# Patient Record
Sex: FEMALE | Race: WHITE | NOT HISPANIC OR LATINO | Employment: OTHER | URBAN - METROPOLITAN AREA
[De-identification: names, ages, dates, MRNs, and addresses within clinical notes are randomized per-mention and may not be internally consistent; named-entity substitution may affect disease eponyms.]

---

## 2018-12-02 ENCOUNTER — HOSPITAL ENCOUNTER (EMERGENCY)
Facility: HOSPITAL | Age: 58
Discharge: HOME | End: 2018-12-02
Attending: EMERGENCY MEDICINE
Payer: COMMERCIAL

## 2018-12-02 VITALS
OXYGEN SATURATION: 95 % | TEMPERATURE: 98 F | SYSTOLIC BLOOD PRESSURE: 183 MMHG | WEIGHT: 215.94 LBS | HEART RATE: 87 BPM | HEIGHT: 62 IN | DIASTOLIC BLOOD PRESSURE: 88 MMHG | RESPIRATION RATE: 18 BRPM | BODY MASS INDEX: 39.74 KG/M2

## 2018-12-02 DIAGNOSIS — G50.0 TRIGEMINAL NEURALGIA: Primary | ICD-10-CM

## 2018-12-02 PROCEDURE — 99284 EMERGENCY DEPT VISIT MOD MDM: CPT | Mod: 25

## 2018-12-02 RX ORDER — CARBAMAZEPINE 200 MG/1
200 TABLET ORAL DAILY
Qty: 30 TABLET | Refills: 0 | Status: SHIPPED | OUTPATIENT
Start: 2018-12-02 | End: 2019-01-01

## 2018-12-02 NOTE — ED PROVIDER NOTES
"Encounter Date: 12/2/2018       History     Chief Complaint   Patient presents with    Otalgia     pt c/o intermittent, "shooting" pain to R ear since Thursday night, denies hearing change, ear fullness, tinnitus     The history is provided by the patient.   Headache    This is a new problem. The current episode started in the past 7 days. The problem occurs every few minutes. The problem has been unchanged. The pain is located in the right unilateral region. The pain radiates to the face. The pain quality is not similar to prior headaches. The quality of the pain is described as transient, knife-like, shooting and localized. The pain is at a severity of 3/10. Pertinent negatives include no abdominal pain, abnormal behavior, anorexia, back pain, blurred vision, coughing, dizziness, drainage, ear pain, eye pain, eye redness, eye watering, facial sweating, fever, hearing loss, insomnia, loss of balance, muscle aches, nausea, neck pain, numbness, phonophobia, photophobia, rhinorrhea, scalp tenderness, seizures, sinus pressure, sore throat, swollen glands, tingling, tinnitus, visual change, vomiting, weakness or weight loss. Nothing aggravates the symptoms. She has tried nothing for the symptoms.     Review of patient's allergies indicates:   Allergen Reactions    Epinephrine Anaphylaxis    Lidocaine Anaphylaxis     Dental visit pt stopped breathing after given lidocaine    Ace inhibitors     Shellfish containing products Itching     No past medical history on file.  No past surgical history on file.  No family history on file.  Social History     Tobacco Use    Smoking status: Not on file   Substance Use Topics    Alcohol use: Not on file    Drug use: Not on file     Review of Systems   Constitutional: Negative for chills, fever and weight loss.   HENT: Negative for ear pain, hearing loss, rhinorrhea, sinus pressure, sore throat and tinnitus.    Eyes: Negative for blurred vision, photophobia, pain and redness. "   Respiratory: Negative for cough and shortness of breath.    Cardiovascular: Negative for chest pain.   Gastrointestinal: Negative for abdominal pain, anorexia, diarrhea, nausea and vomiting.   Endocrine: Negative for polydipsia and polyphagia.   Genitourinary: Negative for dysuria.   Musculoskeletal: Negative for arthralgias, back pain, myalgias and neck pain.   Skin: Negative for rash.   Neurological: Positive for headaches. Negative for dizziness, tingling, seizures, weakness, numbness and loss of balance.   Hematological: Does not bruise/bleed easily.   Psychiatric/Behavioral: The patient is not nervous/anxious and does not have insomnia.    All other systems reviewed and are negative.      Physical Exam     Initial Vitals [12/02/18 1229]   BP Pulse Resp Temp SpO2   (!) 183/88 87 18 98.3 °F (36.8 °C) 95 %      MAP       --         Physical Exam    Nursing note and vitals reviewed.  Constitutional: Vital signs are normal. She appears well-developed and well-nourished. No distress.   HENT:   Head: Normocephalic and atraumatic.   Right Ear: External ear normal.   Left Ear: External ear normal.   Nose: Nose normal.   Mouth/Throat: Oropharynx is clear and moist.   Eyes: Conjunctivae, EOM and lids are normal. Pupils are equal, round, and reactive to light.   Neck: Normal range of motion and full passive range of motion without pain. Neck supple.   Cardiovascular: Normal rate, regular rhythm, S1 normal, S2 normal, normal heart sounds, intact distal pulses and normal pulses.   Pulmonary/Chest: Breath sounds normal. No respiratory distress. She has no wheezes. She has no rales.   Abdominal: Soft. Normal appearance and bowel sounds are normal. She exhibits no distension. There is no tenderness.   Musculoskeletal: Normal range of motion.   Lymphadenopathy:     She has no cervical adenopathy.   Neurological: She is alert and oriented to person, place, and time. She has normal strength. No cranial nerve deficit or sensory  deficit. Coordination and gait normal.   Skin: Skin is warm, dry and intact.   Psychiatric: She has a normal mood and affect. Her speech is normal and behavior is normal. Judgment and thought content normal. Cognition and memory are normal.         ED Course   Procedures  Labs Reviewed - No data to display       Imaging Results          CT Head Without Contrast (Final result)  Result time 12/02/18 14:00:32    Final result by Deuce Prince MD (Timothy) (12/02/18 14:00:32)                 Impression:      Normal study.    All CT scans at this facility use dose modulation, iterative reconstructions, and/or weight base dosing when appropriate to reduce radiation dose to as low as reasonably achievable.      Electronically signed by: Deuce Prince MD  Date:    12/02/2018  Time:    14:00             Narrative:    EXAMINATION:  CT HEAD WITHOUT CONTRAST    CLINICAL HISTORY:  Headache, trigeminal distribution;    COMPARISON:  None    FINDINGS:  No intracranial acute hemorrhage or acute focal brain parenchymal abnormality is identified.  Calvarium is intact.                                                      Clinical Impression:   The encounter diagnosis was Trigeminal neuralgia.      Disposition:   Disposition: Discharged  Condition: Stable                        DAVIDSON Elliott  12/02/18 [id number]

## 2020-02-06 PROBLEM — F32.9 MDD (MAJOR DEPRESSIVE DISORDER): Status: ACTIVE | Noted: 2020-02-06

## 2020-02-06 PROBLEM — K21.9 GERD (GASTROESOPHAGEAL REFLUX DISEASE): Status: ACTIVE | Noted: 2020-02-06

## 2020-02-06 PROBLEM — G25.81 RLS (RESTLESS LEGS SYNDROME): Status: ACTIVE | Noted: 2018-10-18

## 2021-08-19 ENCOUNTER — IMMUNIZATION (OUTPATIENT)
Dept: PRIMARY CARE CLINIC | Facility: CLINIC | Age: 61
End: 2021-08-19
Payer: COMMERCIAL

## 2021-08-19 DIAGNOSIS — Z23 NEED FOR VACCINATION: Primary | ICD-10-CM

## 2021-08-19 PROCEDURE — 0031A COVID-19,VECTOR-NR,RS-AD26,PF,0.5 ML DOSE VACCINE (JANSSEN): ICD-10-PCS | Mod: CV19,S$GLB,, | Performed by: FAMILY MEDICINE

## 2021-08-19 PROCEDURE — 91303 COVID-19,VECTOR-NR,RS-AD26,PF,0.5 ML DOSE VACCINE (JANSSEN): CPT | Mod: S$GLB,,, | Performed by: FAMILY MEDICINE

## 2021-08-19 PROCEDURE — 91303 COVID-19,VECTOR-NR,RS-AD26,PF,0.5 ML DOSE VACCINE (JANSSEN): ICD-10-PCS | Mod: S$GLB,,, | Performed by: FAMILY MEDICINE

## 2021-08-19 PROCEDURE — 0031A COVID-19,VECTOR-NR,RS-AD26,PF,0.5 ML DOSE VACCINE (JANSSEN): CPT | Mod: CV19,S$GLB,, | Performed by: FAMILY MEDICINE

## 2022-03-18 ENCOUNTER — HOSPITAL ENCOUNTER (EMERGENCY)
Facility: HOSPITAL | Age: 62
Discharge: HOME | End: 2022-03-18
Attending: EMERGENCY MEDICINE
Payer: COMMERCIAL

## 2022-03-18 VITALS
RESPIRATION RATE: 16 BRPM | TEMPERATURE: 98 F | HEART RATE: 72 BPM | OXYGEN SATURATION: 99 % | SYSTOLIC BLOOD PRESSURE: 163 MMHG | BODY MASS INDEX: 36.63 KG/M2 | WEIGHT: 200.31 LBS | DIASTOLIC BLOOD PRESSURE: 70 MMHG

## 2022-03-18 DIAGNOSIS — R73.9 HYPERGLYCEMIA WITHOUT KETOSIS: Primary | ICD-10-CM

## 2022-03-18 DIAGNOSIS — R19.7 DIARRHEA, UNSPECIFIED TYPE: ICD-10-CM

## 2022-03-18 LAB
ALBUMIN SERPL BCP-MCNC: 3.1 G/DL (ref 3.5–5.2)
ALP SERPL-CCNC: 86 U/L (ref 55–135)
ALT SERPL W/O P-5'-P-CCNC: 12 U/L (ref 10–44)
ANION GAP SERPL CALC-SCNC: 13 MMOL/L (ref 8–16)
AST SERPL-CCNC: 17 U/L (ref 10–40)
BACTERIA #/AREA URNS HPF: NORMAL /HPF
BASOPHILS # BLD AUTO: 0.03 K/UL (ref 0–0.2)
BASOPHILS NFR BLD: 0.4 % (ref 0–1.9)
BILIRUB SERPL-MCNC: 0.2 MG/DL (ref 0.1–1)
BILIRUB UR QL STRIP: NEGATIVE
BUN SERPL-MCNC: 9 MG/DL (ref 6–20)
CALCIUM SERPL-MCNC: 8.7 MG/DL (ref 8.7–10.5)
CHLORIDE SERPL-SCNC: 104 MMOL/L (ref 95–110)
CLARITY UR: CLEAR
CO2 SERPL-SCNC: 19 MMOL/L (ref 23–29)
COLOR UR: YELLOW
CREAT SERPL-MCNC: 0.8 MG/DL (ref 0.5–1.4)
DIFFERENTIAL METHOD: ABNORMAL
EOSINOPHIL # BLD AUTO: 0.2 K/UL (ref 0–0.5)
EOSINOPHIL NFR BLD: 2.4 % (ref 0–8)
ERYTHROCYTE [DISTWIDTH] IN BLOOD BY AUTOMATED COUNT: 14 % (ref 11.5–14.5)
EST. GFR  (AFRICAN AMERICAN): >60 ML/MIN/1.73 M^2
EST. GFR  (NON AFRICAN AMERICAN): >60 ML/MIN/1.73 M^2
GLUCOSE SERPL-MCNC: 295 MG/DL (ref 70–110)
GLUCOSE UR QL STRIP: ABNORMAL
HCT VFR BLD AUTO: 39.6 % (ref 37–48.5)
HGB BLD-MCNC: 12.7 G/DL (ref 12–16)
HGB UR QL STRIP: NEGATIVE
IMM GRANULOCYTES # BLD AUTO: 0.02 K/UL (ref 0–0.04)
IMM GRANULOCYTES NFR BLD AUTO: 0.3 % (ref 0–0.5)
KETONES UR QL STRIP: NEGATIVE
LACTATE SERPL-SCNC: 1.2 MMOL/L (ref 0.5–2.2)
LACTATE SERPL-SCNC: 3.5 MMOL/L (ref 0.5–2.2)
LEUKOCYTE ESTERASE UR QL STRIP: ABNORMAL
LIPASE SERPL-CCNC: 9 U/L (ref 4–60)
LYMPHOCYTES # BLD AUTO: 2.6 K/UL (ref 1–4.8)
LYMPHOCYTES NFR BLD: 36.6 % (ref 18–48)
MCH RBC QN AUTO: 26.9 PG (ref 27–31)
MCHC RBC AUTO-ENTMCNC: 32.1 G/DL (ref 32–36)
MCV RBC AUTO: 84 FL (ref 82–98)
MICROSCOPIC COMMENT: NORMAL
MONOCYTES # BLD AUTO: 0.9 K/UL (ref 0.3–1)
MONOCYTES NFR BLD: 12.3 % (ref 4–15)
NEUTROPHILS # BLD AUTO: 3.4 K/UL (ref 1.8–7.7)
NEUTROPHILS NFR BLD: 48 % (ref 38–73)
NITRITE UR QL STRIP: NEGATIVE
NRBC BLD-RTO: 0 /100 WBC
PH UR STRIP: 6 [PH] (ref 5–8)
PLATELET # BLD AUTO: 229 K/UL (ref 150–450)
PMV BLD AUTO: 10.8 FL (ref 9.2–12.9)
POTASSIUM SERPL-SCNC: 3.6 MMOL/L (ref 3.5–5.1)
PROT SERPL-MCNC: 6.8 G/DL (ref 6–8.4)
PROT UR QL STRIP: NEGATIVE
RBC # BLD AUTO: 4.72 M/UL (ref 4–5.4)
SODIUM SERPL-SCNC: 136 MMOL/L (ref 136–145)
SP GR UR STRIP: <=1.005 (ref 1–1.03)
URN SPEC COLLECT METH UR: ABNORMAL
UROBILINOGEN UR STRIP-ACNC: NEGATIVE EU/DL
WBC # BLD AUTO: 7.15 K/UL (ref 3.9–12.7)
WBC #/AREA URNS HPF: 3 /HPF (ref 0–5)
YEAST URNS QL MICRO: NORMAL

## 2022-03-18 PROCEDURE — 96361 HYDRATE IV INFUSION ADD-ON: CPT

## 2022-03-18 PROCEDURE — 25000003 PHARM REV CODE 250: Performed by: EMERGENCY MEDICINE

## 2022-03-18 PROCEDURE — 99284 EMERGENCY DEPT VISIT MOD MDM: CPT | Mod: 25

## 2022-03-18 PROCEDURE — 85025 COMPLETE CBC W/AUTO DIFF WBC: CPT | Performed by: EMERGENCY MEDICINE

## 2022-03-18 PROCEDURE — 83605 ASSAY OF LACTIC ACID: CPT | Mod: 91 | Performed by: EMERGENCY MEDICINE

## 2022-03-18 PROCEDURE — 83690 ASSAY OF LIPASE: CPT | Performed by: EMERGENCY MEDICINE

## 2022-03-18 PROCEDURE — 81000 URINALYSIS NONAUTO W/SCOPE: CPT | Performed by: EMERGENCY MEDICINE

## 2022-03-18 PROCEDURE — 80053 COMPREHEN METABOLIC PANEL: CPT | Performed by: EMERGENCY MEDICINE

## 2022-03-18 PROCEDURE — 96360 HYDRATION IV INFUSION INIT: CPT

## 2022-03-18 RX ORDER — LOPERAMIDE HYDROCHLORIDE 2 MG/1
2 CAPSULE ORAL 4 TIMES DAILY PRN
Qty: 12 CAPSULE | Refills: 0 | Status: SHIPPED | OUTPATIENT
Start: 2022-03-18 | End: 2022-03-28

## 2022-03-18 RX ORDER — DICYCLOMINE HYDROCHLORIDE 20 MG/1
20 TABLET ORAL 3 TIMES DAILY PRN
Qty: 12 TABLET | Refills: 0 | Status: SHIPPED | OUTPATIENT
Start: 2022-03-18 | End: 2022-09-17

## 2022-03-18 RX ORDER — DIPHENOXYLATE HYDROCHLORIDE AND ATROPINE SULFATE 2.5; .025 MG/1; MG/1
1 TABLET ORAL
Status: COMPLETED | OUTPATIENT
Start: 2022-03-18 | End: 2022-03-18

## 2022-03-18 RX ADMIN — SODIUM CHLORIDE 1000 ML: 0.9 INJECTION, SOLUTION INTRAVENOUS at 03:03

## 2022-03-18 RX ADMIN — SODIUM CHLORIDE 1000 ML: 0.9 INJECTION, SOLUTION INTRAVENOUS at 12:03

## 2022-03-18 RX ADMIN — DIPHENOXYLATE HYDROCHLORIDE AND ATROPINE SULFATE 1 TABLET: 2.5; .025 TABLET ORAL at 05:03

## 2022-03-18 NOTE — ED NOTES
Pt ambulated to restroom with steady gait, urine cup given for urine sample. Will continue to monitor.

## 2022-03-18 NOTE — ED PROVIDER NOTES
SCRIBE #1 NOTE: I, Jorge Bangura, am scribing for, and in the presence of, Alec Moe MD. I have scribed the entire note.      History      Chief Complaint   Patient presents with    Diarrhea     Watery stools x 5 days       Review of patient's allergies indicates:   Allergen Reactions    Epinephrine Anaphylaxis    Lidocaine Anaphylaxis     Dental visit pt stopped breathing after given lidocaine    Ace inhibitors     Shellfish containing products Itching        HPI   HPI    3/18/2022, 11:58 AM   History obtained from the patient      History of Present Illness: Ca Diaz is a 61 y.o. female patient who presents to the Emergency Department for diarrhea, onset 5 days PTA. Symptoms are episodic and moderate in severity. No mitigating or exacerbating factors reported.  No recent abx.     Associated sxs include generalized abdominal cramping. Patient denies any fever, chills, n/v, SOB, CP, weakness, numbness, dizziness, headache, and all other sxs at this time. No prior Tx reported. No further complaints or concerns at this time.     Arrival mode: Personal vehicle    PCP: Desiree Frye MD       Past Medical History:  Past Medical History:   Diagnosis Date    Anxiety and depression 3/13/2014    Essential (primary) hypertension 2013    Fibromyalgia 2012    Hyperlipidemia     Obstructive sleep apnea syndrome 2014    RLS (restless legs syndrome) 10/18/2018    Overview:  Rheum Dr Tam    Seizures     Type 2 diabetes mellitus 2012    ICD-10 Transition Last Assessment & Plan:  Continue with sliding scale insulin.  Control improved on low-dose Lantus presently       Past Surgical History:  Past Surgical History:   Procedure Laterality Date    ABDOMINAL SURGERY       SECTION           Family History:  History reviewed. No pertinent family history.    Social History:  Social History     Tobacco Use    Smoking status: Former Smoker     Types: Cigarettes     Quit date:  1990     Years since quittin.2    Smokeless tobacco: Never Used   Substance and Sexual Activity    Alcohol use: Not Currently    Drug use: Never    Sexual activity: Not on file       ROS   Review of Systems   Constitutional: Negative for chills and fever.   HENT: Negative for sore throat.    Respiratory: Negative for shortness of breath.    Cardiovascular: Negative for chest pain.   Gastrointestinal: Positive for abdominal pain (generalized cramping) and diarrhea. Negative for nausea and vomiting.   Genitourinary: Negative for dysuria.   Musculoskeletal: Negative for back pain.   Skin: Negative for rash.   Neurological: Negative for dizziness, weakness, light-headedness, numbness and headaches.   Hematological: Does not bruise/bleed easily.   All other systems reviewed and are negative.    Physical Exam      Initial Vitals [22 1143]   BP Pulse Resp Temp SpO2   109/74 80 20 99.1 °F (37.3 °C) 97 %      MAP       --          Physical Exam  Nursing Notes and Vital Signs Reviewed.  Constitutional: Patient is in no acute distress. Well-developed and well-nourished.  Head: Atraumatic. Normocephalic.  Eyes: PERRL. EOM intact. Conjunctivae are not pale. No scleral icterus.  ENT: Mucous membranes are slightly dry. Oropharynx is clear and symmetric.    Neck: Supple. Full ROM.   Cardiovascular: Regular rate. Regular rhythm. No murmurs, rubs, or gallops. Distal pulses are 2+ and symmetric.  Pulmonary/Chest: No respiratory distress. Clear to auscultation bilaterally. No wheezing or rales.  Abdominal: Soft and non-distended.  There is no tenderness.  No rebound, guarding, or rigidity.   Musculoskeletal: Moves all extremities. No obvious deformities. No edema.  Skin: Warm and dry.  Neurological:  Alert, awake, and appropriate.  Normal speech.  No acute focal neurological deficits are appreciated.  Psychiatric: Normal affect. Good eye contact. Appropriate in content.    ED Course    Procedures  ED Vital  Signs:  Vitals:    03/18/22 1143 03/18/22 1155 03/18/22 1203 03/18/22 1233   BP: 109/74 139/68 (!) 117/53 (!) 142/67   Pulse: 80 82 79 80   Resp: 20      Temp: 99.1 °F (37.3 °C)      TempSrc: Oral      SpO2: 97% 98% 97% 98%   Weight: 90.9 kg (200 lb 4.6 oz)       03/18/22 1304 03/18/22 1333 03/18/22 1403 03/18/22 1503   BP: (!) 182/85 (!) 157/72 133/66 (!) 152/68   Pulse: 82 80 79 74   Resp:       Temp:   99 °F (37.2 °C)    TempSrc:   Oral    SpO2: 99% 97% 98% 97%   Weight:        03/18/22 1532 03/18/22 1730   BP: (!) 153/67 (!) 163/70   Pulse: 75 72   Resp:  16   Temp:  98.3 °F (36.8 °C)   TempSrc:     SpO2: 99% 99%   Weight:         Abnormal Lab Results:  Labs Reviewed   CBC W/ AUTO DIFFERENTIAL - Abnormal; Notable for the following components:       Result Value    MCH 26.9 (*)     All other components within normal limits   COMPREHENSIVE METABOLIC PANEL - Abnormal; Notable for the following components:    CO2 19 (*)     Glucose 295 (*)     Albumin 3.1 (*)     All other components within normal limits   URINALYSIS, REFLEX TO URINE CULTURE - Abnormal; Notable for the following components:    Specific Gravity, UA <=1.005 (*)     Glucose, UA 3+ (*)     Leukocytes, UA Trace (*)     All other components within normal limits    Narrative:     Specimen Source->Urine   LACTIC ACID, PLASMA - Abnormal; Notable for the following components:    Lactate (Lactic Acid) 3.5 (*)     All other components within normal limits    Narrative:      LA  critical result(s) called and verbal readback obtained from   FREDIS LEÓN RN by ZHANNA 03/18/2022 12:49   LIPASE   URINALYSIS MICROSCOPIC    Narrative:     Specimen Source->Urine   LACTIC ACID, PLASMA        All Lab Results:  Results for orders placed or performed during the hospital encounter of 03/18/22   CBC W/ AUTO DIFFERENTIAL   Result Value Ref Range    WBC 7.15 3.90 - 12.70 K/uL    RBC 4.72 4.00 - 5.40 M/uL    Hemoglobin 12.7 12.0 - 16.0 g/dL    Hematocrit 39.6 37.0 - 48.5 %     MCV 84 82 - 98 fL    MCH 26.9 (L) 27.0 - 31.0 pg    MCHC 32.1 32.0 - 36.0 g/dL    RDW 14.0 11.5 - 14.5 %    Platelets 229 150 - 450 K/uL    MPV 10.8 9.2 - 12.9 fL    Immature Granulocytes 0.3 0.0 - 0.5 %    Gran # (ANC) 3.4 1.8 - 7.7 K/uL    Immature Grans (Abs) 0.02 0.00 - 0.04 K/uL    Lymph # 2.6 1.0 - 4.8 K/uL    Mono # 0.9 0.3 - 1.0 K/uL    Eos # 0.2 0.0 - 0.5 K/uL    Baso # 0.03 0.00 - 0.20 K/uL    nRBC 0 0 /100 WBC    Gran % 48.0 38.0 - 73.0 %    Lymph % 36.6 18.0 - 48.0 %    Mono % 12.3 4.0 - 15.0 %    Eosinophil % 2.4 0.0 - 8.0 %    Basophil % 0.4 0.0 - 1.9 %    Differential Method Automated    Comp. Metabolic Panel   Result Value Ref Range    Sodium 136 136 - 145 mmol/L    Potassium 3.6 3.5 - 5.1 mmol/L    Chloride 104 95 - 110 mmol/L    CO2 19 (L) 23 - 29 mmol/L    Glucose 295 (H) 70 - 110 mg/dL    BUN 9 6 - 20 mg/dL    Creatinine 0.8 0.5 - 1.4 mg/dL    Calcium 8.7 8.7 - 10.5 mg/dL    Total Protein 6.8 6.0 - 8.4 g/dL    Albumin 3.1 (L) 3.5 - 5.2 g/dL    Total Bilirubin 0.2 0.1 - 1.0 mg/dL    Alkaline Phosphatase 86 55 - 135 U/L    AST 17 10 - 40 U/L    ALT 12 10 - 44 U/L    Anion Gap 13 8 - 16 mmol/L    eGFR if African American >60 >60 mL/min/1.73 m^2    eGFR if non African American >60 >60 mL/min/1.73 m^2   Lipase   Result Value Ref Range    Lipase 9 4 - 60 U/L   Urinalysis, Reflex to Urine Culture Urine, Clean Catch    Specimen: Urine   Result Value Ref Range    Specimen UA Urine, Clean Catch     Color, UA Yellow Yellow, Straw, Estrellita    Appearance, UA Clear Clear    pH, UA 6.0 5.0 - 8.0    Specific Gravity, UA <=1.005 (A) 1.005 - 1.030    Protein, UA Negative Negative    Glucose, UA 3+ (A) Negative    Ketones, UA Negative Negative    Bilirubin (UA) Negative Negative    Occult Blood UA Negative Negative    Nitrite, UA Negative Negative    Urobilinogen, UA Negative <2.0 EU/dL    Leukocytes, UA Trace (A) Negative   Lactic acid, plasma   Result Value Ref Range    Lactate (Lactic Acid) 3.5 (HH) 0.5 - 2.2  mmol/L   Urinalysis Microscopic   Result Value Ref Range    WBC, UA 3 0 - 5 /hpf    Bacteria Rare None-Occ /hpf    Yeast, UA None None    Microscopic Comment SEE COMMENT    Lactic acid, plasma   Result Value Ref Range    Lactate (Lactic Acid) 1.2 0.5 - 2.2 mmol/L     Imaging Results:  Imaging Results          X-Ray Abdomen Flat And Erect (Final result)  Result time 03/18/22 12:54:39    Final result by Amrit Cotter MD (03/18/22 12:54:39)                 Impression:      1.  Fluid in the nondilated colon, a finding that can be seen in patients who have gastroenteritis or other diarrheal disease.  Bowel-gas pattern is otherwise normal.    2.  Negative for acute process otherwise.  Incidental findings as noted above.      Electronically signed by: Amrit Cotter MD  Date:    03/18/2022  Time:    12:54             Narrative:    EXAMINATION:  XR ABDOMEN FLAT AND ERECT    CLINICAL HISTORY:  Abdominal Pain;    TECHNIQUE:  Flat and erect AP views of the abdomen were performed.    COMPARISON:  None    FINDINGS:  There is air in fluid in the nondilated colon.  Bowel-gas pattern is otherwise normal.  Negative for free air.    Lung bases are clear.  Mild degenerative changes of the spine and pelvis.                                        The Emergency Provider reviewed the vital signs and test results, which are outlined above.    ED Discussion     4:57 PM: Reassessed pt at this time. Discussed with pt all pertinent ED information and results. Offered pt admission at this time but pt declined, stating that she now feels much better and would prefer outpatient management at this time. Discussed pt dx and plan of tx. Gave pt all f/u and return to the ED instructions. All questions and concerns were addressed at this time. Pt expresses understanding of information and instructions, and is comfortable with plan to discharge. Pt is stable for discharge and will f/u PCP in the next 2-3 days and return to the ED immediately for any  worsening sx/s..    I discussed with patient and/or family/caretaker that evaluation in the ED does not suggest any emergent or life threatening medical conditions requiring immediate intervention beyond what was provided in the ED, and I believe patient is safe for discharge.  Regardless, an unremarkable evaluation in the ED does not preclude the development or presence of a serious of life threatening condition. As such, patient was instructed to return immediately for any worsening or change in current symptoms.         ED Medication(s):  Medications   sodium chloride 0.9% bolus 1,000 mL (0 mLs Intravenous Stopped 3/18/22 1301)   sodium chloride 0.9% bolus 1,000 mL (0 mLs Intravenous Stopped 3/18/22 1705)   diphenoxylate-atropine 2.5-0.025 mg per tablet 1 tablet (1 tablet Oral Given 3/18/22 3039)     Discharge Medication List as of 3/18/2022  4:59 PM      START taking these medications    Details   dicyclomine (BENTYL) 20 mg tablet Take 1 tablet (20 mg total) by mouth 3 (three) times daily as needed (Abdominal cramping). As needed for abdominal cramping, Starting Fri 3/18/2022, Normal      loperamide (IMODIUM) 2 mg capsule Take 1 capsule (2 mg total) by mouth 4 (four) times daily as needed for Diarrhea., Starting Fri 3/18/2022, Until Mon 3/28/2022 at 2359, Normal            Follow-up Information     Desiree Frye MD. Schedule an appointment as soon as possible for a visit in 3 days.    Specialty: Family Medicine  Contact information:  [address]  [facility]  [facility] [address]  [phone]                           Medical Decision Making    Medical Decision Making:   Clinical Tests:   Lab Tests: Ordered and Reviewed  Radiological Study: Ordered and Reviewed           Scribe Attestation:   Scribe #1: I performed the above scribed service and the documentation accurately describes the services I performed. I attest to the accuracy of the note.    Attending:   Physician Attestation Statement for Scribe  #1: I, Alec Moe MD, personally performed the services described in this documentation, as scribed by Jorge Bangura, in my presence, and it is both accurate and complete.          Clinical Impression       ICD-10-CM ICD-9-CM   1. Hyperglycemia without ketosis  R73.9 790.29   2. Diarrhea, unspecified type  R19.7 787.91       Disposition:   Disposition: Discharged  Condition: Stable         Alec Moe MD  03/18/22 [id number]

## 2022-09-16 ENCOUNTER — HOSPITAL ENCOUNTER (INPATIENT)
Facility: HOSPITAL | Age: 62
LOS: 13 days | Discharge: NURSING, REHAB OR LONG-TERM CARE | DRG: 233 | End: 2022-09-30
Attending: EMERGENCY MEDICINE | Admitting: FAMILY MEDICINE
Payer: COMMERCIAL

## 2022-09-16 DIAGNOSIS — Z95.1 S/P CABG X 3: Primary | ICD-10-CM

## 2022-09-16 DIAGNOSIS — R07.9 CHEST PAIN, UNSPECIFIED TYPE: ICD-10-CM

## 2022-09-16 DIAGNOSIS — Z98.890 POST-OPERATIVE STATE: ICD-10-CM

## 2022-09-16 DIAGNOSIS — I21.4 NSTEMI (NON-ST ELEVATED MYOCARDIAL INFARCTION): ICD-10-CM

## 2022-09-16 DIAGNOSIS — F41.9 ANXIETY AND DEPRESSION: ICD-10-CM

## 2022-09-16 DIAGNOSIS — R42 DIZZINESS: ICD-10-CM

## 2022-09-16 DIAGNOSIS — I10 ESSENTIAL (PRIMARY) HYPERTENSION: Chronic | ICD-10-CM

## 2022-09-16 DIAGNOSIS — R07.9 CHEST PAIN: ICD-10-CM

## 2022-09-16 DIAGNOSIS — R73.9 HYPERGLYCEMIA: ICD-10-CM

## 2022-09-16 DIAGNOSIS — R56.9 SEIZURE: ICD-10-CM

## 2022-09-16 DIAGNOSIS — Z01.810 PRE-OPERATIVE CARDIOVASCULAR EXAMINATION: ICD-10-CM

## 2022-09-16 DIAGNOSIS — F32.A ANXIETY AND DEPRESSION: ICD-10-CM

## 2022-09-16 DIAGNOSIS — E78.5 HYPERLIPIDEMIA, UNSPECIFIED HYPERLIPIDEMIA TYPE: Chronic | ICD-10-CM

## 2022-09-16 PROCEDURE — 93005 ELECTROCARDIOGRAM TRACING: CPT

## 2022-09-16 PROCEDURE — 80053 COMPREHEN METABOLIC PANEL: CPT | Performed by: EMERGENCY MEDICINE

## 2022-09-16 PROCEDURE — 84484 ASSAY OF TROPONIN QUANT: CPT | Performed by: EMERGENCY MEDICINE

## 2022-09-16 PROCEDURE — 85025 COMPLETE CBC W/AUTO DIFF WBC: CPT | Performed by: EMERGENCY MEDICINE

## 2022-09-16 PROCEDURE — 96360 HYDRATION IV INFUSION INIT: CPT

## 2022-09-16 PROCEDURE — 99291 CRITICAL CARE FIRST HOUR: CPT

## 2022-09-16 PROCEDURE — 93010 EKG 12-LEAD: ICD-10-PCS | Mod: ,,, | Performed by: INTERNAL MEDICINE

## 2022-09-16 PROCEDURE — 93010 ELECTROCARDIOGRAM REPORT: CPT | Mod: ,,, | Performed by: INTERNAL MEDICINE

## 2022-09-16 RX ORDER — MECLIZINE HYDROCHLORIDE 25 MG/1
50 TABLET ORAL
Status: COMPLETED | OUTPATIENT
Start: 2022-09-16 | End: 2022-09-17

## 2022-09-16 NOTE — Clinical Note
The left ventricle was injected. The injected rate was 12 mL/sec. The total injected volume was 30 mL.

## 2022-09-16 NOTE — Clinical Note
The catheter was inserted over the wire into the ostium   left main. An angiography was performed of the left coronary arteries. Multiple views were taken.

## 2022-09-16 NOTE — Clinical Note
The catheter was inserted over the wire into the left subclavian artery. An angiography was performed of the LIMA.

## 2022-09-16 NOTE — Clinical Note
The catheter was inserted over the wire into the ostium   right coronary artery. An angiography was performed of the right coronary arteries. Multiple views were taken.

## 2022-09-17 PROBLEM — I21.4 NSTEMI (NON-ST ELEVATED MYOCARDIAL INFARCTION): Status: ACTIVE | Noted: 2022-09-17

## 2022-09-17 PROBLEM — I25.10 ATHEROSCLEROSIS OF NATIVE CORONARY ARTERY OF NATIVE HEART WITHOUT ANGINA PECTORIS: Status: ACTIVE | Noted: 2018-10-18

## 2022-09-17 PROBLEM — I25.10 ATHEROSCLEROSIS OF NATIVE CORONARY ARTERY OF NATIVE HEART WITHOUT ANGINA PECTORIS: Chronic | Status: ACTIVE | Noted: 2018-10-18

## 2022-09-17 PROBLEM — E66.9 OBESITY: Chronic | Status: ACTIVE | Noted: 2022-09-17

## 2022-09-17 LAB
ALBUMIN SERPL BCP-MCNC: 3.1 G/DL (ref 3.5–5.2)
ALP SERPL-CCNC: 71 U/L (ref 55–135)
ALT SERPL W/O P-5'-P-CCNC: 37 U/L (ref 10–44)
ANION GAP SERPL CALC-SCNC: 13 MMOL/L (ref 8–16)
ANION GAP SERPL CALC-SCNC: 15 MMOL/L (ref 8–16)
AORTIC ROOT ANNULUS: 2.57 CM
APTT BLDCRRT: 27.8 SEC (ref 21–32)
APTT BLDCRRT: 33.6 SEC (ref 21–32)
APTT BLDCRRT: 39.1 SEC (ref 21–32)
APTT BLDCRRT: 45.6 SEC (ref 21–32)
APTT BLDCRRT: 68.6 SEC (ref 21–32)
ASCENDING AORTA: 2.9 CM
AST SERPL-CCNC: 53 U/L (ref 10–40)
AV INDEX (PROSTH): 0.65
AV MEAN GRADIENT: 8 MMHG
AV PEAK GRADIENT: 12 MMHG
AV VALVE AREA: 1.96 CM2
AV VELOCITY RATIO: 0.63
BACTERIA #/AREA URNS HPF: ABNORMAL /HPF
BASOPHILS # BLD AUTO: 0.02 K/UL (ref 0–0.2)
BASOPHILS # BLD AUTO: 0.03 K/UL (ref 0–0.2)
BASOPHILS NFR BLD: 0.5 % (ref 0–1.9)
BASOPHILS NFR BLD: 0.7 % (ref 0–1.9)
BILIRUB SERPL-MCNC: 0.3 MG/DL (ref 0.1–1)
BILIRUB UR QL STRIP: NEGATIVE
BSA FOR ECHO PROCEDURE: 2 M2
BUN SERPL-MCNC: 11 MG/DL (ref 6–20)
BUN SERPL-MCNC: 9 MG/DL (ref 6–20)
CALCIUM SERPL-MCNC: 8.4 MG/DL (ref 8.7–10.5)
CALCIUM SERPL-MCNC: 9.1 MG/DL (ref 8.7–10.5)
CATH EF QUANTITATIVE: 60 %
CHLORIDE SERPL-SCNC: 102 MMOL/L (ref 95–110)
CHLORIDE SERPL-SCNC: 98 MMOL/L (ref 95–110)
CHOLEST SERPL-MCNC: 96 MG/DL (ref 120–199)
CHOLEST/HDLC SERPL: 4 {RATIO} (ref 2–5)
CLARITY UR: CLEAR
CO2 SERPL-SCNC: 24 MMOL/L (ref 23–29)
CO2 SERPL-SCNC: 25 MMOL/L (ref 23–29)
COLOR UR: YELLOW
CREAT SERPL-MCNC: 0.6 MG/DL (ref 0.5–1.4)
CREAT SERPL-MCNC: 0.7 MG/DL (ref 0.5–1.4)
CTP QC/QA: YES
CV ECHO LV RWT: 0.68 CM
DIFFERENTIAL METHOD: ABNORMAL
DIFFERENTIAL METHOD: NORMAL
DOP CALC AO PEAK VEL: 1.71 M/S
DOP CALC AO VTI: 42.1 CM
DOP CALC LVOT AREA: 3 CM2
DOP CALC LVOT DIAMETER: 1.96 CM
DOP CALC LVOT PEAK VEL: 1.07 M/S
DOP CALC LVOT STROKE VOLUME: 82.33 CM3
DOP CALC RVOT PEAK VEL: 0.7 M/S
DOP CALC RVOT VTI: 15.6 CM
DOP CALCLVOT PEAK VEL VTI: 27.3 CM
E WAVE DECELERATION TIME: 236.57 MSEC
E/A RATIO: 0.98
E/E' RATIO: 16 M/S
ECHO LV POSTERIOR WALL: 1.18 CM (ref 0.6–1.1)
EJECTION FRACTION: 60 %
EOSINOPHIL # BLD AUTO: 0.1 K/UL (ref 0–0.5)
EOSINOPHIL # BLD AUTO: 0.1 K/UL (ref 0–0.5)
EOSINOPHIL NFR BLD: 2.2 % (ref 0–8)
EOSINOPHIL NFR BLD: 2.8 % (ref 0–8)
ERYTHROCYTE [DISTWIDTH] IN BLOOD BY AUTOMATED COUNT: 14.1 % (ref 11.5–14.5)
ERYTHROCYTE [DISTWIDTH] IN BLOOD BY AUTOMATED COUNT: 14.2 % (ref 11.5–14.5)
EST. GFR  (NO RACE VARIABLE): >60 ML/MIN/1.73 M^2
EST. GFR  (NO RACE VARIABLE): >60 ML/MIN/1.73 M^2
ESTIMATED AVG GLUCOSE: 266 MG/DL (ref 68–131)
FRACTIONAL SHORTENING: 30 % (ref 28–44)
GLUCOSE SERPL-MCNC: 284 MG/DL (ref 70–110)
GLUCOSE SERPL-MCNC: 324 MG/DL (ref 70–110)
GLUCOSE UR QL STRIP: ABNORMAL
HBA1C MFR BLD: 10.9 % (ref 4–5.6)
HCT VFR BLD AUTO: 38 % (ref 37–48.5)
HCT VFR BLD AUTO: 40.1 % (ref 37–48.5)
HCV AB SERPL QL IA: NEGATIVE
HDLC SERPL-MCNC: 24 MG/DL (ref 40–75)
HDLC SERPL: 25 % (ref 20–50)
HEP C VIRUS HOLD SPECIMEN: NORMAL
HGB BLD-MCNC: 12.7 G/DL (ref 12–16)
HGB BLD-MCNC: 13.4 G/DL (ref 12–16)
HGB UR QL STRIP: NEGATIVE
HIV 1+2 AB+HIV1 P24 AG SERPL QL IA: NEGATIVE
HYALINE CASTS #/AREA URNS LPF: 38 /LPF
IMM GRANULOCYTES # BLD AUTO: 0.01 K/UL (ref 0–0.04)
IMM GRANULOCYTES # BLD AUTO: 0.02 K/UL (ref 0–0.04)
IMM GRANULOCYTES NFR BLD AUTO: 0.2 % (ref 0–0.5)
IMM GRANULOCYTES NFR BLD AUTO: 0.5 % (ref 0–0.5)
INR PPP: 1 (ref 0.8–1.2)
INR PPP: 1 (ref 0.8–1.2)
INTERVENTRICULAR SEPTUM: 1.28 CM (ref 0.6–1.1)
IVC DIAMETER: 1.78 CM
IVRT: 79.92 MSEC
KETONES UR QL STRIP: ABNORMAL
LA MAJOR: 5.08 CM
LA MINOR: 5.02 CM
LA WIDTH: 3.5 CM
LDLC SERPL CALC-MCNC: 43 MG/DL (ref 63–159)
LEFT ATRIUM SIZE: 3.58 CM
LEFT ATRIUM VOLUME INDEX: 28 ML/M2
LEFT ATRIUM VOLUME: 53.78 CM3
LEFT INTERNAL DIMENSION IN SYSTOLE: 2.43 CM (ref 2.1–4)
LEFT VENTRICLE DIASTOLIC VOLUME INDEX: 26.3 ML/M2
LEFT VENTRICLE DIASTOLIC VOLUME: 50.49 ML
LEFT VENTRICLE MASS INDEX: 73 G/M2
LEFT VENTRICLE SYSTOLIC VOLUME INDEX: 10.8 ML/M2
LEFT VENTRICLE SYSTOLIC VOLUME: 20.74 ML
LEFT VENTRICULAR INTERNAL DIMENSION IN DIASTOLE: 3.49 CM (ref 3.5–6)
LEFT VENTRICULAR MASS: 140.49 G
LEUKOCYTE ESTERASE UR QL STRIP: ABNORMAL
LV LATERAL E/E' RATIO: 16 M/S
LV SEPTAL E/E' RATIO: 16 M/S
LVOT MG: 3.28 MMHG
LVOT MV: 0.9 CM/S
LYMPHOCYTES # BLD AUTO: 1.6 K/UL (ref 1–4.8)
LYMPHOCYTES # BLD AUTO: 2 K/UL (ref 1–4.8)
LYMPHOCYTES NFR BLD: 39.3 % (ref 18–48)
LYMPHOCYTES NFR BLD: 46.9 % (ref 18–48)
MCH RBC QN AUTO: 27.2 PG (ref 27–31)
MCH RBC QN AUTO: 27.3 PG (ref 27–31)
MCHC RBC AUTO-ENTMCNC: 33.4 G/DL (ref 32–36)
MCHC RBC AUTO-ENTMCNC: 33.4 G/DL (ref 32–36)
MCV RBC AUTO: 81 FL (ref 82–98)
MCV RBC AUTO: 82 FL (ref 82–98)
MICROSCOPIC COMMENT: ABNORMAL
MONOCYTES # BLD AUTO: 0.6 K/UL (ref 0.3–1)
MONOCYTES # BLD AUTO: 0.6 K/UL (ref 0.3–1)
MONOCYTES NFR BLD: 13.2 % (ref 4–15)
MONOCYTES NFR BLD: 14.5 % (ref 4–15)
MV PEAK A VEL: 0.98 M/S
MV PEAK E VEL: 0.96 M/S
NEUTROPHILS # BLD AUTO: 1.6 K/UL (ref 1.8–7.7)
NEUTROPHILS # BLD AUTO: 1.8 K/UL (ref 1.8–7.7)
NEUTROPHILS NFR BLD: 36.1 % (ref 38–73)
NEUTROPHILS NFR BLD: 43.1 % (ref 38–73)
NITRITE UR QL STRIP: NEGATIVE
NONHDLC SERPL-MCNC: 72 MG/DL
NRBC BLD-RTO: 0 /100 WBC
NRBC BLD-RTO: 0 /100 WBC
PH UR STRIP: 7 [PH] (ref 5–8)
PLATELET # BLD AUTO: 207 K/UL (ref 150–450)
PLATELET # BLD AUTO: 210 K/UL (ref 150–450)
PMV BLD AUTO: 10.5 FL (ref 9.2–12.9)
PMV BLD AUTO: 10.8 FL (ref 9.2–12.9)
POCT GLUCOSE: 247 MG/DL (ref 70–110)
POCT GLUCOSE: 301 MG/DL (ref 70–110)
POCT GLUCOSE: 335 MG/DL (ref 70–110)
POTASSIUM SERPL-SCNC: 3.4 MMOL/L (ref 3.5–5.1)
POTASSIUM SERPL-SCNC: 3.6 MMOL/L (ref 3.5–5.1)
PROT SERPL-MCNC: 7 G/DL (ref 6–8.4)
PROT UR QL STRIP: NEGATIVE
PROTHROMBIN TIME: 10.4 SEC (ref 9–12.5)
PROTHROMBIN TIME: 10.5 SEC (ref 9–12.5)
PV MEAN GRADIENT: 1.22 MMHG
RA MAJOR: 3.84 CM
RA PRESSURE: 8 MMHG
RA WIDTH: 2.8 CM
RBC # BLD AUTO: 4.67 M/UL (ref 4–5.4)
RBC # BLD AUTO: 4.9 M/UL (ref 4–5.4)
SARS-COV-2 RDRP RESP QL NAA+PROBE: NEGATIVE
SODIUM SERPL-SCNC: 138 MMOL/L (ref 136–145)
SODIUM SERPL-SCNC: 139 MMOL/L (ref 136–145)
SP GR UR STRIP: 1.02 (ref 1–1.03)
SQUAMOUS #/AREA URNS HPF: 4 /HPF
STJ: 2.92 CM
TDI LATERAL: 0.06 M/S
TDI SEPTAL: 0.06 M/S
TDI: 0.06 M/S
TRICUSPID ANNULAR PLANE SYSTOLIC EXCURSION: 1.9 CM
TRIGL SERPL-MCNC: 145 MG/DL (ref 30–150)
TROPONIN I SERPL DL<=0.01 NG/ML-MCNC: 0.21 NG/ML (ref 0–0.03)
TROPONIN I SERPL DL<=0.01 NG/ML-MCNC: 0.27 NG/ML (ref 0–0.03)
TROPONIN I SERPL DL<=0.01 NG/ML-MCNC: 0.5 NG/ML (ref 0–0.03)
URN SPEC COLLECT METH UR: ABNORMAL
UROBILINOGEN UR STRIP-ACNC: NEGATIVE EU/DL
WBC # BLD AUTO: 4.07 K/UL (ref 3.9–12.7)
WBC # BLD AUTO: 4.33 K/UL (ref 3.9–12.7)
WBC #/AREA URNS HPF: 13 /HPF (ref 0–5)
WBC CLUMPS URNS QL MICRO: ABNORMAL
YEAST URNS QL MICRO: ABNORMAL

## 2022-09-17 PROCEDURE — 86803 HEPATITIS C AB TEST: CPT | Performed by: EMERGENCY MEDICINE

## 2022-09-17 PROCEDURE — 25000003 PHARM REV CODE 250: Performed by: NURSE PRACTITIONER

## 2022-09-17 PROCEDURE — 63600175 PHARM REV CODE 636 W HCPCS: Performed by: INTERNAL MEDICINE

## 2022-09-17 PROCEDURE — 99152 MOD SED SAME PHYS/QHP 5/>YRS: CPT | Performed by: INTERNAL MEDICINE

## 2022-09-17 PROCEDURE — 25000003 PHARM REV CODE 250: Performed by: FAMILY MEDICINE

## 2022-09-17 PROCEDURE — C1894 INTRO/SHEATH, NON-LASER: HCPCS | Performed by: INTERNAL MEDICINE

## 2022-09-17 PROCEDURE — 99152 PR MOD CONSCIOUS SEDATION, SAME PHYS, 5+ YRS, FIRST 15 MIN: ICD-10-PCS | Mod: ,,, | Performed by: INTERNAL MEDICINE

## 2022-09-17 PROCEDURE — 25000003 PHARM REV CODE 250: Performed by: INTERNAL MEDICINE

## 2022-09-17 PROCEDURE — 99223 1ST HOSP IP/OBS HIGH 75: CPT | Mod: 25,,, | Performed by: INTERNAL MEDICINE

## 2022-09-17 PROCEDURE — 36415 COLL VENOUS BLD VENIPUNCTURE: CPT | Performed by: STUDENT IN AN ORGANIZED HEALTH CARE EDUCATION/TRAINING PROGRAM

## 2022-09-17 PROCEDURE — 25500020 PHARM REV CODE 255: Performed by: INTERNAL MEDICINE

## 2022-09-17 PROCEDURE — 25000003 PHARM REV CODE 250: Performed by: EMERGENCY MEDICINE

## 2022-09-17 PROCEDURE — 85730 THROMBOPLASTIN TIME PARTIAL: CPT | Mod: 91 | Performed by: FAMILY MEDICINE

## 2022-09-17 PROCEDURE — 63600175 PHARM REV CODE 636 W HCPCS: Performed by: NURSE PRACTITIONER

## 2022-09-17 PROCEDURE — 85610 PROTHROMBIN TIME: CPT | Performed by: EMERGENCY MEDICINE

## 2022-09-17 PROCEDURE — 80048 BASIC METABOLIC PNL TOTAL CA: CPT | Performed by: NURSE PRACTITIONER

## 2022-09-17 PROCEDURE — 96372 THER/PROPH/DIAG INJ SC/IM: CPT | Performed by: NURSE PRACTITIONER

## 2022-09-17 PROCEDURE — 81000 URINALYSIS NONAUTO W/SCOPE: CPT | Performed by: EMERGENCY MEDICINE

## 2022-09-17 PROCEDURE — 99152 MOD SED SAME PHYS/QHP 5/>YRS: CPT | Mod: ,,, | Performed by: INTERNAL MEDICINE

## 2022-09-17 PROCEDURE — 99153 MOD SED SAME PHYS/QHP EA: CPT | Performed by: INTERNAL MEDICINE

## 2022-09-17 PROCEDURE — 87086 URINE CULTURE/COLONY COUNT: CPT | Performed by: EMERGENCY MEDICINE

## 2022-09-17 PROCEDURE — 87389 HIV-1 AG W/HIV-1&-2 AB AG IA: CPT | Performed by: EMERGENCY MEDICINE

## 2022-09-17 PROCEDURE — U0002 COVID-19 LAB TEST NON-CDC: HCPCS | Performed by: EMERGENCY MEDICINE

## 2022-09-17 PROCEDURE — 63600175 PHARM REV CODE 636 W HCPCS: Performed by: EMERGENCY MEDICINE

## 2022-09-17 PROCEDURE — 36415 COLL VENOUS BLD VENIPUNCTURE: CPT | Performed by: FAMILY MEDICINE

## 2022-09-17 PROCEDURE — 85730 THROMBOPLASTIN TIME PARTIAL: CPT | Performed by: NURSE PRACTITIONER

## 2022-09-17 PROCEDURE — 84484 ASSAY OF TROPONIN QUANT: CPT | Mod: 91 | Performed by: NURSE PRACTITIONER

## 2022-09-17 PROCEDURE — C1760 CLOSURE DEV, VASC: HCPCS | Performed by: INTERNAL MEDICINE

## 2022-09-17 PROCEDURE — 21400001 HC TELEMETRY ROOM

## 2022-09-17 PROCEDURE — 80061 LIPID PANEL: CPT | Performed by: NURSE PRACTITIONER

## 2022-09-17 PROCEDURE — 85730 THROMBOPLASTIN TIME PARTIAL: CPT | Mod: 91 | Performed by: EMERGENCY MEDICINE

## 2022-09-17 PROCEDURE — 93459 L HRT ART/GRFT ANGIO: CPT | Performed by: INTERNAL MEDICINE

## 2022-09-17 PROCEDURE — 85025 COMPLETE CBC W/AUTO DIFF WBC: CPT | Performed by: NURSE PRACTITIONER

## 2022-09-17 PROCEDURE — 11000001 HC ACUTE MED/SURG PRIVATE ROOM

## 2022-09-17 PROCEDURE — 93459 L HRT ART/GRFT ANGIO: CPT | Mod: 26,,, | Performed by: INTERNAL MEDICINE

## 2022-09-17 PROCEDURE — 83036 HEMOGLOBIN GLYCOSYLATED A1C: CPT | Performed by: NURSE PRACTITIONER

## 2022-09-17 PROCEDURE — 85730 THROMBOPLASTIN TIME PARTIAL: CPT | Mod: 91 | Performed by: STUDENT IN AN ORGANIZED HEALTH CARE EDUCATION/TRAINING PROGRAM

## 2022-09-17 PROCEDURE — 85610 PROTHROMBIN TIME: CPT | Mod: 91 | Performed by: FAMILY MEDICINE

## 2022-09-17 PROCEDURE — 99223 PR INITIAL HOSPITAL CARE,LEVL III: ICD-10-PCS | Mod: 25,,, | Performed by: INTERNAL MEDICINE

## 2022-09-17 PROCEDURE — 93459: ICD-10-PCS | Mod: 26,,, | Performed by: INTERNAL MEDICINE

## 2022-09-17 PROCEDURE — 27201423 OPTIME MED/SURG SUP & DEVICES STERILE SUPPLY: Performed by: INTERNAL MEDICINE

## 2022-09-17 DEVICE — ANGIO-SEAL VIP VASCULAR CLOSURE DEVICE
Type: IMPLANTABLE DEVICE | Site: GROIN | Status: FUNCTIONAL
Brand: ANGIO-SEAL

## 2022-09-17 RX ORDER — IODIXANOL 320 MG/ML
INJECTION, SOLUTION INTRAVASCULAR
Status: DISCONTINUED | OUTPATIENT
Start: 2022-09-17 | End: 2022-09-17 | Stop reason: HOSPADM

## 2022-09-17 RX ORDER — ACETAMINOPHEN 325 MG/1
650 TABLET ORAL EVERY 6 HOURS PRN
Status: DISCONTINUED | OUTPATIENT
Start: 2022-09-17 | End: 2022-09-30 | Stop reason: HOSPADM

## 2022-09-17 RX ORDER — PREGABALIN 100 MG/1
100 CAPSULE ORAL 3 TIMES DAILY
Status: DISCONTINUED | OUTPATIENT
Start: 2022-09-17 | End: 2022-09-25

## 2022-09-17 RX ORDER — DIVALPROEX SODIUM 500 MG/1
500 TABLET, DELAYED RELEASE ORAL 3 TIMES DAILY
COMMUNITY
Start: 2022-07-19

## 2022-09-17 RX ORDER — SODIUM CHLORIDE 9 MG/ML
INJECTION, SOLUTION INTRAVENOUS CONTINUOUS
Status: ACTIVE | OUTPATIENT
Start: 2022-09-17 | End: 2022-09-17

## 2022-09-17 RX ORDER — INSULIN ASPART 100 [IU]/ML
1-10 INJECTION, SOLUTION INTRAVENOUS; SUBCUTANEOUS
Status: DISCONTINUED | OUTPATIENT
Start: 2022-09-17 | End: 2022-09-23

## 2022-09-17 RX ORDER — IPRATROPIUM BROMIDE AND ALBUTEROL SULFATE 2.5; .5 MG/3ML; MG/3ML
3 SOLUTION RESPIRATORY (INHALATION) EVERY 4 HOURS PRN
Status: DISCONTINUED | OUTPATIENT
Start: 2022-09-17 | End: 2022-09-30 | Stop reason: HOSPADM

## 2022-09-17 RX ORDER — IBUPROFEN 200 MG
24 TABLET ORAL
Status: DISCONTINUED | OUTPATIENT
Start: 2022-09-17 | End: 2022-09-23 | Stop reason: HOSPADM

## 2022-09-17 RX ORDER — ACETAMINOPHEN 325 MG/1
650 TABLET ORAL EVERY 4 HOURS PRN
Status: DISCONTINUED | OUTPATIENT
Start: 2022-09-17 | End: 2022-09-23 | Stop reason: HOSPADM

## 2022-09-17 RX ORDER — SODIUM CHLORIDE 9 MG/ML
INJECTION, SOLUTION INTRAVENOUS CONTINUOUS
Status: DISCONTINUED | OUTPATIENT
Start: 2022-09-17 | End: 2022-09-17

## 2022-09-17 RX ORDER — MIDAZOLAM HYDROCHLORIDE 1 MG/ML
INJECTION, SOLUTION INTRAMUSCULAR; INTRAVENOUS
Status: DISCONTINUED | OUTPATIENT
Start: 2022-09-17 | End: 2022-09-17 | Stop reason: HOSPADM

## 2022-09-17 RX ORDER — PREGABALIN 100 MG/1
100 CAPSULE ORAL 3 TIMES DAILY
COMMUNITY
Start: 2022-09-10 | End: 2022-11-13 | Stop reason: SDUPTHER

## 2022-09-17 RX ORDER — BISACODYL 10 MG
10 SUPPOSITORY, RECTAL RECTAL DAILY PRN
Status: DISCONTINUED | OUTPATIENT
Start: 2022-09-17 | End: 2022-09-30 | Stop reason: HOSPADM

## 2022-09-17 RX ORDER — HEPARIN SODIUM,PORCINE/D5W 25000/250
0-40 INTRAVENOUS SOLUTION INTRAVENOUS CONTINUOUS
Status: DISCONTINUED | OUTPATIENT
Start: 2022-09-17 | End: 2022-09-17

## 2022-09-17 RX ORDER — GLUCAGON 1 MG
1 KIT INJECTION
Status: DISCONTINUED | OUTPATIENT
Start: 2022-09-17 | End: 2022-09-23 | Stop reason: HOSPADM

## 2022-09-17 RX ORDER — ATORVASTATIN CALCIUM 80 MG/1
1 TABLET, FILM COATED ORAL DAILY
Status: ON HOLD | COMMUNITY
Start: 2022-08-03 | End: 2022-09-30 | Stop reason: HOSPADM

## 2022-09-17 RX ORDER — METOPROLOL TARTRATE 25 MG/1
25 TABLET, FILM COATED ORAL
Status: COMPLETED | OUTPATIENT
Start: 2022-09-17 | End: 2022-09-17

## 2022-09-17 RX ORDER — SODIUM CHLORIDE 0.9 % (FLUSH) 0.9 %
10 SYRINGE (ML) INJECTION EVERY 12 HOURS PRN
Status: DISCONTINUED | OUTPATIENT
Start: 2022-09-17 | End: 2022-09-30 | Stop reason: HOSPADM

## 2022-09-17 RX ORDER — ASPIRIN 81 MG/1
81 TABLET ORAL DAILY
Status: DISCONTINUED | OUTPATIENT
Start: 2022-09-18 | End: 2022-09-30 | Stop reason: HOSPADM

## 2022-09-17 RX ORDER — IBUPROFEN 200 MG
16 TABLET ORAL
Status: DISCONTINUED | OUTPATIENT
Start: 2022-09-17 | End: 2022-09-23 | Stop reason: HOSPADM

## 2022-09-17 RX ORDER — LOSARTAN POTASSIUM 50 MG/1
100 TABLET ORAL DAILY
Status: DISCONTINUED | OUTPATIENT
Start: 2022-09-17 | End: 2022-09-17

## 2022-09-17 RX ORDER — ASPIRIN 325 MG
325 TABLET ORAL
Status: COMPLETED | OUTPATIENT
Start: 2022-09-17 | End: 2022-09-17

## 2022-09-17 RX ORDER — DIVALPROEX SODIUM 125 MG/1
500 CAPSULE, COATED PELLETS ORAL 3 TIMES DAILY
Status: DISCONTINUED | OUTPATIENT
Start: 2022-09-17 | End: 2022-09-20 | Stop reason: CLARIF

## 2022-09-17 RX ORDER — TALC
6 POWDER (GRAM) TOPICAL NIGHTLY PRN
Status: DISCONTINUED | OUTPATIENT
Start: 2022-09-17 | End: 2022-09-30 | Stop reason: HOSPADM

## 2022-09-17 RX ORDER — METOPROLOL SUCCINATE 50 MG/1
50 TABLET, EXTENDED RELEASE ORAL DAILY
Status: ON HOLD | COMMUNITY
Start: 2022-06-09 | End: 2022-09-30 | Stop reason: HOSPADM

## 2022-09-17 RX ORDER — AMLODIPINE BESYLATE 10 MG/1
10 TABLET ORAL DAILY
Status: DISCONTINUED | OUTPATIENT
Start: 2022-09-17 | End: 2022-09-24

## 2022-09-17 RX ORDER — NITROGLYCERIN 0.4 MG/1
0.4 TABLET SUBLINGUAL EVERY 5 MIN PRN
Status: DISCONTINUED | OUTPATIENT
Start: 2022-09-17 | End: 2022-09-30 | Stop reason: HOSPADM

## 2022-09-17 RX ORDER — FLUOXETINE HYDROCHLORIDE 20 MG/1
20 CAPSULE ORAL DAILY
COMMUNITY
Start: 2022-03-28 | End: 2022-11-13 | Stop reason: SDUPTHER

## 2022-09-17 RX ORDER — CIDER VINEGAR 300 MG
450 TABLET ORAL DAILY
COMMUNITY

## 2022-09-17 RX ORDER — CEFAZOLIN SODIUM 1 G/3ML
INJECTION, POWDER, FOR SOLUTION INTRAMUSCULAR; INTRAVENOUS
Status: DISCONTINUED | OUTPATIENT
Start: 2022-09-17 | End: 2022-09-17 | Stop reason: HOSPADM

## 2022-09-17 RX ORDER — PRAMIPEXOLE DIHYDROCHLORIDE 0.5 MG/1
1 TABLET ORAL NIGHTLY
Status: DISCONTINUED | OUTPATIENT
Start: 2022-09-17 | End: 2022-09-17

## 2022-09-17 RX ORDER — DIPHENHYDRAMINE HYDROCHLORIDE 50 MG/ML
INJECTION INTRAMUSCULAR; INTRAVENOUS
Status: DISCONTINUED | OUTPATIENT
Start: 2022-09-17 | End: 2022-09-17 | Stop reason: HOSPADM

## 2022-09-17 RX ORDER — INSULIN LISPRO 100 [IU]/ML
80 INJECTION, SUSPENSION SUBCUTANEOUS
Status: ON HOLD | COMMUNITY
Start: 2021-10-11 | End: 2022-09-30 | Stop reason: HOSPADM

## 2022-09-17 RX ORDER — ONDANSETRON 2 MG/ML
4 INJECTION INTRAMUSCULAR; INTRAVENOUS EVERY 8 HOURS PRN
Status: DISCONTINUED | OUTPATIENT
Start: 2022-09-17 | End: 2022-09-30 | Stop reason: HOSPADM

## 2022-09-17 RX ORDER — ONDANSETRON 8 MG/1
8 TABLET, ORALLY DISINTEGRATING ORAL EVERY 8 HOURS PRN
Status: DISCONTINUED | OUTPATIENT
Start: 2022-09-17 | End: 2022-09-30 | Stop reason: HOSPADM

## 2022-09-17 RX ORDER — FLUOXETINE HYDROCHLORIDE 40 MG/1
40 CAPSULE ORAL DAILY
Status: ON HOLD | COMMUNITY
Start: 2022-03-28 | End: 2022-09-30 | Stop reason: HOSPADM

## 2022-09-17 RX ORDER — NALOXONE HCL 0.4 MG/ML
0.02 VIAL (ML) INJECTION
Status: DISCONTINUED | OUTPATIENT
Start: 2022-09-17 | End: 2022-09-30 | Stop reason: HOSPADM

## 2022-09-17 RX ORDER — GUAIFENESIN AND PHENYLEPHRINE HCL 400; 10 MG/1; MG/1
500 TABLET ORAL 2 TIMES DAILY
COMMUNITY

## 2022-09-17 RX ORDER — PROMETHAZINE HYDROCHLORIDE 25 MG/1
25 TABLET ORAL EVERY 6 HOURS PRN
Status: DISCONTINUED | OUTPATIENT
Start: 2022-09-17 | End: 2022-09-30 | Stop reason: HOSPADM

## 2022-09-17 RX ORDER — AMLODIPINE BESYLATE 10 MG/1
1 TABLET ORAL DAILY
Status: ON HOLD | COMMUNITY
Start: 2021-11-22 | End: 2022-09-30 | Stop reason: HOSPADM

## 2022-09-17 RX ORDER — MAG HYDROX/ALUMINUM HYD/SIMETH 200-200-20
30 SUSPENSION, ORAL (FINAL DOSE FORM) ORAL 4 TIMES DAILY PRN
Status: DISCONTINUED | OUTPATIENT
Start: 2022-09-17 | End: 2022-09-30 | Stop reason: HOSPADM

## 2022-09-17 RX ORDER — FENTANYL CITRATE 50 UG/ML
INJECTION, SOLUTION INTRAMUSCULAR; INTRAVENOUS
Status: DISCONTINUED | OUTPATIENT
Start: 2022-09-17 | End: 2022-09-17 | Stop reason: HOSPADM

## 2022-09-17 RX ORDER — METOPROLOL SUCCINATE 50 MG/1
100 TABLET, EXTENDED RELEASE ORAL DAILY
Status: DISCONTINUED | OUTPATIENT
Start: 2022-09-17 | End: 2022-09-17

## 2022-09-17 RX ORDER — FLUOXETINE HYDROCHLORIDE 20 MG/1
40 CAPSULE ORAL DAILY
Status: DISCONTINUED | OUTPATIENT
Start: 2022-09-17 | End: 2022-09-30 | Stop reason: HOSPADM

## 2022-09-17 RX ORDER — FAMOTIDINE 10 MG/ML
INJECTION INTRAVENOUS
Status: DISCONTINUED | OUTPATIENT
Start: 2022-09-17 | End: 2022-09-17 | Stop reason: HOSPADM

## 2022-09-17 RX ORDER — HEPARIN SODIUM,PORCINE/D5W 25000/250
0-40 INTRAVENOUS SOLUTION INTRAVENOUS CONTINUOUS
Status: DISCONTINUED | OUTPATIENT
Start: 2022-09-17 | End: 2022-09-23 | Stop reason: HOSPADM

## 2022-09-17 RX ORDER — ASPIRIN 81 MG/1
1 TABLET ORAL DAILY
Status: ON HOLD | COMMUNITY
Start: 2022-07-25 | End: 2022-09-30 | Stop reason: HOSPADM

## 2022-09-17 RX ORDER — FLASH GLUCOSE SENSOR
1 KIT MISCELLANEOUS
COMMUNITY
Start: 2022-08-08

## 2022-09-17 RX ORDER — ATORVASTATIN CALCIUM 40 MG/1
80 TABLET, FILM COATED ORAL DAILY
Status: DISCONTINUED | OUTPATIENT
Start: 2022-09-17 | End: 2022-09-30 | Stop reason: HOSPADM

## 2022-09-17 RX ORDER — METOPROLOL SUCCINATE 50 MG/1
50 TABLET, EXTENDED RELEASE ORAL DAILY
Status: DISCONTINUED | OUTPATIENT
Start: 2022-09-17 | End: 2022-09-19

## 2022-09-17 RX ORDER — DIVALPROEX SODIUM 250 MG/1
250 TABLET, DELAYED RELEASE ORAL DAILY
Status: DISCONTINUED | OUTPATIENT
Start: 2022-09-17 | End: 2022-09-17

## 2022-09-17 RX ADMIN — TICAGRELOR 90 MG: 90 TABLET ORAL at 09:09

## 2022-09-17 RX ADMIN — SODIUM CHLORIDE: 0.9 INJECTION, SOLUTION INTRAVENOUS at 05:09

## 2022-09-17 RX ADMIN — PREGABALIN 100 MG: 100 CAPSULE ORAL at 09:09

## 2022-09-17 RX ADMIN — SODIUM CHLORIDE: 0.9 INJECTION, SOLUTION INTRAVENOUS at 12:09

## 2022-09-17 RX ADMIN — SODIUM CHLORIDE 500 ML: 0.9 INJECTION, SOLUTION INTRAVENOUS at 12:09

## 2022-09-17 RX ADMIN — MECLIZINE HYDROCHLORIDE 50 MG: 25 TABLET ORAL at 12:09

## 2022-09-17 RX ADMIN — DIVALPROEX SODIUM 500 MG: 125 CAPSULE, COATED PELLETS ORAL at 09:09

## 2022-09-17 RX ADMIN — ASPIRIN 325 MG: 325 TABLET ORAL at 01:09

## 2022-09-17 RX ADMIN — METOPROLOL TARTRATE 25 MG: 25 TABLET, FILM COATED ORAL at 01:09

## 2022-09-17 RX ADMIN — INSULIN ASPART 4 UNITS: 100 INJECTION, SOLUTION INTRAVENOUS; SUBCUTANEOUS at 11:09

## 2022-09-17 RX ADMIN — INSULIN ASPART 8 UNITS: 100 INJECTION, SOLUTION INTRAVENOUS; SUBCUTANEOUS at 04:09

## 2022-09-17 RX ADMIN — DIVALPROEX SODIUM 500 MG: 125 CAPSULE, COATED PELLETS ORAL at 08:09

## 2022-09-17 RX ADMIN — INSULIN ASPART 4 UNITS: 100 INJECTION, SOLUTION INTRAVENOUS; SUBCUTANEOUS at 09:09

## 2022-09-17 RX ADMIN — HEPARIN SODIUM 12 UNITS/KG/HR: 10000 INJECTION, SOLUTION INTRAVENOUS at 06:09

## 2022-09-17 RX ADMIN — AMLODIPINE BESYLATE 10 MG: 10 TABLET ORAL at 09:09

## 2022-09-17 RX ADMIN — PREGABALIN 100 MG: 100 CAPSULE ORAL at 08:09

## 2022-09-17 RX ADMIN — METOPROLOL SUCCINATE 50 MG: 50 TABLET, FILM COATED, EXTENDED RELEASE ORAL at 09:09

## 2022-09-17 RX ADMIN — PREGABALIN 100 MG: 100 CAPSULE ORAL at 03:09

## 2022-09-17 RX ADMIN — HEPARIN SODIUM AND DEXTROSE 12 UNITS/KG/HR: 10000; 5 INJECTION INTRAVENOUS at 01:09

## 2022-09-17 RX ADMIN — ALUMINUM HYDROXIDE, MAGNESIUM HYDROXIDE, AND SIMETHICONE 30 ML: 200; 200; 20 SUSPENSION ORAL at 09:09

## 2022-09-17 RX ADMIN — DIVALPROEX SODIUM 500 MG: 125 CAPSULE, COATED PELLETS ORAL at 03:09

## 2022-09-17 RX ADMIN — FLUOXETINE 40 MG: 20 CAPSULE ORAL at 09:09

## 2022-09-17 RX ADMIN — ATORVASTATIN CALCIUM 80 MG: 40 TABLET, FILM COATED ORAL at 09:09

## 2022-09-17 NOTE — CONSULTS
"[facility]  Cardiology  Consult Note    Patient Name: Ca Diaz  MRN: [id number]  Admission Date: 9/16/2022  Hospital Length of Stay: 0 days  Code Status: Full Code   Attending Provider: Leisa Plata, *   Consulting Provider: Elmer Maguire MD  Primary Care Physician: Desiree Frye MD  Principal Problem:NSTEMI (non-ST elevated myocardial infarction)    Patient information was obtained from patient and ER records.     Inpatient consult to Cardiology  Consult performed by: Elmer Maguire MD  Consult ordered by: Alba Mccann NP        Subjective:     Chief Complaint:  Chest pain and weakness     HPI:    Ca Diaz is a 62 y.o. female patient with a PMHx of anxiety, CAD, h/o CVA, depression, DM II, RLS, seizures, fibromyalgia, HLD, HTN, PRAVEENA, and obesity who presented to the Emergency Department for evaluation of generalized weakness which onset gradually two days ago. Pt states she fell last night (9/15) and the night before last (9/14). Pt's  states that she "can't even walk through an open doorway." Symptoms are constant and moderate in severity. No mitigating or exacerbating factors reported. Associated sxs include N/D and dizziness. Pt reports the nausea began one day ago. Patient denies any HA, vomiting, light-headedness, visual disturbance, CP, SOB/MALAVE, diaphoresis, neck or jaw pain, upper extremity pain, numbness/tingling, dysuria, and all other sxs at this time. Work-up in the ED revealed NSTEMI with troponin of 0.502, EKG unrevealing, CXR unremarkable, CT of the head negative for acute process. 325 mg ASA given and UFH initiated. Hospital Medicine was contacted for admission and patient placed in Observation.     Prior CAD and coronary stents:  1.  Coronary artery disease , status post stenting of the proximal circumflex artery with 2.5 x 16 mm Synergy drug-eluting stent and stenting of the distal right coronary artery 3.0 x 24 mm Synergy " drug-eluting stent 2020.  Recent catheterization also revealed moderate stenosis of about 60% in the LAD that was evaluated by FFR and deemed to be not functionally significant, first diagonal stenosis of 90% just proximal to the previously placed stent.  The remote stents of the LAD and diagonal from  were patent with about 50 to 60% in-stent stenosis of the LAD, patent stent to the diagonal but with 90% stenosis just proximal to the stent at the ostium of the diagonal.  The stents to the obtuse marginal and proximal right coronary artery from  were also patent.   Patient states she has several days of angina and presented with weakness and chest pressure.      Past Medical History:   Diagnosis Date    Anxiety and depression 3/13/2014    Essential (primary) hypertension 2013    Fibromyalgia 2012    Hyperlipidemia     Obstructive sleep apnea syndrome 2014    RLS (restless legs syndrome) 10/18/2018    Overview:  Rheum Dr Tam    Seizures     Type 2 diabetes mellitus 2012    ICD-10 Transition Last Assessment & Plan:  Continue with sliding scale insulin.  Control improved on low-dose Lantus presently       Past Surgical History:   Procedure Laterality Date    ABDOMINAL SURGERY       SECTION         Review of patient's allergies indicates:   Allergen Reactions    Epinephrine Anaphylaxis    Lidocaine Anaphylaxis     Dental visit pt stopped breathing after given lidocaine    Ace inhibitors     Shellfish containing products Itching    Sulfadiazine Other (See Comments)       No current facility-administered medications on file prior to encounter.     Current Outpatient Medications on File Prior to Encounter   Medication Sig    amLODIPine (NORVASC) 10 MG tablet TAKE 1 TABLET BY MOUTH ONCE DAILY    amLODIPine (NORVASC) 10 MG tablet Take 1 tablet by mouth once daily.    apple cider vinegar 300 mg Tab Take 450 mg by mouth once daily.    aspirin (ECOTRIN) 81 MG EC  tablet Take 81 mg by mouth.    aspirin (ECOTRIN) 81 MG EC tablet Take 1 tablet by mouth once daily.    atorvastatin (LIPITOR) 80 MG tablet TAKE 1 TABLET BY MOUTH ONCE DAILY    atorvastatin (LIPITOR) 80 MG tablet Take 1 tablet by mouth once daily.    CALCIUM-MAGNESIUM-ZINC ORAL Take 2 tablets by mouth once daily.    divalproex ER (DEPAKOTE) 500 MG Tb24 Take 500 mg by mouth 3 (three) times daily.    ELDERBERRY FRUIT ORAL Take 50 mg by mouth once daily.    flash glucose sensor (MentorMobSTYLE QUYNH 14 DAY SENSOR) Kit 1 Cartridge by subcutaneous (via wearable injector) route every 14 (fourteen) days.    FLUoxetine 20 MG capsule Take 20 mg by mouth once daily.    FLUoxetine 40 MG capsule Take 40 mg by mouth once daily.    HUMALOG MIX 75-25 KWIKPEN 100 unit/mL (75-25) InPn INJECT 75 UNITS SUBCUTANEOUSLY TWICE DAILY BEFORE MEAL(S)    insulin lispro protamin-lispro 100 unit/mL (75-25) InPn Inject 80 Units into the skin.    metoprolol succinate (TOPROL-XL) 50 MG 24 hr tablet Take 50 mg by mouth once daily.    multivitamin capsule Take 1 capsule by mouth once daily.    pregabalin (LYRICA) 100 MG capsule Take 100 mg by mouth 3 (three) times daily.    ticagrelor (BRILINTA) 90 mg tablet Take 1 tablet by mouth 2 (two) times daily.    turmeric root extract 500 mg Cap Take 500 mg by mouth 2 (two) times a day.    albuterol (PROVENTIL/VENTOLIN HFA) 90 mcg/actuation inhaler Inhale 2 puffs into the lungs every 6 (six) hours as needed for Wheezing.    carBAMazepine (TEGRETOL) 200 mg tablet Take 1 tablet (200 mg total) by mouth once daily. Take one tab daily x 7 days;  If symptoms persist may increase to one tab BID thereafter    divalproex (DEPAKOTE) 500 MG TbEC Take 500 mg by mouth 3 (three) times daily.    [DISCONTINUED] diclofenac (VOLTAREN) 75 MG EC tablet Take 75 mg by mouth.    [DISCONTINUED] dicyclomine (BENTYL) 20 mg tablet Take 1 tablet (20 mg total) by mouth 3 (three) times daily as needed (Abdominal cramping).  As needed for abdominal cramping    [DISCONTINUED] divalproex (DEPAKOTE) 250 MG EC tablet TAKE 1 TABLET BY MOUTH ONCE DAILY    [DISCONTINUED] HYDROcodone-acetaminophen (NORCO)  mg per tablet One by mouth every 4-6 hours when necessary pain    [DISCONTINUED] JARDIANCE 25 mg Tab Take 1 tablet by mouth once daily.    [DISCONTINUED] losartan (COZAAR) 100 MG tablet TAKE ONE TABLET BY MOUTH ONCE DAILY FOR  BLOOD  PRESSURE.    [DISCONTINUED] modafinil (PROVIGIL) 100 MG Tab TAKE 1 TABLET BY MOUTH TWICE DAILY AS NEEDED FOR FATIGUE    [DISCONTINUED] pantoprazole (PROTONIX) 40 MG tablet TAKE 1 TABLET BY MOUTH ONCE DAILY FOR REFLUX    [DISCONTINUED] pramipexole (MIRAPEX) 1 MG tablet Take 1 mg by mouth nightly.    [DISCONTINUED] zonisamide (ZONEGRAN) 100 MG Cap TAKE 1 CAPSULE BY MOUTH AT NIGHT     Family History    None       Tobacco Use    Smoking status: Former     Types: Cigarettes     Quit date:      Years since quittin.7    Smokeless tobacco: Never   Substance and Sexual Activity    Alcohol use: Not Currently    Drug use: Never    Sexual activity: Not on file     Review of Systems   Constitutional: Positive for malaise/fatigue. Negative for chills, diaphoresis, night sweats, weight gain and weight loss.   HENT:  Negative for congestion, hoarse voice, sore throat and stridor.    Eyes:  Negative for double vision and pain.   Cardiovascular:  Positive for chest pain. Negative for claudication, cyanosis, dyspnea on exertion, irregular heartbeat, leg swelling, near-syncope, orthopnea, palpitations, paroxysmal nocturnal dyspnea and syncope.   Respiratory:  Negative for cough, hemoptysis, shortness of breath, sleep disturbances due to breathing, snoring, sputum production and wheezing.    Endocrine: Negative for cold intolerance, heat intolerance and polydipsia.   Hematologic/Lymphatic: Negative for bleeding problem. Does not bruise/bleed easily.   Skin:  Negative for color change, dry skin and rash.    Musculoskeletal:  Negative for joint swelling and muscle cramps.   Gastrointestinal:  Negative for bloating, abdominal pain, constipation, diarrhea, dysphagia, melena, nausea and vomiting.   Genitourinary:  Negative for flank pain and urgency.   Neurological:  Negative for dizziness, focal weakness, headaches, light-headedness, loss of balance, seizures and weakness.   Psychiatric/Behavioral:  Negative for altered mental status and memory loss. The patient is not nervous/anxious.    Objective:     Vital Signs (Most Recent):  Temp: 98.2 °F (36.8 °C) (09/17/22 1150)  Pulse: 67 (09/17/22 1152)  Resp: 20 (09/17/22 1150)  BP: 134/62 (09/17/22 1150)  SpO2: 95 % (09/17/22 1150) Vital Signs (24h Range):  Temp:  [97.8 °F (36.6 °C)-98.7 °F (37.1 °C)] 98.2 °F (36.8 °C)  Pulse:  [65-81] 67  Resp:  [15-24] 20  SpO2:  [95 %-99 %] 95 %  BP: (127-156)/(58-86) 134/62     Weight: 91.6 kg (202 lb)  Body mass index is 36.95 kg/m².    SpO2: 95 %  O2 Device (Oxygen Therapy): room air      Intake/Output Summary (Last 24 hours) at 9/17/2022 1201  Last data filed at 9/17/2022 1000  Gross per 24 hour   Intake 500 ml   Output 200 ml   Net 300 ml       Lines/Drains/Airways       Peripheral Intravenous Line  Duration                  Peripheral IV - Single Lumen 09/17/22 0001 20 G Left;Posterior Hand <1 day         Peripheral IV - Single Lumen 09/17/22 0150 20 G Posterior;Right Hand <1 day                    Physical Exam  Eyes:      Pupils: Pupils are equal, round, and reactive to light.   Neck:      Trachea: No tracheal deviation.   Cardiovascular:      Rate and Rhythm: Normal rate and regular rhythm.      Pulses: Intact distal pulses.           Carotid pulses are 2+ on the right side and 2+ on the left side.       Radial pulses are 2+ on the right side and 2+ on the left side.        Femoral pulses are 2+ on the right side and 2+ on the left side.       Popliteal pulses are 2+ on the right side and 2+ on the left side.        Dorsalis  pedis pulses are 2+ on the right side and 2+ on the left side.        Posterior tibial pulses are 2+ on the right side and 2+ on the left side.      Heart sounds: Normal heart sounds. No murmur heard.    No friction rub. No gallop.   Pulmonary:      Effort: Pulmonary effort is normal. No respiratory distress.      Breath sounds: Normal breath sounds. No stridor. No wheezing or rales.   Chest:      Chest wall: No tenderness.   Abdominal:      General: There is no distension.      Tenderness: There is no abdominal tenderness. There is no rebound.   Musculoskeletal:         General: No tenderness.      Cervical back: Normal range of motion.   Skin:     General: Skin is warm and dry.   Neurological:      Mental Status: She is alert and oriented to person, place, and time.       Significant Labs: CBC   Recent Labs   Lab 09/16/22 2357 09/17/22  0410   WBC 4.07 4.33   HGB 13.4 12.7   HCT 40.1 38.0    210    and Troponin   Recent Labs   Lab 09/16/22 2357 09/17/22  0828   TROPONINI 0.502* 0.274*       Significant Imaging: Echocardiogram: Transthoracic echo (TTE) complete ([facility] Only):   Results for orders placed or performed during the hospital encounter of 09/16/22   Echo   Result Value Ref Range    BSA 2 m2    TDI SEPTAL 0.06 m/s    LV LATERAL E/E' RATIO 16.00 m/s    LV SEPTAL E/E' RATIO 16.00 m/s    LA WIDTH 3.50 cm    IVC diameter 1.78 cm    Left Ventricular Outflow Tract Mean Velocity 0.90 cm/s    Left Ventricular Outflow Tract Mean Gradient 3.28 mmHg    TDI LATERAL 0.06 m/s    LVIDd 3.49 (A) 3.5 - 6.0 cm    IVS 1.28 (A) 0.6 - 1.1 cm    Posterior Wall 1.18 (A) 0.6 - 1.1 cm    Ao root annulus 2.57 cm    LVIDs 2.43 2.1 - 4.0 cm    FS 30 28 - 44 %    LA volume 53.78 cm3    STJ 2.92 cm    Ascending aorta 2.90 cm    LV mass 140.49 g    LA size 3.58 cm    TAPSE 1.90 cm    Left Ventricle Relative Wall Thickness 0.68 cm    AV mean gradient 8 mmHg    AV valve area 1.96 cm2    AV Velocity Ratio 0.63     AV index  (prosthetic) 0.65     E/A ratio 0.98     Mean e' 0.06 m/s    E wave deceleration time 236.57 msec    IVRT 79.92 msec    LVOT diameter 1.96 cm    LVOT area 3.0 cm2    LVOT peak nilay 1.07 m/s    LVOT peak VTI 27.30 cm    Ao peak nilay 1.71 m/s    Ao VTI 42.1 cm    RVOT peak nilay 0.70 m/s    RVOT peak VTI 15.6 cm    LVOT stroke volume 82.33 cm3    AV peak gradient 12 mmHg    PV mean gradient 1.22 mmHg    E/E' ratio 16.00 m/s    MV Peak E Nilay 0.96 m/s    MV Peak A Nilay 0.98 m/s    LV Systolic Volume 20.74 mL    LV Systolic Volume Index 10.8 mL/m2    LV Diastolic Volume 50.49 mL    LV Diastolic Volume Index 26.30 mL/m2    LA Volume Index 28.0 mL/m2    LV Mass Index 73 g/m2    RA Major Axis 3.84 cm    Left Atrium Minor Axis 5.02 cm    Left Atrium Major Axis 5.08 cm    RA Width 2.80 cm    Right Atrial Pressure (from IVC) 8 mmHg    EF 60 %    Narrative    · The left ventricle is normal in size with concentric remodeling and   normal systolic function.  · The estimated ejection fraction is 60%.  · Indeterminate left ventricular diastolic function.  · Normal right ventricular size with normal right ventricular systolic   function.  · There is mild aortic valve stenosis.  · Aortic valve area is 1.96 cm2; peak velocity is 1.71 m/s; mean gradient   is 8 mmHg.  · Intermediate central venous pressure (8 mmHg).        Assessment and Plan:     * NSTEMI (non-ST elevated myocardial infarction)  Plan cath today    Atherosclerosis of native coronary artery of native heart without angina pectoris  Multiple prior stents last 2020. Recurrent symptoms and pos troponin.  Plan  Cath today . Patient agrees    Hyperlipidemia  Continue statin    Essential (primary) hypertension  Continue amlodipine    Type 2 diabetes mellitus, with long-term current use of insulin  Per hospital medicine        VTE Risk Mitigation (From admission, onward)         Ordered     IP VTE HIGH RISK PATIENT  Once         09/17/22 0315     Place sequential compression device   Until discontinued         09/17/22 0315     heparin 25,000 units in dextrose 5% (100 units/ml) IV bolus from bag - ADDITIONAL PRN BOLUS - 60 units/kg (max bolus 4000 units)  As needed (PRN)        Question:  Heparin Infusion Adjustment (DO NOT MODIFY ANSWER)  Answer:  [email or web address]\epic\Images\Pharmacy\HeparinInfusions\heparin LOW INTENSITY nomogram for [facility] [id number]    09/17/22 0045     heparin 25,000 units in dextrose 5% (100 units/ml) IV bolus from bag - ADDITIONAL PRN BOLUS - 30 units/kg (max bolus 4000 units)  As needed (PRN)        Question:  Heparin Infusion Adjustment (DO NOT MODIFY ANSWER)  Answer:  [email or web address]\epic\Images\Pharmacy\HeparinInfusions\heparin LOW INTENSITY nomogram for [facility] [id number]    09/17/22 0045     heparin 25,000 units in dextrose 5% 250 mL (100 units/mL) infusion LOW INTENSITY nomogram - [facility]  Continuous        Question Answer Comment   Heparin Infusion Adjustment (DO NOT MODIFY ANSWER) [email or web address]\epic\Images\Pharmacy\HeparinInfusions\heparin LOW INTENSITY nomogram for [facility] [id number]    Begin at (in units/kg/hr) 12        09/17/22 0045                Thank you for your consult. I will follow-up with patient. Please contact us if you have any additional questions.    Elmer Maguire MD  Cardiology   [facility]

## 2022-09-17 NOTE — HOSPITAL COURSE
9/17:  Examination of patient done at bedside; pt was alert and oriented, c/o chest discomfort;   Cardiology has evaluated and planned for cardiac cath today- Lmca normal; lad prox eccentric plaque 70% hazzy d1 stent 99%  mid lad 70%  Lcx non obs cad om1 stent patent edge stent 40-50% stenosis.   Rca stent patent nopn obs cad pda 70%.  Lvedp normal    Lvf normal anterolateral hk; recommended cardiothoracic surgery consult;   Will f/u; currently on heparin drip; aspirin, BB, statin;   9/18:  Examination done at bedside; denied any chest pain or SOB; currently on heparin drip;   Cardiothoracic surg on board - recommended keep the patient on heparin drip and watch for symptoms and her surgery is planned for Friday which is the same time for bypass surgery;   Electrolytes replaced to maintain K >4, Mg > 2;   Will f/u on BG;   9/19:  Examination done at bedside; denied any acute issues; awaiting CABG; cardio and cardiothoracic surg on board  9/20:  Examination done at bedside; denies any acute issues;  Awaiting CABG for Friday;   Currently on heparin drip, no signs of bleeding; Hb stable;   9/21:  Examination done at bedside; denies any acute issues;  Hemodynamically stable;  9/22:  Examination done at bedside; denies any acute issues;  Awaiting CABG for Friday;   Hemodynamically stable;  Aspirin, BB, statin, heparin drip --> Hb stable, no signs of bleeding;   CTS on board  9/23 status post cabg per cts. Transferred to icu, intubated, sedated.

## 2022-09-17 NOTE — ASSESSMENT & PLAN NOTE
Body mass index is 37.02 kg/m². Morbid obesity complicates all aspects of disease management from diagnostic modalities to treatment. Weight loss encouraged and health benefits explained to patient.

## 2022-09-17 NOTE — CARE UPDATE
62 year old female admitted for NSTEMI. Seen by Cardiology who plans to perform LHC today. Echocardiogram shows normal LVEF without WMA.  Continue ASA, BB, STATIN and heparin infusion. Further recs to follow

## 2022-09-17 NOTE — ASSESSMENT & PLAN NOTE
- Troponin 0.502. EKG unrevealing. CP free on admission.    - Heparin drip per nomogram.  - Continue home ASA, BB, and high intensity statin.  - SL NTG as needed.     - Trend serial cardiac enzymes and EKG.  - Check FLP.  - Will obtain 2D echo.  - Cardiology consult.     9/17:    Cardiology has evaluated and planned for cardiac cath today- Lmca normal; lad prox eccentric plaque 70% hazzy d1 stent 99%  mid lad 70%  Lcx non obs cad om1 stent patent edge stent 40-50% stenosis.   Rca stent patent nopn obs cad pda 70%.  Lvedp normal    Lvf normal anterolateral hk; recommended cardiothoracic surgery consult;   Will f/u; currently on heparin drip; aspirin, BB, statin;

## 2022-09-17 NOTE — PROGRESS NOTES
"[facility]  Progress Note    Patient Name: Ca Diaz  MRN: [id number]  Patient Class: IP- Inpatient   Admission Date: 9/16/2022  Length of Stay: 0 days  Attending Physician: Aram Ellington MD  Primary Care Provider: Desiree Frye MD        Subjective:     Principal Problem:NSTEMI (non-ST elevated myocardial infarction)        HPI:  Ca Diaz is a 62 y.o. female patient with a PMHx of anxiety, CAD, h/o CVA, depression, DM II, RLS, seizures, fibromyalgia, HLD, HTN, PRAVEENA, and obesity who presented to the Emergency Department for evaluation of generalized weakness which onset gradually two days ago. Pt states she fell last night (9/15) and the night before last (9/14). Pt's  states that she "can't even walk through an open doorway." Symptoms are constant and moderate in severity. No mitigating or exacerbating factors reported. Associated sxs include N/D and dizziness. Pt reports the nausea began one day ago. Patient denies any HA, vomiting, light-headedness, visual disturbance, CP, SOB/MALAVE, diaphoresis, neck or jaw pain, upper extremity pain, numbness/tingling, dysuria, and all other sxs at this time. Work-up in the ED revealed NSTEMI with troponin of 0.502, EKG unrevealing, CXR unremarkable, CT of the head negative for acute process. 325 mg ASA given and UFH initiated. Hospital Medicine was contacted for admission and patient placed in Observation.         Overview/Hospital Course:  9/17:    Examination of patient done at bedside; pt was alert and oriented, c/o chest discomfort;   Cardiology has evaluated and planned for cardiac cath today- Lmca normal; lad prox eccentric plaque 70% hazzy d1 stent 99%  mid lad 70%  Lcx non obs cad om1 stent patent edge stent 40-50% stenosis.   Rca stent patent nopn obs cad pda 70%.  Lvedp normal    Lvf normal anterolateral hk; recommended cardiothoracic surgery consult;   Will f/u; currently on heparin drip; aspirin, BB, statin; "               I    Review of Systems    Constitutional:  Negative for chills, diaphoresis, fatigue and fever.   HENT:  Negative for congestion and sore throat.    Respiratory:  Negative for cough, chest tightness, shortness of breath and wheezing.    Cardiovascular:  Negative for chest pain, palpitations and leg swelling.   Gastrointestinal:  Negative for abdominal pain, constipation and vomiting.   Genitourinary:  Negative for dysuria and hematuria.   Musculoskeletal:  Negative for arthralgias, back pain and myalgias.   Skin:  Negative for pallor and rash.   Neurological:  Positive for dizziness and weakness (generalized). Negative for syncope, light-headedness, numbness and headaches.   Psychiatric/Behavioral:  Negative for confusion. The patient is not nervous/anxious.    All other systems reviewed and are negative.  Objective:     Vital Signs (Most Recent):  Temp: 98.2 °F (36.8 °C) (09/17/22 1150)  Pulse: 70 (09/17/22 1500)  Resp: 16 (09/17/22 1500)  BP: 133/75 (09/17/22 1500)  SpO2: 97 % (09/17/22 1500) Vital Signs (24h Range):  Temp:  [97.8 °F (36.6 °C)-98.7 °F (37.1 °C)] 98.2 °F (36.8 °C)  Pulse:  [65-81] 70  Resp:  [15-24] 16  SpO2:  [95 %-99 %] 97 %  BP: (127-156)/(58-86) 133/75     Weight: 91.6 kg (202 lb)  Body mass index is 36.95 kg/m².    Intake/Output Summary (Last 24 hours) at 9/17/2022 1539  Last data filed at 9/17/2022 1205  Gross per 24 hour   Intake 500 ml   Output 200 ml   Net 300 ml      Physical Exam      Vitals and nursing note reviewed.   Constitutional:       General: She is awake. She is not in acute distress.     Appearance: Normal appearance. She is well-developed. She is obese. She is not diaphoretic.   HENT:      Head: Normocephalic and atraumatic.   Eyes:      Conjunctiva/sclera: Conjunctivae normal.      Comments: PERRL; EOM intact.   Cardiovascular:      Rate and Rhythm: Normal rate and regular rhythm. No extrasystoles are present.     Heart sounds: S1 normal and S2 normal. No murmur  heard.    No gallop.   Pulmonary:      Effort: Pulmonary effort is normal. No tachypnea.      Breath sounds: Normal breath sounds and air entry. No wheezing, rhonchi or rales.   Abdominal:      General: Bowel sounds are normal. There is no distension.      Palpations: Abdomen is soft.      Tenderness: There is no abdominal tenderness.   Musculoskeletal:         General: No tenderness or deformity. Normal range of motion.      Cervical back: Normal range of motion and neck supple.      Right lower leg: No edema.      Left lower leg: No edema.   Skin:     General: Skin is warm and dry.      Capillary Refill: Capillary refill takes less than 2 seconds.      Findings: No erythema or rash.   Neurological:      General: No focal deficit present.      Mental Status: She is alert and oriented to person, place, and time.      GCS: GCS eye subscore is 4. GCS verbal subscore is 5. GCS motor subscore is 6.      Cranial Nerves: Cranial nerves 2-12 are intact.      Sensory: Sensation is intact.      Motor: Motor function is intact.   Psychiatric:         Mood and Affect: Mood and affect normal.         Behavior: Behavior normal. Behavior is cooperative.     Significant Labs: All pertinent labs within the past 24 hours have been reviewed.  CBC:   Recent Labs   Lab 09/16/22 2357 09/17/22  0410   WBC 4.07 4.33   HGB 13.4 12.7   HCT 40.1 38.0    210     CMP:   Recent Labs   Lab 09/16/22 2357 09/17/22  0410    139   K 3.6 3.4*   CL 98 102   CO2 25 24   * 284*   BUN 11 9   CREATININE 0.7 0.6   CALCIUM 9.1 8.4*   PROT 7.0  --    ALBUMIN 3.1*  --    BILITOT 0.3  --    ALKPHOS 71  --    AST 53*  --    ALT 37  --    ANIONGAP 15 13       Significant Imaging:     Imaging Results              CT Head Without Contrast (Final result)  Result time 09/17/22 08:33:37      Final result by Yo Larios MD (09/17/22 08:33:37)                   Impression:      Negative for acute intracranial abnormality.    All CT scans at  this facility are performed  using dose modulation techniques as appropriate to performed exam including the following:  automated exposure control; adjustment of mA and/or kV according to the patients size (this includes techniques or standardized protocols for targeted exams where dose is matched to indication/reason for exam: i.e. extremities or head);  iterative reconstruction technique.      Electronically signed by: Yo Larios MD  Date:    09/17/2022  Time:    08:33               Narrative:    EXAMINATION:  CT HEAD WITHOUT CONTRAST    CLINICAL HISTORY:  Dizziness, persistent/recurrent, cardiac or vascular cause suspected;    TECHNIQUE:  Axial CT images obtained throughout the head without intravenous contrast.    COMPARISON:  December 2, 2018    FINDINGS:  Negative for acute hemorrhage, mass effect, extraaxial collection, hydrocephalus.    There is good gray white matter differentiation.  Mild chronic small vessel ischemic changes deep periventricular white matter.    The paranasal sinuses and mastoids are clear.    The calvarium is unremarkable with no fractures.                                       X-Ray Chest AP Portable (Final result)  Result time 09/17/22 00:09:52      Final result by Roel Oliveira MD (09/17/22 00:09:52)                   Impression:      No acute abnormality.      Electronically signed by: Tee Sevilla  Date:    09/17/2022  Time:    00:09               Narrative:    EXAMINATION:  XR CHEST AP PORTABLE    CLINICAL HISTORY:  dizziness;    TECHNIQUE:  Single frontal view of the chest was performed.    COMPARISON:  None    FINDINGS:  The lungs are clear, with normal appearance of pulmonary vasculature and no pleural effusion or pneumothorax.    The cardiac silhouette is normal in size. The hilar and mediastinal contours are unremarkable.    Bones are intact.                        ED Interpretation by Miguel Johnson Jr., MD (09/17/22 00:08:13, [facility] - Emergency Dept., Emergency  Medicine)      No acute findings.                                       Assessment/Plan:      * NSTEMI (non-ST elevated myocardial infarction)  - Troponin 0.502. EKG unrevealing. CP free on admission.    - Heparin drip per nomogram.  - Continue home ASA, BB, and high intensity statin.  - SL NTG as needed.     - Trend serial cardiac enzymes and EKG.  - Check FLP.  - Will obtain 2D echo.  - Cardiology consult.     9/17:    Cardiology has evaluated and planned for cardiac cath today- Lmca normal; lad prox eccentric plaque 70% hazzy d1 stent 99%  mid lad 70%  Lcx non obs cad om1 stent patent edge stent 40-50% stenosis.   Rca stent patent nopn obs cad pda 70%.  Lvedp normal    Lvf normal anterolateral hk; recommended cardiothoracic surgery consult;   Will f/u; currently on heparin drip; aspirin, BB, statin;       Obesity  Body mass index is 37.02 kg/m². Morbid obesity complicates all aspects of disease management from diagnostic modalities to treatment. Weight loss encouraged and health benefits explained to patient.       Atherosclerosis of native coronary artery of native heart without angina pectoris  - Continue medical management as above.       Hyperlipidemia  - Continue home statin. FLP in AM.       Essential (primary) hypertension  - Continue home antihypertensives.       Type 2 diabetes mellitus, with long-term current use of insulin  Patient's FSGs are uncontrolled due to hyperglycemia on current medication regimen.  Last A1c reviewed- No results found for: LABA1C, HGBA1C  Most recent fingerstick glucose reviewed- No results for input(s): POCTGLUCOSE in the last 24 hours.  Current correctional scale  Medium  Maintain anti-hyperglycemic dose as follows-   Antihyperglycemics (From admission, onward)    Start     Stop Route Frequency Ordered    09/17/22 0413  insulin aspart U-100 pen 1-10 Units         -- SubQ Before meals & nightly PRN 09/17/22 0316        Hold Oral hypoglycemics while patient is in the  [facility].        VTE Risk Mitigation (From admission, onward)         Ordered     IP VTE HIGH RISK PATIENT  Once         09/17/22 0315     Place sequential compression device  Until discontinued         09/17/22 0315     heparin 25,000 units in dextrose 5% (100 units/ml) IV bolus from bag - ADDITIONAL PRN BOLUS - 60 units/kg (max bolus 4000 units)  As needed (PRN)        Question:  Heparin Infusion Adjustment (DO NOT MODIFY ANSWER)  Answer:  [email or web address]\epic\Images\Pharmacy\HeparinInfusions\heparin LOW INTENSITY nomogram for [facility] [id number]    09/17/22 0045     heparin 25,000 units in dextrose 5% (100 units/ml) IV bolus from bag - ADDITIONAL PRN BOLUS - 30 units/kg (max bolus 4000 units)  As needed (PRN)        Question:  Heparin Infusion Adjustment (DO NOT MODIFY ANSWER)  Answer:  [email or web address]\epic\Images\Pharmacy\HeparinInfusions\heparin LOW INTENSITY nomogram for [facility] [id number]    09/17/22 0045     heparin 25,000 units in dextrose 5% 250 mL (100 units/mL) infusion LOW INTENSITY nomogram - [facility]  Continuous        Question Answer Comment   Heparin Infusion Adjustment (DO NOT MODIFY ANSWER) [email or web address]\epic\Images\Pharmacy\HeparinInfusions\heparin LOW INTENSITY nomogram for [facility] [id number].pdf    Begin at (in units/kg/hr) 12        09/17/22 0045                Discharge Planning   JOHN:      Code Status: Full Code   Is the patient medically ready for discharge?:     Reason for patient still in hospital (select all that apply): pending cardiothoracic surg eval and recommendations                     Leisa Plata MD  Department of Hospital Medicine   [facility] - Med Surg

## 2022-09-17 NOTE — ED PROVIDER NOTES
"SCRIBE #1 NOTE: I, Wendy Neftali, am scribing for, and in the presence of, Miguel Johnson Jr., MD. I have scribed the entire note.       History     Chief Complaint   Patient presents with    Fatigue     Pt reports generalized weakness, fell today and yesterday. Incontinent episodes before the fall. Hx: CVA 2017     Review of patient's allergies indicates:   Allergen Reactions    Epinephrine Anaphylaxis    Lidocaine Anaphylaxis     Dental visit pt stopped breathing after given lidocaine    Ace inhibitors     Shellfish containing products Itching    Sulfadiazine Other (See Comments)         History of Present Illness     HPI    9/16/2022, 11:21 PM  History obtained from the patient and       History of Present Illness: Ca Diaz is a 62 y.o. female patient with a PMHx of RLS, seizures, essential HTN, and CVA (2017) who presents to the Emergency Department for evaluation of generalized weakness which onset gradually two days ago. Pt states she fell last night (9/15) and the night before last (9/14). Pt's  states that she "can't even walk through an open doorway." Symptoms are constant and moderate in severity. No mitigating or exacerbating factors reported. Associated sxs include N/D. Pt reports the nausea began one day ago. Patient denies any HA, vomiting, light-headedness, visual disturbance, dizziness, dysuria, and all other sxs at this time. No prior Tx provided. No further complaints or concerns at this time.       Arrival mode: Personal vehicle    PCP: Desiree Frye MD        Past Medical History:  Past Medical History:   Diagnosis Date    Anxiety and depression 3/13/2014    Essential (primary) hypertension 1/22/2013    Fibromyalgia 9/17/2012    Hyperlipidemia     Obstructive sleep apnea syndrome 8/20/2014    RLS (restless legs syndrome) 10/18/2018    Overview:  Rheum Dr Tam    Seizures     Type 2 diabetes mellitus 9/17/2012    ICD-10 Transition Last Assessment & Plan:  " Continue with sliding scale insulin.  Control improved on low-dose Lantus presently       Past Surgical History:  Past Surgical History:   Procedure Laterality Date    ABDOMINAL SURGERY       SECTION           Family History:  No family history on file.    Social History:  Social History     Tobacco Use    Smoking status: Former     Types: Cigarettes     Quit date:      Years since quittin.7    Smokeless tobacco: Never   Substance and Sexual Activity    Alcohol use: Not Currently    Drug use: Never    Sexual activity: Not on file        Review of Systems     Review of Systems   Constitutional:  Negative for fever.   HENT:  Negative for sore throat.    Eyes:  Negative for visual disturbance.   Respiratory:  Negative for shortness of breath.    Cardiovascular:  Negative for chest pain.   Gastrointestinal:  Positive for diarrhea and nausea.   Genitourinary:  Negative for dysuria.   Musculoskeletal:  Negative for back pain.   Skin:  Negative for rash.   Neurological:  Positive for weakness. Negative for dizziness, light-headedness and headaches.   Hematological:  Does not bruise/bleed easily.   All other systems reviewed and are negative.     Physical Exam     Initial Vitals   BP Pulse Resp Temp SpO2   22 2153 22 2152 22 2152 22 2152 22 2152   (!) 135/58 79 16 98.7 °F (37.1 °C) 96 %      MAP       --                 Physical Exam  Nursing Notes and Vital Signs Reviewed.  Constitutional: Patient is in no acute distress. Well-developed and well-nourished.  Head: Atraumatic. Normocephalic.  Eyes:  EOM intact.  No scleral icterus..  No nystagmus.  ENT: Mucous membranes are moist.  Nares clear .  0 P clear.  TMs normal.  No mastoid tenderness or erythema.  Neck:  Full ROM. No JVD.  Cardiovascular: Regular rate. Regular rhythm No murmurs, rubs, or gallops. Distal pulses are 2+ and symmetric  Pulmonary/Chest: No respiratory distress. Clear to auscultation bilaterally. No wheezing  or rales.  Equal chest wall rise bilaterally  Abdominal: Soft and non-distended.  There is no tenderness.  No rebound, guarding, or rigidity. Good bowel sounds.  Genitourinary: No CVA tenderness.  No suprapubic tenderness  Musculoskeletal: Moves all extremities. No obvious deformities.  5 x 5 strength in all extremities   Skin: Warm and dry.  Neurological:  Alert, awake, and appropriate.  Normal speech.  No acute focal neurological deficits are appreciated.  Two through 12 intact bilaterally.  Psychiatric: Normal affect. Good eye contact. Appropriate in content.     ED Course   Critical Care    Date/Time: 9/17/2022 12:53 AM  Performed by: Miguel Johnson Jr., MD  Authorized by: Miguel Johnson Jr., MD   Direct patient critical care time: 7 minutes  Additional history critical care time: 6 minutes  Ordering / reviewing critical care time: 5 minutes  Documentation critical care time: 7 minutes  Consulting other physicians critical care time: 5 minutes  Consult with family critical care time: 6 minutes  Other critical care time: 5 minutes  Total critical care time (exclusive of procedural time) : 41 minutes  Critical care time was exclusive of separately billable procedures and treating other patients and teaching time.  Critical care was necessary to treat or prevent imminent or life-threatening deterioration of the following conditions: NSTEMI.  Critical care was time spent personally by me on the following activities: review of old charts, re-evaluation of patient's condition, pulse oximetry, interpretation of cardiac output measurements, evaluation of patient's response to treatment, examination of patient, obtaining history from patient or surrogate, ordering and performing treatments and interventions, ordering and review of radiographic studies, ordering and review of laboratory studies, discussions with consultants, development of treatment plan with patient or surrogate and blood draw for specimens.      ED  "Vital Signs:  Vitals:    09/16/22 2152 09/16/22 2153 09/16/22 2339 09/16/22 2341   BP:  (!) 135/58 (!) 140/68 (!) 156/74   Pulse: 79  75 80   Resp: 16  15 17   Temp: 98.7 °F (37.1 °C)      TempSrc: Oral      SpO2: 96%  99% 99%   Weight: 91.8 kg (202 lb 6.1 oz)      Height: 5' 2" (1.575 m)       09/16/22 2345 09/16/22 2347 09/17/22 0132   BP: (!) 141/65  (!) 152/67   Pulse: 78 81 75   Resp: 18  15   Temp:      TempSrc:      SpO2: 97%  98%   Weight:      Height:          Abnormal Lab Results:  Labs Reviewed   COMPREHENSIVE METABOLIC PANEL - Abnormal; Notable for the following components:       Result Value    Glucose 324 (*)     Albumin 3.1 (*)     AST 53 (*)     All other components within normal limits   TROPONIN I - Abnormal; Notable for the following components:    Troponin I 0.502 (*)     All other components within normal limits   URINALYSIS, REFLEX TO URINE CULTURE - Abnormal; Notable for the following components:    Glucose, UA 4+ (*)     Ketones, UA 1+ (*)     Leukocytes, UA 2+ (*)     All other components within normal limits    Narrative:     Specimen Source->Urine   URINALYSIS MICROSCOPIC - Abnormal; Notable for the following components:    WBC, UA 13 (*)     Hyaline Casts, UA 38 (*)     All other components within normal limits    Narrative:     Specimen Source->Urine   SARS-COV-2 RDRP GENE - Normal    Narrative:     This test utilizes isothermal nucleic acid amplification   technology to detect the SARS-CoV-2 RdRp nucleic acid segment.   The analytical sensitivity (limit of detection) is 125 genome   equivalents/mL.   A POSITIVE result implies infection with the SARS-CoV-2 virus;   the patient is presumed to be contagious.     A NEGATIVE result means that SARS-CoV-2 nucleic acids are not   present above the limit of detection. A NEGATIVE result should be   treated as presumptive. It does not rule out the possibility of   COVID-19 and should not be the sole basis for treatment decisions.   If COVID-19 is " strongly suspected based on clinical and exposure   history, re-testing using an alternate molecular assay should be   considered.   This test is only for use under the Food and Drug   Administration s Emergency Use Authorization (EUA).   Commercial kits are provided by [employer].   Performance characteristics of the EUA have been independently   verified by [facility] Department of   Pathology and Laboratory Medicine.   _________________________________________________________________   The authorized Fact Sheet for Healthcare Providers and the authorized Fact   Sheet for Patients of the ID NOW COVID-19 are available on the FDA   website:     https://www.fda.gov/media/811597/download  https://www.fda.gov/media/980611/download          CULTURE, URINE   CBC W/ AUTO DIFFERENTIAL   HIV 1 / 2 ANTIBODY    Narrative:     Release to patient->Immediate   HEPATITIS C ANTIBODY    Narrative:     Release to patient->Immediate   HEP C VIRUS HOLD SPECIMEN    Narrative:     Release to patient->Immediate   APTT    Narrative:     Draw baseline aPTT prior to starting the heparin bolus or  infusion  (if patient is on warfarin prior to heparin therapy)   PROTIME-INR    Narrative:     Draw baseline aPTT prior to starting the heparin bolus or  infusion  (if patient is on warfarin prior to heparin therapy)   APTT   CBC W/ AUTO DIFFERENTIAL   HEMOGLOBIN A1C   LIPID PANEL   TROPONIN I   BASIC METABOLIC PANEL        All Lab Results:  Results for orders placed or performed during the hospital encounter of 09/16/22   CBC auto differential   Result Value Ref Range    WBC 4.07 3.90 - 12.70 K/uL    RBC 4.90 4.00 - 5.40 M/uL    Hemoglobin 13.4 12.0 - 16.0 g/dL    Hematocrit 40.1 37.0 - 48.5 %    MCV 82 82 - 98 fL    MCH 27.3 27.0 - 31.0 pg    MCHC 33.4 32.0 - 36.0 g/dL    RDW 14.1 11.5 - 14.5 %    Platelets 207 150 - 450 K/uL    MPV 10.5 9.2 - 12.9 fL    Immature Granulocytes 0.2 0.0 - 0.5 %    Gran # (ANC) 1.8 1.8 - 7.7 K/uL     Immature Grans (Abs) 0.01 0.00 - 0.04 K/uL    Lymph # 1.6 1.0 - 4.8 K/uL    Mono # 0.6 0.3 - 1.0 K/uL    Eos # 0.1 0.0 - 0.5 K/uL    Baso # 0.03 0.00 - 0.20 K/uL    nRBC 0 0 /100 WBC    Gran % 43.1 38.0 - 73.0 %    Lymph % 39.3 18.0 - 48.0 %    Mono % 14.5 4.0 - 15.0 %    Eosinophil % 2.2 0.0 - 8.0 %    Basophil % 0.7 0.0 - 1.9 %    Differential Method Automated    Comprehensive metabolic panel   Result Value Ref Range    Sodium 138 136 - 145 mmol/L    Potassium 3.6 3.5 - 5.1 mmol/L    Chloride 98 95 - 110 mmol/L    CO2 25 23 - 29 mmol/L    Glucose 324 (H) 70 - 110 mg/dL    BUN 11 6 - 20 mg/dL    Creatinine 0.7 0.5 - 1.4 mg/dL    Calcium 9.1 8.7 - 10.5 mg/dL    Total Protein 7.0 6.0 - 8.4 g/dL    Albumin 3.1 (L) 3.5 - 5.2 g/dL    Total Bilirubin 0.3 0.1 - 1.0 mg/dL    Alkaline Phosphatase 71 55 - 135 U/L    AST 53 (H) 10 - 40 U/L    ALT 37 10 - 44 U/L    Anion Gap 15 8 - 16 mmol/L    eGFR >60 >60 mL/min/1.73 m^2   Troponin I   Result Value Ref Range    Troponin I 0.502 (H) 0.000 - 0.026 ng/mL   Urinalysis, Reflex to Urine Culture Urine, Clean Catch    Specimen: Urine   Result Value Ref Range    Specimen UA Urine, Clean Catch     Color, UA Yellow Yellow, Straw, Estrellita    Appearance, UA Clear Clear    pH, UA 7.0 5.0 - 8.0    Specific Gravity, UA 1.020 1.005 - 1.030    Protein, UA Negative Negative    Glucose, UA 4+ (A) Negative    Ketones, UA 1+ (A) Negative    Bilirubin (UA) Negative Negative    Occult Blood UA Negative Negative    Nitrite, UA Negative Negative    Urobilinogen, UA Negative <2.0 EU/dL    Leukocytes, UA 2+ (A) Negative   HIV 1/2 Ag/Ab (4th Gen)   Result Value Ref Range    HIV 1/2 Ag/Ab Negative Negative   Hepatitis C Antibody   Result Value Ref Range    Hepatitis C Ab Negative Negative   HCV Virus Hold Specimen   Result Value Ref Range    HEP C Virus Hold Specimen Hold for HCV sendout    APTT   Result Value Ref Range    aPTT 27.8 21.0 - 32.0 sec   Protime-INR   Result Value Ref Range    Prothrombin  Time 10.4 9.0 - 12.5 sec    INR 1.0 0.8 - 1.2   Urinalysis Microscopic   Result Value Ref Range    WBC, UA 13 (H) 0 - 5 /hpf    WBC Clumps, UA None None-Rare    Bacteria Rare None-Occ /hpf    Yeast, UA None None    Squam Epithel, UA 4 /hpf    Hyaline Casts, UA 38 (A) 0-1/lpf /lpf    Microscopic Comment SEE COMMENT    POCT COVID-19 Rapid Screening   Result Value Ref Range    POC Rapid COVID Negative Negative     Acceptable Yes          Imaging Results:  Imaging Results              CT Head Without Contrast (In process)                      X-Ray Chest AP Portable (Final result)  Result time 09/17/22 00:09:52      Final result by Roel Oliveira MD (09/17/22 00:09:52)                   Impression:      No acute abnormality.      Electronically signed by: Tee Sevilla  Date:    09/17/2022  Time:    00:09               Narrative:    EXAMINATION:  XR CHEST AP PORTABLE    CLINICAL HISTORY:  dizziness;    TECHNIQUE:  Single frontal view of the chest was performed.    COMPARISON:  None    FINDINGS:  The lungs are clear, with normal appearance of pulmonary vasculature and no pleural effusion or pneumothorax.    The cardiac silhouette is normal in size. The hilar and mediastinal contours are unremarkable.    Bones are intact.                        ED Interpretation by Miguel Johnson Jr., MD (09/17/22 00:08:13, [facility] - Emergency Dept., Emergency Medicine)      No acute findings.                                   2:33 AM: Per STAT radiology, pt's CT Head Without Intravenous Contrast results: 1. No acute intracranial process. 2. Involutional changes with small vessel disease.    The EKG was ordered, reviewed, and independently interpreted by the ED provider.  Interpretation time: 23:20  Rate: 72 BPM  Rhythm: normal sinus rhythm  Interpretation: Minimal voltage criteria for LVH, may be normal variant ( R in aVL). Anterior infarct, age undetermined. Abnormal ECG. No STEMI.           The Emergency Provider  reviewed the vital signs and test results, which are outlined above.     ED Discussion     2:42 AM: Discussed case with Alba Mccann NP ([facility]). Dr. Ellington agrees with current care and management of pt and accepts admission.   Admitting Service: Hospital Medicine  Admitting Physician: Dr. Ellington  Admit to: Obs tele    2:52 AM: Re-evaluated pt. I have discussed test results, shared treatment plan, and the need for admission with patient and family at bedside. Pt and family express understanding at this time and agree with all information. All questions answered. Pt and family have no further questions or concerns at this time. Pt is ready for admit.     Patient has an elevated troponin with dizziness.  EKG is unremarkable.  She has not had any chest pain.  Clinically the patient has an NSTEMI.  Will admit on heparin drip for trending of enzymes and cardiology consultation if needed in the morning.    Medical Decision Making:   Clinical Tests:   Lab Tests: Ordered and Reviewed  Radiological Study: Ordered and Reviewed  Medical Tests: Ordered and Reviewed         ED Medication(s):  Medications   heparin 25,000 units in dextrose 5% 250 mL (100 units/mL) infusion LOW INTENSITY nomogram - [facility] (12 Units/kg/hr × 66.8 kg (Adjusted) Intravenous New Bag 9/17/22 0155)   heparin 25,000 units in dextrose 5% (100 units/ml) IV bolus from bag - ADDITIONAL PRN BOLUS - 60 units/kg (max bolus 4000 units) (has no administration in time range)   heparin 25,000 units in dextrose 5% (100 units/ml) IV bolus from bag - ADDITIONAL PRN BOLUS - 30 units/kg (max bolus 4000 units) (has no administration in time range)   amLODIPine tablet 10 mg (has no administration in time range)   aspirin EC tablet 81 mg (has no administration in time range)   atorvastatin tablet 80 mg (has no administration in time range)   divalproex EC tablet 250 mg (has no administration in time range)   losartan tablet 100 mg (has no administration in time range)    metoprolol succinate (TOPROL-XL) 24 hr tablet 100 mg (has no administration in time range)   pramipexole tablet 1 mg (has no administration in time range)   sodium chloride 0.9% flush 10 mL (has no administration in time range)   naloxone 0.4 mg/mL injection 0.02 mg (has no administration in time range)   glucose chewable tablet 16 g (has no administration in time range)   glucose chewable tablet 24 g (has no administration in time range)   dextrose 50% injection 12.5 g (has no administration in time range)   dextrose 50% injection 25 g (has no administration in time range)   glucagon (human recombinant) injection 1 mg (has no administration in time range)   acetaminophen tablet 650 mg (has no administration in time range)   bisacodyL suppository 10 mg (has no administration in time range)   ondansetron injection 4 mg (has no administration in time range)   promethazine tablet 25 mg (has no administration in time range)   insulin aspart U-100 pen 1-10 Units (has no administration in time range)   albuterol-ipratropium 2.5 mg-0.5 mg/3 mL nebulizer solution 3 mL (has no administration in time range)   melatonin tablet 6 mg (has no administration in time range)   aluminum-magnesium hydroxide-simethicone 200-200-20 mg/5 mL suspension 30 mL (has no administration in time range)   nitroGLYCERIN SL tablet 0.4 mg (has no administration in time range)   sodium chloride 0.9% bolus 500 mL (0 mLs Intravenous Stopped 9/17/22 0115)   meclizine tablet 50 mg (50 mg Oral Given 9/17/22 0007)   aspirin tablet 325 mg (325 mg Oral Given 9/17/22 0143)   metoprolol tartrate (LOPRESSOR) tablet 25 mg (25 mg Oral Given 9/17/22 0143)   heparin 25,000 units in dextrose 5% (100 units/ml) IV bolus from bag INITIAL BOLUS (max bolus 4000 units) (4,000 Units Intravenous Bolus from Bag 9/17/22 0155)       New Prescriptions    No medications on file               Scribe Attestation:   Scribe #1: I performed the above scribed service and the  documentation accurately describes the services I performed. I attest to the accuracy of the note.     Attending:   Physician Attestation Statement for Scribe #1: I, Miguel Johnson Jr., MD, personally performed the services described in this documentation, as scribed by Wendy Lindsay, in my presence, and it is both accurate and complete.       Scribe Attestation:   Scribe #1: I performed the above scribed service and the documentation accurately describes the services I performed. I attest to the accuracy of the note.    Attending Attestation:           Physician Attestation for Scribe:  Physician Attestation Statement for Scribe #1: I, Miguel Johnson Jr., MD, reviewed documentation, as scribed by Wendy Lindsay in my presence, and it is both accurate and complete.            Clinical Impression       ICD-10-CM ICD-9-CM   1. NSTEMI (non-ST elevated myocardial infarction)  I21.4 410.70   2. Dizziness  R42 780.4   3. Hyperglycemia  R73.9 790.29   4. Chest pain  R07.9 786.50       Disposition:   Disposition: Placed in Observation  Condition: Fair       Miguel Johnson Jr., MD  09/17/22 0317       Miguel Johnson Jr., MD  09/17/22 0318

## 2022-09-17 NOTE — ASSESSMENT & PLAN NOTE
- Troponin 0.502. EKG unrevealing. CP free on admission.    - Heparin drip per nomogram.  - Continue home ASA, BB, and high intensity statin.  - SL NTG as needed.     - Trend serial cardiac enzymes and EKG.  - Check FLP.  - Will obtain 2D echo.  - Cardiology consult.

## 2022-09-17 NOTE — ASSESSMENT & PLAN NOTE
Patient's FSGs are uncontrolled due to hyperglycemia on current medication regimen.  Last A1c reviewed- No results found for: LABA1C, HGBA1C  Most recent fingerstick glucose reviewed- No results for input(s): POCTGLUCOSE in the last 24 hours.  Current correctional scale  Medium  Maintain anti-hyperglycemic dose as follows-   Antihyperglycemics (From admission, onward)    Start     Stop Route Frequency Ordered    09/17/22 0413  insulin aspart U-100 pen 1-10 Units         -- SubQ Before meals & nightly PRN 09/17/22 0316        Hold Oral hypoglycemics while patient is in the hospital.

## 2022-09-17 NOTE — SUBJECTIVE & OBJECTIVE
I    Review of Systems    Constitutional:  Negative for chills, diaphoresis, fatigue and fever.   HENT:  Negative for congestion and sore throat.    Respiratory:  Negative for cough, chest tightness, shortness of breath and wheezing.    Cardiovascular:  Negative for chest pain, palpitations and leg swelling.   Gastrointestinal:  Negative for abdominal pain, constipation and vomiting.   Genitourinary:  Negative for dysuria and hematuria.   Musculoskeletal:  Negative for arthralgias, back pain and myalgias.   Skin:  Negative for pallor and rash.   Neurological:  Positive for dizziness and weakness (generalized). Negative for syncope, light-headedness, numbness and headaches.   Psychiatric/Behavioral:  Negative for confusion. The patient is not nervous/anxious.    All other systems reviewed and are negative.  Objective:     Vital Signs (Most Recent):  Temp: 98.2 °F (36.8 °C) (09/17/22 1150)  Pulse: 70 (09/17/22 1500)  Resp: 16 (09/17/22 1500)  BP: 133/75 (09/17/22 1500)  SpO2: 97 % (09/17/22 1500) Vital Signs (24h Range):  Temp:  [97.8 °F (36.6 °C)-98.7 °F (37.1 °C)] 98.2 °F (36.8 °C)  Pulse:  [65-81] 70  Resp:  [15-24] 16  SpO2:  [95 %-99 %] 97 %  BP: (127-156)/(58-86) 133/75     Weight: 91.6 kg (202 lb)  Body mass index is 36.95 kg/m².    Intake/Output Summary (Last 24 hours) at 9/17/2022 1539  Last data filed at 9/17/2022 1205  Gross per 24 hour   Intake 500 ml   Output 200 ml   Net 300 ml      Physical Exam      Vitals and nursing note reviewed.   Constitutional:       General: She is awake. She is not in acute distress.     Appearance: Normal appearance. She is well-developed. She is obese. She is not diaphoretic.   HENT:      Head: Normocephalic and atraumatic.   Eyes:      Conjunctiva/sclera: Conjunctivae normal.      Comments: PERRL; EOM intact.   Cardiovascular:      Rate and Rhythm: Normal rate and regular rhythm. No extrasystoles are present.     Heart sounds: S1 normal and S2 normal. No murmur heard.    No  gallop.   Pulmonary:      Effort: Pulmonary effort is normal. No tachypnea.      Breath sounds: Normal breath sounds and air entry. No wheezing, rhonchi or rales.   Abdominal:      General: Bowel sounds are normal. There is no distension.      Palpations: Abdomen is soft.      Tenderness: There is no abdominal tenderness.   Musculoskeletal:         General: No tenderness or deformity. Normal range of motion.      Cervical back: Normal range of motion and neck supple.      Right lower leg: No edema.      Left lower leg: No edema.   Skin:     General: Skin is warm and dry.      Capillary Refill: Capillary refill takes less than 2 seconds.      Findings: No erythema or rash.   Neurological:      General: No focal deficit present.      Mental Status: She is alert and oriented to person, place, and time.      GCS: GCS eye subscore is 4. GCS verbal subscore is 5. GCS motor subscore is 6.      Cranial Nerves: Cranial nerves 2-12 are intact.      Sensory: Sensation is intact.      Motor: Motor function is intact.   Psychiatric:         Mood and Affect: Mood and affect normal.         Behavior: Behavior normal. Behavior is cooperative.     Significant Labs: All pertinent labs within the past 24 hours have been reviewed.  CBC:   Recent Labs   Lab 09/16/22 2357 09/17/22  0410   WBC 4.07 4.33   HGB 13.4 12.7   HCT 40.1 38.0    210     CMP:   Recent Labs   Lab 09/16/22 2357 09/17/22  0410    139   K 3.6 3.4*   CL 98 102   CO2 25 24   * 284*   BUN 11 9   CREATININE 0.7 0.6   CALCIUM 9.1 8.4*   PROT 7.0  --    ALBUMIN 3.1*  --    BILITOT 0.3  --    ALKPHOS 71  --    AST 53*  --    ALT 37  --    ANIONGAP 15 13       Significant Imaging:     Imaging Results              CT Head Without Contrast (Final result)  Result time 09/17/22 08:33:37      Final result by Yo Larios MD (09/17/22 08:33:37)                   Impression:      Negative for acute intracranial abnormality.    All CT scans at this facility  are performed  using dose modulation techniques as appropriate to performed exam including the following:  automated exposure control; adjustment of mA and/or kV according to the patients size (this includes techniques or standardized protocols for targeted exams where dose is matched to indication/reason for exam: i.e. extremities or head);  iterative reconstruction technique.      Electronically signed by: Yo Larios MD  Date:    09/17/2022  Time:    08:33               Narrative:    EXAMINATION:  CT HEAD WITHOUT CONTRAST    CLINICAL HISTORY:  Dizziness, persistent/recurrent, cardiac or vascular cause suspected;    TECHNIQUE:  Axial CT images obtained throughout the head without intravenous contrast.    COMPARISON:  December 2, 2018    FINDINGS:  Negative for acute hemorrhage, mass effect, extraaxial collection, hydrocephalus.    There is good gray white matter differentiation.  Mild chronic small vessel ischemic changes deep periventricular white matter.    The paranasal sinuses and mastoids are clear.    The calvarium is unremarkable with no fractures.                                       X-Ray Chest AP Portable (Final result)  Result time 09/17/22 00:09:52      Final result by Roel Oliveira MD (09/17/22 00:09:52)                   Impression:      No acute abnormality.      Electronically signed by: Tee Sevilla  Date:    09/17/2022  Time:    00:09               Narrative:    EXAMINATION:  XR CHEST AP PORTABLE    CLINICAL HISTORY:  dizziness;    TECHNIQUE:  Single frontal view of the chest was performed.    COMPARISON:  None    FINDINGS:  The lungs are clear, with normal appearance of pulmonary vasculature and no pleural effusion or pneumothorax.    The cardiac silhouette is normal in size. The hilar and mediastinal contours are unremarkable.    Bones are intact.                        ED Interpretation by Miguel Johnson Jr., MD (09/17/22 00:08:13, [facility] - Emergency Dept[facility] Emergency Medicine)      No  acute findings.

## 2022-09-17 NOTE — PLAN OF CARE
Pt resting in bed, remains free of falls and injuries this shift. VSS. No obvious s/sx of distress noted. Pt on bed rest until 1700 post heart cath. POC reviewed with the patient, verbalized understanding. No further needs at this time, call light within reach. Will continue POC as ordered.

## 2022-09-17 NOTE — INTERVAL H&P NOTE
The patient has been examined and the H&P has been reviewed:    I concur with the findings and no changes have occurred since H&P was written.    Procedure risks, benefits and alternative options discussed and understood by patient/family.          Active Hospital Problems    Diagnosis  POA    *NSTEMI (non-ST elevated myocardial infarction) [I21.4]  Yes    Obesity [E66.9]  Yes     Chronic    Atherosclerosis of native coronary artery of native heart without angina pectoris [I25.10]  Yes     Chronic     Formatting of this note might be different from the original.  Cardiologist Dr Morgan  Next appt 10/18/2018      Hyperlipidemia [E78.5]  Yes     Chronic    Essential (primary) hypertension [I10]  Yes     Chronic    Type 2 diabetes mellitus, with long-term current use of insulin [E11.9, Z79.4]  Not Applicable     Chronic     ICD-10 Transition  Last Assessment & Plan:   Continue with sliding scale insulin.  Control improved on low-dose Lantus presently        Resolved Hospital Problems   No resolved problems to display.

## 2022-09-17 NOTE — HPI
"Ca Diaz is a 62 y.o. female patient with a PMHx of anxiety, CAD, h/o CVA, depression, DM II, RLS, seizures, fibromyalgia, HLD, HTN, PRAVEENA, and obesity who presented to the Emergency Department for evaluation of generalized weakness which onset gradually two days ago. Pt states she fell last night (9/15) and the night before last (9/14). Pt's  states that she "can't even walk through an open doorway." Symptoms are constant and moderate in severity. No mitigating or exacerbating factors reported. Associated sxs include N/D and dizziness. Pt reports the nausea began one day ago. Patient denies any HA, vomiting, light-headedness, visual disturbance, CP, SOB/MALAVE, diaphoresis, neck or jaw pain, upper extremity pain, numbness/tingling, dysuria, and all other sxs at this time. Work-up in the ED revealed NSTEMI with troponin of 0.502, EKG unrevealing, CXR unremarkable, CT of the head negative for acute process. 325 mg ASA given and UFH initiated. Hospital Medicine was contacted for admission and patient placed in Observation.     "

## 2022-09-17 NOTE — ASSESSMENT & PLAN NOTE
Multiple prior stents last 2020. Recurrent symptoms and pos troponin.  Plan  Cath today . Patient agrees

## 2022-09-17 NOTE — HPI
" Ca Diaz is a 62 y.o. female patient with a PMHx of anxiety, CAD, h/o CVA, depression, DM II, RLS, seizures, fibromyalgia, HLD, HTN, PRAVEENA, and obesity who presented to the Emergency Department for evaluation of generalized weakness which onset gradually two days ago. Pt states she fell last night (9/15) and the night before last (9/14). Pt's  states that she "can't even walk through an open doorway." Symptoms are constant and moderate in severity. No mitigating or exacerbating factors reported. Associated sxs include N/D and dizziness. Pt reports the nausea began one day ago. Patient denies any HA, vomiting, light-headedness, visual disturbance, CP, SOB/MALAVE, diaphoresis, neck or jaw pain, upper extremity pain, numbness/tingling, dysuria, and all other sxs at this time. Work-up in the ED revealed NSTEMI with troponin of 0.502, EKG unrevealing, CXR unremarkable, CT of the head negative for acute process. 325 mg ASA given and UFH initiated. Hospital Medicine was contacted for admission and patient placed in Observation.     Prior CAD and coronary stents:  1.  Coronary artery disease , status post stenting of the proximal circumflex artery with 2.5 x 16 mm Synergy drug-eluting stent and stenting of the distal right coronary artery 3.0 x 24 mm Synergy drug-eluting stent 8/25/2020.  Recent catheterization also revealed moderate stenosis of about 60% in the LAD that was evaluated by FFR and deemed to be not functionally significant, first diagonal stenosis of 90% just proximal to the previously placed stent.  The remote stents of the LAD and diagonal from 2010 were patent with about 50 to 60% in-stent stenosis of the LAD, patent stent to the diagonal but with 90% stenosis just proximal to the stent at the ostium of the diagonal.  The stents to the obtuse marginal and proximal right coronary artery from 2016 were also patent.   Patient states she has several days of angina and presented with weakness " and chest pressure.

## 2022-09-17 NOTE — SUBJECTIVE & OBJECTIVE
Past Medical History:   Diagnosis Date      Anxiety and depression  CAD 3/13/2014    Essential (primary) hypertension 2013    Fibromyalgia 2012    Hyperlipidemia     Obstructive sleep apnea syndrome 2014    RLS (restless legs syndrome) 10/18/2018    Overview:  Rheum Dr Tam    Seizures     Type 2 diabetes mellitus 2012    ICD-10 Transition Last Assessment & Plan:  Continue with sliding scale insulin.  Control improved on low-dose Lantus presently       Past Surgical History:   Procedure Laterality Date    ABDOMINAL SURGERY       SECTION         Review of patient's allergies indicates:   Allergen Reactions    Epinephrine Anaphylaxis    Lidocaine Anaphylaxis     Dental visit pt stopped breathing after given lidocaine    Ace inhibitors     Shellfish containing products Itching    Sulfadiazine Other (See Comments)       No current facility-administered medications on file prior to encounter.     Current Outpatient Medications on File Prior to Encounter   Medication Sig    albuterol (PROVENTIL/VENTOLIN HFA) 90 mcg/actuation inhaler Inhale 2 puffs into the lungs every 6 (six) hours as needed for Wheezing.    amLODIPine (NORVASC) 10 MG tablet TAKE 1 TABLET BY MOUTH ONCE DAILY    aspirin (ECOTRIN) 81 MG EC tablet Take 81 mg by mouth.    atorvastatin (LIPITOR) 80 MG tablet TAKE 1 TABLET BY MOUTH ONCE DAILY    carBAMazepine (TEGRETOL) 200 mg tablet Take 1 tablet (200 mg total) by mouth once daily. Take one tab daily x 7 days;  If symptoms persist may increase to one tab BID thereafter    diclofenac (VOLTAREN) 75 MG EC tablet Take 75 mg by mouth.    dicyclomine (BENTYL) 20 mg tablet Take 1 tablet (20 mg total) by mouth 3 (three) times daily as needed (Abdominal cramping). As needed for abdominal cramping    divalproex (DEPAKOTE) 250 MG EC tablet TAKE 1 TABLET BY MOUTH ONCE DAILY    divalproex ER (DEPAKOTE) 500 MG Tb24 Take 500 mg by mouth.    HUMALOG MIX 75-25 KWIKPEN 100 unit/mL (75-25) InPn  INJECT 75 UNITS SUBCUTANEOUSLY TWICE DAILY BEFORE MEAL(S)    HYDROcodone-acetaminophen (NORCO)  mg per tablet One by mouth every 4-6 hours when necessary pain    JARDIANCE 25 mg Tab Take 1 tablet by mouth once daily.    losartan (COZAAR) 100 MG tablet TAKE ONE TABLET BY MOUTH ONCE DAILY FOR  BLOOD  PRESSURE.    metoprolol succinate (TOPROL-XL) 100 MG 24 hr tablet Take 100 mg by mouth.    modafinil (PROVIGIL) 100 MG Tab TAKE 1 TABLET BY MOUTH TWICE DAILY AS NEEDED FOR FATIGUE    pantoprazole (PROTONIX) 40 MG tablet TAKE 1 TABLET BY MOUTH ONCE DAILY FOR REFLUX    pramipexole (MIRAPEX) 1 MG tablet Take 1 mg by mouth nightly.    zonisamide (ZONEGRAN) 100 MG Cap TAKE 1 CAPSULE BY MOUTH AT NIGHT     Family History    Reviewed and not pertinent.        Tobacco Use    Smoking status: Former     Types: Cigarettes     Quit date:      Years since quittin.    Smokeless tobacco: Never   Substance and Sexual Activity    Alcohol use: Not Currently    Drug use: Never    Sexual activity: Not on file     Review of Systems   Constitutional:  Negative for chills, diaphoresis, fatigue and fever.   HENT:  Negative for congestion and sore throat.    Respiratory:  Negative for cough, chest tightness, shortness of breath and wheezing.    Cardiovascular:  Negative for chest pain, palpitations and leg swelling.   Gastrointestinal:  Positive for diarrhea and nausea. Negative for abdominal pain, constipation and vomiting.   Genitourinary:  Negative for dysuria and hematuria.   Musculoskeletal:  Negative for arthralgias, back pain and myalgias.   Skin:  Negative for pallor and rash.   Neurological:  Positive for dizziness and weakness (generalized). Negative for syncope, light-headedness, numbness and headaches.   Psychiatric/Behavioral:  Negative for confusion. The patient is not nervous/anxious.    All other systems reviewed and are negative.  Objective:     Vital Signs (Most Recent):  Temp: 98.7 °F (37.1 °C) (22  2152)  Pulse: 75 (09/17/22 0132)  Resp: 15 (09/17/22 0132)  BP: (!) 152/67 (09/17/22 0132)  SpO2: 98 % (09/17/22 0132)   Vital Signs (24h Range):  Temp:  [98.7 °F (37.1 °C)] 98.7 °F (37.1 °C)  Pulse:  [75-81] 75  Resp:  [15-18] 15  SpO2:  [96 %-99 %] 98 %  BP: (135-156)/(58-74) 152/67     Weight: 91.8 kg (202 lb 6.1 oz)  Body mass index is 37.02 kg/m².    Physical Exam  Vitals and nursing note reviewed.   Constitutional:       General: She is awake. She is not in acute distress.     Appearance: Normal appearance. She is well-developed. She is obese. She is not diaphoretic.   HENT:      Head: Normocephalic and atraumatic.   Eyes:      Conjunctiva/sclera: Conjunctivae normal.      Comments: PERRL; EOM intact.   Cardiovascular:      Rate and Rhythm: Normal rate and regular rhythm. No extrasystoles are present.     Heart sounds: S1 normal and S2 normal. No murmur heard.    No gallop.   Pulmonary:      Effort: Pulmonary effort is normal. No tachypnea.      Breath sounds: Normal breath sounds and air entry. No wheezing, rhonchi or rales.   Abdominal:      General: Bowel sounds are normal. There is no distension.      Palpations: Abdomen is soft.      Tenderness: There is no abdominal tenderness.   Musculoskeletal:         General: No tenderness or deformity. Normal range of motion.      Cervical back: Normal range of motion and neck supple.      Right lower leg: No edema.      Left lower leg: No edema.   Skin:     General: Skin is warm and dry.      Capillary Refill: Capillary refill takes less than 2 seconds.      Findings: No erythema or rash.   Neurological:      General: No focal deficit present.      Mental Status: She is alert and oriented to person, place, and time.      GCS: GCS eye subscore is 4. GCS verbal subscore is 5. GCS motor subscore is 6.      Cranial Nerves: Cranial nerves 2-12 are intact.      Sensory: Sensation is intact.      Motor: Motor function is intact.   Psychiatric:         Mood and Affect:  Mood and affect normal.         Behavior: Behavior normal. Behavior is cooperative.           Significant Labs:  Results for orders placed or performed during the hospital encounter of 09/16/22   CBC auto differential   Result Value Ref Range    WBC 4.07 3.90 - 12.70 K/uL    RBC 4.90 4.00 - 5.40 M/uL    Hemoglobin 13.4 12.0 - 16.0 g/dL    Hematocrit 40.1 37.0 - 48.5 %    MCV 82 82 - 98 fL    MCH 27.3 27.0 - 31.0 pg    MCHC 33.4 32.0 - 36.0 g/dL    RDW 14.1 11.5 - 14.5 %    Platelets 207 150 - 450 K/uL    MPV 10.5 9.2 - 12.9 fL    Immature Granulocytes 0.2 0.0 - 0.5 %    Gran # (ANC) 1.8 1.8 - 7.7 K/uL    Immature Grans (Abs) 0.01 0.00 - 0.04 K/uL    Lymph # 1.6 1.0 - 4.8 K/uL    Mono # 0.6 0.3 - 1.0 K/uL    Eos # 0.1 0.0 - 0.5 K/uL    Baso # 0.03 0.00 - 0.20 K/uL    nRBC 0 0 /100 WBC    Gran % 43.1 38.0 - 73.0 %    Lymph % 39.3 18.0 - 48.0 %    Mono % 14.5 4.0 - 15.0 %    Eosinophil % 2.2 0.0 - 8.0 %    Basophil % 0.7 0.0 - 1.9 %    Differential Method Automated    Comprehensive metabolic panel   Result Value Ref Range    Sodium 138 136 - 145 mmol/L    Potassium 3.6 3.5 - 5.1 mmol/L    Chloride 98 95 - 110 mmol/L    CO2 25 23 - 29 mmol/L    Glucose 324 (H) 70 - 110 mg/dL    BUN 11 6 - 20 mg/dL    Creatinine 0.7 0.5 - 1.4 mg/dL    Calcium 9.1 8.7 - 10.5 mg/dL    Total Protein 7.0 6.0 - 8.4 g/dL    Albumin 3.1 (L) 3.5 - 5.2 g/dL    Total Bilirubin 0.3 0.1 - 1.0 mg/dL    Alkaline Phosphatase 71 55 - 135 U/L    AST 53 (H) 10 - 40 U/L    ALT 37 10 - 44 U/L    Anion Gap 15 8 - 16 mmol/L    eGFR >60 >60 mL/min/1.73 m^2   Troponin I   Result Value Ref Range    Troponin I 0.502 (H) 0.000 - 0.026 ng/mL   Urinalysis, Reflex to Urine Culture Urine, Clean Catch    Specimen: Urine   Result Value Ref Range    Specimen UA Urine, Clean Catch     Color, UA Yellow Yellow, Straw, Estrellita    Appearance, UA Clear Clear    pH, UA 7.0 5.0 - 8.0    Specific Gravity, UA 1.020 1.005 - 1.030    Protein, UA Negative Negative    Glucose, UA 4+ (A)  Negative    Ketones, UA 1+ (A) Negative    Bilirubin (UA) Negative Negative    Occult Blood UA Negative Negative    Nitrite, UA Negative Negative    Urobilinogen, UA Negative <2.0 EU/dL    Leukocytes, UA 2+ (A) Negative   HIV 1/2 Ag/Ab (4th Gen)   Result Value Ref Range    HIV 1/2 Ag/Ab Negative Negative   Hepatitis C Antibody   Result Value Ref Range    Hepatitis C Ab Negative Negative   HCV Virus Hold Specimen   Result Value Ref Range    HEP C Virus Hold Specimen Hold for HCV sendout    APTT   Result Value Ref Range    aPTT 27.8 21.0 - 32.0 sec   Protime-INR   Result Value Ref Range    Prothrombin Time 10.4 9.0 - 12.5 sec    INR 1.0 0.8 - 1.2   Urinalysis Microscopic   Result Value Ref Range    WBC, UA 13 (H) 0 - 5 /hpf    WBC Clumps, UA None None-Rare    Bacteria Rare None-Occ /hpf    Yeast, UA None None    Squam Epithel, UA 4 /hpf    Hyaline Casts, UA 38 (A) 0-1/lpf /lpf    Microscopic Comment SEE COMMENT    POCT COVID-19 Rapid Screening   Result Value Ref Range    POC Rapid COVID Negative Negative     Acceptable Yes       All pertinent labs within the past 24 hours have been reviewed.    Significant Imaging:   Imaging Results              CT Head Without Contrast (In process)                      X-Ray Chest AP Portable (Final result)  Result time 09/17/22 00:09:52      Final result by Roel Oliveira MD (09/17/22 00:09:52)                   Impression:      No acute abnormality.      Electronically signed by: Tee Sevilla  Date:    09/17/2022  Time:    00:09               Narrative:    EXAMINATION:  XR CHEST AP PORTABLE    CLINICAL HISTORY:  dizziness;    TECHNIQUE:  Single frontal view of the chest was performed.    COMPARISON:  None    FINDINGS:  The lungs are clear, with normal appearance of pulmonary vasculature and no pleural effusion or pneumothorax.    The cardiac silhouette is normal in size. The hilar and mediastinal contours are unremarkable.    Bones are intact.                         ED Interpretation by Miguel Johnson Jr., MD (09/17/22 00:08:13, [facility] - Emergency Dept[facility] Emergency Medicine)      No acute findings.                                   I have reviewed all pertinent imaging results/findings within the past 24 hours.      EKG: (personally reviewed)  Normal sinus rhythm, no acute ischemic ST-T abnormalities. No previous tracings to compare.

## 2022-09-17 NOTE — H&P (VIEW-ONLY)
"[facility]  Cardiology  Consult Note    Patient Name: Ca Diaz  MRN: [id number]  Admission Date: 9/16/2022  Hospital Length of Stay: 0 days  Code Status: Full Code   Attending Provider: Leisa Plata, *   Consulting Provider: Elmer Maguire MD  Primary Care Physician: Desiree Frye MD  Principal Problem:NSTEMI (non-ST elevated myocardial infarction)    Patient information was obtained from patient and ER records.     Inpatient consult to Cardiology  Consult performed by: Elmer Maguire MD  Consult ordered by: Alba Mccann NP        Subjective:     Chief Complaint:  Chest pain and weakness     HPI:    Ca Diaz is a 62 y.o. female patient with a PMHx of anxiety, CAD, h/o CVA, depression, DM II, RLS, seizures, fibromyalgia, HLD, HTN, PRAVEENA, and obesity who presented to the Emergency Department for evaluation of generalized weakness which onset gradually two days ago. Pt states she fell last night (9/15) and the night before last (9/14). Pt's  states that she "can't even walk through an open doorway." Symptoms are constant and moderate in severity. No mitigating or exacerbating factors reported. Associated sxs include N/D and dizziness. Pt reports the nausea began one day ago. Patient denies any HA, vomiting, light-headedness, visual disturbance, CP, SOB/MALAVE, diaphoresis, neck or jaw pain, upper extremity pain, numbness/tingling, dysuria, and all other sxs at this time. Work-up in the ED revealed NSTEMI with troponin of 0.502, EKG unrevealing, CXR unremarkable, CT of the head negative for acute process. 325 mg ASA given and UFH initiated. Hospital Medicine was contacted for admission and patient placed in Observation.     Prior CAD and coronary stents:  1.  Coronary artery disease , status post stenting of the proximal circumflex artery with 2.5 x 16 mm Synergy drug-eluting stent and stenting of the distal right coronary artery 3.0 x 24 mm Synergy " drug-eluting stent 2020.  Recent catheterization also revealed moderate stenosis of about 60% in the LAD that was evaluated by FFR and deemed to be not functionally significant, first diagonal stenosis of 90% just proximal to the previously placed stent.  The remote stents of the LAD and diagonal from  were patent with about 50 to 60% in-stent stenosis of the LAD, patent stent to the diagonal but with 90% stenosis just proximal to the stent at the ostium of the diagonal.  The stents to the obtuse marginal and proximal right coronary artery from  were also patent.   Patient states she has several days of angina and presented with weakness and chest pressure.      Past Medical History:   Diagnosis Date    Anxiety and depression 3/13/2014    Essential (primary) hypertension 2013    Fibromyalgia 2012    Hyperlipidemia     Obstructive sleep apnea syndrome 2014    RLS (restless legs syndrome) 10/18/2018    Overview:  Rheum Dr Tam    Seizures     Type 2 diabetes mellitus 2012    ICD-10 Transition Last Assessment & Plan:  Continue with sliding scale insulin.  Control improved on low-dose Lantus presently       Past Surgical History:   Procedure Laterality Date    ABDOMINAL SURGERY       SECTION         Review of patient's allergies indicates:   Allergen Reactions    Epinephrine Anaphylaxis    Lidocaine Anaphylaxis     Dental visit pt stopped breathing after given lidocaine    Ace inhibitors     Shellfish containing products Itching    Sulfadiazine Other (See Comments)       No current facility-administered medications on file prior to encounter.     Current Outpatient Medications on File Prior to Encounter   Medication Sig    amLODIPine (NORVASC) 10 MG tablet TAKE 1 TABLET BY MOUTH ONCE DAILY    amLODIPine (NORVASC) 10 MG tablet Take 1 tablet by mouth once daily.    apple cider vinegar 300 mg Tab Take 450 mg by mouth once daily.    aspirin (ECOTRIN) 81 MG EC  tablet Take 81 mg by mouth.    aspirin (ECOTRIN) 81 MG EC tablet Take 1 tablet by mouth once daily.    atorvastatin (LIPITOR) 80 MG tablet TAKE 1 TABLET BY MOUTH ONCE DAILY    atorvastatin (LIPITOR) 80 MG tablet Take 1 tablet by mouth once daily.    CALCIUM-MAGNESIUM-ZINC ORAL Take 2 tablets by mouth once daily.    divalproex ER (DEPAKOTE) 500 MG Tb24 Take 500 mg by mouth 3 (three) times daily.    ELDERBERRY FRUIT ORAL Take 50 mg by mouth once daily.    flash glucose sensor (AlignMedSTYLE QUYNH 14 DAY SENSOR) Kit 1 Cartridge by subcutaneous (via wearable injector) route every 14 (fourteen) days.    FLUoxetine 20 MG capsule Take 20 mg by mouth once daily.    FLUoxetine 40 MG capsule Take 40 mg by mouth once daily.    HUMALOG MIX 75-25 KWIKPEN 100 unit/mL (75-25) InPn INJECT 75 UNITS SUBCUTANEOUSLY TWICE DAILY BEFORE MEAL(S)    insulin lispro protamin-lispro 100 unit/mL (75-25) InPn Inject 80 Units into the skin.    metoprolol succinate (TOPROL-XL) 50 MG 24 hr tablet Take 50 mg by mouth once daily.    multivitamin capsule Take 1 capsule by mouth once daily.    pregabalin (LYRICA) 100 MG capsule Take 100 mg by mouth 3 (three) times daily.    ticagrelor (BRILINTA) 90 mg tablet Take 1 tablet by mouth 2 (two) times daily.    turmeric root extract 500 mg Cap Take 500 mg by mouth 2 (two) times a day.    albuterol (PROVENTIL/VENTOLIN HFA) 90 mcg/actuation inhaler Inhale 2 puffs into the lungs every 6 (six) hours as needed for Wheezing.    carBAMazepine (TEGRETOL) 200 mg tablet Take 1 tablet (200 mg total) by mouth once daily. Take one tab daily x 7 days;  If symptoms persist may increase to one tab BID thereafter    divalproex (DEPAKOTE) 500 MG TbEC Take 500 mg by mouth 3 (three) times daily.    [DISCONTINUED] diclofenac (VOLTAREN) 75 MG EC tablet Take 75 mg by mouth.    [DISCONTINUED] dicyclomine (BENTYL) 20 mg tablet Take 1 tablet (20 mg total) by mouth 3 (three) times daily as needed (Abdominal cramping).  As needed for abdominal cramping    [DISCONTINUED] divalproex (DEPAKOTE) 250 MG EC tablet TAKE 1 TABLET BY MOUTH ONCE DAILY    [DISCONTINUED] HYDROcodone-acetaminophen (NORCO)  mg per tablet One by mouth every 4-6 hours when necessary pain    [DISCONTINUED] JARDIANCE 25 mg Tab Take 1 tablet by mouth once daily.    [DISCONTINUED] losartan (COZAAR) 100 MG tablet TAKE ONE TABLET BY MOUTH ONCE DAILY FOR  BLOOD  PRESSURE.    [DISCONTINUED] modafinil (PROVIGIL) 100 MG Tab TAKE 1 TABLET BY MOUTH TWICE DAILY AS NEEDED FOR FATIGUE    [DISCONTINUED] pantoprazole (PROTONIX) 40 MG tablet TAKE 1 TABLET BY MOUTH ONCE DAILY FOR REFLUX    [DISCONTINUED] pramipexole (MIRAPEX) 1 MG tablet Take 1 mg by mouth nightly.    [DISCONTINUED] zonisamide (ZONEGRAN) 100 MG Cap TAKE 1 CAPSULE BY MOUTH AT NIGHT     Family History    None       Tobacco Use    Smoking status: Former     Types: Cigarettes     Quit date:      Years since quittin.7    Smokeless tobacco: Never   Substance and Sexual Activity    Alcohol use: Not Currently    Drug use: Never    Sexual activity: Not on file     Review of Systems   Constitutional: Positive for malaise/fatigue. Negative for chills, diaphoresis, night sweats, weight gain and weight loss.   HENT:  Negative for congestion, hoarse voice, sore throat and stridor.    Eyes:  Negative for double vision and pain.   Cardiovascular:  Positive for chest pain. Negative for claudication, cyanosis, dyspnea on exertion, irregular heartbeat, leg swelling, near-syncope, orthopnea, palpitations, paroxysmal nocturnal dyspnea and syncope.   Respiratory:  Negative for cough, hemoptysis, shortness of breath, sleep disturbances due to breathing, snoring, sputum production and wheezing.    Endocrine: Negative for cold intolerance, heat intolerance and polydipsia.   Hematologic/Lymphatic: Negative for bleeding problem. Does not bruise/bleed easily.   Skin:  Negative for color change, dry skin and rash.    Musculoskeletal:  Negative for joint swelling and muscle cramps.   Gastrointestinal:  Negative for bloating, abdominal pain, constipation, diarrhea, dysphagia, melena, nausea and vomiting.   Genitourinary:  Negative for flank pain and urgency.   Neurological:  Negative for dizziness, focal weakness, headaches, light-headedness, loss of balance, seizures and weakness.   Psychiatric/Behavioral:  Negative for altered mental status and memory loss. The patient is not nervous/anxious.    Objective:     Vital Signs (Most Recent):  Temp: 98.2 °F (36.8 °C) (09/17/22 1150)  Pulse: 67 (09/17/22 1152)  Resp: 20 (09/17/22 1150)  BP: 134/62 (09/17/22 1150)  SpO2: 95 % (09/17/22 1150) Vital Signs (24h Range):  Temp:  [97.8 °F (36.6 °C)-98.7 °F (37.1 °C)] 98.2 °F (36.8 °C)  Pulse:  [65-81] 67  Resp:  [15-24] 20  SpO2:  [95 %-99 %] 95 %  BP: (127-156)/(58-86) 134/62     Weight: 91.6 kg (202 lb)  Body mass index is 36.95 kg/m².    SpO2: 95 %  O2 Device (Oxygen Therapy): room air      Intake/Output Summary (Last 24 hours) at 9/17/2022 1201  Last data filed at 9/17/2022 1000  Gross per 24 hour   Intake 500 ml   Output 200 ml   Net 300 ml       Lines/Drains/Airways       Peripheral Intravenous Line  Duration                  Peripheral IV - Single Lumen 09/17/22 0001 20 G Left;Posterior Hand <1 day         Peripheral IV - Single Lumen 09/17/22 0150 20 G Posterior;Right Hand <1 day                    Physical Exam  Eyes:      Pupils: Pupils are equal, round, and reactive to light.   Neck:      Trachea: No tracheal deviation.   Cardiovascular:      Rate and Rhythm: Normal rate and regular rhythm.      Pulses: Intact distal pulses.           Carotid pulses are 2+ on the right side and 2+ on the left side.       Radial pulses are 2+ on the right side and 2+ on the left side.        Femoral pulses are 2+ on the right side and 2+ on the left side.       Popliteal pulses are 2+ on the right side and 2+ on the left side.        Dorsalis  pedis pulses are 2+ on the right side and 2+ on the left side.        Posterior tibial pulses are 2+ on the right side and 2+ on the left side.      Heart sounds: Normal heart sounds. No murmur heard.    No friction rub. No gallop.   Pulmonary:      Effort: Pulmonary effort is normal. No respiratory distress.      Breath sounds: Normal breath sounds. No stridor. No wheezing or rales.   Chest:      Chest wall: No tenderness.   Abdominal:      General: There is no distension.      Tenderness: There is no abdominal tenderness. There is no rebound.   Musculoskeletal:         General: No tenderness.      Cervical back: Normal range of motion.   Skin:     General: Skin is warm and dry.   Neurological:      Mental Status: She is alert and oriented to person, place, and time.       Significant Labs: CBC   Recent Labs   Lab 09/16/22 2357 09/17/22  0410   WBC 4.07 4.33   HGB 13.4 12.7   HCT 40.1 38.0    210    and Troponin   Recent Labs   Lab 09/16/22 2357 09/17/22  0828   TROPONINI 0.502* 0.274*       Significant Imaging: Echocardiogram: Transthoracic echo (TTE) complete ([facility] Only):   Results for orders placed or performed during the hospital encounter of 09/16/22   Echo   Result Value Ref Range    BSA 2 m2    TDI SEPTAL 0.06 m/s    LV LATERAL E/E' RATIO 16.00 m/s    LV SEPTAL E/E' RATIO 16.00 m/s    LA WIDTH 3.50 cm    IVC diameter 1.78 cm    Left Ventricular Outflow Tract Mean Velocity 0.90 cm/s    Left Ventricular Outflow Tract Mean Gradient 3.28 mmHg    TDI LATERAL 0.06 m/s    LVIDd 3.49 (A) 3.5 - 6.0 cm    IVS 1.28 (A) 0.6 - 1.1 cm    Posterior Wall 1.18 (A) 0.6 - 1.1 cm    Ao root annulus 2.57 cm    LVIDs 2.43 2.1 - 4.0 cm    FS 30 28 - 44 %    LA volume 53.78 cm3    STJ 2.92 cm    Ascending aorta 2.90 cm    LV mass 140.49 g    LA size 3.58 cm    TAPSE 1.90 cm    Left Ventricle Relative Wall Thickness 0.68 cm    AV mean gradient 8 mmHg    AV valve area 1.96 cm2    AV Velocity Ratio 0.63     AV index  (prosthetic) 0.65     E/A ratio 0.98     Mean e' 0.06 m/s    E wave deceleration time 236.57 msec    IVRT 79.92 msec    LVOT diameter 1.96 cm    LVOT area 3.0 cm2    LVOT peak nilay 1.07 m/s    LVOT peak VTI 27.30 cm    Ao peak nilay 1.71 m/s    Ao VTI 42.1 cm    RVOT peak nilay 0.70 m/s    RVOT peak VTI 15.6 cm    LVOT stroke volume 82.33 cm3    AV peak gradient 12 mmHg    PV mean gradient 1.22 mmHg    E/E' ratio 16.00 m/s    MV Peak E Nilay 0.96 m/s    MV Peak A Nilay 0.98 m/s    LV Systolic Volume 20.74 mL    LV Systolic Volume Index 10.8 mL/m2    LV Diastolic Volume 50.49 mL    LV Diastolic Volume Index 26.30 mL/m2    LA Volume Index 28.0 mL/m2    LV Mass Index 73 g/m2    RA Major Axis 3.84 cm    Left Atrium Minor Axis 5.02 cm    Left Atrium Major Axis 5.08 cm    RA Width 2.80 cm    Right Atrial Pressure (from IVC) 8 mmHg    EF 60 %    Narrative    · The left ventricle is normal in size with concentric remodeling and   normal systolic function.  · The estimated ejection fraction is 60%.  · Indeterminate left ventricular diastolic function.  · Normal right ventricular size with normal right ventricular systolic   function.  · There is mild aortic valve stenosis.  · Aortic valve area is 1.96 cm2; peak velocity is 1.71 m/s; mean gradient   is 8 mmHg.  · Intermediate central venous pressure (8 mmHg).        Assessment and Plan:     * NSTEMI (non-ST elevated myocardial infarction)  Plan cath today    Atherosclerosis of native coronary artery of native heart without angina pectoris  Multiple prior stents last 2020. Recurrent symptoms and pos troponin.  Plan  Cath today . Patient agrees    Hyperlipidemia  Continue statin    Essential (primary) hypertension  Continue amlodipine    Type 2 diabetes mellitus, with long-term current use of insulin  Per hospital medicine        VTE Risk Mitigation (From admission, onward)         Ordered     IP VTE HIGH RISK PATIENT  Once         09/17/22 0315     Place sequential compression device   Until discontinued         09/17/22 0315     heparin 25,000 units in dextrose 5% (100 units/ml) IV bolus from bag - ADDITIONAL PRN BOLUS - 60 units/kg (max bolus 4000 units)  As needed (PRN)        Question:  Heparin Infusion Adjustment (DO NOT MODIFY ANSWER)  Answer:  [email or web address]\epic\Images\Pharmacy\HeparinInfusions\heparin LOW INTENSITY nomogram for [facility] [id number]    09/17/22 0045     heparin 25,000 units in dextrose 5% (100 units/ml) IV bolus from bag - ADDITIONAL PRN BOLUS - 30 units/kg (max bolus 4000 units)  As needed (PRN)        Question:  Heparin Infusion Adjustment (DO NOT MODIFY ANSWER)  Answer:  [email or web address]\epic\Images\Pharmacy\HeparinInfusions\heparin LOW INTENSITY nomogram for [facility] [id number]    09/17/22 0045     heparin 25,000 units in dextrose 5% 250 mL (100 units/mL) infusion LOW INTENSITY nomogram - [facility]  Continuous        Question Answer Comment   Heparin Infusion Adjustment (DO NOT MODIFY ANSWER) [email or web address]\epic\Images\Pharmacy\HeparinInfusions\heparin LOW INTENSITY nomogram for [facility] [id number]    Begin at (in units/kg/hr) 12        09/17/22 0045                Thank you for your consult. I will follow-up with patient. Please contact us if you have any additional questions.    Elmer Maguire MD  Cardiology   [facility]

## 2022-09-17 NOTE — OP NOTE
INPATIENT Operative Note         SUMMARY     Surgery Date: 9/17/2022     Surgeon(s) and Role:     * Vale Pa MD - Primary    ASSISTANT:none    Pre-op Diagnosis:  NSTEMI (non-ST elevated myocardial infarction) [I21.4]      Post-op Diagnosis:  NSTEMI (non-ST elevated myocardial infarction) [I21.4]    Procedure(s) (LRB):  CATHETERIZATION, HEART, LEFT (Left)  ARTERIOGRAM, SUBCLAVIAN    COMPLICATION:none    Anesthesia: RN IV Sedation    Findings/Key Components:  Lmca normal; lad prox eccentric plaque 70% hazzy d1 stent 99%  mid lad 70%  Lcx non obs cad om1 stent patent edge stent 40-50% stenosis.   Rca stent patent nopn obs cad pda 70%.  Lvedp normal    Lvf normal anterolateral hk.    Estimated Blood Loss: < 50 ML.         SPECIMEN: NONE    Devices/Prostetics: None    PLAN:   Consult cardiac surgery.

## 2022-09-17 NOTE — SUBJECTIVE & OBJECTIVE
Past Medical History:   Diagnosis Date    Anxiety and depression 3/13/2014    Essential (primary) hypertension 2013    Fibromyalgia 2012    Hyperlipidemia     Obstructive sleep apnea syndrome 2014    RLS (restless legs syndrome) 10/18/2018    Overview:  Rheum Dr Tam    Seizures     Type 2 diabetes mellitus 2012    ICD-10 Transition Last Assessment & Plan:  Continue with sliding scale insulin.  Control improved on low-dose Lantus presently       Past Surgical History:   Procedure Laterality Date    ABDOMINAL SURGERY       SECTION         Review of patient's allergies indicates:   Allergen Reactions    Epinephrine Anaphylaxis    Lidocaine Anaphylaxis     Dental visit pt stopped breathing after given lidocaine    Ace inhibitors     Shellfish containing products Itching    Sulfadiazine Other (See Comments)       No current facility-administered medications on file prior to encounter.     Current Outpatient Medications on File Prior to Encounter   Medication Sig    amLODIPine (NORVASC) 10 MG tablet TAKE 1 TABLET BY MOUTH ONCE DAILY    amLODIPine (NORVASC) 10 MG tablet Take 1 tablet by mouth once daily.    apple cider vinegar 300 mg Tab Take 450 mg by mouth once daily.    aspirin (ECOTRIN) 81 MG EC tablet Take 81 mg by mouth.    aspirin (ECOTRIN) 81 MG EC tablet Take 1 tablet by mouth once daily.    atorvastatin (LIPITOR) 80 MG tablet TAKE 1 TABLET BY MOUTH ONCE DAILY    atorvastatin (LIPITOR) 80 MG tablet Take 1 tablet by mouth once daily.    CALCIUM-MAGNESIUM-ZINC ORAL Take 2 tablets by mouth once daily.    divalproex ER (DEPAKOTE) 500 MG Tb24 Take 500 mg by mouth 3 (three) times daily.    ELDERBERRY FRUIT ORAL Take 50 mg by mouth once daily.    flash glucose sensor (FREESTYLE QUYNH 14 DAY SENSOR) Kit 1 Cartridge by subcutaneous (via wearable injector) route every 14 (fourteen) days.    FLUoxetine 20 MG capsule Take 20 mg by mouth once daily.    FLUoxetine 40 MG capsule Take 40 mg by  mouth once daily.    HUMALOG MIX 75-25 KWIKPEN 100 unit/mL (75-25) InPn INJECT 75 UNITS SUBCUTANEOUSLY TWICE DAILY BEFORE MEAL(S)    insulin lispro protamin-lispro 100 unit/mL (75-25) InPn Inject 80 Units into the skin.    metoprolol succinate (TOPROL-XL) 50 MG 24 hr tablet Take 50 mg by mouth once daily.    multivitamin capsule Take 1 capsule by mouth once daily.    pregabalin (LYRICA) 100 MG capsule Take 100 mg by mouth 3 (three) times daily.    ticagrelor (BRILINTA) 90 mg tablet Take 1 tablet by mouth 2 (two) times daily.    turmeric root extract 500 mg Cap Take 500 mg by mouth 2 (two) times a day.    albuterol (PROVENTIL/VENTOLIN HFA) 90 mcg/actuation inhaler Inhale 2 puffs into the lungs every 6 (six) hours as needed for Wheezing.    carBAMazepine (TEGRETOL) 200 mg tablet Take 1 tablet (200 mg total) by mouth once daily. Take one tab daily x 7 days;  If symptoms persist may increase to one tab BID thereafter    divalproex (DEPAKOTE) 500 MG TbEC Take 500 mg by mouth 3 (three) times daily.    [DISCONTINUED] diclofenac (VOLTAREN) 75 MG EC tablet Take 75 mg by mouth.    [DISCONTINUED] dicyclomine (BENTYL) 20 mg tablet Take 1 tablet (20 mg total) by mouth 3 (three) times daily as needed (Abdominal cramping). As needed for abdominal cramping    [DISCONTINUED] divalproex (DEPAKOTE) 250 MG EC tablet TAKE 1 TABLET BY MOUTH ONCE DAILY    [DISCONTINUED] HYDROcodone-acetaminophen (NORCO)  mg per tablet One by mouth every 4-6 hours when necessary pain    [DISCONTINUED] JARDIANCE 25 mg Tab Take 1 tablet by mouth once daily.    [DISCONTINUED] losartan (COZAAR) 100 MG tablet TAKE ONE TABLET BY MOUTH ONCE DAILY FOR  BLOOD  PRESSURE.    [DISCONTINUED] modafinil (PROVIGIL) 100 MG Tab TAKE 1 TABLET BY MOUTH TWICE DAILY AS NEEDED FOR FATIGUE    [DISCONTINUED] pantoprazole (PROTONIX) 40 MG tablet TAKE 1 TABLET BY MOUTH ONCE DAILY FOR REFLUX    [DISCONTINUED] pramipexole (MIRAPEX) 1 MG tablet Take 1 mg by mouth nightly.     [DISCONTINUED] zonisamide (ZONEGRAN) 100 MG Cap TAKE 1 CAPSULE BY MOUTH AT NIGHT     Family History    None       Tobacco Use    Smoking status: Former     Types: Cigarettes     Quit date:      Years since quittin.7    Smokeless tobacco: Never   Substance and Sexual Activity    Alcohol use: Not Currently    Drug use: Never    Sexual activity: Not on file     Review of Systems   Constitutional: Positive for malaise/fatigue. Negative for chills, diaphoresis, night sweats, weight gain and weight loss.   HENT:  Negative for congestion, hoarse voice, sore throat and stridor.    Eyes:  Negative for double vision and pain.   Cardiovascular:  Positive for chest pain. Negative for claudication, cyanosis, dyspnea on exertion, irregular heartbeat, leg swelling, near-syncope, orthopnea, palpitations, paroxysmal nocturnal dyspnea and syncope.   Respiratory:  Negative for cough, hemoptysis, shortness of breath, sleep disturbances due to breathing, snoring, sputum production and wheezing.    Endocrine: Negative for cold intolerance, heat intolerance and polydipsia.   Hematologic/Lymphatic: Negative for bleeding problem. Does not bruise/bleed easily.   Skin:  Negative for color change, dry skin and rash.   Musculoskeletal:  Negative for joint swelling and muscle cramps.   Gastrointestinal:  Negative for bloating, abdominal pain, constipation, diarrhea, dysphagia, melena, nausea and vomiting.   Genitourinary:  Negative for flank pain and urgency.   Neurological:  Negative for dizziness, focal weakness, headaches, light-headedness, loss of balance, seizures and weakness.   Psychiatric/Behavioral:  Negative for altered mental status and memory loss. The patient is not nervous/anxious.    Objective:     Vital Signs (Most Recent):  Temp: 98.2 °F (36.8 °C) (22 1150)  Pulse: 67 (22 1152)  Resp: 20 (22 1150)  BP: 134/62 (22 1150)  SpO2: 95 % (22 1150) Vital Signs (24h Range):  Temp:  [97.8 °F (36.6  °C)-98.7 °F (37.1 °C)] 98.2 °F (36.8 °C)  Pulse:  [65-81] 67  Resp:  [15-24] 20  SpO2:  [95 %-99 %] 95 %  BP: (127-156)/(58-86) 134/62     Weight: 91.6 kg (202 lb)  Body mass index is 36.95 kg/m².    SpO2: 95 %  O2 Device (Oxygen Therapy): room air      Intake/Output Summary (Last 24 hours) at 9/17/2022 1201  Last data filed at 9/17/2022 1000  Gross per 24 hour   Intake 500 ml   Output 200 ml   Net 300 ml       Lines/Drains/Airways       Peripheral Intravenous Line  Duration                  Peripheral IV - Single Lumen 09/17/22 0001 20 G Left;Posterior Hand <1 day         Peripheral IV - Single Lumen 09/17/22 0150 20 G Posterior;Right Hand <1 day                    Physical Exam  Eyes:      Pupils: Pupils are equal, round, and reactive to light.   Neck:      Trachea: No tracheal deviation.   Cardiovascular:      Rate and Rhythm: Normal rate and regular rhythm.      Pulses: Intact distal pulses.           Carotid pulses are 2+ on the right side and 2+ on the left side.       Radial pulses are 2+ on the right side and 2+ on the left side.        Femoral pulses are 2+ on the right side and 2+ on the left side.       Popliteal pulses are 2+ on the right side and 2+ on the left side.        Dorsalis pedis pulses are 2+ on the right side and 2+ on the left side.        Posterior tibial pulses are 2+ on the right side and 2+ on the left side.      Heart sounds: Normal heart sounds. No murmur heard.    No friction rub. No gallop.   Pulmonary:      Effort: Pulmonary effort is normal. No respiratory distress.      Breath sounds: Normal breath sounds. No stridor. No wheezing or rales.   Chest:      Chest wall: No tenderness.   Abdominal:      General: There is no distension.      Tenderness: There is no abdominal tenderness. There is no rebound.   Musculoskeletal:         General: No tenderness.      Cervical back: Normal range of motion.   Skin:     General: Skin is warm and dry.   Neurological:      Mental Status: She is  alert and oriented to person, place, and time.       Significant Labs: CBC   Recent Labs   Lab 09/16/22 2357 09/17/22  0410   WBC 4.07 4.33   HGB 13.4 12.7   HCT 40.1 38.0    210    and Troponin   Recent Labs   Lab 09/16/22 2357 09/17/22  0828   TROPONINI 0.502* 0.274*       Significant Imaging: Echocardiogram: Transthoracic echo (TTE) complete ([facility]upid Only):   Results for orders placed or performed during the hospital encounter of 09/16/22   Echo   Result Value Ref Range    BSA 2 m2    TDI SEPTAL 0.06 m/s    LV LATERAL E/E' RATIO 16.00 m/s    LV SEPTAL E/E' RATIO 16.00 m/s    LA WIDTH 3.50 cm    IVC diameter 1.78 cm    Left Ventricular Outflow Tract Mean Velocity 0.90 cm/s    Left Ventricular Outflow Tract Mean Gradient 3.28 mmHg    TDI LATERAL 0.06 m/s    LVIDd 3.49 (A) 3.5 - 6.0 cm    IVS 1.28 (A) 0.6 - 1.1 cm    Posterior Wall 1.18 (A) 0.6 - 1.1 cm    Ao root annulus 2.57 cm    LVIDs 2.43 2.1 - 4.0 cm    FS 30 28 - 44 %    LA volume 53.78 cm3    STJ 2.92 cm    Ascending aorta 2.90 cm    LV mass 140.49 g    LA size 3.58 cm    TAPSE 1.90 cm    Left Ventricle Relative Wall Thickness 0.68 cm    AV mean gradient 8 mmHg    AV valve area 1.96 cm2    AV Velocity Ratio 0.63     AV index (prosthetic) 0.65     E/A ratio 0.98     Mean e' 0.06 m/s    E wave deceleration time 236.57 msec    IVRT 79.92 msec    LVOT diameter 1.96 cm    LVOT area 3.0 cm2    LVOT peak nilay 1.07 m/s    LVOT peak VTI 27.30 cm    Ao peak nilay 1.71 m/s    Ao VTI 42.1 cm    RVOT peak nilay 0.70 m/s    RVOT peak VTI 15.6 cm    LVOT stroke volume 82.33 cm3    AV peak gradient 12 mmHg    PV mean gradient 1.22 mmHg    E/E' ratio 16.00 m/s    MV Peak E Nilay 0.96 m/s    MV Peak A Nilay 0.98 m/s    LV Systolic Volume 20.74 mL    LV Systolic Volume Index 10.8 mL/m2    LV Diastolic Volume 50.49 mL    LV Diastolic Volume Index 26.30 mL/m2    LA Volume Index 28.0 mL/m2    LV Mass Index 73 g/m2    RA Major Axis 3.84 cm    Left Atrium Minor Axis 5.02 cm    Left  Atrium Major Axis 5.08 cm    RA Width 2.80 cm    Right Atrial Pressure (from IVC) 8 mmHg    EF 60 %    Narrative    · The left ventricle is normal in size with concentric remodeling and   normal systolic function.  · The estimated ejection fraction is 60%.  · Indeterminate left ventricular diastolic function.  · Normal right ventricular size with normal right ventricular systolic   function.  · There is mild aortic valve stenosis.  · Aortic valve area is 1.96 cm2; peak velocity is 1.71 m/s; mean gradient   is 8 mmHg.  · Intermediate central venous pressure (8 mmHg).

## 2022-09-17 NOTE — H&P
"[facility]  [facility] Medicine  History & Physical    Patient Name: Ca Diaz  MRN: [id number]  Patient Class: OP- Observation  Admission Date: 9/16/2022  Attending Physician: Leisa Plata, *   Primary Care Provider: Desiree Frye MD         Patient information was obtained from patient, past medical records and ER records.     Subjective:     Principal Problem:NSTEMI (non-ST elevated myocardial infarction)    Chief Complaint:   Chief Complaint   Patient presents with    Fatigue     Pt reports generalized weakness, fell today and yesterday. Incontinent episodes before the fall. Hx: CVA 2017        HPI: Ca Diaz is a 62 y.o. female patient with a PMHx of anxiety, CAD, h/o CVA, depression, DM II, RLS, seizures, fibromyalgia, HLD, HTN, PRAVEENA, and obesity who presented to the Emergency Department for evaluation of generalized weakness which onset gradually two days ago. Pt states she fell last night (9/15) and the night before last (9/14). Pt's  states that she "can't even walk through an open doorway." Symptoms are constant and moderate in severity. No mitigating or exacerbating factors reported. Associated sxs include N/D and dizziness. Pt reports the nausea began one day ago. Patient denies any HA, vomiting, light-headedness, visual disturbance, CP, SOB/MALAVE, diaphoresis, neck or jaw pain, upper extremity pain, numbness/tingling, dysuria, and all other sxs at this time. Work-up in the ED revealed NSTEMI with troponin of 0.502, EKG unrevealing, CXR unremarkable, CT of the head negative for acute process. 325 mg ASA given and UFH initiated. Hospital Medicine was contacted for admission and patient placed in Observation.         Past Medical History:   Diagnosis Date      Anxiety and depression  CAD 3/13/2014    Essential (primary) hypertension 1/22/2013    Fibromyalgia 9/17/2012    Hyperlipidemia     Obstructive sleep apnea syndrome 8/20/2014    RLS (restless " legs syndrome) 10/18/2018    Overview:  Rheum Dr Tam    Seizures     Type 2 diabetes mellitus 2012    ICD-10 Transition Last Assessment & Plan:  Continue with sliding scale insulin.  Control improved on low-dose Lantus presently       Past Surgical History:   Procedure Laterality Date    ABDOMINAL SURGERY       SECTION         Review of patient's allergies indicates:   Allergen Reactions    Epinephrine Anaphylaxis    Lidocaine Anaphylaxis     Dental visit pt stopped breathing after given lidocaine    Ace inhibitors     Shellfish containing products Itching    Sulfadiazine Other (See Comments)       No current facility-administered medications on file prior to encounter.     Current Outpatient Medications on File Prior to Encounter   Medication Sig    albuterol (PROVENTIL/VENTOLIN HFA) 90 mcg/actuation inhaler Inhale 2 puffs into the lungs every 6 (six) hours as needed for Wheezing.    amLODIPine (NORVASC) 10 MG tablet TAKE 1 TABLET BY MOUTH ONCE DAILY    aspirin (ECOTRIN) 81 MG EC tablet Take 81 mg by mouth.    atorvastatin (LIPITOR) 80 MG tablet TAKE 1 TABLET BY MOUTH ONCE DAILY    carBAMazepine (TEGRETOL) 200 mg tablet Take 1 tablet (200 mg total) by mouth once daily. Take one tab daily x 7 days;  If symptoms persist may increase to one tab BID thereafter    diclofenac (VOLTAREN) 75 MG EC tablet Take 75 mg by mouth.    dicyclomine (BENTYL) 20 mg tablet Take 1 tablet (20 mg total) by mouth 3 (three) times daily as needed (Abdominal cramping). As needed for abdominal cramping    divalproex (DEPAKOTE) 250 MG EC tablet TAKE 1 TABLET BY MOUTH ONCE DAILY    divalproex ER (DEPAKOTE) 500 MG Tb24 Take 500 mg by mouth.    HUMALOG MIX 75-25 KWIKPEN 100 unit/mL (75-25) InPn INJECT 75 UNITS SUBCUTANEOUSLY TWICE DAILY BEFORE MEAL(S)    HYDROcodone-acetaminophen (NORCO)  mg per tablet One by mouth every 4-6 hours when necessary pain    JARDIANCE 25 mg Tab Take 1 tablet by mouth once  daily.    losartan (COZAAR) 100 MG tablet TAKE ONE TABLET BY MOUTH ONCE DAILY FOR  BLOOD  PRESSURE.    metoprolol succinate (TOPROL-XL) 100 MG 24 hr tablet Take 100 mg by mouth.    modafinil (PROVIGIL) 100 MG Tab TAKE 1 TABLET BY MOUTH TWICE DAILY AS NEEDED FOR FATIGUE    pantoprazole (PROTONIX) 40 MG tablet TAKE 1 TABLET BY MOUTH ONCE DAILY FOR REFLUX    pramipexole (MIRAPEX) 1 MG tablet Take 1 mg by mouth nightly.    zonisamide (ZONEGRAN) 100 MG Cap TAKE 1 CAPSULE BY MOUTH AT NIGHT     Family History    Reviewed and not pertinent.        Tobacco Use    Smoking status: Former     Types: Cigarettes     Quit date:      Years since quittin.7    Smokeless tobacco: Never   Substance and Sexual Activity    Alcohol use: Not Currently    Drug use: Never    Sexual activity: Not on file     Review of Systems   Constitutional:  Negative for chills, diaphoresis, fatigue and fever.   HENT:  Negative for congestion and sore throat.    Respiratory:  Negative for cough, chest tightness, shortness of breath and wheezing.    Cardiovascular:  Negative for chest pain, palpitations and leg swelling.   Gastrointestinal:  Positive for diarrhea and nausea. Negative for abdominal pain, constipation and vomiting.   Genitourinary:  Negative for dysuria and hematuria.   Musculoskeletal:  Negative for arthralgias, back pain and myalgias.   Skin:  Negative for pallor and rash.   Neurological:  Positive for dizziness and weakness (generalized). Negative for syncope, light-headedness, numbness and headaches.   Psychiatric/Behavioral:  Negative for confusion. The patient is not nervous/anxious.    All other systems reviewed and are negative.  Objective:     Vital Signs (Most Recent):  Temp: 98.7 °F (37.1 °C) (22)  Pulse: 75 (22)  Resp: 15 (22)  BP: (!) 152/67 (22)  SpO2: 98 % (22)   Vital Signs (24h Range):  Temp:  [98.7 °F (37.1 °C)] 98.7 °F (37.1 °C)  Pulse:  [75-81]  75  Resp:  [15-18] 15  SpO2:  [96 %-99 %] 98 %  BP: (135-156)/(58-74) 152/67     Weight: 91.8 kg (202 lb 6.1 oz)  Body mass index is 37.02 kg/m².    Physical Exam  Vitals and nursing note reviewed.   Constitutional:       General: She is awake. She is not in acute distress.     Appearance: Normal appearance. She is well-developed. She is obese. She is not diaphoretic.   HENT:      Head: Normocephalic and atraumatic.   Eyes:      Conjunctiva/sclera: Conjunctivae normal.      Comments: PERRL; EOM intact.   Cardiovascular:      Rate and Rhythm: Normal rate and regular rhythm. No extrasystoles are present.     Heart sounds: S1 normal and S2 normal. No murmur heard.    No gallop.   Pulmonary:      Effort: Pulmonary effort is normal. No tachypnea.      Breath sounds: Normal breath sounds and air entry. No wheezing, rhonchi or rales.   Abdominal:      General: Bowel sounds are normal. There is no distension.      Palpations: Abdomen is soft.      Tenderness: There is no abdominal tenderness.   Musculoskeletal:         General: No tenderness or deformity. Normal range of motion.      Cervical back: Normal range of motion and neck supple.      Right lower leg: No edema.      Left lower leg: No edema.   Skin:     General: Skin is warm and dry.      Capillary Refill: Capillary refill takes less than 2 seconds.      Findings: No erythema or rash.   Neurological:      General: No focal deficit present.      Mental Status: She is alert and oriented to person, place, and time.      GCS: GCS eye subscore is 4. GCS verbal subscore is 5. GCS motor subscore is 6.      Cranial Nerves: Cranial nerves 2-12 are intact.      Sensory: Sensation is intact.      Motor: Motor function is intact.   Psychiatric:         Mood and Affect: Mood and affect normal.         Behavior: Behavior normal. Behavior is cooperative.           Significant Labs:  Results for orders placed or performed during the hospital encounter of 09/16/22   CBC auto  differential   Result Value Ref Range    WBC 4.07 3.90 - 12.70 K/uL    RBC 4.90 4.00 - 5.40 M/uL    Hemoglobin 13.4 12.0 - 16.0 g/dL    Hematocrit 40.1 37.0 - 48.5 %    MCV 82 82 - 98 fL    MCH 27.3 27.0 - 31.0 pg    MCHC 33.4 32.0 - 36.0 g/dL    RDW 14.1 11.5 - 14.5 %    Platelets 207 150 - 450 K/uL    MPV 10.5 9.2 - 12.9 fL    Immature Granulocytes 0.2 0.0 - 0.5 %    Gran # (ANC) 1.8 1.8 - 7.7 K/uL    Immature Grans (Abs) 0.01 0.00 - 0.04 K/uL    Lymph # 1.6 1.0 - 4.8 K/uL    Mono # 0.6 0.3 - 1.0 K/uL    Eos # 0.1 0.0 - 0.5 K/uL    Baso # 0.03 0.00 - 0.20 K/uL    nRBC 0 0 /100 WBC    Gran % 43.1 38.0 - 73.0 %    Lymph % 39.3 18.0 - 48.0 %    Mono % 14.5 4.0 - 15.0 %    Eosinophil % 2.2 0.0 - 8.0 %    Basophil % 0.7 0.0 - 1.9 %    Differential Method Automated    Comprehensive metabolic panel   Result Value Ref Range    Sodium 138 136 - 145 mmol/L    Potassium 3.6 3.5 - 5.1 mmol/L    Chloride 98 95 - 110 mmol/L    CO2 25 23 - 29 mmol/L    Glucose 324 (H) 70 - 110 mg/dL    BUN 11 6 - 20 mg/dL    Creatinine 0.7 0.5 - 1.4 mg/dL    Calcium 9.1 8.7 - 10.5 mg/dL    Total Protein 7.0 6.0 - 8.4 g/dL    Albumin 3.1 (L) 3.5 - 5.2 g/dL    Total Bilirubin 0.3 0.1 - 1.0 mg/dL    Alkaline Phosphatase 71 55 - 135 U/L    AST 53 (H) 10 - 40 U/L    ALT 37 10 - 44 U/L    Anion Gap 15 8 - 16 mmol/L    eGFR >60 >60 mL/min/1.73 m^2   Troponin I   Result Value Ref Range    Troponin I 0.502 (H) 0.000 - 0.026 ng/mL   Urinalysis, Reflex to Urine Culture Urine, Clean Catch    Specimen: Urine   Result Value Ref Range    Specimen UA Urine, Clean Catch     Color, UA Yellow Yellow, Straw, Estrellita    Appearance, UA Clear Clear    pH, UA 7.0 5.0 - 8.0    Specific Gravity, UA 1.020 1.005 - 1.030    Protein, UA Negative Negative    Glucose, UA 4+ (A) Negative    Ketones, UA 1+ (A) Negative    Bilirubin (UA) Negative Negative    Occult Blood UA Negative Negative    Nitrite, UA Negative Negative    Urobilinogen, UA Negative <2.0 EU/dL    Leukocytes, UA  2+ (A) Negative   HIV 1/2 Ag/Ab (4th Gen)   Result Value Ref Range    HIV 1/2 Ag/Ab Negative Negative   Hepatitis C Antibody   Result Value Ref Range    Hepatitis C Ab Negative Negative   HCV Virus Hold Specimen   Result Value Ref Range    HEP C Virus Hold Specimen Hold for HCV sendout    APTT   Result Value Ref Range    aPTT 27.8 21.0 - 32.0 sec   Protime-INR   Result Value Ref Range    Prothrombin Time 10.4 9.0 - 12.5 sec    INR 1.0 0.8 - 1.2   Urinalysis Microscopic   Result Value Ref Range    WBC, UA 13 (H) 0 - 5 /hpf    WBC Clumps, UA None None-Rare    Bacteria Rare None-Occ /hpf    Yeast, UA None None    Squam Epithel, UA 4 /hpf    Hyaline Casts, UA 38 (A) 0-1/lpf /lpf    Microscopic Comment SEE COMMENT    POCT COVID-19 Rapid Screening   Result Value Ref Range    POC Rapid COVID Negative Negative     Acceptable Yes       All pertinent labs within the past 24 hours have been reviewed.    Significant Imaging:   Imaging Results              CT Head Without Contrast (In process)                      X-Ray Chest AP Portable (Final result)  Result time 09/17/22 00:09:52      Final result by Roel Oliveira MD (09/17/22 00:09:52)                   Impression:      No acute abnormality.      Electronically signed by: Tee Sevilla  Date:    09/17/2022  Time:    00:09               Narrative:    EXAMINATION:  XR CHEST AP PORTABLE    CLINICAL HISTORY:  dizziness;    TECHNIQUE:  Single frontal view of the chest was performed.    COMPARISON:  None    FINDINGS:  The lungs are clear, with normal appearance of pulmonary vasculature and no pleural effusion or pneumothorax.    The cardiac silhouette is normal in size. The hilar and mediastinal contours are unremarkable.    Bones are intact.                        ED Interpretation by Miguel Johnson Jr., MD (09/17/22 00:08:13, [facility] Emergency Medicine)      No acute findings.                                   I have reviewed all pertinent  imaging results/findings within the past 24 hours.      EKG: (personally reviewed)  Normal sinus rhythm, no acute ischemic ST-T abnormalities. No previous tracings to compare.                Assessment/Plan:     * NSTEMI (non-ST elevated myocardial infarction)  - Troponin 0.502. EKG unrevealing. CP free on admission.    - Heparin drip per nomogram.  - Continue home ASA, BB, and high intensity statin.  - SL NTG as needed.     - Trend serial cardiac enzymes and EKG.  - Check FLP.  - Will obtain 2D echo.  - Cardiology consult.       Atherosclerosis of native coronary artery of native heart without angina pectoris  - Continue medical management as above.       Obesity  Body mass index is 37.02 kg/m². Morbid obesity complicates all aspects of disease management from diagnostic modalities to treatment. Weight loss encouraged and health benefits explained to patient.       Hyperlipidemia  - Continue home statin. FLP in AM.       Essential (primary) hypertension  - Continue home antihypertensives.       Type 2 diabetes mellitus, with long-term current use of insulin  Patient's FSGs are uncontrolled due to hyperglycemia on current medication regimen.  Last A1c reviewed- No results found for: LABA1C, HGBA1C  Most recent fingerstick glucose reviewed- No results for input(s): POCTGLUCOSE in the last 24 hours.  Current correctional scale  Medium  Maintain anti-hyperglycemic dose as follows-   Antihyperglycemics (From admission, onward)    Start     Stop Route Frequency Ordered    09/17/22 0413  insulin aspart U-100 pen 1-10 Units         -- SubQ Before meals & nightly PRN 09/17/22 0316        Hold Oral hypoglycemics while patient is in the hospital.        VTE Risk Mitigation (From admission, onward)         Ordered     IP VTE HIGH RISK PATIENT  Once         09/17/22 0315     Place sequential compression device  Until discontinued         09/17/22 0315     heparin 25,000 units in dextrose 5% (100 units/ml) IV bolus from bag -  ADDITIONAL PRN BOLUS - 60 units/kg (max bolus 4000 units)  As needed (PRN)        Question:  Heparin Infusion Adjustment (DO NOT MODIFY ANSWER)  Answer:  [email or web address]\epic\Images\Pharmacy\HeparinInfusions\heparin LOW INTENSITY nomogram for [facility] [id number].pdf    09/17/22 0045     heparin 25,000 units in dextrose 5% (100 units/ml) IV bolus from bag - ADDITIONAL PRN BOLUS - 30 units/kg (max bolus 4000 units)  As needed (PRN)        Question:  Heparin Infusion Adjustment (DO NOT MODIFY ANSWER)  Answer:  [email or web address]\epic\Images\Pharmacy\HeparinInfusions\heparin LOW INTENSITY nomogram for [facility] [id number].pdf    09/17/22 0045     heparin 25,000 units in dextrose 5% 250 mL (100 units/mL) infusion LOW INTENSITY nomogram - [facility]  Continuous        Question Answer Comment   Heparin Infusion Adjustment (DO NOT MODIFY ANSWER) \\[email or web address]\epic\Images\Pharmacy\HeparinInfusions\heparin LOW INTENSITY nomogram for [facility] [id number].pdf    Begin at (in units/kg/hr) 12        09/17/22 0045                   Alba Mccann NP  Department of Hospital Medicine   [facility] Dept.

## 2022-09-18 LAB
ANION GAP SERPL CALC-SCNC: 11 MMOL/L (ref 8–16)
APTT BLDCRRT: 49.3 SEC (ref 21–32)
BACTERIA UR CULT: NORMAL
BACTERIA UR CULT: NORMAL
BASOPHILS # BLD AUTO: 0.02 K/UL (ref 0–0.2)
BASOPHILS NFR BLD: 0.4 % (ref 0–1.9)
BUN SERPL-MCNC: 10 MG/DL (ref 6–20)
CALCIUM SERPL-MCNC: 7.7 MG/DL (ref 8.7–10.5)
CHLORIDE SERPL-SCNC: 108 MMOL/L (ref 95–110)
CO2 SERPL-SCNC: 24 MMOL/L (ref 23–29)
CREAT SERPL-MCNC: 0.6 MG/DL (ref 0.5–1.4)
DIFFERENTIAL METHOD: ABNORMAL
EOSINOPHIL # BLD AUTO: 0 K/UL (ref 0–0.5)
EOSINOPHIL NFR BLD: 0.2 % (ref 0–8)
ERYTHROCYTE [DISTWIDTH] IN BLOOD BY AUTOMATED COUNT: 14.1 % (ref 11.5–14.5)
EST. GFR  (NO RACE VARIABLE): >60 ML/MIN/1.73 M^2
GLUCOSE SERPL-MCNC: 247 MG/DL (ref 70–110)
HCT VFR BLD AUTO: 34.9 % (ref 37–48.5)
HGB BLD-MCNC: 11.4 G/DL (ref 12–16)
IMM GRANULOCYTES # BLD AUTO: 0.02 K/UL (ref 0–0.04)
IMM GRANULOCYTES NFR BLD AUTO: 0.4 % (ref 0–0.5)
LYMPHOCYTES # BLD AUTO: 2 K/UL (ref 1–4.8)
LYMPHOCYTES NFR BLD: 39.6 % (ref 18–48)
MAGNESIUM SERPL-MCNC: 1.7 MG/DL (ref 1.6–2.6)
MCH RBC QN AUTO: 27.1 PG (ref 27–31)
MCHC RBC AUTO-ENTMCNC: 32.7 G/DL (ref 32–36)
MCV RBC AUTO: 83 FL (ref 82–98)
MONOCYTES # BLD AUTO: 0.4 K/UL (ref 0.3–1)
MONOCYTES NFR BLD: 8 % (ref 4–15)
NEUTROPHILS # BLD AUTO: 2.6 K/UL (ref 1.8–7.7)
NEUTROPHILS NFR BLD: 51.4 % (ref 38–73)
NRBC BLD-RTO: 0 /100 WBC
PLATELET # BLD AUTO: 213 K/UL (ref 150–450)
PMV BLD AUTO: 10.9 FL (ref 9.2–12.9)
POCT GLUCOSE: 233 MG/DL (ref 70–110)
POCT GLUCOSE: 243 MG/DL (ref 70–110)
POCT GLUCOSE: 258 MG/DL (ref 70–110)
POCT GLUCOSE: 282 MG/DL (ref 70–110)
POTASSIUM SERPL-SCNC: 3.4 MMOL/L (ref 3.5–5.1)
RBC # BLD AUTO: 4.21 M/UL (ref 4–5.4)
SODIUM SERPL-SCNC: 143 MMOL/L (ref 136–145)
WBC # BLD AUTO: 5.13 K/UL (ref 3.9–12.7)

## 2022-09-18 PROCEDURE — 85730 THROMBOPLASTIN TIME PARTIAL: CPT | Performed by: FAMILY MEDICINE

## 2022-09-18 PROCEDURE — 25000003 PHARM REV CODE 250: Performed by: INTERNAL MEDICINE

## 2022-09-18 PROCEDURE — 80048 BASIC METABOLIC PNL TOTAL CA: CPT | Performed by: INTERNAL MEDICINE

## 2022-09-18 PROCEDURE — 99233 SBSQ HOSP IP/OBS HIGH 50: CPT | Mod: ,,, | Performed by: INTERNAL MEDICINE

## 2022-09-18 PROCEDURE — 36415 COLL VENOUS BLD VENIPUNCTURE: CPT | Performed by: FAMILY MEDICINE

## 2022-09-18 PROCEDURE — 63600175 PHARM REV CODE 636 W HCPCS: Performed by: INTERNAL MEDICINE

## 2022-09-18 PROCEDURE — 21400001 HC TELEMETRY ROOM

## 2022-09-18 PROCEDURE — 25000003 PHARM REV CODE 250: Performed by: STUDENT IN AN ORGANIZED HEALTH CARE EDUCATION/TRAINING PROGRAM

## 2022-09-18 PROCEDURE — 85025 COMPLETE CBC W/AUTO DIFF WBC: CPT | Performed by: INTERNAL MEDICINE

## 2022-09-18 PROCEDURE — 99233 PR SUBSEQUENT HOSPITAL CARE,LEVL III: ICD-10-PCS | Mod: ,,, | Performed by: INTERNAL MEDICINE

## 2022-09-18 PROCEDURE — 11000001 HC ACUTE MED/SURG PRIVATE ROOM

## 2022-09-18 PROCEDURE — 83735 ASSAY OF MAGNESIUM: CPT | Performed by: STUDENT IN AN ORGANIZED HEALTH CARE EDUCATION/TRAINING PROGRAM

## 2022-09-18 RX ORDER — POTASSIUM CHLORIDE 20 MEQ/1
40 TABLET, EXTENDED RELEASE ORAL ONCE
Status: COMPLETED | OUTPATIENT
Start: 2022-09-18 | End: 2022-09-18

## 2022-09-18 RX ORDER — LANOLIN ALCOHOL/MO/W.PET/CERES
400 CREAM (GRAM) TOPICAL
Status: COMPLETED | OUTPATIENT
Start: 2022-09-18 | End: 2022-09-18

## 2022-09-18 RX ADMIN — INSULIN ASPART 3 UNITS: 100 INJECTION, SOLUTION INTRAVENOUS; SUBCUTANEOUS at 09:09

## 2022-09-18 RX ADMIN — AMLODIPINE BESYLATE 10 MG: 10 TABLET ORAL at 08:09

## 2022-09-18 RX ADMIN — DIVALPROEX SODIUM 500 MG: 125 CAPSULE, COATED PELLETS ORAL at 08:09

## 2022-09-18 RX ADMIN — ATORVASTATIN CALCIUM 80 MG: 40 TABLET, FILM COATED ORAL at 08:09

## 2022-09-18 RX ADMIN — Medication 400 MG: at 12:09

## 2022-09-18 RX ADMIN — PREGABALIN 100 MG: 100 CAPSULE ORAL at 02:09

## 2022-09-18 RX ADMIN — ASPIRIN 81 MG: 81 TABLET, COATED ORAL at 08:09

## 2022-09-18 RX ADMIN — INSULIN DETEMIR 10 UNITS: 100 INJECTION, SOLUTION SUBCUTANEOUS at 08:09

## 2022-09-18 RX ADMIN — METOPROLOL SUCCINATE 50 MG: 50 TABLET, FILM COATED, EXTENDED RELEASE ORAL at 08:09

## 2022-09-18 RX ADMIN — Medication 400 MG: at 10:09

## 2022-09-18 RX ADMIN — FLUOXETINE 40 MG: 20 CAPSULE ORAL at 08:09

## 2022-09-18 RX ADMIN — DIVALPROEX SODIUM 500 MG: 125 CAPSULE, COATED PELLETS ORAL at 09:09

## 2022-09-18 RX ADMIN — ACETAMINOPHEN 650 MG: 325 TABLET ORAL at 04:09

## 2022-09-18 RX ADMIN — HEPARIN SODIUM 12 UNITS/KG/HR: 10000 INJECTION, SOLUTION INTRAVENOUS at 03:09

## 2022-09-18 RX ADMIN — INSULIN ASPART 4 UNITS: 100 INJECTION, SOLUTION INTRAVENOUS; SUBCUTANEOUS at 06:09

## 2022-09-18 RX ADMIN — INSULIN DETEMIR 10 UNITS: 100 INJECTION, SOLUTION SUBCUTANEOUS at 09:09

## 2022-09-18 RX ADMIN — DIVALPROEX SODIUM 500 MG: 125 CAPSULE, COATED PELLETS ORAL at 02:09

## 2022-09-18 RX ADMIN — PREGABALIN 100 MG: 100 CAPSULE ORAL at 09:09

## 2022-09-18 RX ADMIN — PREGABALIN 100 MG: 100 CAPSULE ORAL at 08:09

## 2022-09-18 RX ADMIN — INSULIN ASPART 4 UNITS: 100 INJECTION, SOLUTION INTRAVENOUS; SUBCUTANEOUS at 04:09

## 2022-09-18 RX ADMIN — POTASSIUM CHLORIDE 40 MEQ: 1500 TABLET, EXTENDED RELEASE ORAL at 08:09

## 2022-09-18 RX ADMIN — INSULIN ASPART 6 UNITS: 100 INJECTION, SOLUTION INTRAVENOUS; SUBCUTANEOUS at 01:09

## 2022-09-18 NOTE — PLAN OF CARE
Fall precautions maintained. Call bell and personal items within reach. VSS. No complaints of pain or discomfort. Glucose monitoring continued. Heparin drip infusing. Chart check complete. Will continue to monitor.     Problem: Adult Inpatient Plan of Care  Goal: Plan of Care Review  Outcome: Ongoing, Progressing     Problem: Adult Inpatient Plan of Care  Goal: Patient-Specific Goal (Individualized)  Outcome: Ongoing, Progressing     Problem: Adult Inpatient Plan of Care  Goal: Absence of Hospital-Acquired Illness or Injury  Outcome: Ongoing, Progressing     Problem: Adult Inpatient Plan of Care  Goal: Optimal Comfort and Wellbeing  Outcome: Ongoing, Progressing     Problem: Adult Inpatient Plan of Care  Goal: Readiness for Transition of Care  Outcome: Ongoing, Progressing     Problem: Fall Injury Risk  Goal: Absence of Fall and Fall-Related Injury  Outcome: Ongoing, Progressing

## 2022-09-18 NOTE — PLAN OF CARE
[facility] - Med Surg  Initial Discharge Assessment       Primary Care Provider: Desiree Frye MD    Admission Diagnosis: Dizziness [R42]  Hyperglycemia [R73.9]  NSTEMI (non-ST elevated myocardial infarction) [I21.4]  Chest pain [R07.9]    Admission Date: 9/16/2022  Expected Discharge Date:     Discharge Barriers Identified: None    Payor: BLUE CROSS BLUE SHIELD / Plan: [facility] / Product Type: Commercial /     Extended Emergency Contact Information  Primary Emergency Contact: DEYVI DIAZ  Mobile Phone: [phone]  Relation: Spouse   needed? No    Discharge Plan A: Home  Discharge Plan B: Home      [facility] [address] [facility] [address] [place] [address]  [address] [address] [address]  Phone: [phone] Fax: [phone]      Initial Assessment (most recent)       Adult Discharge Assessment - 09/18/22 1218          Discharge Assessment    Source of Information patient     When was your last doctors appointment? --   March 2022    Does patient/caregiver understand observation status Yes     Reason For Admission Fall/Heart Attack     Lives With spouse     Do you expect to return to your current living situation? Yes     Do you have help at home or someone to help you manage your care at home? Yes     Who are your caregiver(s) and their phone number(s)? Deyvi Diaz, Spouse [phone]     Prior to hospitilization cognitive status: Alert/Oriented     Current cognitive status: Alert/Oriented     Walking or Climbing Stairs Difficulty none     Dressing/Bathing Difficulty none     Equipment Currently Used at Home none     Do you currently have service(s) that help you manage your care at home? No     Do you take prescription medications? Yes     Do you have prescription coverage? Yes     Coverage BCBS     Do you have any problems affording any of your prescribed medications? No     Is the patient taking medications as prescribed? yes     Who is going to help you  get home at discharge? Spouse Deyvi Diaz     How do you get to doctors appointments? family or friend will provide     Are you on dialysis? No     Do you take coumadin? No     Discharge Plan A Home     Discharge Plan B Home     DME Needed Upon Discharge  none     Discharge Plan discussed with: Patient     Discharge Barriers Identified None                   Swer met with pt at bedside to complete d/c assessment. Pt reports no needs and spouse avai;able when discharged. CM provided a transitional care folder, information on advanced directives, information on pharmacy bedside delivery, and discharge planning begins on admission with contact information for any needs/questions.

## 2022-09-18 NOTE — ASSESSMENT & PLAN NOTE
Multiple prior stents last 2020. Recurrent symptoms and pos troponin.  Plan  Cath today . Patient agrees    9/18/22-Patient stable post cath, 3 vessel CAD and plan CABG

## 2022-09-18 NOTE — PROGRESS NOTES
[facility]  Cardiology  Progress Note    Patient Name: Ca Diaz  MRN: [id number]  Admission Date: 9/16/2022  Hospital Length of Stay: 1 days  Code Status: Full Code   Attending Physician: Leisa Plata, *   Primary Care Physician: Desiree Frye MD  Expected Discharge Date:   Principal Problem:NSTEMI (non-ST elevated myocardial infarction)    Subjective:     Hospital Course:   9/18/22-Patient seen and examined today. No chest pain. Plan CABG soon due to complex 3 vessel CAD.      Interval History: stable, post cath    Review of Systems   Constitutional: Negative for chills, diaphoresis, night sweats, weight gain and weight loss.   HENT:  Negative for congestion, hoarse voice, sore throat and stridor.    Eyes:  Negative for double vision and pain.   Cardiovascular:  Positive for chest pain. Negative for claudication, cyanosis, dyspnea on exertion, irregular heartbeat, leg swelling, near-syncope, orthopnea, palpitations, paroxysmal nocturnal dyspnea and syncope.   Respiratory:  Negative for cough, hemoptysis, shortness of breath, sleep disturbances due to breathing, snoring, sputum production and wheezing.    Endocrine: Negative for cold intolerance, heat intolerance and polydipsia.   Hematologic/Lymphatic: Negative for bleeding problem. Does not bruise/bleed easily.   Skin:  Negative for color change, dry skin and rash.   Musculoskeletal:  Negative for joint swelling and muscle cramps.   Gastrointestinal:  Negative for bloating, abdominal pain, constipation, diarrhea, dysphagia, melena, nausea and vomiting.   Genitourinary:  Negative for flank pain and urgency.   Neurological:  Negative for dizziness, focal weakness, headaches, light-headedness, loss of balance, seizures and weakness.   Psychiatric/Behavioral:  Negative for altered mental status and memory loss. The patient is not nervous/anxious.    Objective:     Vital Signs (Most Recent):  Temp: 97.9 °F (36.6 °C) (09/18/22 1033)  Pulse:  79 (09/18/22 1033)  Resp: 20 (09/18/22 1033)  BP: (!) 161/69 (09/18/22 1033)  SpO2: 96 % (09/18/22 1033)   Vital Signs (24h Range):  Temp:  [97 °F (36.1 °C)-97.9 °F (36.6 °C)] 97.9 °F (36.6 °C)  Pulse:  [63-79] 79  Resp:  [16-20] 20  SpO2:  [95 %-98 %] 96 %  BP: (106-161)/(55-75) 161/69     Weight: 91.6 kg (202 lb)  Body mass index is 36.95 kg/m².     SpO2: 96 %  O2 Device (Oxygen Therapy): nasal cannula      Intake/Output Summary (Last 24 hours) at 9/18/2022 1207  Last data filed at 9/18/2022 0800  Gross per 24 hour   Intake 2020.35 ml   Output 400 ml   Net 1620.35 ml       Lines/Drains/Airways       Peripheral Intravenous Line  Duration                  Peripheral IV - Single Lumen 09/17/22 0001 20 G Left;Posterior Hand 1 day         Peripheral IV - Single Lumen 09/17/22 0150 20 G Posterior;Right Hand 1 day                    Physical Exam  Eyes:      Pupils: Pupils are equal, round, and reactive to light.   Neck:      Trachea: No tracheal deviation.   Cardiovascular:      Rate and Rhythm: Normal rate and regular rhythm.      Pulses: Intact distal pulses.           Carotid pulses are 2+ on the right side and 2+ on the left side.       Radial pulses are 2+ on the right side and 2+ on the left side.        Femoral pulses are 2+ on the right side and 2+ on the left side.       Popliteal pulses are 2+ on the right side and 2+ on the left side.        Dorsalis pedis pulses are 2+ on the right side and 2+ on the left side.        Posterior tibial pulses are 2+ on the right side and 2+ on the left side.      Heart sounds: Normal heart sounds. No murmur heard.    No friction rub. No gallop.   Pulmonary:      Effort: Pulmonary effort is normal. No respiratory distress.      Breath sounds: Normal breath sounds. No stridor. No wheezing or rales.   Chest:      Chest wall: No tenderness.   Abdominal:      General: There is no distension.      Tenderness: There is no abdominal tenderness. There is no rebound.   Musculoskeletal:          General: No tenderness.      Cervical back: Normal range of motion.   Skin:     General: Skin is warm and dry.   Neurological:      Mental Status: She is alert and oriented to person, place, and time.       Significant Labs: CMP   Recent Labs   Lab 09/16/22 2357 09/17/22 0410 09/18/22  0536    139 143   K 3.6 3.4* 3.4*   CL 98 102 108   CO2 25 24 24   * 284* 247*   BUN 11 9 10   CREATININE 0.7 0.6 0.6   CALCIUM 9.1 8.4* 7.7*   PROT 7.0  --   --    ALBUMIN 3.1*  --   --    BILITOT 0.3  --   --    ALKPHOS 71  --   --    AST 53*  --   --    ALT 37  --   --    ANIONGAP 15 13 11   , CBC   Recent Labs   Lab 09/16/22 2357 09/17/22 0410 09/18/22  0536   WBC 4.07 4.33 5.13   HGB 13.4 12.7 11.4*   HCT 40.1 38.0 34.9*    210 213   , and Troponin   Recent Labs   Lab 09/16/22 2357 09/17/22  0828 09/17/22  1207   TROPONINI 0.502* 0.274* 0.207*       Significant Imaging: Echocardiogram: Transthoracic echo (TTE) complete ([facility] Only):   Results for orders placed or performed during the hospital encounter of 09/16/22   Echo   Result Value Ref Range    BSA 2 m2    TDI SEPTAL 0.06 m/s    LV LATERAL E/E' RATIO 16.00 m/s    LV SEPTAL E/E' RATIO 16.00 m/s    LA WIDTH 3.50 cm    IVC diameter 1.78 cm    Left Ventricular Outflow Tract Mean Velocity 0.90 cm/s    Left Ventricular Outflow Tract Mean Gradient 3.28 mmHg    TDI LATERAL 0.06 m/s    LVIDd 3.49 (A) 3.5 - 6.0 cm    IVS 1.28 (A) 0.6 - 1.1 cm    Posterior Wall 1.18 (A) 0.6 - 1.1 cm    Ao root annulus 2.57 cm    LVIDs 2.43 2.1 - 4.0 cm    FS 30 28 - 44 %    LA volume 53.78 cm3    STJ 2.92 cm    Ascending aorta 2.90 cm    LV mass 140.49 g    LA size 3.58 cm    TAPSE 1.90 cm    Left Ventricle Relative Wall Thickness 0.68 cm    AV mean gradient 8 mmHg    AV valve area 1.96 cm2    AV Velocity Ratio 0.63     AV index (prosthetic) 0.65     E/A ratio 0.98     Mean e' 0.06 m/s    E wave deceleration time 236.57 msec    IVRT 79.92 msec    LVOT diameter 1.96 cm     LVOT area 3.0 cm2    LVOT peak nilay 1.07 m/s    LVOT peak VTI 27.30 cm    Ao peak nilay 1.71 m/s    Ao VTI 42.1 cm    RVOT peak nilay 0.70 m/s    RVOT peak VTI 15.6 cm    LVOT stroke volume 82.33 cm3    AV peak gradient 12 mmHg    PV mean gradient 1.22 mmHg    E/E' ratio 16.00 m/s    MV Peak E Nilay 0.96 m/s    MV Peak A Nilay 0.98 m/s    LV Systolic Volume 20.74 mL    LV Systolic Volume Index 10.8 mL/m2    LV Diastolic Volume 50.49 mL    LV Diastolic Volume Index 26.30 mL/m2    LA Volume Index 28.0 mL/m2    LV Mass Index 73 g/m2    RA Major Axis 3.84 cm    Left Atrium Minor Axis 5.02 cm    Left Atrium Major Axis 5.08 cm    RA Width 2.80 cm    Right Atrial Pressure (from IVC) 8 mmHg    EF 60 %    Narrative    · The left ventricle is normal in size with concentric remodeling and   normal systolic function.  · The estimated ejection fraction is 60%.  · Indeterminate left ventricular diastolic function.  · Normal right ventricular size with normal right ventricular systolic   function.  · There is mild aortic valve stenosis.  · Aortic valve area is 1.96 cm2; peak velocity is 1.71 m/s; mean gradient   is 8 mmHg.  · Intermediate central venous pressure (8 mmHg).        Assessment and Plan:     Brief HPI: stable post cath    * NSTEMI (non-ST elevated myocardial infarction)  Plan cath today    9/18/22- no further symptoms    Atherosclerosis of native coronary artery of native heart without angina pectoris  Multiple prior stents last 2020. Recurrent symptoms and pos troponin.  Plan  Cath today . Patient agrees    9/18/22-Patient stable post cath, 3 vessel CAD and plan CABG     Hyperlipidemia  Continue statin    Essential (primary) hypertension  Continue amlodipine    Type 2 diabetes mellitus, with long-term current use of insulin  Per hospital medicine        VTE Risk Mitigation (From admission, onward)         Ordered     heparin 25,000 units in dextrose 5% (100 units/ml) IV bolus from bag - ADDITIONAL PRN BOLUS - 60 units/kg (max  bolus 4000 units)  As needed (PRN)        Question:  Heparin Infusion Adjustment (DO NOT MODIFY ANSWER)  Answer:  [email or web address]\epic\Images\Pharmacy\HeparinInfusions\heparin LOW INTENSITY nomogram for [facility] [id number]    09/17/22 1829     heparin 25,000 units in dextrose 5% (100 units/ml) IV bolus from bag - ADDITIONAL PRN BOLUS - 30 units/kg (max bolus 4000 units)  As needed (PRN)        Question:  Heparin Infusion Adjustment (DO NOT MODIFY ANSWER)  Answer:  [email or web address]\epic\Images\Pharmacy\HeparinInfusions\heparin LOW INTENSITY nomogram for [facility] [id number].pdf    09/17/22 1829     heparin 25,000 units in dextrose 5% 250 mL (100 units/mL) infusion LOW INTENSITY nomogram - [facility]  Continuous        Question Answer Comment   Heparin Infusion Adjustment (DO NOT MODIFY ANSWER) [email or web address]\epic\Images\Pharmacy\HeparinInfusions\heparin LOW INTENSITY nomogram for [facility] [id number]    Begin at (in units/kg/hr) 12        09/17/22 1829     IP VTE HIGH RISK PATIENT  Once         09/17/22 0315     Place sequential compression device  Until discontinued         09/17/22 0315                Elmer Maguire MD  Cardiology  [facility]

## 2022-09-18 NOTE — CONSULTS
[facility]  Cardiothoracic Surgery  Consult Note    Patient Name: Ca Diaz  MRN: [id number]  Admission Date: 9/16/2022  Attending Physician: Leisa Plata, *  Referring Provider: Self, Aaareferral    Patient information was obtained from spouse/SO, ER records, and primary team.     Consults  Subjective:     Chief Complaint/Reason for Admission:  NSTEMI    History of Present Illness:  62-year-old female who has a extensive history of coronary artery disease with multiple stents in her left anterior descending artery right coronary artery and circumflex artery is admitted with NSTEMI and had a left heart catheterization which showed three-vessel coronary artery disease the patient is chest pain-free at this time and she is resting.  She has a history of hypertension hyperlipidemia and type 1 diabetes mellitus.  The patient was taking Brilinta for her stents and her last dose was on the day of admission.  She is been admitted for observation and Brilinta washout before going for an urgent coronary artery bypass grafting x3.    No current facility-administered medications on file prior to encounter.     Current Outpatient Medications on File Prior to Encounter   Medication Sig    amLODIPine (NORVASC) 10 MG tablet TAKE 1 TABLET BY MOUTH ONCE DAILY    amLODIPine (NORVASC) 10 MG tablet Take 1 tablet by mouth once daily.    apple cider vinegar 300 mg Tab Take 450 mg by mouth once daily.    aspirin (ECOTRIN) 81 MG EC tablet Take 81 mg by mouth.    aspirin (ECOTRIN) 81 MG EC tablet Take 1 tablet by mouth once daily.    atorvastatin (LIPITOR) 80 MG tablet TAKE 1 TABLET BY MOUTH ONCE DAILY    atorvastatin (LIPITOR) 80 MG tablet Take 1 tablet by mouth once daily.    CALCIUM-MAGNESIUM-ZINC ORAL Take 2 tablets by mouth once daily.    divalproex ER (DEPAKOTE) 500 MG Tb24 Take 500 mg by mouth 3 (three) times daily.    ELDERBERRY FRUIT ORAL Take 50 mg by mouth once daily.    flash glucose sensor (FREESTYLE  QUYNH 14 DAY SENSOR) Kit 1 Cartridge by subcutaneous (via wearable injector) route every 14 (fourteen) days.    FLUoxetine 20 MG capsule Take 20 mg by mouth once daily.    FLUoxetine 40 MG capsule Take 40 mg by mouth once daily.    HUMALOG MIX 75-25 KWIKPEN 100 unit/mL (75-25) InPn INJECT 75 UNITS SUBCUTANEOUSLY TWICE DAILY BEFORE MEAL(S)    insulin lispro protamin-lispro 100 unit/mL (75-25) InPn Inject 80 Units into the skin.    metoprolol succinate (TOPROL-XL) 50 MG 24 hr tablet Take 50 mg by mouth once daily.    multivitamin capsule Take 1 capsule by mouth once daily.    pregabalin (LYRICA) 100 MG capsule Take 100 mg by mouth 3 (three) times daily.    ticagrelor (BRILINTA) 90 mg tablet Take 1 tablet by mouth 2 (two) times daily.    turmeric root extract 500 mg Cap Take 500 mg by mouth 2 (two) times a day.    albuterol (PROVENTIL/VENTOLIN HFA) 90 mcg/actuation inhaler Inhale 2 puffs into the lungs every 6 (six) hours as needed for Wheezing.    carBAMazepine (TEGRETOL) 200 mg tablet Take 1 tablet (200 mg total) by mouth once daily. Take one tab daily x 7 days;  If symptoms persist may increase to one tab BID thereafter    divalproex (DEPAKOTE) 500 MG TbEC Take 500 mg by mouth 3 (three) times daily.       Review of patient's allergies indicates:   Allergen Reactions    Epinephrine Anaphylaxis    Lidocaine Anaphylaxis     Dental visit pt stopped breathing after given lidocaine    Ace inhibitors     Shellfish containing products Itching    Sulfadiazine Other (See Comments)       Past Medical History:   Diagnosis Date    Anxiety and depression 3/13/2014    Essential (primary) hypertension 1/22/2013    Fibromyalgia 9/17/2012    Hyperlipidemia     Obstructive sleep apnea syndrome 8/20/2014    RLS (restless legs syndrome) 10/18/2018    Overview:  Rheum Dr Tam    Seizures     Type 2 diabetes mellitus 9/17/2012    ICD-10 Transition Last Assessment & Plan:  Continue with sliding scale insulin.  Control improved on  low-dose Lantus presently     Past Surgical History:   Procedure Laterality Date    ABDOMINAL SURGERY       SECTION       Family History    None       Tobacco Use    Smoking status: Former     Types: Cigarettes     Quit date:      Years since quittin.7    Smokeless tobacco: Never   Substance and Sexual Activity    Alcohol use: Not Currently    Drug use: Never    Sexual activity: Not on file     Review of Systems   Constitutional:  Negative for activity change and appetite change.   HENT:  Negative for dental problem, nosebleeds and sore throat.    Eyes:  Negative for discharge and visual disturbance.   Respiratory:  Positive for chest tightness. Negative for cough and stridor.    Cardiovascular:  Negative for leg swelling.   Gastrointestinal:  Negative for abdominal distention and abdominal pain.   Genitourinary:  Negative for difficulty urinating and dysuria.   Musculoskeletal:  Negative for arthralgias, back pain and joint swelling.   Allergic/Immunologic: Negative for environmental allergies.   Neurological:  Negative for dizziness, syncope and headaches.   Hematological:  Does not bruise/bleed easily.   Psychiatric/Behavioral:  Negative for behavioral problems.    Objective:     Vital Signs (Most Recent):  Temp: 97.6 °F (36.4 °C) (22)  Pulse: 70 (22)  Resp: 16 (22)  BP: (!) 148/67 (22)  SpO2: 95 % (22)   Vital Signs (24h Range):  Temp:  [97 °F (36.1 °C)-98.2 °F (36.8 °C)] 97.6 °F (36.4 °C)  Pulse:  [63-76] 70  Resp:  [16-20] 16  SpO2:  [95 %-98 %] 95 %  BP: (106-148)/(55-75) 148/67     Weight: 91.6 kg (202 lb)  Body mass index is 36.95 kg/m².    SpO2: 95 %  O2 Device (Oxygen Therapy): nasal cannula     Intake/Output - Last 3 Shifts             P.O.  480 240    I.V. (mL/kg)  1300.4 (14.2)     IV Piggyback 500      Total Intake(mL/kg) 500 (5.4) 1780.4 (19.4) 240 (2.6)    Urine  (mL/kg/hr)  600 (0.3)     Total Output  600     Net +500 +1180.4 +240           Urine Occurrence  1 x 1 x             Lines/Drains/Airways       Peripheral Intravenous Line  Duration                  Peripheral IV - Single Lumen 09/17/22 0001 20 G Left;Posterior Hand 1 day         Peripheral IV - Single Lumen 09/17/22 0150 20 G Posterior;Right Hand 1 day                     STS Risk Score:     Physical Exam  Vitals and nursing note reviewed.   Constitutional:       Appearance: She is obese.   HENT:      Head: Normocephalic.      Mouth/Throat:      Mouth: Mucous membranes are moist.   Eyes:      Extraocular Movements: Extraocular movements intact.      Pupils: Pupils are equal, round, and reactive to light.   Cardiovascular:      Rate and Rhythm: Normal rate and regular rhythm.   Pulmonary:      Effort: Pulmonary effort is normal.      Breath sounds: Normal breath sounds.   Abdominal:      Palpations: Abdomen is soft.   Musculoskeletal:         General: Normal range of motion.      Cervical back: Normal range of motion and neck supple.   Skin:     General: Skin is warm.      Capillary Refill: Capillary refill takes less than 2 seconds.   Neurological:      General: No focal deficit present.      Mental Status: She is alert and oriented to person, place, and time.   Psychiatric:         Mood and Affect: Mood normal.       Significant Labs:  BMP:   Recent Labs   Lab 09/18/22  0536   *      K 3.4*      CO2 24   BUN 10   CREATININE 0.6   CALCIUM 7.7*   MG 1.7     Cardiac markers:   Recent Labs   Lab 09/17/22  1207   TROPONINI 0.207*     CBC:   Recent Labs   Lab 09/18/22  0536   WBC 5.13   RBC 4.21   HGB 11.4*   HCT 34.9*      MCV 83   MCH 27.1   MCHC 32.7       Significant Diagnostics:  I reviewed the left heart catheterization which shows multivessel coronary artery disease and is stenosis of the left anterior descending artery 70% before the stent and also in the mid segment the diagonal 1 has  disease obtuse marginal has 70% stenosis and the posterior descending artery has 70% stenosis with a patent stent in the right coronary artery.    Assessment/Plan:   62-year-old female with a history of hypertension diabetes mellitus obesity significant coronary artery disease with three-vessel coronary artery disease admitted with NSTEMI.  The patient was taking Brilinta until the day of admission she is admitted for observation for Brilinta washout before her urgent coronary artery bypass grafting x3.  We will keep the patient on heparin drip and watch for symptoms and her surgery is planned for Friday which is the same time for bypass surgery and the patient is on Brilinta to prevent catastrophic bleeding in the operative and perioperative period.  We will continue to do carotid Doppler ABIs bedside pulmonary function test while waiting for the surgery.  NYHA Score: NYHA II: slight limitation of physical activity, comfortable at rest    Active Diagnoses:    Diagnosis Date Noted POA    PRINCIPAL PROBLEM:  NSTEMI (non-ST elevated myocardial infarction) [I21.4] 09/17/2022 Yes    Obesity [E66.9] 09/17/2022 Yes     Chronic    Atherosclerosis of native coronary artery of native heart without angina pectoris [I25.10] 10/18/2018 Yes     Chronic    Hyperlipidemia [E78.5] 01/22/2013 Yes     Chronic    Essential (primary) hypertension [I10] 01/22/2013 Yes     Chronic    Type 2 diabetes mellitus, with long-term current use of insulin [E11.9, Z79.4] 09/17/2012 Not Applicable     Chronic      Problems Resolved During this Admission:       Thank you for your consult. I will follow-up with patient. Please contact us if you have any additional questions.    Krystal Marin MD  Cardiothoracic Surgery  [facility]

## 2022-09-18 NOTE — ASSESSMENT & PLAN NOTE
Patient's FSGs are uncontrolled due to hyperglycemia on current medication regimen.  Last A1c reviewed-   Lab Results   Component Value Date    HGBA1C 10.9 (H) 09/17/2022     Most recent fingerstick glucose reviewed-   Recent Labs   Lab 09/17/22  1644 09/17/22  2112 09/18/22  0601 09/18/22  1130   POCTGLUCOSE 301* 335* 233* 258*     Current correctional scale  Medium  Maintain anti-hyperglycemic dose as follows-   Antihyperglycemics (From admission, onward)    Start     Stop Route Frequency Ordered    09/18/22 0815  insulin detemir U-100 pen 10 Units         -- SubQ 2 times daily 09/18/22 0806    09/17/22 0413  insulin aspart U-100 pen 1-10 Units         -- SubQ Before meals & nightly PRN 09/17/22 0316        Hold Oral hypoglycemics while patient is in the hospital.    9/18:    Ordered levemir 10 units BID; will monitor and titrate dose accordingly

## 2022-09-18 NOTE — ASSESSMENT & PLAN NOTE
- Troponin 0.502. EKG unrevealing. CP free on admission.    - Heparin drip per nomogram.  - Continue home ASA, BB, and high intensity statin.  - SL NTG as needed.     - Trend serial cardiac enzymes and EKG.  - Check FLP.  - Will obtain 2D echo.  - Cardiology consult.     9/17:    Cardiology has evaluated and planned for cardiac cath today- Lmca normal; lad prox eccentric plaque 70% hazzy d1 stent 99%  mid lad 70%  Lcx non obs cad om1 stent patent edge stent 40-50% stenosis.   Rca stent patent nopn obs cad pda 70%.  Lvedp normal    Lvf normal anterolateral hk; recommended cardiothoracic surgery consult;   Will f/u; currently on heparin drip; aspirin, BB, statin;       9/18:    Examination done at bedside; denied any chest pain or SOB; currently on heparin drip;   Cardiothoracic surg on board - recommended keep the patient on heparin drip and watch for symptoms and her surgery is planned for Friday which is the same time for bypass surgery;   Electrolytes replaced to maintain K >4, Mg > 2; continue aspirin, BB, statin;

## 2022-09-18 NOTE — HOSPITAL COURSE
"   Ca Diaz is a 62 y.o. female patient with a PMHx of anxiety, CAD, h/o CVA, depression, DM II, RLS, seizures, fibromyalgia, HLD, HTN, PRAVEENA, and obesity who presented to the Emergency Department for evaluation of generalized weakness which onset gradually two days ago. Pt states she fell last night (9/15) and the night before last (9/14). Pt's  states that she "can't even walk through an open doorway." Symptoms are constant and moderate in severity. No mitigating or exacerbating factors reported. Associated sxs include N/D and dizziness. Pt reports the nausea began one day ago. Patient denies any HA, vomiting, light-headedness, visual disturbance, CP, SOB/MALAVE, diaphoresis, neck or jaw pain, upper extremity pain, numbness/tingling, dysuria, and all other sxs at this time. Work-up in the ED revealed NSTEMI with troponin of 0.502, EKG unrevealing, CXR unremarkable, CT of the head negative for acute process. 325 mg ASA given and UFH initiated. Hospital Medicine was contacted for admission and patient placed in Observation.   9/18/22-Patient seen and examined today. No chest pain. Plan CABG soon due to complex 3 vessel CAD.        9/19/22-Patient seen and examined today. Reported some indigestion symptoms earlier this AM, has since resolved. No alden chest pain or tightness. No other CV complaints. Labs stable. Awaiting CABG after Brilinta washout period.    9/20/22- Patient seen and examined today. No complaints overnight. No c/o cp or SOB.  Labs reviewed, on Hep gtt. CABG planned for Friday after Brilinta washout period    9/21/22-Patient seen and examined today. Stable overnight. No issues. Labs reviewed. Awaiting CABG.    9/24/22- POD1. The patient had successful CABG x3 on 9/23/22. SVG-diagonal, SVG-PDA, LIMA-LAD.  Doing well.  Sitting up in the chair, feeling tired.  No acute events overnight.    9/25/22-POD2. Became disoriented overdnight. Hgb 8.4->7.6. No arrhythmias overnight. Low grade " fever     9/26/22-Patient seen and examined today, s/p CABG x 3 POD # 3. Remains confused, did not respond to questions. Hemodynamically stable. Labs reviewed. H/H 7.6/23.    9/27/22-Patient seen and examined today, s/p CABG x 3 POD # 4. Mental status improved, more awake/alert. No CV complaints during exam. Pain controlled. Labs stable.     09/28/2022 s/p CABG POD#5. VSS, non chest pain and confusion improved, the lab reviewed.     09/29/2022 s/p CABG POD#6. Stable. Ambulating well the lab reviewed.     09/30 no chest pain ambulating, lab reviewed s/p CABg POD #7

## 2022-09-18 NOTE — PROGRESS NOTES
"[facility] Medicine  Progress Note    Patient Name: Ca Diaz  MRN: [id number]  Patient Class: IP- Inpatient   Admission Date: 9/16/2022  Length of Stay: 1 days  Attending Physician: Leisa Plata, *  Primary Care Provider: Desiree Frye MD        Subjective:     Principal Problem:NSTEMI (non-ST elevated myocardial infarction)        HPI:  Ca Diaz is a 62 y.o. female patient with a PMHx of anxiety, CAD, h/o CVA, depression, DM II, RLS, seizures, fibromyalgia, HLD, HTN, PRAVEENA, and obesity who presented to the Emergency Department for evaluation of generalized weakness which onset gradually two days ago. Pt states she fell last night (9/15) and the night before last (9/14). Pt's  states that she "can't even walk through an open doorway." Symptoms are constant and moderate in severity. No mitigating or exacerbating factors reported. Associated sxs include N/D and dizziness. Pt reports the nausea began one day ago. Patient denies any HA, vomiting, light-headedness, visual disturbance, CP, SOB/MALAVE, diaphoresis, neck or jaw pain, upper extremity pain, numbness/tingling, dysuria, and all other sxs at this time. Work-up in the ED revealed NSTEMI with troponin of 0.502, EKG unrevealing, CXR unremarkable, CT of the head negative for acute process. 325 mg ASA given and UFH initiated. Hospital Medicine was contacted for admission and patient placed in Observation.         Overview/Hospital Course:  9/17:    Examination of patient done at bedside; pt was alert and oriented, c/o chest discomfort;   Cardiology has evaluated and planned for cardiac cath today- Lmca normal; lad prox eccentric plaque 70% hazzy d1 stent 99%  mid lad 70%  Lcx non obs cad om1 stent patent edge stent 40-50% stenosis.   Rca stent patent nopn obs cad pda 70%.  Lvedp normal    Lvf normal anterolateral hk; recommended cardiothoracic surgery consult;   Will f/u; currently on heparin drip; aspirin, BB, " statin;         9/18:    Examination done at bedside; denied any chest pain or SOB; currently on heparin drip;   Cardiothoracic surg on board - recommended keep the patient on heparin drip and watch for symptoms and her surgery is planned for Friday which is the same time for bypass surgery;   Electrolytes replaced to maintain K >4, Mg > 2;   Will f/u on BG;           Review of Systems    Constitutional:  Negative for chills, diaphoresis, fatigue and fever.   HENT:  Negative for congestion and sore throat.    Respiratory:  Negative for cough, chest tightness, shortness of breath and wheezing.    Cardiovascular:  Negative for chest pain, palpitations and leg swelling.   Gastrointestinal:  Negative for abdominal pain, constipation and vomiting.   Genitourinary:  Negative for dysuria and hematuria.   Musculoskeletal:  Negative for arthralgias, back pain and myalgias.   Skin:  Negative for pallor and rash.   Neurological:  Positive for dizziness and weakness (generalized). Negative for syncope, light-headedness, numbness and headaches.   Psychiatric/Behavioral:  Negative for confusion. The patient is not nervous/anxious.    All other systems reviewed and are negative.      Objective:     Vital Signs (Most Recent):  Temp: 97.9 °F (36.6 °C) (09/18/22 1033)  Pulse: 79 (09/18/22 1033)  Resp: 20 (09/18/22 1033)  BP: (!) 161/69 (09/18/22 1033)  SpO2: 96 % (09/18/22 1033)   Vital Signs (24h Range):  Temp:  [97 °F (36.1 °C)-97.9 °F (36.6 °C)] 97.9 °F (36.6 °C)  Pulse:  [63-79] 79  Resp:  [16-20] 20  SpO2:  [95 %-98 %] 96 %  BP: (106-161)/(55-75) 161/69     Weight: 91.6 kg (202 lb)  Body mass index is 36.95 kg/m².    Intake/Output Summary (Last 24 hours) at 9/18/2022 1202  Last data filed at 9/18/2022 0800  Gross per 24 hour   Intake 2020.35 ml   Output 400 ml   Net 1620.35 ml      Physical Exam    Constitutional:       General: She is awake. She is not in acute distress.     Appearance: Normal appearance. She is well-developed.  She is obese. She is not diaphoretic.   HENT:      Head: Normocephalic and atraumatic.   Eyes:      Conjunctiva/sclera: Conjunctivae normal.      Comments: PERRL; EOM intact.   Cardiovascular:      Rate and Rhythm: Normal rate and regular rhythm. No extrasystoles are present.     Heart sounds: S1 normal and S2 normal. No murmur heard.    No gallop.   Pulmonary:      Effort: Pulmonary effort is normal. No tachypnea.      Breath sounds: Normal breath sounds and air entry. No wheezing, rhonchi or rales.   Abdominal:      General: Bowel sounds are normal. There is no distension.      Palpations: Abdomen is soft.      Tenderness: There is no abdominal tenderness.   Musculoskeletal:         General: No tenderness or deformity. Normal range of motion.      Cervical back: Normal range of motion and neck supple.      Right lower leg: No edema.      Left lower leg: No edema.   Skin:     General: Skin is warm and dry.      Capillary Refill: Capillary refill takes less than 2 seconds.      Findings: No erythema or rash.   Neurological:      General: No focal deficit present.      Mental Status: She is alert and oriented to person, place, and time.      GCS: GCS eye subscore is 4. GCS verbal subscore is 5. GCS motor subscore is 6.      Cranial Nerves: Cranial nerves 2-12 are intact.      Sensory: Sensation is intact.      Motor: Motor function is intact.   Psychiatric:         Mood and Affect: Mood and affect normal.         Behavior: Behavior normal. Behavior is cooperative.     Significant Labs: All pertinent labs within the past 24 hours have been reviewed.  CBC:   Recent Labs   Lab 09/16/22 2357 09/17/22  0410 09/18/22  0536   WBC 4.07 4.33 5.13   HGB 13.4 12.7 11.4*   HCT 40.1 38.0 34.9*    210 213     CMP:   Recent Labs   Lab 09/16/22 2357 09/17/22  0410 09/18/22  0536    139 143   K 3.6 3.4* 3.4*   CL 98 102 108   CO2 25 24 24   * 284* 247*   BUN 11 9 10   CREATININE 0.7 0.6 0.6   CALCIUM 9.1 8.4*  7.7*   PROT 7.0  --   --    ALBUMIN 3.1*  --   --    BILITOT 0.3  --   --    ALKPHOS 71  --   --    AST 53*  --   --    ALT 37  --   --    ANIONGAP 15 13 11       Significant Imaging:     .im      Assessment/Plan:      * NSTEMI (non-ST elevated myocardial infarction)  - Troponin 0.502. EKG unrevealing. CP free on admission.    - Heparin drip per nomogram.  - Continue home ASA, BB, and high intensity statin.  - SL NTG as needed.     - Trend serial cardiac enzymes and EKG.  - Check FLP.  - Will obtain 2D echo.  - Cardiology consult.     9/17:    Cardiology has evaluated and planned for cardiac cath today- Lmca normal; lad prox eccentric plaque 70% hazzy d1 stent 99%  mid lad 70%  Lcx non obs cad om1 stent patent edge stent 40-50% stenosis.   Rca stent patent nopn obs cad pda 70%.  Lvedp normal    Lvf normal anterolateral hk; recommended cardiothoracic surgery consult;   Will f/u; currently on heparin drip; aspirin, BB, statin;       9/18:    Examination done at bedside; denied any chest pain or SOB; currently on heparin drip;   Cardiothoracic surg on board - recommended keep the patient on heparin drip and watch for symptoms and her surgery is planned for Friday which is the same time for bypass surgery;   Electrolytes replaced to maintain K >4, Mg > 2; continue aspirin, BB, statin;       Obesity  Body mass index is 37.02 kg/m². Morbid obesity complicates all aspects of disease management from diagnostic modalities to treatment. Weight loss encouraged and health benefits explained to patient.       Atherosclerosis of native coronary artery of native heart without angina pectoris  - Continue medical management as above.       Hyperlipidemia  - Continue home statin. FLP in AM.       Essential (primary) hypertension  - Continue home antihypertensives.       Type 2 diabetes mellitus, with long-term current use of insulin  Patient's FSGs are uncontrolled due to hyperglycemia on current medication regimen.  Last A1c  reviewed-   Lab Results   Component Value Date    HGBA1C 10.9 (H) 09/17/2022     Most recent fingerstick glucose reviewed-   Recent Labs   Lab 09/17/22  1644 09/17/22  2112 09/18/22  0601 09/18/22  1130   POCTGLUCOSE 301* 335* 233* 258*     Current correctional scale  Medium  Maintain anti-hyperglycemic dose as follows-   Antihyperglycemics (From admission, onward)    Start     Stop Route Frequency Ordered    09/18/22 0815  insulin detemir U-100 pen 10 Units         -- SubQ 2 times daily 09/18/22 0806    09/17/22 0413  insulin aspart U-100 pen 1-10 Units         -- SubQ Before meals & nightly PRN 09/17/22 0316        Hold Oral hypoglycemics while patient is in the hospital.    9/18:    Ordered levemir 10 units BID; will monitor and titrate dose accordingly         VTE Risk Mitigation (From admission, onward)         Ordered     heparin 25,000 units in dextrose 5% (100 units/ml) IV bolus from bag - ADDITIONAL PRN BOLUS - 60 units/kg (max bolus 4000 units)  As needed (PRN)        Question:  Heparin Infusion Adjustment (DO NOT MODIFY ANSWER)  Answer:  [email or web address]\[email or web address]\Images\Pharmacy\HeparinInfusions\heparin LOW INTENSITY nomogram for [facility] [id number]    09/17/22 1829     heparin 25,000 units in dextrose 5% (100 units/ml) IV bolus from bag - ADDITIONAL PRN BOLUS - 30 units/kg (max bolus 4000 units)  As needed (PRN)        Question:  Heparin Infusion Adjustment (DO NOT MODIFY ANSWER)  Answer:  [email or web address]\[email or web address]\Images\Pharmacy\HeparinInfusions\heparin LOW INTENSITY nomogram for [facility] [id number].pdf    09/17/22 1829     heparin 25,000 units in dextrose 5% 250 mL (100 units/mL) infusion LOW INTENSITY nomogram - [facility]  Continuous        Question Answer Comment   Heparin Infusion Adjustment (DO NOT MODIFY ANSWER) [email or web address]\Images\Pharmacy\HeparinInfusions\heparin LOW INTENSITY nomogram for [facility] [id number]    Begin at (in units/kg/hr) 12        09/17/22 1829     IP VTE HIGH RISK PATIENT  Once         09/17/22 0315     Place  sequential compression device  Until discontinued         09/17/22 8906                Discharge Planning   JOHN:      Code Status: Full Code   Is the patient medically ready for discharge?:     Reason for patient still in hospital (select all that apply): currently on heparin drip;     surgery is planned for Friday -urgent  bypass surgery;                    Leisa Plata MD  Department of Hospital Medicine   [facility]

## 2022-09-18 NOTE — PLAN OF CARE
Patient and family discussed surgery, managing diabetes for proper wound healing, and safety. Patient and family comfortable and agree with plan of care.

## 2022-09-18 NOTE — SUBJECTIVE & OBJECTIVE
Review of Systems    Constitutional:  Negative for chills, diaphoresis, fatigue and fever.   HENT:  Negative for congestion and sore throat.    Respiratory:  Negative for cough, chest tightness, shortness of breath and wheezing.    Cardiovascular:  Negative for chest pain, palpitations and leg swelling.   Gastrointestinal:  Negative for abdominal pain, constipation and vomiting.   Genitourinary:  Negative for dysuria and hematuria.   Musculoskeletal:  Negative for arthralgias, back pain and myalgias.   Skin:  Negative for pallor and rash.   Neurological:  Positive for dizziness and weakness (generalized). Negative for syncope, light-headedness, numbness and headaches.   Psychiatric/Behavioral:  Negative for confusion. The patient is not nervous/anxious.    All other systems reviewed and are negative.      Objective:     Vital Signs (Most Recent):  Temp: 97.9 °F (36.6 °C) (09/18/22 1033)  Pulse: 79 (09/18/22 1033)  Resp: 20 (09/18/22 1033)  BP: (!) 161/69 (09/18/22 1033)  SpO2: 96 % (09/18/22 1033)   Vital Signs (24h Range):  Temp:  [97 °F (36.1 °C)-97.9 °F (36.6 °C)] 97.9 °F (36.6 °C)  Pulse:  [63-79] 79  Resp:  [16-20] 20  SpO2:  [95 %-98 %] 96 %  BP: (106-161)/(55-75) 161/69     Weight: 91.6 kg (202 lb)  Body mass index is 36.95 kg/m².    Intake/Output Summary (Last 24 hours) at 9/18/2022 1202  Last data filed at 9/18/2022 0800  Gross per 24 hour   Intake 2020.35 ml   Output 400 ml   Net 1620.35 ml      Physical Exam    Constitutional:       General: She is awake. She is not in acute distress.     Appearance: Normal appearance. She is well-developed. She is obese. She is not diaphoretic.   HENT:      Head: Normocephalic and atraumatic.   Eyes:      Conjunctiva/sclera: Conjunctivae normal.      Comments: PERRL; EOM intact.   Cardiovascular:      Rate and Rhythm: Normal rate and regular rhythm. No extrasystoles are present.     Heart sounds: S1 normal and S2 normal. No murmur heard.    No gallop.   Pulmonary:       Effort: Pulmonary effort is normal. No tachypnea.      Breath sounds: Normal breath sounds and air entry. No wheezing, rhonchi or rales.   Abdominal:      General: Bowel sounds are normal. There is no distension.      Palpations: Abdomen is soft.      Tenderness: There is no abdominal tenderness.   Musculoskeletal:         General: No tenderness or deformity. Normal range of motion.      Cervical back: Normal range of motion and neck supple.      Right lower leg: No edema.      Left lower leg: No edema.   Skin:     General: Skin is warm and dry.      Capillary Refill: Capillary refill takes less than 2 seconds.      Findings: No erythema or rash.   Neurological:      General: No focal deficit present.      Mental Status: She is alert and oriented to person, place, and time.      GCS: GCS eye subscore is 4. GCS verbal subscore is 5. GCS motor subscore is 6.      Cranial Nerves: Cranial nerves 2-12 are intact.      Sensory: Sensation is intact.      Motor: Motor function is intact.   Psychiatric:         Mood and Affect: Mood and affect normal.         Behavior: Behavior normal. Behavior is cooperative.     Significant Labs: All pertinent labs within the past 24 hours have been reviewed.  CBC:   Recent Labs   Lab 09/16/22 2357 09/17/22  0410 09/18/22  0536   WBC 4.07 4.33 5.13   HGB 13.4 12.7 11.4*   HCT 40.1 38.0 34.9*    210 213     CMP:   Recent Labs   Lab 09/16/22 2357 09/17/22  0410 09/18/22  0536    139 143   K 3.6 3.4* 3.4*   CL 98 102 108   CO2 25 24 24   * 284* 247*   BUN 11 9 10   CREATININE 0.7 0.6 0.6   CALCIUM 9.1 8.4* 7.7*   PROT 7.0  --   --    ALBUMIN 3.1*  --   --    BILITOT 0.3  --   --    ALKPHOS 71  --   --    AST 53*  --   --    ALT 37  --   --    ANIONGAP 15 13 11       Significant Imaging:     .im

## 2022-09-18 NOTE — SUBJECTIVE & OBJECTIVE
Interval History: stable, post cath    Review of Systems   Constitutional: Negative for chills, diaphoresis, night sweats, weight gain and weight loss.   HENT:  Negative for congestion, hoarse voice, sore throat and stridor.    Eyes:  Negative for double vision and pain.   Cardiovascular:  Positive for chest pain. Negative for claudication, cyanosis, dyspnea on exertion, irregular heartbeat, leg swelling, near-syncope, orthopnea, palpitations, paroxysmal nocturnal dyspnea and syncope.   Respiratory:  Negative for cough, hemoptysis, shortness of breath, sleep disturbances due to breathing, snoring, sputum production and wheezing.    Endocrine: Negative for cold intolerance, heat intolerance and polydipsia.   Hematologic/Lymphatic: Negative for bleeding problem. Does not bruise/bleed easily.   Skin:  Negative for color change, dry skin and rash.   Musculoskeletal:  Negative for joint swelling and muscle cramps.   Gastrointestinal:  Negative for bloating, abdominal pain, constipation, diarrhea, dysphagia, melena, nausea and vomiting.   Genitourinary:  Negative for flank pain and urgency.   Neurological:  Negative for dizziness, focal weakness, headaches, light-headedness, loss of balance, seizures and weakness.   Psychiatric/Behavioral:  Negative for altered mental status and memory loss. The patient is not nervous/anxious.    Objective:     Vital Signs (Most Recent):  Temp: 97.9 °F (36.6 °C) (09/18/22 1033)  Pulse: 79 (09/18/22 1033)  Resp: 20 (09/18/22 1033)  BP: (!) 161/69 (09/18/22 1033)  SpO2: 96 % (09/18/22 1033)   Vital Signs (24h Range):  Temp:  [97 °F (36.1 °C)-97.9 °F (36.6 °C)] 97.9 °F (36.6 °C)  Pulse:  [63-79] 79  Resp:  [16-20] 20  SpO2:  [95 %-98 %] 96 %  BP: (106-161)/(55-75) 161/69     Weight: 91.6 kg (202 lb)  Body mass index is 36.95 kg/m².     SpO2: 96 %  O2 Device (Oxygen Therapy): nasal cannula      Intake/Output Summary (Last 24 hours) at 9/18/2022 1207  Last data filed at 9/18/2022 0800  Gross  per 24 hour   Intake 2020.35 ml   Output 400 ml   Net 1620.35 ml       Lines/Drains/Airways       Peripheral Intravenous Line  Duration                  Peripheral IV - Single Lumen 09/17/22 0001 20 G Left;Posterior Hand 1 day         Peripheral IV - Single Lumen 09/17/22 0150 20 G Posterior;Right Hand 1 day                    Physical Exam  Eyes:      Pupils: Pupils are equal, round, and reactive to light.   Neck:      Trachea: No tracheal deviation.   Cardiovascular:      Rate and Rhythm: Normal rate and regular rhythm.      Pulses: Intact distal pulses.           Carotid pulses are 2+ on the right side and 2+ on the left side.       Radial pulses are 2+ on the right side and 2+ on the left side.        Femoral pulses are 2+ on the right side and 2+ on the left side.       Popliteal pulses are 2+ on the right side and 2+ on the left side.        Dorsalis pedis pulses are 2+ on the right side and 2+ on the left side.        Posterior tibial pulses are 2+ on the right side and 2+ on the left side.      Heart sounds: Normal heart sounds. No murmur heard.    No friction rub. No gallop.   Pulmonary:      Effort: Pulmonary effort is normal. No respiratory distress.      Breath sounds: Normal breath sounds. No stridor. No wheezing or rales.   Chest:      Chest wall: No tenderness.   Abdominal:      General: There is no distension.      Tenderness: There is no abdominal tenderness. There is no rebound.   Musculoskeletal:         General: No tenderness.      Cervical back: Normal range of motion.   Skin:     General: Skin is warm and dry.   Neurological:      Mental Status: She is alert and oriented to person, place, and time.       Significant Labs: CMP   Recent Labs   Lab 09/16/22  2357 09/17/22  0410 09/18/22  0536    139 143   K 3.6 3.4* 3.4*   CL 98 102 108   CO2 25 24 24   * 284* 247*   BUN 11 9 10   CREATININE 0.7 0.6 0.6   CALCIUM 9.1 8.4* 7.7*   PROT 7.0  --   --    ALBUMIN 3.1*  --   --    BILITOT  0.3  --   --    ALKPHOS 71  --   --    AST 53*  --   --    ALT 37  --   --    ANIONGAP 15 13 11   , CBC   Recent Labs   Lab 09/16/22  2357 09/17/22  0410 09/18/22  0536   WBC 4.07 4.33 5.13   HGB 13.4 12.7 11.4*   HCT 40.1 38.0 34.9*    210 213   , and Troponin   Recent Labs   Lab 09/16/22  2357 09/17/22  0828 09/17/22  1207   TROPONINI 0.502* 0.274* 0.207*       Significant Imaging: Echocardiogram: Transthoracic echo (TTE) complete (Cupid Only):   Results for orders placed or performed during the hospital encounter of 09/16/22   Echo   Result Value Ref Range    BSA 2 m2    TDI SEPTAL 0.06 m/s    LV LATERAL E/E' RATIO 16.00 m/s    LV SEPTAL E/E' RATIO 16.00 m/s    LA WIDTH 3.50 cm    IVC diameter 1.78 cm    Left Ventricular Outflow Tract Mean Velocity 0.90 cm/s    Left Ventricular Outflow Tract Mean Gradient 3.28 mmHg    TDI LATERAL 0.06 m/s    LVIDd 3.49 (A) 3.5 - 6.0 cm    IVS 1.28 (A) 0.6 - 1.1 cm    Posterior Wall 1.18 (A) 0.6 - 1.1 cm    Ao root annulus 2.57 cm    LVIDs 2.43 2.1 - 4.0 cm    FS 30 28 - 44 %    LA volume 53.78 cm3    STJ 2.92 cm    Ascending aorta 2.90 cm    LV mass 140.49 g    LA size 3.58 cm    TAPSE 1.90 cm    Left Ventricle Relative Wall Thickness 0.68 cm    AV mean gradient 8 mmHg    AV valve area 1.96 cm2    AV Velocity Ratio 0.63     AV index (prosthetic) 0.65     E/A ratio 0.98     Mean e' 0.06 m/s    E wave deceleration time 236.57 msec    IVRT 79.92 msec    LVOT diameter 1.96 cm    LVOT area 3.0 cm2    LVOT peak nilay 1.07 m/s    LVOT peak VTI 27.30 cm    Ao peak nilay 1.71 m/s    Ao VTI 42.1 cm    RVOT peak nilay 0.70 m/s    RVOT peak VTI 15.6 cm    LVOT stroke volume 82.33 cm3    AV peak gradient 12 mmHg    PV mean gradient 1.22 mmHg    E/E' ratio 16.00 m/s    MV Peak E Nilay 0.96 m/s    MV Peak A Nilay 0.98 m/s    LV Systolic Volume 20.74 mL    LV Systolic Volume Index 10.8 mL/m2    LV Diastolic Volume 50.49 mL    LV Diastolic Volume Index 26.30 mL/m2    LA Volume Index 28.0 mL/m2    LV  Mass Index 73 g/m2    RA Major Axis 3.84 cm    Left Atrium Minor Axis 5.02 cm    Left Atrium Major Axis 5.08 cm    RA Width 2.80 cm    Right Atrial Pressure (from IVC) 8 mmHg    EF 60 %    Narrative    · The left ventricle is normal in size with concentric remodeling and   normal systolic function.  · The estimated ejection fraction is 60%.  · Indeterminate left ventricular diastolic function.  · Normal right ventricular size with normal right ventricular systolic   function.  · There is mild aortic valve stenosis.  · Aortic valve area is 1.96 cm2; peak velocity is 1.71 m/s; mean gradient   is 8 mmHg.  · Intermediate central venous pressure (8 mmHg).

## 2022-09-19 LAB
ANION GAP SERPL CALC-SCNC: 10 MMOL/L (ref 8–16)
APTT BLDCRRT: 41.1 SEC (ref 21–32)
BASOPHILS # BLD AUTO: 0.02 K/UL (ref 0–0.2)
BASOPHILS NFR BLD: 0.3 % (ref 0–1.9)
BUN SERPL-MCNC: 8 MG/DL (ref 6–20)
CALCIUM SERPL-MCNC: 8.5 MG/DL (ref 8.7–10.5)
CHLORIDE SERPL-SCNC: 106 MMOL/L (ref 95–110)
CO2 SERPL-SCNC: 26 MMOL/L (ref 23–29)
CREAT SERPL-MCNC: 0.5 MG/DL (ref 0.5–1.4)
DIFFERENTIAL METHOD: ABNORMAL
EOSINOPHIL # BLD AUTO: 0.3 K/UL (ref 0–0.5)
EOSINOPHIL NFR BLD: 4.5 % (ref 0–8)
ERYTHROCYTE [DISTWIDTH] IN BLOOD BY AUTOMATED COUNT: 14.2 % (ref 11.5–14.5)
EST. GFR  (NO RACE VARIABLE): >60 ML/MIN/1.73 M^2
GLUCOSE SERPL-MCNC: 215 MG/DL (ref 70–110)
HCT VFR BLD AUTO: 39.6 % (ref 37–48.5)
HGB BLD-MCNC: 12 G/DL (ref 12–16)
IMM GRANULOCYTES # BLD AUTO: 0.02 K/UL (ref 0–0.04)
IMM GRANULOCYTES NFR BLD AUTO: 0.3 % (ref 0–0.5)
LYMPHOCYTES # BLD AUTO: 3.4 K/UL (ref 1–4.8)
LYMPHOCYTES NFR BLD: 54.6 % (ref 18–48)
MAGNESIUM SERPL-MCNC: 1.8 MG/DL (ref 1.6–2.6)
MCH RBC QN AUTO: 26.3 PG (ref 27–31)
MCHC RBC AUTO-ENTMCNC: 30.3 G/DL (ref 32–36)
MCV RBC AUTO: 87 FL (ref 82–98)
MONOCYTES # BLD AUTO: 0.4 K/UL (ref 0.3–1)
MONOCYTES NFR BLD: 7 % (ref 4–15)
NEUTROPHILS # BLD AUTO: 2.1 K/UL (ref 1.8–7.7)
NEUTROPHILS NFR BLD: 33.3 % (ref 38–73)
NRBC BLD-RTO: 0 /100 WBC
PHOSPHATE SERPL-MCNC: 3 MG/DL (ref 2.7–4.5)
PLATELET # BLD AUTO: 228 K/UL (ref 150–450)
PMV BLD AUTO: 10.7 FL (ref 9.2–12.9)
POCT GLUCOSE: 196 MG/DL (ref 70–110)
POCT GLUCOSE: 214 MG/DL (ref 70–110)
POCT GLUCOSE: 215 MG/DL (ref 70–110)
POCT GLUCOSE: 249 MG/DL (ref 70–110)
POTASSIUM SERPL-SCNC: 3.9 MMOL/L (ref 3.5–5.1)
RBC # BLD AUTO: 4.56 M/UL (ref 4–5.4)
SODIUM SERPL-SCNC: 142 MMOL/L (ref 136–145)
WBC # BLD AUTO: 6.28 K/UL (ref 3.9–12.7)

## 2022-09-19 PROCEDURE — 99232 SBSQ HOSP IP/OBS MODERATE 35: CPT | Mod: ,,, | Performed by: STUDENT IN AN ORGANIZED HEALTH CARE EDUCATION/TRAINING PROGRAM

## 2022-09-19 PROCEDURE — 21400001 HC TELEMETRY ROOM

## 2022-09-19 PROCEDURE — 25000003 PHARM REV CODE 250: Performed by: INTERNAL MEDICINE

## 2022-09-19 PROCEDURE — 83735 ASSAY OF MAGNESIUM: CPT | Performed by: STUDENT IN AN ORGANIZED HEALTH CARE EDUCATION/TRAINING PROGRAM

## 2022-09-19 PROCEDURE — 85730 THROMBOPLASTIN TIME PARTIAL: CPT | Performed by: FAMILY MEDICINE

## 2022-09-19 PROCEDURE — 25000003 PHARM REV CODE 250: Performed by: PHYSICIAN ASSISTANT

## 2022-09-19 PROCEDURE — 63600175 PHARM REV CODE 636 W HCPCS: Performed by: INTERNAL MEDICINE

## 2022-09-19 PROCEDURE — 80048 BASIC METABOLIC PNL TOTAL CA: CPT | Performed by: STUDENT IN AN ORGANIZED HEALTH CARE EDUCATION/TRAINING PROGRAM

## 2022-09-19 PROCEDURE — 99232 PR SUBSEQUENT HOSPITAL CARE,LEVL II: ICD-10-PCS | Mod: ,,, | Performed by: STUDENT IN AN ORGANIZED HEALTH CARE EDUCATION/TRAINING PROGRAM

## 2022-09-19 PROCEDURE — 85025 COMPLETE CBC W/AUTO DIFF WBC: CPT | Performed by: STUDENT IN AN ORGANIZED HEALTH CARE EDUCATION/TRAINING PROGRAM

## 2022-09-19 PROCEDURE — 36415 COLL VENOUS BLD VENIPUNCTURE: CPT | Performed by: FAMILY MEDICINE

## 2022-09-19 PROCEDURE — 84100 ASSAY OF PHOSPHORUS: CPT | Performed by: STUDENT IN AN ORGANIZED HEALTH CARE EDUCATION/TRAINING PROGRAM

## 2022-09-19 RX ORDER — METOPROLOL SUCCINATE 50 MG/1
100 TABLET, EXTENDED RELEASE ORAL DAILY
Status: DISCONTINUED | OUTPATIENT
Start: 2022-09-20 | End: 2022-09-23

## 2022-09-19 RX ORDER — PANTOPRAZOLE SODIUM 40 MG/1
40 TABLET, DELAYED RELEASE ORAL DAILY
Status: DISCONTINUED | OUTPATIENT
Start: 2022-09-19 | End: 2022-09-30 | Stop reason: HOSPADM

## 2022-09-19 RX ORDER — HYDROCODONE BITARTRATE AND ACETAMINOPHEN 500; 5 MG/1; MG/1
TABLET ORAL
Status: DISCONTINUED | OUTPATIENT
Start: 2022-09-19 | End: 2022-09-30 | Stop reason: HOSPADM

## 2022-09-19 RX ADMIN — PREGABALIN 100 MG: 100 CAPSULE ORAL at 08:09

## 2022-09-19 RX ADMIN — FLUOXETINE 40 MG: 20 CAPSULE ORAL at 09:09

## 2022-09-19 RX ADMIN — AMLODIPINE BESYLATE 10 MG: 10 TABLET ORAL at 09:09

## 2022-09-19 RX ADMIN — PANTOPRAZOLE SODIUM 40 MG: 40 TABLET, DELAYED RELEASE ORAL at 03:09

## 2022-09-19 RX ADMIN — PREGABALIN 100 MG: 100 CAPSULE ORAL at 03:09

## 2022-09-19 RX ADMIN — INSULIN ASPART 2 UNITS: 100 INJECTION, SOLUTION INTRAVENOUS; SUBCUTANEOUS at 08:09

## 2022-09-19 RX ADMIN — HEPARIN SODIUM 12 UNITS/KG/HR: 10000 INJECTION, SOLUTION INTRAVENOUS at 07:09

## 2022-09-19 RX ADMIN — DIVALPROEX SODIUM 500 MG: 125 CAPSULE, COATED PELLETS ORAL at 09:09

## 2022-09-19 RX ADMIN — INSULIN DETEMIR 10 UNITS: 100 INJECTION, SOLUTION SUBCUTANEOUS at 08:09

## 2022-09-19 RX ADMIN — DIVALPROEX SODIUM 500 MG: 125 CAPSULE, COATED PELLETS ORAL at 03:09

## 2022-09-19 RX ADMIN — INSULIN ASPART 6 UNITS: 100 INJECTION, SOLUTION INTRAVENOUS; SUBCUTANEOUS at 10:09

## 2022-09-19 RX ADMIN — PREGABALIN 100 MG: 100 CAPSULE ORAL at 09:09

## 2022-09-19 RX ADMIN — ATORVASTATIN CALCIUM 80 MG: 40 TABLET, FILM COATED ORAL at 09:09

## 2022-09-19 RX ADMIN — INSULIN ASPART 4 UNITS: 100 INJECTION, SOLUTION INTRAVENOUS; SUBCUTANEOUS at 05:09

## 2022-09-19 RX ADMIN — METOPROLOL SUCCINATE 50 MG: 50 TABLET, FILM COATED, EXTENDED RELEASE ORAL at 09:09

## 2022-09-19 RX ADMIN — DIVALPROEX SODIUM 500 MG: 125 CAPSULE, COATED PELLETS ORAL at 08:09

## 2022-09-19 RX ADMIN — INSULIN DETEMIR 10 UNITS: 100 INJECTION, SOLUTION SUBCUTANEOUS at 09:09

## 2022-09-19 RX ADMIN — INSULIN ASPART 2 UNITS: 100 INJECTION, SOLUTION INTRAVENOUS; SUBCUTANEOUS at 06:09

## 2022-09-19 RX ADMIN — ASPIRIN 81 MG: 81 TABLET, COATED ORAL at 09:09

## 2022-09-19 NOTE — PROGRESS NOTES
"[facility] Medicine  Progress Note    Patient Name: Ca Diaz  MRN: [id number]  Patient Class: IP- Inpatient   Admission Date: 9/16/2022  Length of Stay: 2 days  Attending Physician: Leisa Plata, *  Primary Care Provider: Desiree Frye MD        Subjective:     Principal Problem:NSTEMI (non-ST elevated myocardial infarction)        HPI:  Ca Diaz is a 62 y.o. female patient with a PMHx of anxiety, CAD, h/o CVA, depression, DM II, RLS, seizures, fibromyalgia, HLD, HTN, PRAVEENA, and obesity who presented to the Emergency Department for evaluation of generalized weakness which onset gradually two days ago. Pt states she fell last night (9/15) and the night before last (9/14). Pt's  states that she "can't even walk through an open doorway." Symptoms are constant and moderate in severity. No mitigating or exacerbating factors reported. Associated sxs include N/D and dizziness. Pt reports the nausea began one day ago. Patient denies any HA, vomiting, light-headedness, visual disturbance, CP, SOB/MALAVE, diaphoresis, neck or jaw pain, upper extremity pain, numbness/tingling, dysuria, and all other sxs at this time. Work-up in the ED revealed NSTEMI with troponin of 0.502, EKG unrevealing, CXR unremarkable, CT of the head negative for acute process. 325 mg ASA given and UFH initiated. Hospital Medicine was contacted for admission and patient placed in Observation.         Overview/Hospital Course:  9/17:    Examination of patient done at bedside; pt was alert and oriented, c/o chest discomfort;   Cardiology has evaluated and planned for cardiac cath today- Lmca normal; lad prox eccentric plaque 70% hazzy d1 stent 99%  mid lad 70%  Lcx non obs cad om1 stent patent edge stent 40-50% stenosis.   Rca stent patent nopn obs cad pda 70%.  Lvedp normal    Lvf normal anterolateral hk; recommended cardiothoracic surgery consult;   Will f/u; currently on heparin drip; aspirin, BB, " statin;         9/18:    Examination done at bedside; denied any chest pain or SOB; currently on heparin drip;   Cardiothoracic surg on board - recommended keep the patient on heparin drip and watch for symptoms and her surgery is planned for Friday which is the same time for bypass surgery;   Electrolytes replaced to maintain K >4, Mg > 2;   Will f/u on BG;       9/19:    Examination done at bedside; denied any acute issues; awaiting CABG; cardio and cardiothoracic surg on board          Review of Systems    Constitutional:  Negative for chills, diaphoresis, fatigue and fever.   HENT:  Negative for congestion and sore throat.    Respiratory:  Negative for cough, chest tightness, shortness of breath and wheezing.    Cardiovascular:  Negative for chest pain, palpitations and leg swelling.   Gastrointestinal:  Negative for abdominal pain, constipation and vomiting.   Genitourinary:  Negative for dysuria and hematuria.   Musculoskeletal:  Negative for arthralgias, back pain and myalgias.   Skin:  Negative for pallor and rash.   Neurological:  Positive for  weakness (generalized). Negative for syncope, light-headedness, numbness and headaches.   Psychiatric/Behavioral:  Negative for confusion. The patient is not nervous/anxious.    All other systems reviewed and are negative.    Objective:     Vital Signs (Most Recent):  Temp: 97.7 °F (36.5 °C) (09/19/22 1556)  Pulse: 69 (09/19/22 1556)  Resp: 20 (09/19/22 1556)  BP: (!) 142/63 (09/19/22 1556)  SpO2: 96 % (09/19/22 1556)   Vital Signs (24h Range):  Temp:  [97.2 °F (36.2 °C)-98.2 °F (36.8 °C)] 97.7 °F (36.5 °C)  Pulse:  [66-75] 69  Resp:  [16-20] 20  SpO2:  [94 %-99 %] 96 %  BP: (128-179)/(60-77) 142/63     Weight: 91.6 kg (202 lb)  Body mass index is 36.95 kg/m².    Intake/Output Summary (Last 24 hours) at 9/19/2022 1632  Last data filed at 9/19/2022 1400  Gross per 24 hour   Intake 1055.01 ml   Output 1550 ml   Net -494.99 ml      Physical Exam      Constitutional:        General: She is awake. She is not in acute distress.     Appearance: Normal appearance. She is well-developed. She is obese. She is not diaphoretic.   HENT:      Head: Normocephalic and atraumatic.   Eyes:      Conjunctiva/sclera: Conjunctivae normal.      Comments: PERRL; EOM intact.   Cardiovascular:      Rate and Rhythm: Normal rate and regular rhythm. No extrasystoles are present.     Heart sounds: S1 normal and S2 normal. No murmur heard.    No gallop.   Pulmonary:      Effort: Pulmonary effort is normal. No tachypnea.      Breath sounds: Normal breath sounds and air entry. No wheezing, rhonchi or rales.   Abdominal:      General: Bowel sounds are normal. There is no distension.      Palpations: Abdomen is soft.      Tenderness: There is no abdominal tenderness.   Musculoskeletal:         General: No tenderness or deformity. Normal range of motion.      Cervical back: Normal range of motion and neck supple.      Right lower leg: No edema.      Left lower leg: No edema.   Skin:     General: Skin is warm and dry.      Capillary Refill: Capillary refill takes less than 2 seconds.      Findings: No erythema or rash.   Neurological:      General: No focal deficit present.      Mental Status: She is alert and oriented to person, place, and time.      GCS: GCS eye subscore is 4. GCS verbal subscore is 5. GCS motor subscore is 6.      Cranial Nerves: Cranial nerves 2-12 are intact.      Sensory: Sensation is intact.      Motor: Motor function is intact.   Psychiatric:         Mood and Affect: Mood and affect normal.         Behavior: Behavior normal. Behavior is cooperative.     Significant Labs: All pertinent labs within the past 24 hours have been reviewed.  CBC:   Recent Labs   Lab 09/18/22  0536 09/19/22  0613   WBC 5.13 6.28   HGB 11.4* 12.0   HCT 34.9* 39.6    228     CMP:   Recent Labs   Lab 09/18/22  0536 09/19/22 0613    142   K 3.4* 3.9    106   CO2 24 26   * 215*   BUN 10 8    CREATININE 0.6 0.5   CALCIUM 7.7* 8.5*   ANIONGAP 11 10       Significant Imaging:       Imaging Results              CT Head Without Contrast (Final result)  Result time 09/17/22 08:33:37      Final result by Yo Larios MD (09/17/22 08:33:37)                   Impression:      Negative for acute intracranial abnormality.    All CT scans at this facility are performed  using dose modulation techniques as appropriate to performed exam including the following:  automated exposure control; adjustment of mA and/or kV according to the patients size (this includes techniques or standardized protocols for targeted exams where dose is matched to indication/reason for exam: i.e. extremities or head);  iterative reconstruction technique.      Electronically signed by: Yo Larios MD  Date:    09/17/2022  Time:    08:33               Narrative:    EXAMINATION:  CT HEAD WITHOUT CONTRAST    CLINICAL HISTORY:  Dizziness, persistent/recurrent, cardiac or vascular cause suspected;    TECHNIQUE:  Axial CT images obtained throughout the head without intravenous contrast.    COMPARISON:  December 2, 2018    FINDINGS:  Negative for acute hemorrhage, mass effect, extraaxial collection, hydrocephalus.    There is good gray white matter differentiation.  Mild chronic small vessel ischemic changes deep periventricular white matter.    The paranasal sinuses and mastoids are clear.    The calvarium is unremarkable with no fractures.                                       X-Ray Chest AP Portable (Final result)  Result time 09/17/22 00:09:52      Final result by Roel Oliveira MD (09/17/22 00:09:52)                   Impression:      No acute abnormality.      Electronically signed by: Tee Sevilla  Date:    09/17/2022  Time:    00:09               Narrative:    EXAMINATION:  XR CHEST AP PORTABLE    CLINICAL HISTORY:  dizziness;    TECHNIQUE:  Single frontal view of the chest was performed.    COMPARISON:  None    FINDINGS:  The lungs  are clear, with normal appearance of pulmonary vasculature and no pleural effusion or pneumothorax.    The cardiac silhouette is normal in size. The hilar and mediastinal contours are unremarkable.    Bones are intact.                        ED Interpretation by Miguel Johnson Jr., MD (09/17/22 00:08:13, [facility] - Emergency Dept., Emergency Medicine)      No acute findings.                                     RADIOLOGY REPORT (Final result)  Result time 09/17/22 16:38:09      Final result by Unknown User (09/17/22 16:38:09)                                           Assessment/Plan:      * NSTEMI (non-ST elevated myocardial infarction)  - Troponin 0.502. EKG unrevealing. CP free on admission.    - Heparin drip per nomogram.  - Continue home ASA, BB, and high intensity statin.  - SL NTG as needed.     - Trend serial cardiac enzymes and EKG.  - Check FLP.  - Will obtain 2D echo.  - Cardiology consult.     9/17:    Cardiology has evaluated and planned for cardiac cath today- Lmca normal; lad prox eccentric plaque 70% hazzy d1 stent 99%  mid lad 70%  Lcx non obs cad om1 stent patent edge stent 40-50% stenosis.   Rca stent patent nopn obs cad pda 70%.  Lvedp normal    Lvf normal anterolateral hk; recommended cardiothoracic surgery consult;   Will f/u; currently on heparin drip; aspirin, BB, statin;       9/18:    Examination done at bedside; denied any chest pain or SOB; currently on heparin drip;   Cardiothoracic surg on board - recommended keep the patient on heparin drip and watch for symptoms and her surgery is planned for Friday which is the same time for bypass surgery;   Electrolytes replaced to maintain K >4, Mg > 2; continue aspirin, BB, statin;       Obesity  Body mass index is 37.02 kg/m². Morbid obesity complicates all aspects of disease management from diagnostic modalities to treatment. Weight loss encouraged and health benefits explained to patient.       Atherosclerosis of native coronary artery of native  heart without angina pectoris  - Continue medical management as above.       Hyperlipidemia  - Continue home statin. FLP in AM.       Essential (primary) hypertension  - Continue home antihypertensives.       Type 2 diabetes mellitus, with long-term current use of insulin  Patient's FSGs are uncontrolled due to hyperglycemia on current medication regimen.  Last A1c reviewed-   Lab Results   Component Value Date    HGBA1C 10.9 (H) 09/17/2022     Most recent fingerstick glucose reviewed-   Recent Labs   Lab 09/17/22  1644 09/17/22  2112 09/18/22  0601 09/18/22  1130   POCTGLUCOSE 301* 335* 233* 258*     Current correctional scale  Medium  Maintain anti-hyperglycemic dose as follows-   Antihyperglycemics (From admission, onward)    Start     Stop Route Frequency Ordered    09/18/22 0815  insulin detemir U-100 pen 10 Units         -- SubQ 2 times daily 09/18/22 0806    09/17/22 0413  insulin aspart U-100 pen 1-10 Units         -- SubQ Before meals & nightly PRN 09/17/22 0316        Hold Oral hypoglycemics while patient is in the hospital.    9/18:    Ordered levemir 10 units BID; will monitor and titrate dose accordingly         VTE Risk Mitigation (From admission, onward)         Ordered     heparin 25,000 units in dextrose 5% (100 units/ml) IV bolus from bag - ADDITIONAL PRN BOLUS - 60 units/kg (max bolus 4000 units)  As needed (PRN)        Question:  Heparin Infusion Adjustment (DO NOT MODIFY ANSWER)  Answer:  \\[email or web address]\epic\Images\Pharmacy\HeparinInfusions\heparin LOW INTENSITY nomogram for [facility] [id number].pdf    09/17/22 1829     heparin 25,000 units in dextrose 5% (100 units/ml) IV bolus from bag - ADDITIONAL PRN BOLUS - 30 units/kg (max bolus 4000 units)  As needed (PRN)        Question:  Heparin Infusion Adjustment (DO NOT MODIFY ANSWER)  Answer:  \\[email or web address]\epic\Images\Pharmacy\HeparinInfusions\heparin LOW INTENSITY nomogram for [facility] [id number].pdf    09/17/22 1829     heparin 25,000 units in dextrose 5% 250 mL  (100 units/mL) infusion LOW INTENSITY nomogram - [facility]  Continuous        Question Answer Comment   Heparin Infusion Adjustment (DO NOT MODIFY ANSWER) \\[email or web address]\epic\Images\Pharmacy\HeparinInfusions\heparin LOW INTENSITY nomogram for [facility] [id number].pdf    Begin at (in units/kg/hr) 12        09/17/22 1829     IP VTE HIGH RISK PATIENT  Once         09/17/22 0315     Place sequential compression device  Until discontinued         09/17/22 0315                Discharge Planning   JOHN:      Code Status: Full Code   Is the patient medically ready for discharge?:     Reason for patient still in hospital (select all that apply): pending CABG on Friday;   Discharge Plan A: Home                  Leisa Plata MD  Department of Hospital Medicine   [facility] - Med Surg

## 2022-09-19 NOTE — CARE UPDATE
Talked to patient and  (at bedside): patient stated that she would prefer to undergo CABG procedure here at [facility]; alert and oriented x3- stated that she doesnot have any concerns/ thoughts to undergo procedure here and strongly expressed her interest to undergo here;  also agreed to the plan;    Patient prefers cardiothoracic surgeon from here to talk to her cardiologist and update the plan.    Patient's cardiologist: Dr. Morgan (at [facility]) [phone];    Updated about the above discussion to cardiothoracic surgeon;

## 2022-09-19 NOTE — SUBJECTIVE & OBJECTIVE
Review of Systems   Constitutional: Negative.   HENT: Negative.     Eyes: Negative.    Cardiovascular: Negative.    Respiratory: Negative.     Endocrine: Negative.    Hematologic/Lymphatic: Negative.    Skin: Negative.    Musculoskeletal: Negative.    Gastrointestinal:  Positive for heartburn.   Genitourinary: Negative.    Neurological: Negative.    Psychiatric/Behavioral: Negative.     Allergic/Immunologic: Negative.    Objective:     Vital Signs (Most Recent):  Temp: 97.2 °F (36.2 °C) (09/19/22 1212)  Pulse: 69 (09/19/22 1300)  Resp: 20 (09/19/22 1212)  BP: (!) 165/70 (09/19/22 1212)  SpO2: 96 % (09/19/22 1212)   Vital Signs (24h Range):  Temp:  [97.2 °F (36.2 °C)-98.2 °F (36.8 °C)] 97.2 °F (36.2 °C)  Pulse:  [66-75] 69  Resp:  [16-20] 20  SpO2:  [94 %-99 %] 96 %  BP: (128-179)/(60-77) 165/70     Weight: 91.6 kg (202 lb)  Body mass index is 36.95 kg/m².     SpO2: 96 %  O2 Device (Oxygen Therapy): nasal cannula      Intake/Output Summary (Last 24 hours) at 9/19/2022 1501  Last data filed at 9/19/2022 1200  Gross per 24 hour   Intake 815.01 ml   Output 1550 ml   Net -734.99 ml       Lines/Drains/Airways       Peripheral Intravenous Line  Duration                  Peripheral IV - Single Lumen 09/17/22 0001 20 G Left;Posterior Hand 2 days         Peripheral IV - Single Lumen 09/17/22 0150 20 G Posterior;Right Hand 2 days                    Physical Exam  Vitals and nursing note reviewed.   Constitutional:       General: She is not in acute distress.     Appearance: Normal appearance. She is well-developed. She is not diaphoretic.   HENT:      Head: Normocephalic and atraumatic.   Eyes:      General:         Right eye: No discharge.         Left eye: No discharge.      Pupils: Pupils are equal, round, and reactive to light.   Neck:      Thyroid: No thyromegaly.      Vascular: No JVD.      Trachea: No tracheal deviation.   Cardiovascular:      Rate and Rhythm: Normal rate and regular rhythm.      Heart sounds:  Normal heart sounds, S1 normal and S2 normal. No murmur heard.  Pulmonary:      Effort: Pulmonary effort is normal. No respiratory distress.      Breath sounds: Normal breath sounds. No wheezing or rales.   Abdominal:      General: There is no distension.      Tenderness: There is no rebound.   Musculoskeletal:      Cervical back: Neck supple.      Right lower leg: No edema.      Left lower leg: No edema.   Skin:     General: Skin is warm and dry.      Findings: No erythema.   Neurological:      General: No focal deficit present.      Mental Status: She is alert and oriented to person, place, and time.   Psychiatric:         Mood and Affect: Mood normal.         Behavior: Behavior normal.         Thought Content: Thought content normal.       Significant Labs: CMP   Recent Labs   Lab 09/18/22  0536 09/19/22  0613    142   K 3.4* 3.9    106   CO2 24 26   * 215*   BUN 10 8   CREATININE 0.6 0.5   CALCIUM 7.7* 8.5*   ANIONGAP 11 10   , CBC   Recent Labs   Lab 09/18/22  0536 09/19/22  0613   WBC 5.13 6.28   HGB 11.4* 12.0   HCT 34.9* 39.6    228   , Troponin No results for input(s): TROPONINI in the last 48 hours., and All pertinent lab results from the last 24 hours have been reviewed.    Significant Imaging: Echocardiogram: Transthoracic echo (TTE) complete (Cupid Only):   Results for orders placed or performed during the hospital encounter of 09/16/22   Echo   Result Value Ref Range    BSA 2 m2    TDI SEPTAL 0.06 m/s    LV LATERAL E/E' RATIO 16.00 m/s    LV SEPTAL E/E' RATIO 16.00 m/s    LA WIDTH 3.50 cm    IVC diameter 1.78 cm    Left Ventricular Outflow Tract Mean Velocity 0.90 cm/s    Left Ventricular Outflow Tract Mean Gradient 3.28 mmHg    TDI LATERAL 0.06 m/s    LVIDd 3.49 (A) 3.5 - 6.0 cm    IVS 1.28 (A) 0.6 - 1.1 cm    Posterior Wall 1.18 (A) 0.6 - 1.1 cm    Ao root annulus 2.57 cm    LVIDs 2.43 2.1 - 4.0 cm    FS 30 28 - 44 %    LA volume 53.78 cm3    STJ 2.92 cm    Ascending aorta  2.90 cm    LV mass 140.49 g    LA size 3.58 cm    TAPSE 1.90 cm    Left Ventricle Relative Wall Thickness 0.68 cm    AV mean gradient 8 mmHg    AV valve area 1.96 cm2    AV Velocity Ratio 0.63     AV index (prosthetic) 0.65     E/A ratio 0.98     Mean e' 0.06 m/s    E wave deceleration time 236.57 msec    IVRT 79.92 msec    LVOT diameter 1.96 cm    LVOT area 3.0 cm2    LVOT peak nilay 1.07 m/s    LVOT peak VTI 27.30 cm    Ao peak nilay 1.71 m/s    Ao VTI 42.1 cm    RVOT peak nilay 0.70 m/s    RVOT peak VTI 15.6 cm    LVOT stroke volume 82.33 cm3    AV peak gradient 12 mmHg    PV mean gradient 1.22 mmHg    E/E' ratio 16.00 m/s    MV Peak E Nilay 0.96 m/s    MV Peak A Nilay 0.98 m/s    LV Systolic Volume 20.74 mL    LV Systolic Volume Index 10.8 mL/m2    LV Diastolic Volume 50.49 mL    LV Diastolic Volume Index 26.30 mL/m2    LA Volume Index 28.0 mL/m2    LV Mass Index 73 g/m2    RA Major Axis 3.84 cm    Left Atrium Minor Axis 5.02 cm    Left Atrium Major Axis 5.08 cm    RA Width 2.80 cm    Right Atrial Pressure (from IVC) 8 mmHg    EF 60 %    Narrative    · The left ventricle is normal in size with concentric remodeling and   normal systolic function.  · The estimated ejection fraction is 60%.  · Indeterminate left ventricular diastolic function.  · Normal right ventricular size with normal right ventricular systolic   function.  · There is mild aortic valve stenosis.  · Aortic valve area is 1.96 cm2; peak velocity is 1.71 m/s; mean gradient   is 8 mmHg.  · Intermediate central venous pressure (8 mmHg).      , EKG: Reviewed, and X-Ray: CXR: X-Ray Chest 1 View (CXR): No results found for this visit on 09/16/22. and X-Ray Chest PA and Lateral (CXR): No results found for this visit on 09/16/22.   General

## 2022-09-19 NOTE — PROGRESS NOTES
"[facility]  Cardiology  Progress Note    Patient Name: Ca Diaz  MRN: [id number]  Admission Date: 9/16/2022  Hospital Length of Stay: 2 days  Code Status: Full Code   Attending Physician: Leisa Plata, *   Primary Care Physician: Desiree Frye MD  Expected Discharge Date:   Principal Problem:NSTEMI (non-ST elevated myocardial infarction)    Subjective:   HPI:   Ca Diaz is a 62 y.o. female patient with a PMHx of anxiety, CAD, h/o CVA, depression, DM II, RLS, seizures, fibromyalgia, HLD, HTN, PRAVEENA, and obesity who presented to the Emergency Department for evaluation of generalized weakness which onset gradually two days ago. Pt states she fell last night (9/15) and the night before last (9/14). Pt's  states that she "can't even walk through an open doorway." Symptoms are constant and moderate in severity. No mitigating or exacerbating factors reported. Associated sxs include N/D and dizziness. Pt reports the nausea began one day ago. Patient denies any HA, vomiting, light-headedness, visual disturbance, CP, SOB/MALAVE, diaphoresis, neck or jaw pain, upper extremity pain, numbness/tingling, dysuria, and all other sxs at this time. Work-up in the ED revealed NSTEMI with troponin of 0.502, EKG unrevealing, CXR unremarkable, CT of the head negative for acute process. 325 mg ASA given and UFH initiated. Hospital Medicine was contacted for admission and patient placed in Observation.      Prior CAD and coronary stents:  1.  Coronary artery disease , status post stenting of the proximal circumflex artery with 2.5 x 16 mm Synergy drug-eluting stent and stenting of the distal right coronary artery 3.0 x 24 mm Synergy drug-eluting stent 8/25/2020.  Recent catheterization also revealed moderate stenosis of about 60% in the LAD that was evaluated by FFR and deemed to be not functionally significant, first diagonal stenosis of 90% just proximal to the previously placed stent.  The " remote stents of the LAD and diagonal from 2010 were patent with about 50 to 60% in-stent stenosis of the LAD, patent stent to the diagonal but with 90% stenosis just proximal to the stent at the ostium of the diagonal.  The stents to the obtuse marginal and proximal right coronary artery from 2016 were also patent.   Patient states she has several days of angina and presented with weakness and chest pressure.       Hospital Course:   9/18/22-Patient seen and examined today. No chest pain. Plan CABG soon due to complex 3 vessel CAD.    9/19/22-Patient seen and examined today. Reported some indigestion symptoms earlier this AM, has since resolved. No alden chest pain or tightness. No other CV complaints. Labs stable. Awaiting CABG after Brilinta washout period.          Review of Systems   Constitutional: Negative.   HENT: Negative.     Eyes: Negative.    Cardiovascular: Negative.    Respiratory: Negative.     Endocrine: Negative.    Hematologic/Lymphatic: Negative.    Skin: Negative.    Musculoskeletal: Negative.    Gastrointestinal:  Positive for heartburn.   Genitourinary: Negative.    Neurological: Negative.    Psychiatric/Behavioral: Negative.     Allergic/Immunologic: Negative.    Objective:     Vital Signs (Most Recent):  Temp: 97.2 °F (36.2 °C) (09/19/22 1212)  Pulse: 69 (09/19/22 1300)  Resp: 20 (09/19/22 1212)  BP: (!) 165/70 (09/19/22 1212)  SpO2: 96 % (09/19/22 1212)   Vital Signs (24h Range):  Temp:  [97.2 °F (36.2 °C)-98.2 °F (36.8 °C)] 97.2 °F (36.2 °C)  Pulse:  [66-75] 69  Resp:  [16-20] 20  SpO2:  [94 %-99 %] 96 %  BP: (128-179)/(60-77) 165/70     Weight: 91.6 kg (202 lb)  Body mass index is 36.95 kg/m².     SpO2: 96 %  O2 Device (Oxygen Therapy): nasal cannula      Intake/Output Summary (Last 24 hours) at 9/19/2022 1501  Last data filed at 9/19/2022 1200  Gross per 24 hour   Intake 815.01 ml   Output 1550 ml   Net -734.99 ml       Lines/Drains/Airways       Peripheral Intravenous Line  Duration                   Peripheral IV - Single Lumen 09/17/22 0001 20 G Left;Posterior Hand 2 days         Peripheral IV - Single Lumen 09/17/22 0150 20 G Posterior;Right Hand 2 days                    Physical Exam  Vitals and nursing note reviewed.   Constitutional:       General: She is not in acute distress.     Appearance: Normal appearance. She is well-developed. She is not diaphoretic.   HENT:      Head: Normocephalic and atraumatic.   Eyes:      General:         Right eye: No discharge.         Left eye: No discharge.      Pupils: Pupils are equal, round, and reactive to light.   Neck:      Thyroid: No thyromegaly.      Vascular: No JVD.      Trachea: No tracheal deviation.   Cardiovascular:      Rate and Rhythm: Normal rate and regular rhythm.      Heart sounds: Normal heart sounds, S1 normal and S2 normal. No murmur heard.  Pulmonary:      Effort: Pulmonary effort is normal. No respiratory distress.      Breath sounds: Normal breath sounds. No wheezing or rales.   Abdominal:      General: There is no distension.      Tenderness: There is no rebound.   Musculoskeletal:      Cervical back: Neck supple.      Right lower leg: No edema.      Left lower leg: No edema.   Skin:     General: Skin is warm and dry.      Findings: No erythema.   Neurological:      General: No focal deficit present.      Mental Status: She is alert and oriented to person, place, and time.   Psychiatric:         Mood and Affect: Mood normal.         Behavior: Behavior normal.         Thought Content: Thought content normal.       Significant Labs: CMP   Recent Labs   Lab 09/18/22  0536 09/19/22  0613    142   K 3.4* 3.9    106   CO2 24 26   * 215*   BUN 10 8   CREATININE 0.6 0.5   CALCIUM 7.7* 8.5*   ANIONGAP 11 10   , CBC   Recent Labs   Lab 09/18/22  0536 09/19/22  0613   WBC 5.13 6.28   HGB 11.4* 12.0   HCT 34.9* 39.6    228   , Troponin No results for input(s): TROPONINI in the last 48 hours., and All pertinent lab  results from the last 24 hours have been reviewed.    Significant Imaging: Echocardiogram: Transthoracic echo (TTE) complete (Cupid Only):   Results for orders placed or performed during the hospital encounter of 09/16/22   Echo   Result Value Ref Range    BSA 2 m2    TDI SEPTAL 0.06 m/s    LV LATERAL E/E' RATIO 16.00 m/s    LV SEPTAL E/E' RATIO 16.00 m/s    LA WIDTH 3.50 cm    IVC diameter 1.78 cm    Left Ventricular Outflow Tract Mean Velocity 0.90 cm/s    Left Ventricular Outflow Tract Mean Gradient 3.28 mmHg    TDI LATERAL 0.06 m/s    LVIDd 3.49 (A) 3.5 - 6.0 cm    IVS 1.28 (A) 0.6 - 1.1 cm    Posterior Wall 1.18 (A) 0.6 - 1.1 cm    Ao root annulus 2.57 cm    LVIDs 2.43 2.1 - 4.0 cm    FS 30 28 - 44 %    LA volume 53.78 cm3    STJ 2.92 cm    Ascending aorta 2.90 cm    LV mass 140.49 g    LA size 3.58 cm    TAPSE 1.90 cm    Left Ventricle Relative Wall Thickness 0.68 cm    AV mean gradient 8 mmHg    AV valve area 1.96 cm2    AV Velocity Ratio 0.63     AV index (prosthetic) 0.65     E/A ratio 0.98     Mean e' 0.06 m/s    E wave deceleration time 236.57 msec    IVRT 79.92 msec    LVOT diameter 1.96 cm    LVOT area 3.0 cm2    LVOT peak nilay 1.07 m/s    LVOT peak VTI 27.30 cm    Ao peak nilay 1.71 m/s    Ao VTI 42.1 cm    RVOT peak nilay 0.70 m/s    RVOT peak VTI 15.6 cm    LVOT stroke volume 82.33 cm3    AV peak gradient 12 mmHg    PV mean gradient 1.22 mmHg    E/E' ratio 16.00 m/s    MV Peak E Nilay 0.96 m/s    MV Peak A Nilay 0.98 m/s    LV Systolic Volume 20.74 mL    LV Systolic Volume Index 10.8 mL/m2    LV Diastolic Volume 50.49 mL    LV Diastolic Volume Index 26.30 mL/m2    LA Volume Index 28.0 mL/m2    LV Mass Index 73 g/m2    RA Major Axis 3.84 cm    Left Atrium Minor Axis 5.02 cm    Left Atrium Major Axis 5.08 cm    RA Width 2.80 cm    Right Atrial Pressure (from IVC) 8 mmHg    EF 60 %    Narrative    · The left ventricle is normal in size with concentric remodeling and   normal systolic function.  · The estimated  ejection fraction is 60%.  · Indeterminate left ventricular diastolic function.  · Normal right ventricular size with normal right ventricular systolic   function.  · There is mild aortic valve stenosis.  · Aortic valve area is 1.96 cm2; peak velocity is 1.71 m/s; mean gradient   is 8 mmHg.  · Intermediate central venous pressure (8 mmHg).      , EKG: Reviewed, and X-Ray: CXR: X-Ray Chest 1 View (CXR): No results found for this visit on 09/16/22. and X-Ray Chest PA and Lateral (CXR): No results found for this visit on 09/16/22.    Assessment and Plan:   Patient who presents with NSTEMI/multivessel CAD. Awaiting CABG. Continue OMT. Protonix added for GERD symptoms.     * NSTEMI (non-ST elevated myocardial infarction)  Plan cath today    9/18/22- no further symptoms    9/19/22-See plan above    Atherosclerosis of native coronary artery of native heart without angina pectoris  Multiple prior stents last 2020. Recurrent symptoms and pos troponin.  Plan  Cath today . Patient agrees    9/18/22-Patient stable post cath, 3 vessel CAD and plan CABG     9/19/22  -s/p cath that showed multivessel CAD  -Stable this AM, awaiting CABG after Brilinta washout  -Continue OMT-ASA, statin, BB, amlodipine, heparin gtt    Hyperlipidemia  -Continue statin    Essential (primary) hypertension  -Continue amlodipine    Type 2 diabetes mellitus, with long-term current use of insulin  Per hospital medicine        VTE Risk Mitigation (From admission, onward)         Ordered     heparin 25,000 units in dextrose 5% (100 units/ml) IV bolus from bag - ADDITIONAL PRN BOLUS - 60 units/kg (max bolus 4000 units)  As needed (PRN)        Question:  Heparin Infusion Adjustment (DO NOT MODIFY ANSWER)  Answer:  [email or web address]\epic\Images\Pharmacy\HeparinInfusions\heparin LOW INTENSITY nomogram for [facility] [id number]    09/17/22 1822     heparin 25,000 units in dextrose 5% (100 units/ml) IV bolus from bag - ADDITIONAL PRN BOLUS - 30 units/kg (max bolus 4000  units)  As needed (PRN)        Question:  Heparin Infusion Adjustment (DO NOT MODIFY ANSWER)  Answer:  \\[email or web address]\epic\Images\Pharmacy\HeparinInfusions\heparin LOW INTENSITY nomogram for [facility] [id number]    09/17/22 1829     heparin 25,000 units in dextrose 5% 250 mL (100 units/mL) infusion LOW INTENSITY nomogram - [facility]  Continuous        Question Answer Comment   Heparin Infusion Adjustment (DO NOT MODIFY ANSWER) \\[email or web address]\epic\Images\Pharmacy\HeparinInfusions\heparin LOW INTENSITY nomogram for [facility] [id number]    Begin at (in units/kg/hr) 12        09/17/22 1829     IP VTE HIGH RISK PATIENT  Once         09/17/22 0315     Place sequential compression device  Until discontinued         09/17/22 0315                Ivana Garcia PA-C  Cardiology  [facility]

## 2022-09-19 NOTE — SUBJECTIVE & OBJECTIVE
Review of Systems    Constitutional:  Negative for chills, diaphoresis, fatigue and fever.   HENT:  Negative for congestion and sore throat.    Respiratory:  Negative for cough, chest tightness, shortness of breath and wheezing.    Cardiovascular:  Negative for chest pain, palpitations and leg swelling.   Gastrointestinal:  Negative for abdominal pain, constipation and vomiting.   Genitourinary:  Negative for dysuria and hematuria.   Musculoskeletal:  Negative for arthralgias, back pain and myalgias.   Skin:  Negative for pallor and rash.   Neurological:  Positive for  weakness (generalized). Negative for syncope, light-headedness, numbness and headaches.   Psychiatric/Behavioral:  Negative for confusion. The patient is not nervous/anxious.    All other systems reviewed and are negative.    Objective:     Vital Signs (Most Recent):  Temp: 97.7 °F (36.5 °C) (09/19/22 1556)  Pulse: 69 (09/19/22 1556)  Resp: 20 (09/19/22 1556)  BP: (!) 142/63 (09/19/22 1556)  SpO2: 96 % (09/19/22 1556)   Vital Signs (24h Range):  Temp:  [97.2 °F (36.2 °C)-98.2 °F (36.8 °C)] 97.7 °F (36.5 °C)  Pulse:  [66-75] 69  Resp:  [16-20] 20  SpO2:  [94 %-99 %] 96 %  BP: (128-179)/(60-77) 142/63     Weight: 91.6 kg (202 lb)  Body mass index is 36.95 kg/m².    Intake/Output Summary (Last 24 hours) at 9/19/2022 1632  Last data filed at 9/19/2022 1400  Gross per 24 hour   Intake 1055.01 ml   Output 1550 ml   Net -494.99 ml      Physical Exam      Constitutional:       General: She is awake. She is not in acute distress.     Appearance: Normal appearance. She is well-developed. She is obese. She is not diaphoretic.   HENT:      Head: Normocephalic and atraumatic.   Eyes:      Conjunctiva/sclera: Conjunctivae normal.      Comments: PERRL; EOM intact.   Cardiovascular:      Rate and Rhythm: Normal rate and regular rhythm. No extrasystoles are present.     Heart sounds: S1 normal and S2 normal. No murmur heard.    No gallop.   Pulmonary:      Effort:  Pulmonary effort is normal. No tachypnea.      Breath sounds: Normal breath sounds and air entry. No wheezing, rhonchi or rales.   Abdominal:      General: Bowel sounds are normal. There is no distension.      Palpations: Abdomen is soft.      Tenderness: There is no abdominal tenderness.   Musculoskeletal:         General: No tenderness or deformity. Normal range of motion.      Cervical back: Normal range of motion and neck supple.      Right lower leg: No edema.      Left lower leg: No edema.   Skin:     General: Skin is warm and dry.      Capillary Refill: Capillary refill takes less than 2 seconds.      Findings: No erythema or rash.   Neurological:      General: No focal deficit present.      Mental Status: She is alert and oriented to person, place, and time.      GCS: GCS eye subscore is 4. GCS verbal subscore is 5. GCS motor subscore is 6.      Cranial Nerves: Cranial nerves 2-12 are intact.      Sensory: Sensation is intact.      Motor: Motor function is intact.   Psychiatric:         Mood and Affect: Mood and affect normal.         Behavior: Behavior normal. Behavior is cooperative.     Significant Labs: All pertinent labs within the past 24 hours have been reviewed.  CBC:   Recent Labs   Lab 09/18/22  0536 09/19/22  0613   WBC 5.13 6.28   HGB 11.4* 12.0   HCT 34.9* 39.6    228     CMP:   Recent Labs   Lab 09/18/22  0536 09/19/22  0613    142   K 3.4* 3.9    106   CO2 24 26   * 215*   BUN 10 8   CREATININE 0.6 0.5   CALCIUM 7.7* 8.5*   ANIONGAP 11 10       Significant Imaging:       Imaging Results              CT Head Without Contrast (Final result)  Result time 09/17/22 08:33:37      Final result by Yo Larios MD (09/17/22 08:33:37)                   Impression:      Negative for acute intracranial abnormality.    All CT scans at this facility are performed  using dose modulation techniques as appropriate to performed exam including the following:  automated exposure  control; adjustment of mA and/or kV according to the patients size (this includes techniques or standardized protocols for targeted exams where dose is matched to indication/reason for exam: i.e. extremities or head);  iterative reconstruction technique.      Electronically signed by: Yo Larios MD  Date:    09/17/2022  Time:    08:33               Narrative:    EXAMINATION:  CT HEAD WITHOUT CONTRAST    CLINICAL HISTORY:  Dizziness, persistent/recurrent, cardiac or vascular cause suspected;    TECHNIQUE:  Axial CT images obtained throughout the head without intravenous contrast.    COMPARISON:  December 2, 2018    FINDINGS:  Negative for acute hemorrhage, mass effect, extraaxial collection, hydrocephalus.    There is good gray white matter differentiation.  Mild chronic small vessel ischemic changes deep periventricular white matter.    The paranasal sinuses and mastoids are clear.    The calvarium is unremarkable with no fractures.                                       X-Ray Chest AP Portable (Final result)  Result time 09/17/22 00:09:52      Final result by Roel Oliveira MD (09/17/22 00:09:52)                   Impression:      No acute abnormality.      Electronically signed by: Tee Sevilla  Date:    09/17/2022  Time:    00:09               Narrative:    EXAMINATION:  XR CHEST AP PORTABLE    CLINICAL HISTORY:  dizziness;    TECHNIQUE:  Single frontal view of the chest was performed.    COMPARISON:  None    FINDINGS:  The lungs are clear, with normal appearance of pulmonary vasculature and no pleural effusion or pneumothorax.    The cardiac silhouette is normal in size. The hilar and mediastinal contours are unremarkable.    Bones are intact.                        ED Interpretation by Miguel Johnson Jr., MD (09/17/22 00:08:13, [facility] Emergency Medicine)      No acute findings.                                     RADIOLOGY REPORT (Final result)  Result time 09/17/22 16:38:09      Final  result by Unknown User (09/17/22 16:38:09)

## 2022-09-19 NOTE — PLAN OF CARE
Pt remains free from falls/injuries this shift. Safety precautions maintained. No complaints of pain, Heparin infusing at 12 units. No s/s of acute distress noted. Will continue to monitor. Chart check completed.

## 2022-09-19 NOTE — ASSESSMENT & PLAN NOTE
Multiple prior stents last 2020. Recurrent symptoms and pos troponin.  Plan  Cath today . Patient agrees    9/18/22-Patient stable post cath, 3 vessel CAD and plan CABG     9/19/22  -s/p cath that showed multivessel CAD  -Stable this AM, awaiting CABG after Brilinta washout  -Continue OMT-ASA, statin, BB, amlodipine, heparin gtt

## 2022-09-20 ENCOUNTER — ANESTHESIA EVENT (OUTPATIENT)
Dept: SURGERY | Facility: HOSPITAL | Age: 62
DRG: 233 | End: 2022-09-20
Payer: COMMERCIAL

## 2022-09-20 LAB
APTT BLDCRRT: 39.6 SEC (ref 21–32)
POCT GLUCOSE: 174 MG/DL (ref 70–110)
POCT GLUCOSE: 214 MG/DL (ref 70–110)
POCT GLUCOSE: 224 MG/DL (ref 70–110)
POCT GLUCOSE: 262 MG/DL (ref 70–110)

## 2022-09-20 PROCEDURE — 85730 THROMBOPLASTIN TIME PARTIAL: CPT | Performed by: FAMILY MEDICINE

## 2022-09-20 PROCEDURE — 63600175 PHARM REV CODE 636 W HCPCS: Performed by: INTERNAL MEDICINE

## 2022-09-20 PROCEDURE — 99232 SBSQ HOSP IP/OBS MODERATE 35: CPT | Mod: ,,, | Performed by: PHYSICIAN ASSISTANT

## 2022-09-20 PROCEDURE — 99232 PR SUBSEQUENT HOSPITAL CARE,LEVL II: ICD-10-PCS | Mod: ,,, | Performed by: PHYSICIAN ASSISTANT

## 2022-09-20 PROCEDURE — 25000003 PHARM REV CODE 250: Performed by: INTERNAL MEDICINE

## 2022-09-20 PROCEDURE — 96372 THER/PROPH/DIAG INJ SC/IM: CPT

## 2022-09-20 PROCEDURE — 36415 COLL VENOUS BLD VENIPUNCTURE: CPT | Performed by: FAMILY MEDICINE

## 2022-09-20 PROCEDURE — 25000003 PHARM REV CODE 250: Performed by: STUDENT IN AN ORGANIZED HEALTH CARE EDUCATION/TRAINING PROGRAM

## 2022-09-20 PROCEDURE — 25000003 PHARM REV CODE 250: Performed by: PHYSICIAN ASSISTANT

## 2022-09-20 PROCEDURE — 21400001 HC TELEMETRY ROOM

## 2022-09-20 RX ORDER — DIVALPROEX SODIUM 250 MG/1
500 TABLET, DELAYED RELEASE ORAL EVERY 8 HOURS
Status: DISCONTINUED | OUTPATIENT
Start: 2022-09-20 | End: 2022-09-25

## 2022-09-20 RX ADMIN — PREGABALIN 100 MG: 100 CAPSULE ORAL at 09:09

## 2022-09-20 RX ADMIN — PREGABALIN 100 MG: 100 CAPSULE ORAL at 02:09

## 2022-09-20 RX ADMIN — DIVALPROEX SODIUM 500 MG: 250 TABLET, DELAYED RELEASE ORAL at 02:09

## 2022-09-20 RX ADMIN — AMLODIPINE BESYLATE 10 MG: 10 TABLET ORAL at 09:09

## 2022-09-20 RX ADMIN — INSULIN DETEMIR 10 UNITS: 100 INJECTION, SOLUTION SUBCUTANEOUS at 09:09

## 2022-09-20 RX ADMIN — INSULIN ASPART 2 UNITS: 100 INJECTION, SOLUTION INTRAVENOUS; SUBCUTANEOUS at 05:09

## 2022-09-20 RX ADMIN — INSULIN ASPART 4 UNITS: 100 INJECTION, SOLUTION INTRAVENOUS; SUBCUTANEOUS at 11:09

## 2022-09-20 RX ADMIN — DIVALPROEX SODIUM 500 MG: 250 TABLET, DELAYED RELEASE ORAL at 10:09

## 2022-09-20 RX ADMIN — HEPARIN SODIUM 12 UNITS/KG/HR: 10000 INJECTION, SOLUTION INTRAVENOUS at 04:09

## 2022-09-20 RX ADMIN — INSULIN ASPART 4 UNITS: 100 INJECTION, SOLUTION INTRAVENOUS; SUBCUTANEOUS at 04:09

## 2022-09-20 RX ADMIN — ASPIRIN 81 MG: 81 TABLET, COATED ORAL at 09:09

## 2022-09-20 RX ADMIN — PREGABALIN 100 MG: 100 CAPSULE ORAL at 08:09

## 2022-09-20 RX ADMIN — DIVALPROEX SODIUM 500 MG: 250 TABLET, DELAYED RELEASE ORAL at 05:09

## 2022-09-20 RX ADMIN — METOPROLOL SUCCINATE 100 MG: 50 TABLET, FILM COATED, EXTENDED RELEASE ORAL at 09:09

## 2022-09-20 RX ADMIN — INSULIN ASPART 3 UNITS: 100 INJECTION, SOLUTION INTRAVENOUS; SUBCUTANEOUS at 08:09

## 2022-09-20 RX ADMIN — PANTOPRAZOLE SODIUM 40 MG: 40 TABLET, DELAYED RELEASE ORAL at 09:09

## 2022-09-20 RX ADMIN — ATORVASTATIN CALCIUM 80 MG: 40 TABLET, FILM COATED ORAL at 09:09

## 2022-09-20 RX ADMIN — FLUOXETINE 40 MG: 20 CAPSULE ORAL at 09:09

## 2022-09-20 NOTE — ASSESSMENT & PLAN NOTE
Patient's FSGs are on current medication regimen.  Last A1c reviewed-   Lab Results   Component Value Date    HGBA1C 10.9 (H) 09/17/2022     Most recent fingerstick glucose reviewed-   Recent Labs   Lab 09/19/22  1724 09/19/22 2023 09/20/22  0528 09/20/22  1132   POCTGLUCOSE 214* 215* 174* 224*     Current correctional scale  Medium  Maintain anti-hyperglycemic dose as follows-   Antihyperglycemics (From admission, onward)    Start     Stop Route Frequency Ordered    09/20/22 2100  insulin detemir U-100 pen 15 Units         -- SubQ 2 times daily 09/20/22 1454    09/17/22 0413  insulin aspart U-100 pen 1-10 Units         -- SubQ Before meals & nightly PRN 09/17/22 0316        Hold Oral hypoglycemics while patient is in the hospital.    9/18:    Ordered levemir 10 units BID; will monitor and titrate dose accordingly     9/20:    Increased levemir to 15 units BID;

## 2022-09-20 NOTE — SUBJECTIVE & OBJECTIVE
Interval History:    Review of Systems   Constitutional: Negative.   HENT: Negative.     Eyes: Negative.    Cardiovascular: Negative.    Respiratory: Negative.     Skin: Negative.    Musculoskeletal: Negative.    Gastrointestinal: Negative.    Genitourinary: Negative.    Neurological: Negative.    Psychiatric/Behavioral: Negative.     Objective:     Vital Signs (Most Recent):  Temp: 97.7 °F (36.5 °C) (09/20/22 1108)  Pulse: 61 (09/20/22 1320)  Resp: 17 (09/20/22 1108)  BP: (!) 125/58 (09/20/22 1108)  SpO2: 96 % (09/20/22 1108)   Vital Signs (24h Range):  Temp:  [97.7 °F (36.5 °C)-98 °F (36.7 °C)] 97.7 °F (36.5 °C)  Pulse:  [60-83] 61  Resp:  [16-20] 17  SpO2:  [93 %-97 %] 96 %  BP: (125-162)/(58-72) 125/58     Weight: 91.6 kg (202 lb)  Body mass index is 36.95 kg/m².     SpO2: 96 %  O2 Device (Oxygen Therapy): room air      Intake/Output Summary (Last 24 hours) at 9/20/2022 1425  Last data filed at 9/20/2022 1300  Gross per 24 hour   Intake 886.4 ml   Output --   Net 886.4 ml       Lines/Drains/Airways       Peripheral Intravenous Line  Duration                  Peripheral IV - Single Lumen 09/17/22 0001 20 G Left;Posterior Hand 3 days         Peripheral IV - Single Lumen 09/17/22 0150 20 G Posterior;Right Hand 3 days                    Physical Exam  Vitals and nursing note reviewed.   Constitutional:       Appearance: Normal appearance.   HENT:      Head: Normocephalic.   Eyes:      Pupils: Pupils are equal, round, and reactive to light.   Cardiovascular:      Rate and Rhythm: Normal rate and regular rhythm.      Heart sounds: Normal heart sounds, S1 normal and S2 normal. No murmur heard.    No S3 or S4 sounds.   Pulmonary:      Effort: Pulmonary effort is normal.      Breath sounds: Normal breath sounds.   Abdominal:      General: Bowel sounds are normal.      Palpations: Abdomen is soft.   Musculoskeletal:         General: Normal range of motion.      Cervical back: Normal range of motion.   Skin:     Capillary  Refill: Capillary refill takes less than 2 seconds.   Neurological:      General: No focal deficit present.      Mental Status: She is alert and oriented to person, place, and time.   Psychiatric:         Mood and Affect: Mood normal.         Behavior: Behavior normal.         Thought Content: Thought content normal.       Significant Labs: BMP:   Recent Labs   Lab 09/19/22 0613   *      K 3.9      CO2 26   BUN 8   CREATININE 0.5   CALCIUM 8.5*   MG 1.8   , CMP   Recent Labs   Lab 09/19/22 0613      K 3.9      CO2 26   *   BUN 8   CREATININE 0.5   CALCIUM 8.5*   ANIONGAP 10   , CBC   Recent Labs   Lab 09/19/22 0613   WBC 6.28   HGB 12.0   HCT 39.6      , INR No results for input(s): INR, PROTIME in the last 48 hours., Troponin No results for input(s): TROPONINI in the last 48 hours., and All pertinent lab results from the last 24 hours have been reviewed.    Significant Imaging: Cardiac Cath: Reviewed, Echocardiogram: Transthoracic echo (TTE) complete (Cupid Only):   Results for orders placed or performed during the hospital encounter of 09/16/22   Echo   Result Value Ref Range    BSA 2 m2    TDI SEPTAL 0.06 m/s    LV LATERAL E/E' RATIO 16.00 m/s    LV SEPTAL E/E' RATIO 16.00 m/s    LA WIDTH 3.50 cm    IVC diameter 1.78 cm    Left Ventricular Outflow Tract Mean Velocity 0.90 cm/s    Left Ventricular Outflow Tract Mean Gradient 3.28 mmHg    TDI LATERAL 0.06 m/s    LVIDd 3.49 (A) 3.5 - 6.0 cm    IVS 1.28 (A) 0.6 - 1.1 cm    Posterior Wall 1.18 (A) 0.6 - 1.1 cm    Ao root annulus 2.57 cm    LVIDs 2.43 2.1 - 4.0 cm    FS 30 28 - 44 %    LA volume 53.78 cm3    STJ 2.92 cm    Ascending aorta 2.90 cm    LV mass 140.49 g    LA size 3.58 cm    TAPSE 1.90 cm    Left Ventricle Relative Wall Thickness 0.68 cm    AV mean gradient 8 mmHg    AV valve area 1.96 cm2    AV Velocity Ratio 0.63     AV index (prosthetic) 0.65     E/A ratio 0.98     Mean e' 0.06 m/s    E wave deceleration  time 236.57 msec    IVRT 79.92 msec    LVOT diameter 1.96 cm    LVOT area 3.0 cm2    LVOT peak nilay 1.07 m/s    LVOT peak VTI 27.30 cm    Ao peak nilay 1.71 m/s    Ao VTI 42.1 cm    RVOT peak nilay 0.70 m/s    RVOT peak VTI 15.6 cm    LVOT stroke volume 82.33 cm3    AV peak gradient 12 mmHg    PV mean gradient 1.22 mmHg    E/E' ratio 16.00 m/s    MV Peak E Nilay 0.96 m/s    MV Peak A Nilay 0.98 m/s    LV Systolic Volume 20.74 mL    LV Systolic Volume Index 10.8 mL/m2    LV Diastolic Volume 50.49 mL    LV Diastolic Volume Index 26.30 mL/m2    LA Volume Index 28.0 mL/m2    LV Mass Index 73 g/m2    RA Major Axis 3.84 cm    Left Atrium Minor Axis 5.02 cm    Left Atrium Major Axis 5.08 cm    RA Width 2.80 cm    Right Atrial Pressure (from IVC) 8 mmHg    EF 60 %    Narrative    · The left ventricle is normal in size with concentric remodeling and   normal systolic function.  · The estimated ejection fraction is 60%.  · Indeterminate left ventricular diastolic function.  · Normal right ventricular size with normal right ventricular systolic   function.  · There is mild aortic valve stenosis.  · Aortic valve area is 1.96 cm2; peak velocity is 1.71 m/s; mean gradient   is 8 mmHg.  · Intermediate central venous pressure (8 mmHg).      , and EKG: Reviewed

## 2022-09-20 NOTE — PROGRESS NOTES
"[facility]  Progress Note    Patient Name: Ca Diaz  MRN: [id number]  Patient Class: IP- Inpatient   Admission Date: 9/16/2022  Length of Stay: 3 days  Attending Physician: Leisa Plata, *  Primary Care Provider: Desiree Frye MD        Subjective:     Principal Problem:NSTEMI (non-ST elevated myocardial infarction)        HPI:  Ca Diaz is a 62 y.o. female patient with a PMHx of anxiety, CAD, h/o CVA, depression, DM II, RLS, seizures, fibromyalgia, HLD, HTN, PRAVEENA, and obesity who presented to the Emergency Department for evaluation of generalized weakness which onset gradually two days ago. Pt states she fell last night (9/15) and the night before last (9/14). Pt's  states that she "can't even walk through an open doorway." Symptoms are constant and moderate in severity. No mitigating or exacerbating factors reported. Associated sxs include N/D and dizziness. Pt reports the nausea began one day ago. Patient denies any HA, vomiting, light-headedness, visual disturbance, CP, SOB/MALAVE, diaphoresis, neck or jaw pain, upper extremity pain, numbness/tingling, dysuria, and all other sxs at this time. Work-up in the ED revealed NSTEMI with troponin of 0.502, EKG unrevealing, CXR unremarkable, CT of the head negative for acute process. 325 mg ASA given and UFH initiated. Hospital Medicine was contacted for admission and patient placed in Observation.         Overview/Hospital Course:  9/17:    Examination of patient done at bedside; pt was alert and oriented, c/o chest discomfort;   Cardiology has evaluated and planned for cardiac cath today- Lmca normal; lad prox eccentric plaque 70% hazzy d1 stent 99%  mid lad 70%  Lcx non obs cad om1 stent patent edge stent 40-50% stenosis.   Rca stent patent nopn obs cad pda 70%.  Lvedp normal    Lvf normal anterolateral hk; recommended cardiothoracic surgery consult;   Will f/u; currently on heparin drip; aspirin, BB, " statin;         9/18:    Examination done at bedside; denied any chest pain or SOB; currently on heparin drip;   Cardiothoracic surg on board - recommended keep the patient on heparin drip and watch for symptoms and her surgery is planned for Friday which is the same time for bypass surgery;   Electrolytes replaced to maintain K >4, Mg > 2;   Will f/u on BG;       9/19:    Examination done at bedside; denied any acute issues; awaiting CABG; cardio and cardiothoracic surg on board    9/20:  Examination done at bedside; denies any acute issues;  Awaiting CABG for Friday;   Currently on heparin drip, no signs of bleeding; Hb stable;                 Review of Systems      Constitutional:  Negative for chills, diaphoresis, fatigue and fever.   HENT:  Negative for congestion and sore throat.    Respiratory:  Negative for cough, chest tightness, shortness of breath and wheezing.    Cardiovascular:  Negative for chest pain, palpitations and leg swelling.   Gastrointestinal:  Negative for abdominal pain, constipation and vomiting.   Genitourinary:  Negative for dysuria and hematuria.   Musculoskeletal:  Negative for arthralgias, back pain and myalgias.   Skin:  Negative for pallor and rash.   Neurological:  Positive for  weakness (generalized). Negative for syncope, light-headedness, numbness and headaches.   Psychiatric/Behavioral:  Negative for confusion. The patient is not nervous/anxious.    All other systems reviewed and are negative.  Objective:     Vital Signs (Most Recent):  Temp: 97.7 °F (36.5 °C) (09/20/22 1108)  Pulse: 61 (09/20/22 1320)  Resp: 17 (09/20/22 1108)  BP: (!) 125/58 (09/20/22 1108)  SpO2: 96 % (09/20/22 1108)   Vital Signs (24h Range):  Temp:  [97.7 °F (36.5 °C)-98 °F (36.7 °C)] 97.7 °F (36.5 °C)  Pulse:  [60-83] 61  Resp:  [16-20] 17  SpO2:  [93 %-97 %] 96 %  BP: (125-162)/(58-72) 125/58     Weight: 91.6 kg (202 lb)  Body mass index is 36.95 kg/m².    Intake/Output Summary (Last 24 hours) at  9/20/2022 1455  Last data filed at 9/20/2022 1300  Gross per 24 hour   Intake 886.4 ml   Output --   Net 886.4 ml      Physical Exam    Constitutional:       General: She is awake. She is not in acute distress.     Appearance: Normal appearance. She is well-developed. She is obese. She is not diaphoretic.   HENT:      Head: Normocephalic and atraumatic.   Eyes:      Conjunctiva/sclera: Conjunctivae normal.      Comments: PERRL; EOM intact.   Cardiovascular:      Rate and Rhythm: Normal rate and regular rhythm. No extrasystoles are present.     Heart sounds: S1 normal and S2 normal. No murmur heard.    No gallop.   Pulmonary:      Effort: Pulmonary effort is normal. No tachypnea.      Breath sounds: Normal breath sounds and air entry. No wheezing, rhonchi or rales.   Abdominal:      General: Bowel sounds are normal. There is no distension.      Palpations: Abdomen is soft.      Tenderness: There is no abdominal tenderness.   Musculoskeletal:         General: No tenderness or deformity. Normal range of motion.      Cervical back: Normal range of motion and neck supple.      Right lower leg: No edema.      Left lower leg: No edema.   Skin:     General: Skin is warm and dry.      Capillary Refill: Capillary refill takes less than 2 seconds.      Findings: No erythema or rash.   Neurological:      General: No focal deficit present.      Mental Status: She is alert and oriented to person, place, and time.      GCS: GCS eye subscore is 4. GCS verbal subscore is 5. GCS motor subscore is 6.      Cranial Nerves: Cranial nerves 2-12 are intact.      Sensory: Sensation is intact.      Motor: Motor function is intact.   Psychiatric:         Mood and Affect: Mood and affect normal.         Behavior: Behavior normal. Behavior is cooperative.     Significant Labs: All pertinent labs within the past 24 hours have been reviewed.  CBC:   Recent Labs   Lab 09/19/22  0613   WBC 6.28   HGB 12.0   HCT 39.6        CMP:   Recent Labs    Lab 09/19/22  0613      K 3.9      CO2 26   *   BUN 8   CREATININE 0.5   CALCIUM 8.5*   ANIONGAP 10       Significant Imaging:     Imaging Results              CT Head Without Contrast (Final result)  Result time 09/17/22 08:33:37      Final result by Yo Larios MD (09/17/22 08:33:37)                   Impression:      Negative for acute intracranial abnormality.    All CT scans at this facility are performed  using dose modulation techniques as appropriate to performed exam including the following:  automated exposure control; adjustment of mA and/or kV according to the patients size (this includes techniques or standardized protocols for targeted exams where dose is matched to indication/reason for exam: i.e. extremities or head);  iterative reconstruction technique.      Electronically signed by: Yo Larios MD  Date:    09/17/2022  Time:    08:33               Narrative:    EXAMINATION:  CT HEAD WITHOUT CONTRAST    CLINICAL HISTORY:  Dizziness, persistent/recurrent, cardiac or vascular cause suspected;    TECHNIQUE:  Axial CT images obtained throughout the head without intravenous contrast.    COMPARISON:  December 2, 2018    FINDINGS:  Negative for acute hemorrhage, mass effect, extraaxial collection, hydrocephalus.    There is good gray white matter differentiation.  Mild chronic small vessel ischemic changes deep periventricular white matter.    The paranasal sinuses and mastoids are clear.    The calvarium is unremarkable with no fractures.                                       X-Ray Chest AP Portable (Final result)  Result time 09/17/22 00:09:52      Final result by Roel Oliveira MD (09/17/22 00:09:52)                   Impression:      No acute abnormality.      Electronically signed by: Tee Sevilla  Date:    09/17/2022  Time:    00:09               Narrative:    EXAMINATION:  XR CHEST AP PORTABLE    CLINICAL HISTORY:  dizziness;    TECHNIQUE:  Single frontal view of the chest  was performed.    COMPARISON:  None    FINDINGS:  The lungs are clear, with normal appearance of pulmonary vasculature and no pleural effusion or pneumothorax.    The cardiac silhouette is normal in size. The hilar and mediastinal contours are unremarkable.    Bones are intact.                        ED Interpretation by Miguel Johnson Jr., MD (09/17/22 00:08:13, [facility] - Emergency Dept., Emergency Medicine)      No acute findings.                                     RADIOLOGY REPORT (Final result)  Result time 09/17/22 16:38:09      Final result by Unknown User (09/17/22 16:38:09)                                           Assessment/Plan:      * NSTEMI (non-ST elevated myocardial infarction)  - Troponin 0.502. EKG unrevealing. CP free on admission.    - Heparin drip per nomogram.  - Continue home ASA, BB, and high intensity statin.  - SL NTG as needed.     - Trend serial cardiac enzymes and EKG.  - Check FLP.  - Will obtain 2D echo.  - Cardiology consult.     9/17:    Cardiology has evaluated and planned for cardiac cath today- Lmca normal; lad prox eccentric plaque 70% hazzy d1 stent 99%  mid lad 70%  Lcx non obs cad om1 stent patent edge stent 40-50% stenosis.   Rca stent patent nopn obs cad pda 70%.  Lvedp normal    Lvf normal anterolateral hk; recommended cardiothoracic surgery consult;   Will f/u; currently on heparin drip; aspirin, BB, statin;       9/18:    Examination done at bedside; denied any chest pain or SOB; currently on heparin drip;   Cardiothoracic surg on board - recommended keep the patient on heparin drip and watch for symptoms and her surgery is planned for Friday which is the same time for bypass surgery;   Electrolytes replaced to maintain K >4, Mg > 2; continue aspirin, BB, statin;     9/20:      Awaiting CABG for Friday;   Currently on heparin drip, no signs of bleeding; Hb stable;     Obesity  Body mass index is 37.02 kg/m². Morbid obesity complicates all aspects of disease management  from diagnostic modalities to treatment. Weight loss encouraged and health benefits explained to patient.       Atherosclerosis of native coronary artery of native heart without angina pectoris  - Continue medical management as above.       Hyperlipidemia  - Continue home statin. FLP in AM.       Essential (primary) hypertension  - Continue home antihypertensives.       Type 2 diabetes mellitus, with long-term current use of insulin  Patient's FSGs are on current medication regimen.  Last A1c reviewed-   Lab Results   Component Value Date    HGBA1C 10.9 (H) 09/17/2022     Most recent fingerstick glucose reviewed-   Recent Labs   Lab 09/19/22  1724 09/19/22  2023 09/20/22  0528 09/20/22  1132   POCTGLUCOSE 214* 215* 174* 224*     Current correctional scale  Medium  Maintain anti-hyperglycemic dose as follows-   Antihyperglycemics (From admission, onward)    Start     Stop Route Frequency Ordered    09/20/22 2100  insulin detemir U-100 pen 15 Units         -- SubQ 2 times daily 09/20/22 1454    09/17/22 0413  insulin aspart U-100 pen 1-10 Units         -- SubQ Before meals & nightly PRN 09/17/22 0316        Hold Oral hypoglycemics while patient is in the hospital.    9/18:    Ordered levemir 10 units BID; will monitor and titrate dose accordingly     9/20:    Increased levemir to 15 units BID;         VTE Risk Mitigation (From admission, onward)         Ordered     heparin 25,000 units in dextrose 5% (100 units/ml) IV bolus from bag - ADDITIONAL PRN BOLUS - 60 units/kg (max bolus 4000 units)  As needed (PRN)        Question:  Heparin Infusion Adjustment (DO NOT MODIFY ANSWER)  Answer:  [email or web address]\[email or web address]\Images\Pharmacy\HeparinInfusions\heparin LOW INTENSITY nomogram for [facility] [id number].pdf    09/17/22 1829     heparin 25,000 units in dextrose 5% (100 units/ml) IV bolus from bag - ADDITIONAL PRN BOLUS - 30 units/kg (max bolus 4000 units)  As needed (PRN)        Question:  Heparin Infusion Adjustment (DO NOT MODIFY ANSWER)   Answer:  [email or web address]\[email or web address]\Images\Pharmacy\HeparinInfusions\heparin LOW INTENSITY nomogram for [facility] [id number].pdf    09/17/22 1829     heparin 25,000 units in dextrose 5% 250 mL (100 units/mL) infusion LOW INTENSITY nomogram - [facility]  Continuous        Question Answer Comment   Heparin Infusion Adjustment (DO NOT MODIFY ANSWER) [email or web address]\epic\Images\Pharmacy\HeparinInfusions\heparin LOW INTENSITY nomogram for [facility] [id number].pdf    Begin at (in units/kg/hr) 12        09/17/22 1829     IP VTE HIGH RISK PATIENT  Once         09/17/22 0315     Place sequential compression device  Until discontinued         09/17/22 0315                Discharge Planning   JOHN:      Code Status: Full Code   Is the patient medically ready for discharge?:     Reason for patient still in hospital (select all that apply): pending CABG on Friday; CTS on board;   Discharge Plan A: Home                  Leisa Plata MD  Department of Hospital Medicine   [facility] - Med Surg

## 2022-09-20 NOTE — PLAN OF CARE
Care plan reviewed with patient. Still on Heparin--no signs of bleeding noted.  Free from falls. No other complaints made.

## 2022-09-20 NOTE — ASSESSMENT & PLAN NOTE
- Troponin 0.502. EKG unrevealing. CP free on admission.    - Heparin drip per nomogram.  - Continue home ASA, BB, and high intensity statin.  - SL NTG as needed.     - Trend serial cardiac enzymes and EKG.  - Check FLP.  - Will obtain 2D echo.  - Cardiology consult.     9/17:    Cardiology has evaluated and planned for cardiac cath today- Lmca normal; lad prox eccentric plaque 70% hazzy d1 stent 99%  mid lad 70%  Lcx non obs cad om1 stent patent edge stent 40-50% stenosis.   Rca stent patent nopn obs cad pda 70%.  Lvedp normal    Lvf normal anterolateral hk; recommended cardiothoracic surgery consult;   Will f/u; currently on heparin drip; aspirin, BB, statin;       9/18:    Examination done at bedside; denied any chest pain or SOB; currently on heparin drip;   Cardiothoracic surg on board - recommended keep the patient on heparin drip and watch for symptoms and her surgery is planned for Friday which is the same time for bypass surgery;   Electrolytes replaced to maintain K >4, Mg > 2; continue aspirin, BB, statin;     9/20:      Awaiting CABG for Friday;   Currently on heparin drip, no signs of bleeding; Hb stable;

## 2022-09-20 NOTE — ASSESSMENT & PLAN NOTE
Multiple prior stents last 2020. Recurrent symptoms and pos troponin.  Plan  Cath today . Patient agrees    9/18/22-Patient stable post cath, 3 vessel CAD and plan CABG     9/19/22  -s/p cath that showed multivessel CAD  -Stable this AM, awaiting CABG after Brilinta washout  -Continue OMT-ASA, statin, BB, amlodipine, heparin gtt    9/20/22  -CABG planned Friday after Brilinta washout  -Cont OMT- ASA, statin, BB, amlodipine, hep gtt

## 2022-09-20 NOTE — SUBJECTIVE & OBJECTIVE
Review of Systems      Constitutional:  Negative for chills, diaphoresis, fatigue and fever.   HENT:  Negative for congestion and sore throat.    Respiratory:  Negative for cough, chest tightness, shortness of breath and wheezing.    Cardiovascular:  Negative for chest pain, palpitations and leg swelling.   Gastrointestinal:  Negative for abdominal pain, constipation and vomiting.   Genitourinary:  Negative for dysuria and hematuria.   Musculoskeletal:  Negative for arthralgias, back pain and myalgias.   Skin:  Negative for pallor and rash.   Neurological:  Positive for  weakness (generalized). Negative for syncope, light-headedness, numbness and headaches.   Psychiatric/Behavioral:  Negative for confusion. The patient is not nervous/anxious.    All other systems reviewed and are negative.  Objective:     Vital Signs (Most Recent):  Temp: 97.7 °F (36.5 °C) (09/20/22 1108)  Pulse: 61 (09/20/22 1320)  Resp: 17 (09/20/22 1108)  BP: (!) 125/58 (09/20/22 1108)  SpO2: 96 % (09/20/22 1108)   Vital Signs (24h Range):  Temp:  [97.7 °F (36.5 °C)-98 °F (36.7 °C)] 97.7 °F (36.5 °C)  Pulse:  [60-83] 61  Resp:  [16-20] 17  SpO2:  [93 %-97 %] 96 %  BP: (125-162)/(58-72) 125/58     Weight: 91.6 kg (202 lb)  Body mass index is 36.95 kg/m².    Intake/Output Summary (Last 24 hours) at 9/20/2022 1455  Last data filed at 9/20/2022 1300  Gross per 24 hour   Intake 886.4 ml   Output --   Net 886.4 ml      Physical Exam    Constitutional:       General: She is awake. She is not in acute distress.     Appearance: Normal appearance. She is well-developed. She is obese. She is not diaphoretic.   HENT:      Head: Normocephalic and atraumatic.   Eyes:      Conjunctiva/sclera: Conjunctivae normal.      Comments: PERRL; EOM intact.   Cardiovascular:      Rate and Rhythm: Normal rate and regular rhythm. No extrasystoles are present.     Heart sounds: S1 normal and S2 normal. No murmur heard.    No gallop.   Pulmonary:      Effort: Pulmonary  effort is normal. No tachypnea.      Breath sounds: Normal breath sounds and air entry. No wheezing, rhonchi or rales.   Abdominal:      General: Bowel sounds are normal. There is no distension.      Palpations: Abdomen is soft.      Tenderness: There is no abdominal tenderness.   Musculoskeletal:         General: No tenderness or deformity. Normal range of motion.      Cervical back: Normal range of motion and neck supple.      Right lower leg: No edema.      Left lower leg: No edema.   Skin:     General: Skin is warm and dry.      Capillary Refill: Capillary refill takes less than 2 seconds.      Findings: No erythema or rash.   Neurological:      General: No focal deficit present.      Mental Status: She is alert and oriented to person, place, and time.      GCS: GCS eye subscore is 4. GCS verbal subscore is 5. GCS motor subscore is 6.      Cranial Nerves: Cranial nerves 2-12 are intact.      Sensory: Sensation is intact.      Motor: Motor function is intact.   Psychiatric:         Mood and Affect: Mood and affect normal.         Behavior: Behavior normal. Behavior is cooperative.     Significant Labs: All pertinent labs within the past 24 hours have been reviewed.  CBC:   Recent Labs   Lab 09/19/22  0613   WBC 6.28   HGB 12.0   HCT 39.6        CMP:   Recent Labs   Lab 09/19/22  0613      K 3.9      CO2 26   *   BUN 8   CREATININE 0.5   CALCIUM 8.5*   ANIONGAP 10       Significant Imaging:     Imaging Results              CT Head Without Contrast (Final result)  Result time 09/17/22 08:33:37      Final result by Yo Larios MD (09/17/22 08:33:37)                   Impression:      Negative for acute intracranial abnormality.    All CT scans at this facility are performed  using dose modulation techniques as appropriate to performed exam including the following:  automated exposure control; adjustment of mA and/or kV according to the patients size (this includes techniques or  standardized protocols for targeted exams where dose is matched to indication/reason for exam: i.e. extremities or head);  iterative reconstruction technique.      Electronically signed by: Yo Larios MD  Date:    09/17/2022  Time:    08:33               Narrative:    EXAMINATION:  CT HEAD WITHOUT CONTRAST    CLINICAL HISTORY:  Dizziness, persistent/recurrent, cardiac or vascular cause suspected;    TECHNIQUE:  Axial CT images obtained throughout the head without intravenous contrast.    COMPARISON:  December 2, 2018    FINDINGS:  Negative for acute hemorrhage, mass effect, extraaxial collection, hydrocephalus.    There is good gray white matter differentiation.  Mild chronic small vessel ischemic changes deep periventricular white matter.    The paranasal sinuses and mastoids are clear.    The calvarium is unremarkable with no fractures.                                       X-Ray Chest AP Portable (Final result)  Result time 09/17/22 00:09:52      Final result by Roel Oliveira MD (09/17/22 00:09:52)                   Impression:      No acute abnormality.      Electronically signed by: Tee Sevilla  Date:    09/17/2022  Time:    00:09               Narrative:    EXAMINATION:  XR CHEST AP PORTABLE    CLINICAL HISTORY:  dizziness;    TECHNIQUE:  Single frontal view of the chest was performed.    COMPARISON:  None    FINDINGS:  The lungs are clear, with normal appearance of pulmonary vasculature and no pleural effusion or pneumothorax.    The cardiac silhouette is normal in size. The hilar and mediastinal contours are unremarkable.    Bones are intact.                        ED Interpretation by Miguel Johnson Jr., MD (09/17/22 00:08:13, [facility] - Emergency Dept., Emergency Medicine)      No acute findings.                                     RADIOLOGY REPORT (Final result)  Result time 09/17/22 16:38:09      Final result by Unknown User (09/17/22 16:38:09)

## 2022-09-21 ENCOUNTER — TELEPHONE (OUTPATIENT)
Dept: CARDIOLOGY | Facility: HOSPITAL | Age: 62
End: 2022-09-21
Payer: COMMERCIAL

## 2022-09-21 LAB
ANION GAP SERPL CALC-SCNC: 10 MMOL/L (ref 8–16)
APTT BLDCRRT: 41.6 SEC (ref 21–32)
BASOPHILS # BLD AUTO: 0.03 K/UL (ref 0–0.2)
BASOPHILS NFR BLD: 0.4 % (ref 0–1.9)
BUN SERPL-MCNC: 11 MG/DL (ref 6–20)
CALCIUM SERPL-MCNC: 8.5 MG/DL (ref 8.7–10.5)
CHLORIDE SERPL-SCNC: 103 MMOL/L (ref 95–110)
CO2 SERPL-SCNC: 28 MMOL/L (ref 23–29)
CREAT SERPL-MCNC: 0.7 MG/DL (ref 0.5–1.4)
DIFFERENTIAL METHOD: ABNORMAL
EOSINOPHIL # BLD AUTO: 0.3 K/UL (ref 0–0.5)
EOSINOPHIL NFR BLD: 3.2 % (ref 0–8)
ERYTHROCYTE [DISTWIDTH] IN BLOOD BY AUTOMATED COUNT: 14.4 % (ref 11.5–14.5)
EST. GFR  (NO RACE VARIABLE): >60 ML/MIN/1.73 M^2
GLUCOSE SERPL-MCNC: 199 MG/DL (ref 70–110)
HCT VFR BLD AUTO: 39 % (ref 37–48.5)
HGB BLD-MCNC: 12.1 G/DL (ref 12–16)
IMM GRANULOCYTES # BLD AUTO: 0.04 K/UL (ref 0–0.04)
IMM GRANULOCYTES NFR BLD AUTO: 0.5 % (ref 0–0.5)
LYMPHOCYTES # BLD AUTO: 3.9 K/UL (ref 1–4.8)
LYMPHOCYTES NFR BLD: 50.1 % (ref 18–48)
MCH RBC QN AUTO: 26.1 PG (ref 27–31)
MCHC RBC AUTO-ENTMCNC: 31 G/DL (ref 32–36)
MCV RBC AUTO: 84 FL (ref 82–98)
MONOCYTES # BLD AUTO: 0.6 K/UL (ref 0.3–1)
MONOCYTES NFR BLD: 8.2 % (ref 4–15)
NEUTROPHILS # BLD AUTO: 2.9 K/UL (ref 1.8–7.7)
NEUTROPHILS NFR BLD: 37.6 % (ref 38–73)
NRBC BLD-RTO: 0 /100 WBC
PLATELET # BLD AUTO: 248 K/UL (ref 150–450)
PMV BLD AUTO: 11 FL (ref 9.2–12.9)
POCT GLUCOSE: 184 MG/DL (ref 70–110)
POCT GLUCOSE: 218 MG/DL (ref 70–110)
POCT GLUCOSE: 238 MG/DL (ref 70–110)
POCT GLUCOSE: 286 MG/DL (ref 70–110)
POTASSIUM SERPL-SCNC: 3.9 MMOL/L (ref 3.5–5.1)
RBC # BLD AUTO: 4.63 M/UL (ref 4–5.4)
SODIUM SERPL-SCNC: 141 MMOL/L (ref 136–145)
WBC # BLD AUTO: 7.78 K/UL (ref 3.9–12.7)

## 2022-09-21 PROCEDURE — 25000003 PHARM REV CODE 250: Performed by: STUDENT IN AN ORGANIZED HEALTH CARE EDUCATION/TRAINING PROGRAM

## 2022-09-21 PROCEDURE — 25000003 PHARM REV CODE 250: Performed by: PHYSICIAN ASSISTANT

## 2022-09-21 PROCEDURE — 21400001 HC TELEMETRY ROOM

## 2022-09-21 PROCEDURE — 25000003 PHARM REV CODE 250: Performed by: INTERNAL MEDICINE

## 2022-09-21 PROCEDURE — 85025 COMPLETE CBC W/AUTO DIFF WBC: CPT | Performed by: STUDENT IN AN ORGANIZED HEALTH CARE EDUCATION/TRAINING PROGRAM

## 2022-09-21 PROCEDURE — 80048 BASIC METABOLIC PNL TOTAL CA: CPT | Performed by: STUDENT IN AN ORGANIZED HEALTH CARE EDUCATION/TRAINING PROGRAM

## 2022-09-21 PROCEDURE — 36415 COLL VENOUS BLD VENIPUNCTURE: CPT | Performed by: FAMILY MEDICINE

## 2022-09-21 PROCEDURE — 99232 SBSQ HOSP IP/OBS MODERATE 35: CPT | Mod: ,,, | Performed by: PHYSICIAN ASSISTANT

## 2022-09-21 PROCEDURE — 63600175 PHARM REV CODE 636 W HCPCS: Performed by: INTERNAL MEDICINE

## 2022-09-21 PROCEDURE — 99232 PR SUBSEQUENT HOSPITAL CARE,LEVL II: ICD-10-PCS | Mod: ,,, | Performed by: PHYSICIAN ASSISTANT

## 2022-09-21 PROCEDURE — 94010 BREATHING CAPACITY TEST: CPT

## 2022-09-21 PROCEDURE — 85730 THROMBOPLASTIN TIME PARTIAL: CPT | Performed by: FAMILY MEDICINE

## 2022-09-21 RX ADMIN — PANTOPRAZOLE SODIUM 40 MG: 40 TABLET, DELAYED RELEASE ORAL at 09:09

## 2022-09-21 RX ADMIN — PREGABALIN 100 MG: 100 CAPSULE ORAL at 03:09

## 2022-09-21 RX ADMIN — ASPIRIN 81 MG: 81 TABLET, COATED ORAL at 09:09

## 2022-09-21 RX ADMIN — PREGABALIN 100 MG: 100 CAPSULE ORAL at 09:09

## 2022-09-21 RX ADMIN — METOPROLOL SUCCINATE 100 MG: 50 TABLET, FILM COATED, EXTENDED RELEASE ORAL at 09:09

## 2022-09-21 RX ADMIN — DIVALPROEX SODIUM 500 MG: 250 TABLET, DELAYED RELEASE ORAL at 09:09

## 2022-09-21 RX ADMIN — DIVALPROEX SODIUM 500 MG: 250 TABLET, DELAYED RELEASE ORAL at 06:09

## 2022-09-21 RX ADMIN — DIVALPROEX SODIUM 500 MG: 250 TABLET, DELAYED RELEASE ORAL at 01:09

## 2022-09-21 RX ADMIN — INSULIN ASPART 2 UNITS: 100 INJECTION, SOLUTION INTRAVENOUS; SUBCUTANEOUS at 06:09

## 2022-09-21 RX ADMIN — INSULIN ASPART 4 UNITS: 100 INJECTION, SOLUTION INTRAVENOUS; SUBCUTANEOUS at 03:09

## 2022-09-21 RX ADMIN — PREGABALIN 100 MG: 100 CAPSULE ORAL at 08:09

## 2022-09-21 RX ADMIN — INSULIN ASPART 6 UNITS: 100 INJECTION, SOLUTION INTRAVENOUS; SUBCUTANEOUS at 11:09

## 2022-09-21 RX ADMIN — ATORVASTATIN CALCIUM 80 MG: 40 TABLET, FILM COATED ORAL at 09:09

## 2022-09-21 RX ADMIN — HEPARIN SODIUM 12 UNITS/KG/HR: 10000 INJECTION, SOLUTION INTRAVENOUS at 11:09

## 2022-09-21 RX ADMIN — INSULIN ASPART 2 UNITS: 100 INJECTION, SOLUTION INTRAVENOUS; SUBCUTANEOUS at 08:09

## 2022-09-21 RX ADMIN — FLUOXETINE 40 MG: 20 CAPSULE ORAL at 09:09

## 2022-09-21 RX ADMIN — AMLODIPINE BESYLATE 10 MG: 10 TABLET ORAL at 09:09

## 2022-09-21 NOTE — PROGRESS NOTES
"[facility] Medicine  Progress Note    Patient Name: Ca Diaz  MRN: [id number]  Patient Class: IP- Inpatient   Admission Date: 9/16/2022  Length of Stay: 4 days  Attending Physician: Leisa Plata, *  Primary Care Provider: Desiree Frye MD        Subjective:     Principal Problem:NSTEMI (non-ST elevated myocardial infarction)        HPI:  Ca Diaz is a 62 y.o. female patient with a PMHx of anxiety, CAD, h/o CVA, depression, DM II, RLS, seizures, fibromyalgia, HLD, HTN, PRAVEENA, and obesity who presented to the Emergency Department for evaluation of generalized weakness which onset gradually two days ago. Pt states she fell last night (9/15) and the night before last (9/14). Pt's  states that she "can't even walk through an open doorway." Symptoms are constant and moderate in severity. No mitigating or exacerbating factors reported. Associated sxs include N/D and dizziness. Pt reports the nausea began one day ago. Patient denies any HA, vomiting, light-headedness, visual disturbance, CP, SOB/MALAVE, diaphoresis, neck or jaw pain, upper extremity pain, numbness/tingling, dysuria, and all other sxs at this time. Work-up in the ED revealed NSTEMI with troponin of 0.502, EKG unrevealing, CXR unremarkable, CT of the head negative for acute process. 325 mg ASA given and UFH initiated. Hospital Medicine was contacted for admission and patient placed in Observation.         Overview/Hospital Course:  9/17:    Examination of patient done at bedside; pt was alert and oriented, c/o chest discomfort;   Cardiology has evaluated and planned for cardiac cath today- Lmca normal; lad prox eccentric plaque 70% hazzy d1 stent 99%  mid lad 70%  Lcx non obs cad om1 stent patent edge stent 40-50% stenosis.   Rca stent patent nopn obs cad pda 70%.  Lvedp normal    Lvf normal anterolateral hk; recommended cardiothoracic surgery consult;   Will f/u; currently on heparin drip; aspirin, BB, " statin;         9/18:    Examination done at bedside; denied any chest pain or SOB; currently on heparin drip;   Cardiothoracic surg on board - recommended keep the patient on heparin drip and watch for symptoms and her surgery is planned for Friday which is the same time for bypass surgery;   Electrolytes replaced to maintain K >4, Mg > 2;   Will f/u on BG;       9/19:    Examination done at bedside; denied any acute issues; awaiting CABG; cardio and cardiothoracic surg on board    9/20:  Examination done at bedside; denies any acute issues;  Awaiting CABG for Friday;   Currently on heparin drip, no signs of bleeding; Hb stable;     9/21:    Examination done at bedside; denies any acute issues;  Awaiting CABG for Friday;   Hemodynamically stable;  Aspirin, BB, statin, heparin;   CTS on board;               Review of Systems    Constitutional:  Negative for chills, diaphoresis, fatigue and fever.   HENT:  Negative for congestion and sore throat.    Respiratory:  Negative for cough, chest tightness, shortness of breath and wheezing.    Cardiovascular:  Negative for chest pain, palpitations and leg swelling.   Gastrointestinal:  Negative for abdominal pain, constipation and vomiting.   Genitourinary:  Negative for dysuria and hematuria.   Musculoskeletal:  Negative for arthralgias, back pain and myalgias.   Skin:  Negative for pallor and rash.   Neurological:   Negative for syncope, light-headedness, numbness and headaches.   Psychiatric/Behavioral:  Negative for confusion. The patient is not nervous/anxious.    All other systems reviewed and are negative.    Objective:     Vital Signs (Most Recent):  Temp: 97.8 °F (36.6 °C) (09/21/22 1140)  Pulse: 66 (09/21/22 1140)  Resp: 18 (09/21/22 1140)  BP: (!) 145/65 (09/21/22 1140)  SpO2: (!) 93 % (09/21/22 1140)   Vital Signs (24h Range):  Temp:  [97.7 °F (36.5 °C)-98.3 °F (36.8 °C)] 97.8 °F (36.6 °C)  Pulse:  [63-75] 66  Resp:  [15-18] 18  SpO2:  [93 %-97 %] 93 %  BP:  (123-145)/(57-65) 145/65     Weight: 91.6 kg (202 lb)  Body mass index is 36.95 kg/m².    Intake/Output Summary (Last 24 hours) at 9/21/2022 1423  Last data filed at 9/21/2022 0900  Gross per 24 hour   Intake 465.13 ml   Output --   Net 465.13 ml      Physical Exam    Constitutional:       General: She is awake. She is not in acute distress.     Appearance: Normal appearance. She is well-developed. She is obese. She is not diaphoretic.   HENT:      Head: Normocephalic and atraumatic.   Eyes:      Conjunctiva/sclera: Conjunctivae normal.      Comments: PERRL; EOM intact.   Cardiovascular:      Rate and Rhythm: Normal rate and regular rhythm. No extrasystoles are present.     Heart sounds: S1 normal and S2 normal. No murmur heard.    No gallop.   Pulmonary:      Effort: Pulmonary effort is normal. No tachypnea.      Breath sounds: Normal breath sounds and air entry. No wheezing, rhonchi or rales.   Abdominal:      General: Bowel sounds are normal. There is no distension.      Palpations: Abdomen is soft.      Tenderness: There is no abdominal tenderness.   Musculoskeletal:         General: No tenderness or deformity. Normal range of motion.      Cervical back: Normal range of motion and neck supple.      Right lower leg: No edema.      Left lower leg: No edema.   Skin:     General: Skin is warm and dry.      Capillary Refill: Capillary refill takes less than 2 seconds.      Findings: No erythema or rash.   Neurological:      General: No focal deficit present.      Mental Status: She is alert and oriented to person, place, and time.      GCS: GCS eye subscore is 4. GCS verbal subscore is 5. GCS motor subscore is 6.      Cranial Nerves: Cranial nerves 2-12 are intact.      Sensory: Sensation is intact.      Motor: Motor function is intact.   Psychiatric:         Mood and Affect: Mood and affect normal.         Behavior: Behavior normal. Behavior is cooperative.     Significant Labs: All pertinent labs within the past 24  hours have been reviewed.  CBC:   Recent Labs   Lab 09/21/22  0544   WBC 7.78   HGB 12.1   HCT 39.0        CMP:   Recent Labs   Lab 09/21/22  0544      K 3.9      CO2 28   *   BUN 11   CREATININE 0.7   CALCIUM 8.5*   ANIONGAP 10       Significant Imaging:     Imaging Results              CT Head Without Contrast (Final result)  Result time 09/17/22 08:33:37      Final result by Yo Larios MD (09/17/22 08:33:37)                   Impression:      Negative for acute intracranial abnormality.    All CT scans at this facility are performed  using dose modulation techniques as appropriate to performed exam including the following:  automated exposure control; adjustment of mA and/or kV according to the patients size (this includes techniques or standardized protocols for targeted exams where dose is matched to indication/reason for exam: i.e. extremities or head);  iterative reconstruction technique.      Electronically signed by: Yo Larios MD  Date:    09/17/2022  Time:    08:33               Narrative:    EXAMINATION:  CT HEAD WITHOUT CONTRAST    CLINICAL HISTORY:  Dizziness, persistent/recurrent, cardiac or vascular cause suspected;    TECHNIQUE:  Axial CT images obtained throughout the head without intravenous contrast.    COMPARISON:  December 2, 2018    FINDINGS:  Negative for acute hemorrhage, mass effect, extraaxial collection, hydrocephalus.    There is good gray white matter differentiation.  Mild chronic small vessel ischemic changes deep periventricular white matter.    The paranasal sinuses and mastoids are clear.    The calvarium is unremarkable with no fractures.                                       X-Ray Chest AP Portable (Final result)  Result time 09/17/22 00:09:52      Final result by Roel Oliveira MD (09/17/22 00:09:52)                   Impression:      No acute abnormality.      Electronically signed by: Tee Sevilla  Date:    09/17/2022  Time:    00:09                Narrative:    EXAMINATION:  XR CHEST AP PORTABLE    CLINICAL HISTORY:  dizziness;    TECHNIQUE:  Single frontal view of the chest was performed.    COMPARISON:  None    FINDINGS:  The lungs are clear, with normal appearance of pulmonary vasculature and no pleural effusion or pneumothorax.    The cardiac silhouette is normal in size. The hilar and mediastinal contours are unremarkable.    Bones are intact.                        ED Interpretation by Miguel Johnson Jr., MD (09/17/22 00:08:13, [facility] - Emergency Dept., Emergency Medicine)      No acute findings.                                     RADIOLOGY REPORT (Final result)  Result time 09/17/22 16:38:09      Final result by Unknown User (09/17/22 16:38:09)                                           Assessment/Plan:      * NSTEMI (non-ST elevated myocardial infarction)  - Troponin 0.502. EKG unrevealing. CP free on admission.    - Heparin drip per nomogram.  - Continue home ASA, BB, and high intensity statin.  - SL NTG as needed.     - Trend serial cardiac enzymes and EKG.  - Check FLP.  - Will obtain 2D echo.  - Cardiology consult.     9/17:    Cardiology has evaluated and planned for cardiac cath today- Lmca normal; lad prox eccentric plaque 70% hazzy d1 stent 99%  mid lad 70%  Lcx non obs cad om1 stent patent edge stent 40-50% stenosis.   Rca stent patent nopn obs cad pda 70%.  Lvedp normal    Lvf normal anterolateral hk; recommended cardiothoracic surgery consult;   Will f/u; currently on heparin drip; aspirin, BB, statin;       9/18:    Examination done at bedside; denied any chest pain or SOB; currently on heparin drip;   Cardiothoracic surg on board - recommended keep the patient on heparin drip and watch for symptoms and her surgery is planned for Friday which is the same time for bypass surgery;   Electrolytes replaced to maintain K >4, Mg > 2; continue aspirin, BB, statin;     9/20:      Awaiting CABG for Friday;   Currently on heparin drip, no  signs of bleeding; Hb stable;     9/21:    Awaiting CABG for Friday;   Hemodynamically stable;  Aspirin, BB, statin, heparin;   CTS on board;     Obesity  Body mass index is 37.02 kg/m². Morbid obesity complicates all aspects of disease management from diagnostic modalities to treatment. Weight loss encouraged and health benefits explained to patient.       Atherosclerosis of native coronary artery of native heart without angina pectoris  - Continue medical management as above.       Hyperlipidemia  - Continue home statin. FLP in AM.       Essential (primary) hypertension  - Continue home antihypertensives.       Type 2 diabetes mellitus, with long-term current use of insulin  Patient's FSGs are on current medication regimen.  Last A1c reviewed-   Lab Results   Component Value Date    HGBA1C 10.9 (H) 09/17/2022     Most recent fingerstick glucose reviewed-   Recent Labs   Lab 09/20/22  1601 09/20/22  2053 09/21/22  0623 09/21/22  1132   POCTGLUCOSE 214* 262* 184* 286*     Current correctional scale  Medium  Maintain anti-hyperglycemic dose as follows-   Antihyperglycemics (From admission, onward)    Start     Stop Route Frequency Ordered    09/20/22 2100  insulin detemir U-100 pen 15 Units         -- SubQ 2 times daily 09/20/22 1454    09/17/22 0413  insulin aspart U-100 pen 1-10 Units         -- SubQ Before meals & nightly PRN 09/17/22 0316        Hold Oral hypoglycemics while patient is in the hospital.    9/18:    Ordered levemir 10 units BID; will monitor and titrate dose accordingly     9/20:    Increased levemir to 15 units BID;     9/21:    Increased levemir to 20 units BID;       VTE Risk Mitigation (From admission, onward)         Ordered     heparin 25,000 units in dextrose 5% (100 units/ml) IV bolus from bag - ADDITIONAL PRN BOLUS - 60 units/kg (max bolus 4000 units)  As needed (PRN)        Question:  Heparin Infusion Adjustment (DO NOT MODIFY ANSWER)  Answer:   \\[email or web address]\epic\Images\Pharmacy\HeparinInfusions\heparin LOW INTENSITY nomogram for [facility] [id number].pdf    09/17/22 1829     heparin 25,000 units in dextrose 5% (100 units/ml) IV bolus from bag - ADDITIONAL PRN BOLUS - 30 units/kg (max bolus 4000 units)  As needed (PRN)        Question:  Heparin Infusion Adjustment (DO NOT MODIFY ANSWER)  Answer:  \\[email or web address]\epic\Images\Pharmacy\HeparinInfusions\heparin LOW INTENSITY nomogram for [facility] [id number].pdf    09/17/22 1829     heparin 25,000 units in dextrose 5% 250 mL (100 units/mL) infusion LOW INTENSITY nomogram - [facility]  Continuous        Question Answer Comment   Heparin Infusion Adjustment (DO NOT MODIFY ANSWER) \\[email or web address]\epic\Images\Pharmacy\HeparinInfusions\heparin LOW INTENSITY nomogram for [facility] [id number].pdf    Begin at (in units/kg/hr) 12        09/17/22 1829     IP VTE HIGH RISK PATIENT  Once         09/17/22 0315     Place sequential compression device  Until discontinued         09/17/22 0315                Discharge Planning   JOHN:      Code Status: Full Code   Is the patient medically ready for discharge?:     Reason for patient still in hospital (select all that apply): Awaiting CABG for Friday;  Discharge Plan A: Home                  Leisa Plata MD  Department of Hospital Medicine   [facility] - Med Surg

## 2022-09-21 NOTE — PROGRESS NOTES
"[facility] - Med Surg  Cardiology  Progress Note    Patient Name: Ca Diaz  MRN: [id number]  Admission Date: 9/16/2022  Hospital Length of Stay: 4 days  Code Status: Full Code   Attending Physician: Leisa Plata, *   Primary Care Physician: Desiree Frye MD  Expected Discharge Date:   Principal Problem:NSTEMI (non-ST elevated myocardial infarction)    Subjective:   HPI:   Ca Diaz is a 62 y.o. female patient with a PMHx of anxiety, CAD, h/o CVA, depression, DM II, RLS, seizures, fibromyalgia, HLD, HTN, PRAVEENA, and obesity who presented to the Emergency Department for evaluation of generalized weakness which onset gradually two days ago. Pt states she fell last night (9/15) and the night before last (9/14). Pt's  states that she "can't even walk through an open doorway." Symptoms are constant and moderate in severity. No mitigating or exacerbating factors reported. Associated sxs include N/D and dizziness. Pt reports the nausea began one day ago. Patient denies any HA, vomiting, light-headedness, visual disturbance, CP, SOB/MALAVE, diaphoresis, neck or jaw pain, upper extremity pain, numbness/tingling, dysuria, and all other sxs at this time. Work-up in the ED revealed NSTEMI with troponin of 0.502, EKG unrevealing, CXR unremarkable, CT of the head negative for acute process. 325 mg ASA given and UFH initiated. Hospital Medicine was contacted for admission and patient placed in Observation.      Prior CAD and coronary stents:  1.  Coronary artery disease , status post stenting of the proximal circumflex artery with 2.5 x 16 mm Synergy drug-eluting stent and stenting of the distal right coronary artery 3.0 x 24 mm Synergy drug-eluting stent 8/25/2020.  Recent catheterization also revealed moderate stenosis of about 60% in the LAD that was evaluated by FFR and deemed to be not functionally significant, first diagonal stenosis of 90% just proximal to the previously placed stent.  The " remote stents of the LAD and diagonal from 2010 were patent with about 50 to 60% in-stent stenosis of the LAD, patent stent to the diagonal but with 90% stenosis just proximal to the stent at the ostium of the diagonal.  The stents to the obtuse marginal and proximal right coronary artery from 2016 were also patent.   Patient states she has several days of angina and presented with weakness and chest pressure.        Hospital Course:   9/18/22-Patient seen and examined today. No chest pain. Plan CABG soon due to complex 3 vessel CAD.    9/19/22-Patient seen and examined today. Reported some indigestion symptoms earlier this AM, has since resolved. No alden chest pain or tightness. No other CV complaints. Labs stable. Awaiting CABG after Brilinta washout period.    9/20/22- Patient seen and examined today. No complaints overnight. No c/o cp or SOB.  Labs reviewed, on Hep gtt. CABG planned for Friday after Brilinta washout period    9/21/22-Patient seen and examined today. Stable overnight. No issues. Labs reviewed. Awaiting CABG.          Review of Systems   Constitutional: Negative.   HENT: Negative.     Eyes: Negative.    Cardiovascular: Negative.    Respiratory: Negative.     Endocrine: Negative.    Hematologic/Lymphatic: Negative.    Skin: Negative.    Musculoskeletal: Negative.    Gastrointestinal: Negative.    Genitourinary: Negative.    Neurological: Negative.    Psychiatric/Behavioral: Negative.     Allergic/Immunologic: Negative.    Objective:     Vital Signs (Most Recent):  Temp: 97.8 °F (36.6 °C) (09/21/22 1140)  Pulse: 66 (09/21/22 1140)  Resp: 18 (09/21/22 1140)  BP: (!) 145/65 (09/21/22 1140)  SpO2: (!) 93 % (09/21/22 1140)   Vital Signs (24h Range):  Temp:  [97.7 °F (36.5 °C)-98.3 °F (36.8 °C)] 97.8 °F (36.6 °C)  Pulse:  [61-75] 66  Resp:  [15-18] 18  SpO2:  [93 %-97 %] 93 %  BP: (123-145)/(57-65) 145/65     Weight: 91.6 kg (202 lb)  Body mass index is 36.95 kg/m².     SpO2: (!) 93 %  O2 Device (Oxygen  Therapy): room air      Intake/Output Summary (Last 24 hours) at 9/21/2022 1200  Last data filed at 9/21/2022 0900  Gross per 24 hour   Intake 465.13 ml   Output --   Net 465.13 ml       Lines/Drains/Airways       Peripheral Intravenous Line  Duration                  Peripheral IV - Single Lumen 09/17/22 0001 20 G Left;Posterior Hand 4 days         Peripheral IV - Single Lumen 09/17/22 0150 20 G Posterior;Right Hand 4 days                    Physical Exam  Vitals and nursing note reviewed.   Constitutional:       General: She is not in acute distress.     Appearance: Normal appearance. She is well-developed. She is not diaphoretic.   HENT:      Head: Normocephalic and atraumatic.   Eyes:      General:         Right eye: No discharge.         Left eye: No discharge.      Pupils: Pupils are equal, round, and reactive to light.   Neck:      Thyroid: No thyromegaly.      Vascular: No JVD.      Trachea: No tracheal deviation.   Cardiovascular:      Rate and Rhythm: Normal rate and regular rhythm.      Heart sounds: Normal heart sounds, S1 normal and S2 normal. No murmur heard.  Pulmonary:      Effort: Pulmonary effort is normal. No respiratory distress.      Breath sounds: Normal breath sounds. No wheezing or rales.   Abdominal:      General: There is no distension.      Palpations: Abdomen is soft.      Tenderness: There is no rebound.   Musculoskeletal:      Cervical back: Neck supple.      Right lower leg: No edema.      Left lower leg: No edema.   Skin:     General: Skin is warm and dry.      Findings: No erythema.   Neurological:      General: No focal deficit present.      Mental Status: She is alert and oriented to person, place, and time.   Psychiatric:         Mood and Affect: Mood normal.         Behavior: Behavior normal.         Thought Content: Thought content normal.       Significant Labs: CMP   Recent Labs   Lab 09/21/22  0544      K 3.9      CO2 28   *   BUN 11   CREATININE 0.7    CALCIUM 8.5*   ANIONGAP 10   , CBC   Recent Labs   Lab 09/21/22  0544   WBC 7.78   HGB 12.1   HCT 39.0      , INR No results for input(s): INR, PROTIME in the last 48 hours., Troponin No results for input(s): TROPONINI in the last 48 hours., and All pertinent lab results from the last 24 hours have been reviewed.    Significant Imaging: Echocardiogram: Transthoracic echo (TTE) complete ([facility] Only):   Results for orders placed or performed during the hospital encounter of 09/16/22   Echo   Result Value Ref Range    BSA 2 m2    TDI SEPTAL 0.06 m/s    LV LATERAL E/E' RATIO 16.00 m/s    LV SEPTAL E/E' RATIO 16.00 m/s    LA WIDTH 3.50 cm    IVC diameter 1.78 cm    Left Ventricular Outflow Tract Mean Velocity 0.90 cm/s    Left Ventricular Outflow Tract Mean Gradient 3.28 mmHg    TDI LATERAL 0.06 m/s    LVIDd 3.49 (A) 3.5 - 6.0 cm    IVS 1.28 (A) 0.6 - 1.1 cm    Posterior Wall 1.18 (A) 0.6 - 1.1 cm    Ao root annulus 2.57 cm    LVIDs 2.43 2.1 - 4.0 cm    FS 30 28 - 44 %    LA volume 53.78 cm3    STJ 2.92 cm    Ascending aorta 2.90 cm    LV mass 140.49 g    LA size 3.58 cm    TAPSE 1.90 cm    Left Ventricle Relative Wall Thickness 0.68 cm    AV mean gradient 8 mmHg    AV valve area 1.96 cm2    AV Velocity Ratio 0.63     AV index (prosthetic) 0.65     E/A ratio 0.98     Mean e' 0.06 m/s    E wave deceleration time 236.57 msec    IVRT 79.92 msec    LVOT diameter 1.96 cm    LVOT area 3.0 cm2    LVOT peak nilay 1.07 m/s    LVOT peak VTI 27.30 cm    Ao peak nilay 1.71 m/s    Ao VTI 42.1 cm    RVOT peak nilay 0.70 m/s    RVOT peak VTI 15.6 cm    LVOT stroke volume 82.33 cm3    AV peak gradient 12 mmHg    PV mean gradient 1.22 mmHg    E/E' ratio 16.00 m/s    MV Peak E Nilay 0.96 m/s    MV Peak A Nilay 0.98 m/s    LV Systolic Volume 20.74 mL    LV Systolic Volume Index 10.8 mL/m2    LV Diastolic Volume 50.49 mL    LV Diastolic Volume Index 26.30 mL/m2    LA Volume Index 28.0 mL/m2    LV Mass Index 73 g/m2    RA Major Axis 3.84 cm     Left Atrium Minor Axis 5.02 cm    Left Atrium Major Axis 5.08 cm    RA Width 2.80 cm    Right Atrial Pressure (from IVC) 8 mmHg    EF 60 %    Narrative    · The left ventricle is normal in size with concentric remodeling and   normal systolic function.  · The estimated ejection fraction is 60%.  · Indeterminate left ventricular diastolic function.  · Normal right ventricular size with normal right ventricular systolic   function.  · There is mild aortic valve stenosis.  · Aortic valve area is 1.96 cm2; peak velocity is 1.71 m/s; mean gradient   is 8 mmHg.  · Intermediate central venous pressure (8 mmHg).      , EKG: reviewed, and X-Ray: CXR: X-Ray Chest 1 View (CXR): No results found for this visit on 09/16/22. and X-Ray Chest PA and Lateral (CXR): No results found for this visit on 09/16/22.    Assessment and Plan:   Patient who presents with NSTEMI/multivessel CAD. Stable. Continue OMT. Awaiting CABG.    * NSTEMI (non-ST elevated myocardial infarction)  Plan cath today    9/18/22- no further symptoms    9/19/22-See plan above    Atherosclerosis of native coronary artery of native heart without angina pectoris  Multiple prior stents last 2020. Recurrent symptoms and pos troponin.  Plan  Cath today . Patient agrees    9/18/22-Patient stable post cath, 3 vessel CAD and plan CABG     9/19/22  -s/p cath that showed multivessel CAD  -Stable this AM, awaiting CABG after Brilinta washout  -Continue OMT-ASA, statin, BB, amlodipine, heparin gtt    9/20/22  -CABG planned Friday after Brilinta washout  -Cont OMT- ASA, statin, BB, amlodipine, hep gtt    9/21/22  -Stable, no acute events  -Continue ASA, statin, BB, amlodipine, heparin gtt  -Awaiting CABG    Hyperlipidemia  -Continue statin    Essential (primary) hypertension  -Continue amlodipine    Type 2 diabetes mellitus, with long-term current use of insulin  Per hospital medicine        VTE Risk Mitigation (From admission, onward)         Ordered     heparin 25,000  units in dextrose 5% (100 units/ml) IV bolus from bag - ADDITIONAL PRN BOLUS - 60 units/kg (max bolus 4000 units)  As needed (PRN)        Question:  Heparin Infusion Adjustment (DO NOT MODIFY ANSWER)  Answer:  [email or web address]\epic\Images\Pharmacy\HeparinInfusions\heparin LOW INTENSITY nomogram for [facility] [id number].pdf    09/17/22 1829     heparin 25,000 units in dextrose 5% (100 units/ml) IV bolus from bag - ADDITIONAL PRN BOLUS - 30 units/kg (max bolus 4000 units)  As needed (PRN)        Question:  Heparin Infusion Adjustment (DO NOT MODIFY ANSWER)  Answer:  \\[email or web address]\epic\Images\Pharmacy\HeparinInfusions\heparin LOW INTENSITY nomogram for [facility] [id number].pdf    09/17/22 1829     heparin 25,000 units in dextrose 5% 250 mL (100 units/mL) infusion LOW INTENSITY nomogram - [facility]  Continuous        Question Answer Comment   Heparin Infusion Adjustment (DO NOT MODIFY ANSWER) \\[email or web address]\epic\Images\Pharmacy\HeparinInfusions\heparin LOW INTENSITY nomogram for [facility] [id number].pdf    Begin at (in units/kg/hr) 12        09/17/22 1829     IP VTE HIGH RISK PATIENT  Once         09/17/22 0315     Place sequential compression device  Until discontinued         09/17/22 0315                Ivana Garcia PA-C  Cardiology  [facility]

## 2022-09-21 NOTE — ASSESSMENT & PLAN NOTE
Patient's FSGs are on current medication regimen.  Last A1c reviewed-   Lab Results   Component Value Date    HGBA1C 10.9 (H) 09/17/2022     Most recent fingerstick glucose reviewed-   Recent Labs   Lab 09/20/22  1601 09/20/22  2053 09/21/22  0623 09/21/22  1132   POCTGLUCOSE 214* 262* 184* 286*     Current correctional scale  Medium  Maintain anti-hyperglycemic dose as follows-   Antihyperglycemics (From admission, onward)    Start     Stop Route Frequency Ordered    09/20/22 2100  insulin detemir U-100 pen 15 Units         -- SubQ 2 times daily 09/20/22 1454    09/17/22 0413  insulin aspart U-100 pen 1-10 Units         -- SubQ Before meals & nightly PRN 09/17/22 0316        Hold Oral hypoglycemics while patient is in the hospital.    9/18:    Ordered levemir 10 units BID; will monitor and titrate dose accordingly     9/20:    Increased levemir to 15 units BID;     9/21:    Increased levemir to 20 units BID;

## 2022-09-21 NOTE — ASSESSMENT & PLAN NOTE
- Troponin 0.502. EKG unrevealing. CP free on admission.    - Heparin drip per nomogram.  - Continue home ASA, BB, and high intensity statin.  - SL NTG as needed.     - Trend serial cardiac enzymes and EKG.  - Check FLP.  - Will obtain 2D echo.  - Cardiology consult.     9/17:    Cardiology has evaluated and planned for cardiac cath today- Lmca normal; lad prox eccentric plaque 70% hazzy d1 stent 99%  mid lad 70%  Lcx non obs cad om1 stent patent edge stent 40-50% stenosis.   Rca stent patent nopn obs cad pda 70%.  Lvedp normal    Lvf normal anterolateral hk; recommended cardiothoracic surgery consult;   Will f/u; currently on heparin drip; aspirin, BB, statin;       9/18:    Examination done at bedside; denied any chest pain or SOB; currently on heparin drip;   Cardiothoracic surg on board - recommended keep the patient on heparin drip and watch for symptoms and her surgery is planned for Friday which is the same time for bypass surgery;   Electrolytes replaced to maintain K >4, Mg > 2; continue aspirin, BB, statin;     9/20:      Awaiting CABG for Friday;   Currently on heparin drip, no signs of bleeding; Hb stable;     9/21:    Awaiting CABG for Friday;   Hemodynamically stable;  Aspirin, BB, statin, heparin;   CTS on board;

## 2022-09-21 NOTE — ASSESSMENT & PLAN NOTE
Multiple prior stents last 2020. Recurrent symptoms and pos troponin.  Plan  Cath today . Patient agrees    9/18/22-Patient stable post cath, 3 vessel CAD and plan CABG     9/19/22  -s/p cath that showed multivessel CAD  -Stable this AM, awaiting CABG after Brilinta washout  -Continue OMT-ASA, statin, BB, amlodipine, heparin gtt    9/20/22  -CABG planned Friday after Brilinta washout  -Cont OMT- ASA, statin, BB, amlodipine, hep gtt    9/21/22  -Stable, no acute events  -Continue ASA, statin, BB, amlodipine, heparin gtt  -Awaiting CABG

## 2022-09-21 NOTE — SUBJECTIVE & OBJECTIVE
Review of Systems    Constitutional:  Negative for chills, diaphoresis, fatigue and fever.   HENT:  Negative for congestion and sore throat.    Respiratory:  Negative for cough, chest tightness, shortness of breath and wheezing.    Cardiovascular:  Negative for chest pain, palpitations and leg swelling.   Gastrointestinal:  Negative for abdominal pain, constipation and vomiting.   Genitourinary:  Negative for dysuria and hematuria.   Musculoskeletal:  Negative for arthralgias, back pain and myalgias.   Skin:  Negative for pallor and rash.   Neurological:   Negative for syncope, light-headedness, numbness and headaches.   Psychiatric/Behavioral:  Negative for confusion. The patient is not nervous/anxious.    All other systems reviewed and are negative.    Objective:     Vital Signs (Most Recent):  Temp: 97.8 °F (36.6 °C) (09/21/22 1140)  Pulse: 66 (09/21/22 1140)  Resp: 18 (09/21/22 1140)  BP: (!) 145/65 (09/21/22 1140)  SpO2: (!) 93 % (09/21/22 1140)   Vital Signs (24h Range):  Temp:  [97.7 °F (36.5 °C)-98.3 °F (36.8 °C)] 97.8 °F (36.6 °C)  Pulse:  [63-75] 66  Resp:  [15-18] 18  SpO2:  [93 %-97 %] 93 %  BP: (123-145)/(57-65) 145/65     Weight: 91.6 kg (202 lb)  Body mass index is 36.95 kg/m².    Intake/Output Summary (Last 24 hours) at 9/21/2022 1423  Last data filed at 9/21/2022 0900  Gross per 24 hour   Intake 465.13 ml   Output --   Net 465.13 ml      Physical Exam    Constitutional:       General: She is awake. She is not in acute distress.     Appearance: Normal appearance. She is well-developed. She is obese. She is not diaphoretic.   HENT:      Head: Normocephalic and atraumatic.   Eyes:      Conjunctiva/sclera: Conjunctivae normal.      Comments: PERRL; EOM intact.   Cardiovascular:      Rate and Rhythm: Normal rate and regular rhythm. No extrasystoles are present.     Heart sounds: S1 normal and S2 normal. No murmur heard.    No gallop.   Pulmonary:      Effort: Pulmonary effort is normal. No tachypnea.       Breath sounds: Normal breath sounds and air entry. No wheezing, rhonchi or rales.   Abdominal:      General: Bowel sounds are normal. There is no distension.      Palpations: Abdomen is soft.      Tenderness: There is no abdominal tenderness.   Musculoskeletal:         General: No tenderness or deformity. Normal range of motion.      Cervical back: Normal range of motion and neck supple.      Right lower leg: No edema.      Left lower leg: No edema.   Skin:     General: Skin is warm and dry.      Capillary Refill: Capillary refill takes less than 2 seconds.      Findings: No erythema or rash.   Neurological:      General: No focal deficit present.      Mental Status: She is alert and oriented to person, place, and time.      GCS: GCS eye subscore is 4. GCS verbal subscore is 5. GCS motor subscore is 6.      Cranial Nerves: Cranial nerves 2-12 are intact.      Sensory: Sensation is intact.      Motor: Motor function is intact.   Psychiatric:         Mood and Affect: Mood and affect normal.         Behavior: Behavior normal. Behavior is cooperative.     Significant Labs: All pertinent labs within the past 24 hours have been reviewed.  CBC:   Recent Labs   Lab 09/21/22  0544   WBC 7.78   HGB 12.1   HCT 39.0        CMP:   Recent Labs   Lab 09/21/22  0544      K 3.9      CO2 28   *   BUN 11   CREATININE 0.7   CALCIUM 8.5*   ANIONGAP 10       Significant Imaging:     Imaging Results              CT Head Without Contrast (Final result)  Result time 09/17/22 08:33:37      Final result by Yo Larios MD (09/17/22 08:33:37)                   Impression:      Negative for acute intracranial abnormality.    All CT scans at this facility are performed  using dose modulation techniques as appropriate to performed exam including the following:  automated exposure control; adjustment of mA and/or kV according to the patients size (this includes techniques or standardized protocols for targeted  exams where dose is matched to indication/reason for exam: i.e. extremities or head);  iterative reconstruction technique.      Electronically signed by: Yo Larios MD  Date:    09/17/2022  Time:    08:33               Narrative:    EXAMINATION:  CT HEAD WITHOUT CONTRAST    CLINICAL HISTORY:  Dizziness, persistent/recurrent, cardiac or vascular cause suspected;    TECHNIQUE:  Axial CT images obtained throughout the head without intravenous contrast.    COMPARISON:  December 2, 2018    FINDINGS:  Negative for acute hemorrhage, mass effect, extraaxial collection, hydrocephalus.    There is good gray white matter differentiation.  Mild chronic small vessel ischemic changes deep periventricular white matter.    The paranasal sinuses and mastoids are clear.    The calvarium is unremarkable with no fractures.                                       X-Ray Chest AP Portable (Final result)  Result time 09/17/22 00:09:52      Final result by Roel Oliveira MD (09/17/22 00:09:52)                   Impression:      No acute abnormality.      Electronically signed by: Tee Sevilla  Date:    09/17/2022  Time:    00:09               Narrative:    EXAMINATION:  XR CHEST AP PORTABLE    CLINICAL HISTORY:  dizziness;    TECHNIQUE:  Single frontal view of the chest was performed.    COMPARISON:  None    FINDINGS:  The lungs are clear, with normal appearance of pulmonary vasculature and no pleural effusion or pneumothorax.    The cardiac silhouette is normal in size. The hilar and mediastinal contours are unremarkable.    Bones are intact.                        ED Interpretation by Miguel Johnson Jr., MD (09/17/22 00:08:13, [facility] Emergency Medicine)      No acute findings.                                     RADIOLOGY REPORT (Final result)  Result time 09/17/22 16:38:09      Final result by Unknown User (09/17/22 16:38:09)

## 2022-09-21 NOTE — SUBJECTIVE & OBJECTIVE
Review of Systems   Constitutional: Negative.   HENT: Negative.     Eyes: Negative.    Cardiovascular: Negative.    Respiratory: Negative.     Endocrine: Negative.    Hematologic/Lymphatic: Negative.    Skin: Negative.    Musculoskeletal: Negative.    Gastrointestinal: Negative.    Genitourinary: Negative.    Neurological: Negative.    Psychiatric/Behavioral: Negative.     Allergic/Immunologic: Negative.    Objective:     Vital Signs (Most Recent):  Temp: 97.8 °F (36.6 °C) (09/21/22 1140)  Pulse: 66 (09/21/22 1140)  Resp: 18 (09/21/22 1140)  BP: (!) 145/65 (09/21/22 1140)  SpO2: (!) 93 % (09/21/22 1140)   Vital Signs (24h Range):  Temp:  [97.7 °F (36.5 °C)-98.3 °F (36.8 °C)] 97.8 °F (36.6 °C)  Pulse:  [61-75] 66  Resp:  [15-18] 18  SpO2:  [93 %-97 %] 93 %  BP: (123-145)/(57-65) 145/65     Weight: 91.6 kg (202 lb)  Body mass index is 36.95 kg/m².     SpO2: (!) 93 %  O2 Device (Oxygen Therapy): room air      Intake/Output Summary (Last 24 hours) at 9/21/2022 1200  Last data filed at 9/21/2022 0900  Gross per 24 hour   Intake 465.13 ml   Output --   Net 465.13 ml       Lines/Drains/Airways       Peripheral Intravenous Line  Duration                  Peripheral IV - Single Lumen 09/17/22 0001 20 G Left;Posterior Hand 4 days         Peripheral IV - Single Lumen 09/17/22 0150 20 G Posterior;Right Hand 4 days                    Physical Exam  Vitals and nursing note reviewed.   Constitutional:       General: She is not in acute distress.     Appearance: Normal appearance. She is well-developed. She is not diaphoretic.   HENT:      Head: Normocephalic and atraumatic.   Eyes:      General:         Right eye: No discharge.         Left eye: No discharge.      Pupils: Pupils are equal, round, and reactive to light.   Neck:      Thyroid: No thyromegaly.      Vascular: No JVD.      Trachea: No tracheal deviation.   Cardiovascular:      Rate and Rhythm: Normal rate and regular rhythm.      Heart sounds: Normal heart sounds,  S1 normal and S2 normal. No murmur heard.  Pulmonary:      Effort: Pulmonary effort is normal. No respiratory distress.      Breath sounds: Normal breath sounds. No wheezing or rales.   Abdominal:      General: There is no distension.      Palpations: Abdomen is soft.      Tenderness: There is no rebound.   Musculoskeletal:      Cervical back: Neck supple.      Right lower leg: No edema.      Left lower leg: No edema.   Skin:     General: Skin is warm and dry.      Findings: No erythema.   Neurological:      General: No focal deficit present.      Mental Status: She is alert and oriented to person, place, and time.   Psychiatric:         Mood and Affect: Mood normal.         Behavior: Behavior normal.         Thought Content: Thought content normal.       Significant Labs: CMP   Recent Labs   Lab 09/21/22  0544      K 3.9      CO2 28   *   BUN 11   CREATININE 0.7   CALCIUM 8.5*   ANIONGAP 10   , CBC   Recent Labs   Lab 09/21/22  0544   WBC 7.78   HGB 12.1   HCT 39.0      , INR No results for input(s): INR, PROTIME in the last 48 hours., Troponin No results for input(s): TROPONINI in the last 48 hours., and All pertinent lab results from the last 24 hours have been reviewed.    Significant Imaging: Echocardiogram: Transthoracic echo (TTE) complete ([facility] Only):   Results for orders placed or performed during the hospital encounter of 09/16/22   Echo   Result Value Ref Range    BSA 2 m2    TDI SEPTAL 0.06 m/s    LV LATERAL E/E' RATIO 16.00 m/s    LV SEPTAL E/E' RATIO 16.00 m/s    LA WIDTH 3.50 cm    IVC diameter 1.78 cm    Left Ventricular Outflow Tract Mean Velocity 0.90 cm/s    Left Ventricular Outflow Tract Mean Gradient 3.28 mmHg    TDI LATERAL 0.06 m/s    LVIDd 3.49 (A) 3.5 - 6.0 cm    IVS 1.28 (A) 0.6 - 1.1 cm    Posterior Wall 1.18 (A) 0.6 - 1.1 cm    Ao root annulus 2.57 cm    LVIDs 2.43 2.1 - 4.0 cm    FS 30 28 - 44 %    LA volume 53.78 cm3    STJ 2.92 cm    Ascending aorta 2.90 cm     LV mass 140.49 g    LA size 3.58 cm    TAPSE 1.90 cm    Left Ventricle Relative Wall Thickness 0.68 cm    AV mean gradient 8 mmHg    AV valve area 1.96 cm2    AV Velocity Ratio 0.63     AV index (prosthetic) 0.65     E/A ratio 0.98     Mean e' 0.06 m/s    E wave deceleration time 236.57 msec    IVRT 79.92 msec    LVOT diameter 1.96 cm    LVOT area 3.0 cm2    LVOT peak nilay 1.07 m/s    LVOT peak VTI 27.30 cm    Ao peak nilay 1.71 m/s    Ao VTI 42.1 cm    RVOT peak nilay 0.70 m/s    RVOT peak VTI 15.6 cm    LVOT stroke volume 82.33 cm3    AV peak gradient 12 mmHg    PV mean gradient 1.22 mmHg    E/E' ratio 16.00 m/s    MV Peak E Nilay 0.96 m/s    MV Peak A Nilay 0.98 m/s    LV Systolic Volume 20.74 mL    LV Systolic Volume Index 10.8 mL/m2    LV Diastolic Volume 50.49 mL    LV Diastolic Volume Index 26.30 mL/m2    LA Volume Index 28.0 mL/m2    LV Mass Index 73 g/m2    RA Major Axis 3.84 cm    Left Atrium Minor Axis 5.02 cm    Left Atrium Major Axis 5.08 cm    RA Width 2.80 cm    Right Atrial Pressure (from IVC) 8 mmHg    EF 60 %    Narrative    · The left ventricle is normal in size with concentric remodeling and   normal systolic function.  · The estimated ejection fraction is 60%.  · Indeterminate left ventricular diastolic function.  · Normal right ventricular size with normal right ventricular systolic   function.  · There is mild aortic valve stenosis.  · Aortic valve area is 1.96 cm2; peak velocity is 1.71 m/s; mean gradient   is 8 mmHg.  · Intermediate central venous pressure (8 mmHg).      , EKG: reviewed, and X-Ray: CXR: X-Ray Chest 1 View (CXR): No results found for this visit on 09/16/22. and X-Ray Chest PA and Lateral (CXR): No results found for this visit on 09/16/22.

## 2022-09-21 NOTE — PLAN OF CARE
Problem: Adult Inpatient Plan of Care  Goal: Plan of Care Review  Outcome: Ongoing, Progressing  Goal: Patient-Specific Goal (Individualized)  Outcome: Ongoing, Progressing  Goal: Absence of Hospital-Acquired Illness or Injury  Outcome: Ongoing, Progressing  Goal: Optimal Comfort and Wellbeing  Outcome: Ongoing, Progressing  Goal: Readiness for Transition of Care  Outcome: Ongoing, Progressing     Problem: Diabetes Comorbidity  Goal: Blood Glucose Level Within Targeted Range  Outcome: Ongoing, Progressing     Problem: Fall Injury Risk  Goal: Absence of Fall and Fall-Related Injury  Outcome: Ongoing, Progressing

## 2022-09-21 NOTE — PLAN OF CARE
"Care plan reviewed with patient. Verbalized, "I am ready for the operation." Still on Heparin--no signs of bleeding noted.  Free from falls. No other complaints made.  "

## 2022-09-22 LAB
ABO + RH BLD: NORMAL
ALBUMIN SERPL BCP-MCNC: 2.8 G/DL (ref 3.5–5.2)
ALLENS TEST: ABNORMAL
ALP SERPL-CCNC: 64 U/L (ref 55–135)
ALT SERPL W/O P-5'-P-CCNC: 26 U/L (ref 10–44)
ANION GAP SERPL CALC-SCNC: 10 MMOL/L (ref 8–16)
ANION GAP SERPL CALC-SCNC: 9 MMOL/L (ref 8–16)
APTT BLDCRRT: 43.6 SEC (ref 21–32)
AST SERPL-CCNC: 23 U/L (ref 10–40)
BASOPHILS # BLD AUTO: 0.04 K/UL (ref 0–0.2)
BASOPHILS NFR BLD: 0.5 % (ref 0–1.9)
BILIRUB SERPL-MCNC: 0.4 MG/DL (ref 0.1–1)
BLD GP AB SCN CELLS X3 SERPL QL: NORMAL
BRPFT: NORMAL
BUN SERPL-MCNC: 10 MG/DL (ref 6–20)
BUN SERPL-MCNC: 9 MG/DL (ref 6–20)
CALCIUM SERPL-MCNC: 8.9 MG/DL (ref 8.7–10.5)
CALCIUM SERPL-MCNC: 8.9 MG/DL (ref 8.7–10.5)
CHLORIDE SERPL-SCNC: 101 MMOL/L (ref 95–110)
CHLORIDE SERPL-SCNC: 104 MMOL/L (ref 95–110)
CO2 SERPL-SCNC: 26 MMOL/L (ref 23–29)
CO2 SERPL-SCNC: 30 MMOL/L (ref 23–29)
CREAT SERPL-MCNC: 0.6 MG/DL (ref 0.5–1.4)
CREAT SERPL-MCNC: 0.7 MG/DL (ref 0.5–1.4)
DELSYS: ABNORMAL
DIFFERENTIAL METHOD: ABNORMAL
EOSINOPHIL # BLD AUTO: 0.3 K/UL (ref 0–0.5)
EOSINOPHIL NFR BLD: 4.1 % (ref 0–8)
ERYTHROCYTE [DISTWIDTH] IN BLOOD BY AUTOMATED COUNT: 14.6 % (ref 11.5–14.5)
EST. GFR  (NO RACE VARIABLE): >60 ML/MIN/1.73 M^2
EST. GFR  (NO RACE VARIABLE): >60 ML/MIN/1.73 M^2
FEF 25 75 LLN: 1.11
FEF 25 75 PRE REF: 82.4 %
FEF 25 75 REF: 2.17
FEV1 FVC LLN: 68
FEV1 FVC PRE REF: 100.1 %
FEV1 FVC REF: 80
FEV1 LLN: 1.76
FEV1 PRE REF: 76.7 %
FEV1 REF: 2.33
FIO2: 21
FVC LLN: 2.23
FVC PRE REF: 76.3 %
FVC REF: 2.95
GLUCOSE SERPL-MCNC: 204 MG/DL (ref 70–110)
GLUCOSE SERPL-MCNC: 212 MG/DL (ref 70–110)
HCO3 UR-SCNC: 30.3 MMOL/L (ref 24–28)
HCT VFR BLD AUTO: 41.3 % (ref 37–48.5)
HGB BLD-MCNC: 13.1 G/DL (ref 12–16)
IMM GRANULOCYTES # BLD AUTO: 0.06 K/UL (ref 0–0.04)
IMM GRANULOCYTES NFR BLD AUTO: 0.7 % (ref 0–0.5)
INR PPP: 1 (ref 0.8–1.2)
LYMPHOCYTES # BLD AUTO: 3.4 K/UL (ref 1–4.8)
LYMPHOCYTES NFR BLD: 41.8 % (ref 18–48)
MCH RBC QN AUTO: 26.6 PG (ref 27–31)
MCHC RBC AUTO-ENTMCNC: 31.7 G/DL (ref 32–36)
MCV RBC AUTO: 84 FL (ref 82–98)
MODE: ABNORMAL
MONOCYTES # BLD AUTO: 0.6 K/UL (ref 0.3–1)
MONOCYTES NFR BLD: 7.9 % (ref 4–15)
NEUTROPHILS # BLD AUTO: 3.6 K/UL (ref 1.8–7.7)
NEUTROPHILS NFR BLD: 45 % (ref 38–73)
NRBC BLD-RTO: 0 /100 WBC
PCO2 BLDA: 44.2 MMHG (ref 35–45)
PEF LLN: 4.39
PEF PRE REF: 83.6 %
PEF REF: 5.99
PH SMN: 7.44 [PH] (ref 7.35–7.45)
PLATELET # BLD AUTO: 248 K/UL (ref 150–450)
PMV BLD AUTO: 11.5 FL (ref 9.2–12.9)
PO2 BLDA: 64 MMHG (ref 80–100)
POC BE: 6 MMOL/L
POC SATURATED O2: 93 % (ref 95–100)
POCT GLUCOSE: 170 MG/DL (ref 70–110)
POCT GLUCOSE: 198 MG/DL (ref 70–110)
POCT GLUCOSE: 206 MG/DL (ref 70–110)
POCT GLUCOSE: 231 MG/DL (ref 70–110)
POCT GLUCOSE: 247 MG/DL (ref 70–110)
POTASSIUM SERPL-SCNC: 4.1 MMOL/L (ref 3.5–5.1)
POTASSIUM SERPL-SCNC: 4.5 MMOL/L (ref 3.5–5.1)
PRE FEF 25 75: 1.79 L/S (ref 1.11–3.59)
PRE FET 100: 6.48 SEC
PRE FEV1 FVC: 79.67 % (ref 67.54–89.89)
PRE FEV1: 1.79 L (ref 1.76–2.88)
PRE FVC: 2.25 L (ref 2.23–3.69)
PRE PEF: 5.01 L/S (ref 4.39–7.6)
PROT SERPL-MCNC: 5.9 G/DL (ref 6–8.4)
PROTHROMBIN TIME: 10.3 SEC (ref 9–12.5)
RBC # BLD AUTO: 4.92 M/UL (ref 4–5.4)
SAMPLE: ABNORMAL
SITE: ABNORMAL
SODIUM SERPL-SCNC: 140 MMOL/L (ref 136–145)
SODIUM SERPL-SCNC: 140 MMOL/L (ref 136–145)
WBC # BLD AUTO: 8.02 K/UL (ref 3.9–12.7)

## 2022-09-22 PROCEDURE — 80053 COMPREHEN METABOLIC PANEL: CPT | Performed by: THORACIC SURGERY (CARDIOTHORACIC VASCULAR SURGERY)

## 2022-09-22 PROCEDURE — 85730 THROMBOPLASTIN TIME PARTIAL: CPT | Performed by: FAMILY MEDICINE

## 2022-09-22 PROCEDURE — 25000003 PHARM REV CODE 250: Performed by: INTERNAL MEDICINE

## 2022-09-22 PROCEDURE — 36415 COLL VENOUS BLD VENIPUNCTURE: CPT | Performed by: THORACIC SURGERY (CARDIOTHORACIC VASCULAR SURGERY)

## 2022-09-22 PROCEDURE — 25000003 PHARM REV CODE 250: Performed by: NURSE PRACTITIONER

## 2022-09-22 PROCEDURE — 86901 BLOOD TYPING SEROLOGIC RH(D): CPT | Performed by: THORACIC SURGERY (CARDIOTHORACIC VASCULAR SURGERY)

## 2022-09-22 PROCEDURE — 21400001 HC TELEMETRY ROOM

## 2022-09-22 PROCEDURE — 85025 COMPLETE CBC W/AUTO DIFF WBC: CPT | Performed by: PHYSICIAN ASSISTANT

## 2022-09-22 PROCEDURE — 36415 COLL VENOUS BLD VENIPUNCTURE: CPT | Performed by: PHYSICIAN ASSISTANT

## 2022-09-22 PROCEDURE — 86920 COMPATIBILITY TEST SPIN: CPT | Performed by: STUDENT IN AN ORGANIZED HEALTH CARE EDUCATION/TRAINING PROGRAM

## 2022-09-22 PROCEDURE — 36600 WITHDRAWAL OF ARTERIAL BLOOD: CPT

## 2022-09-22 PROCEDURE — 25000003 PHARM REV CODE 250: Performed by: STUDENT IN AN ORGANIZED HEALTH CARE EDUCATION/TRAINING PROGRAM

## 2022-09-22 PROCEDURE — 80048 BASIC METABOLIC PNL TOTAL CA: CPT | Mod: XB | Performed by: PHYSICIAN ASSISTANT

## 2022-09-22 PROCEDURE — 36415 COLL VENOUS BLD VENIPUNCTURE: CPT | Performed by: FAMILY MEDICINE

## 2022-09-22 PROCEDURE — 36415 COLL VENOUS BLD VENIPUNCTURE: CPT | Performed by: STUDENT IN AN ORGANIZED HEALTH CARE EDUCATION/TRAINING PROGRAM

## 2022-09-22 PROCEDURE — 85610 PROTHROMBIN TIME: CPT | Performed by: THORACIC SURGERY (CARDIOTHORACIC VASCULAR SURGERY)

## 2022-09-22 PROCEDURE — 25000003 PHARM REV CODE 250: Performed by: PHYSICIAN ASSISTANT

## 2022-09-22 PROCEDURE — 99900035 HC TECH TIME PER 15 MIN (STAT)

## 2022-09-22 PROCEDURE — 86920 COMPATIBILITY TEST SPIN: CPT | Performed by: THORACIC SURGERY (CARDIOTHORACIC VASCULAR SURGERY)

## 2022-09-22 PROCEDURE — 82803 BLOOD GASES ANY COMBINATION: CPT

## 2022-09-22 PROCEDURE — 99900031 HC PATIENT EDUCATION (STAT)

## 2022-09-22 RX ORDER — BUTALBITAL, ACETAMINOPHEN AND CAFFEINE 50; 325; 40 MG/1; MG/1; MG/1
1 TABLET ORAL ONCE
Status: COMPLETED | OUTPATIENT
Start: 2022-09-22 | End: 2022-09-22

## 2022-09-22 RX ORDER — CEFAZOLIN SODIUM 2 G/50ML
2 SOLUTION INTRAVENOUS
Status: DISCONTINUED | OUTPATIENT
Start: 2022-09-23 | End: 2022-09-23 | Stop reason: HOSPADM

## 2022-09-22 RX ORDER — METOPROLOL TARTRATE 25 MG/1
25 TABLET, FILM COATED ORAL
Status: COMPLETED | OUTPATIENT
Start: 2022-09-23 | End: 2022-09-23

## 2022-09-22 RX ORDER — AMIODARONE HYDROCHLORIDE 200 MG/1
200 TABLET ORAL ONCE
Status: COMPLETED | OUTPATIENT
Start: 2022-09-23 | End: 2022-09-23

## 2022-09-22 RX ORDER — CHLORHEXIDINE GLUCONATE ORAL RINSE 1.2 MG/ML
10 SOLUTION DENTAL
Status: DISCONTINUED | OUTPATIENT
Start: 2022-09-23 | End: 2022-09-23 | Stop reason: HOSPADM

## 2022-09-22 RX ADMIN — DIVALPROEX SODIUM 500 MG: 250 TABLET, DELAYED RELEASE ORAL at 02:09

## 2022-09-22 RX ADMIN — PREGABALIN 100 MG: 100 CAPSULE ORAL at 09:09

## 2022-09-22 RX ADMIN — METOPROLOL SUCCINATE 100 MG: 50 TABLET, FILM COATED, EXTENDED RELEASE ORAL at 09:09

## 2022-09-22 RX ADMIN — PANTOPRAZOLE SODIUM 40 MG: 40 TABLET, DELAYED RELEASE ORAL at 09:09

## 2022-09-22 RX ADMIN — AMLODIPINE BESYLATE 10 MG: 10 TABLET ORAL at 09:09

## 2022-09-22 RX ADMIN — FLUOXETINE 40 MG: 20 CAPSULE ORAL at 09:09

## 2022-09-22 RX ADMIN — ACETAMINOPHEN 650 MG: 325 TABLET ORAL at 02:09

## 2022-09-22 RX ADMIN — DIVALPROEX SODIUM 500 MG: 250 TABLET, DELAYED RELEASE ORAL at 09:09

## 2022-09-22 RX ADMIN — INSULIN ASPART 2 UNITS: 100 INJECTION, SOLUTION INTRAVENOUS; SUBCUTANEOUS at 04:09

## 2022-09-22 RX ADMIN — INSULIN ASPART 2 UNITS: 100 INJECTION, SOLUTION INTRAVENOUS; SUBCUTANEOUS at 09:09

## 2022-09-22 RX ADMIN — DIVALPROEX SODIUM 500 MG: 250 TABLET, DELAYED RELEASE ORAL at 05:09

## 2022-09-22 RX ADMIN — INSULIN ASPART 2 UNITS: 100 INJECTION, SOLUTION INTRAVENOUS; SUBCUTANEOUS at 05:09

## 2022-09-22 RX ADMIN — INSULIN ASPART 4 UNITS: 100 INJECTION, SOLUTION INTRAVENOUS; SUBCUTANEOUS at 11:09

## 2022-09-22 RX ADMIN — ASPIRIN 81 MG: 81 TABLET, COATED ORAL at 09:09

## 2022-09-22 RX ADMIN — BUTALBITAL, ACETAMINOPHEN AND CAFFEINE 1 TABLET: 50; 325; 40 TABLET ORAL at 07:09

## 2022-09-22 RX ADMIN — ATORVASTATIN CALCIUM 80 MG: 40 TABLET, FILM COATED ORAL at 09:09

## 2022-09-22 RX ADMIN — PREGABALIN 100 MG: 100 CAPSULE ORAL at 02:09

## 2022-09-22 NOTE — ASSESSMENT & PLAN NOTE
- Troponin 0.502. EKG unrevealing. CP free on admission.    - Heparin drip per nomogram.  - Continue home ASA, BB, and high intensity statin.  - SL NTG as needed.     - Trend serial cardiac enzymes and EKG.  - Check FLP.  - Will obtain 2D echo.  - Cardiology consult.     9/17:    Cardiology has evaluated and planned for cardiac cath today- Lmca normal; lad prox eccentric plaque 70% hazzy d1 stent 99%  mid lad 70%  Lcx non obs cad om1 stent patent edge stent 40-50% stenosis.   Rca stent patent nopn obs cad pda 70%.  Lvedp normal    Lvf normal anterolateral hk; recommended cardiothoracic surgery consult;   Will f/u; currently on heparin drip; aspirin, BB, statin;       9/18:    Examination done at bedside; denied any chest pain or SOB; currently on heparin drip;   Cardiothoracic surg on board - recommended keep the patient on heparin drip and watch for symptoms and her surgery is planned for Friday which is the same time for bypass surgery;   Electrolytes replaced to maintain K >4, Mg > 2; continue aspirin, BB, statin;     9/21:      Awaiting CABG for Friday;   Currently on heparin drip, no signs of bleeding; Hb stable;       9/22:      Awaiting CABG for Friday;   Hemodynamically stable;  Aspirin, BB, statin, heparin;   CTS on board;

## 2022-09-22 NOTE — PLAN OF CARE
Pt remains free from falls/injuries this shift. Safety precautions maintained. Headache managed with PRN meds. Heparin therapeutic and infusing at 12 units . No s/s of acute distress noted. Will continue to monitor. Chart check completed.

## 2022-09-22 NOTE — ANESTHESIA PREPROCEDURE EVALUATION
09/22/2022  Ca Diaz is a 62 y.o., female.    62-year-old female who has a extensive history of coronary artery disease with multiple stents in her left anterior descending artery right coronary artery and circumflex artery is admitted with NSTEMI and had a left heart catheterization which showed three-vessel coronary artery disease the patient is chest pain-free at this time and she is resting.  She has a history of hypertension hyperlipidemia and type 1 diabetes mellitus.  The patient was taking Brilinta for her stents and her last dose was on the day of admission.  She is been admitted for observation and Brilinta washout before going for an urgent coronary artery bypass grafting x3.    Patient Active Problem List   Diagnosis    Type 2 diabetes mellitus, with long-term current use of insulin    ASCVD (arteriosclerotic cardiovascular disease)    MDD (major depressive disorder)    Anxiety and depression    Essential (primary) hypertension    GERD (gastroesophageal reflux disease)    History of migraine headaches    Hyperlipidemia    Hypersomnia with sleep apnea    Obstructive sleep apnea syndrome    Fibromyalgia    RLS (restless legs syndrome)    NSTEMI (non-ST elevated myocardial infarction)    Atherosclerosis of native coronary artery of native heart without angina pectoris    Obesity       Pre-op Assessment    I have reviewed the Patient Summary Reports.    I have reviewed the NPO Status.   I have reviewed the Medications.     Review of Systems  Anesthesia Hx:  No problems with previous Anesthesia  Denies Family Hx of Anesthesia complications.   Denies Personal Hx of Anesthesia complications.   Social:  Non-Smoker    Hematology/Oncology:  Hematology Normal        Cardiovascular:   Hypertension Past MI CAD   hyperlipidemia ECG has been reviewed. NSTEMI   Pulmonary:   Sleep  Apnea    Renal/:  Renal/ Normal     Hepatic/GI:   GERD    Musculoskeletal:   Fibromyalgia   Neurological:   Seizures RLS (restless legs syndrome)   Endocrine:   Diabetes    Psych:   anxiety depression          Physical Exam  General: Alert and Oriented    Airway:  Mallampati: III / I  Mouth Opening: Normal  TM Distance: Normal  Tongue: Normal  Neck ROM: Normal ROM        Anesthesia Plan  Type of Anesthesia, risks & benefits discussed:    Anesthesia Type: Gen ETT  Intra-op Monitoring Plan: Standard ASA Monitors, Art Line, Central Line, KALYAN and CO  Induction:  IV  Airway Plan: , Post-Induction  Informed Consent: Informed consent signed with the Patient and all parties understand the risks and agree with anesthesia plan.  All questions answered.   ASA Score: 3  Day of Surgery Review of History & Physical: I have interviewed and examined the patient. I have reviewed the patient's H&P dated:     Ready For Surgery From Anesthesia Perspective.     .      Chemistry        Component Value Date/Time     09/22/2022 0845    K 4.1 09/22/2022 0845     09/22/2022 0845    CO2 26 09/22/2022 0845    BUN 9 09/22/2022 0845    CREATININE 0.6 09/22/2022 0845     (H) 09/22/2022 0845        Component Value Date/Time    CALCIUM 8.9 09/22/2022 0845    ALKPHOS 71 09/16/2022 2357    AST 53 (H) 09/16/2022 2357    ALT 37 09/16/2022 2357    BILITOT 0.3 09/16/2022 2357    ESTGFRAFRICA >60 03/18/2022 1200    EGFRNONAA >60 03/18/2022 1200        Lab Results   Component Value Date    WBC 8.02 09/22/2022    HGB 13.1 09/22/2022    HCT 41.3 09/22/2022    MCV 84 09/22/2022     09/22/2022       Normal sinus rhythm   Minimal voltage criteria for LVH, may be normal variant ( R in aVL )   Anterior infarct ,age undetermined   Abnormal ECG   No previous ECGs available   Confirmed by YULIA BOLANOS MD ([id number]) on 9/18/2022 1:20:21 PM     Echo 9/17/22:   The left ventricle is normal in size with concentric remodeling and normal  systolic function.   The estimated ejection fraction is 60%.   Indeterminate left ventricular diastolic function.   Normal right ventricular size with normal right ventricular systolic function.   There is mild aortic valve stenosis.   Aortic valve area is 1.96 cm2; peak velocity is 1.71 m/s; mean gradient is 8 mmHg.   Intermediate central venous pressure (8 mmHg).     Heart cath 9/17/22:    The RPDA lesion was 70% stenosed.    The 2nd Mrg lesion was 50% stenosed.    The Prox LAD to Mid LAD lesion was 70% stenosed.    The 1st Diag lesion was 99% stenosed.    The Mid LAD lesion was 70% stenosed.    The ejection fraction was calculated to be 60%.    The pre-procedure left ventricular end diastolic pressure was 14.    The post-procedure left ventricular end diastolic pressure was 17.    The estimated blood loss was none.    There was three vessel coronary artery disease.       Diagnostic  Dominance: Right  Left Main   The vessel is large. Exhibits minimal luminal irregularities.      Left Anterior Descending   Prox LAD to Mid LAD lesion was 70% stenosed. The lesion was 10 mm long. The lesion shape was eccentric. The lesion contour was irregular. The segment angulation was 45-90 degrees. The lesion was previously treated using a stent of unknown type.   Mid LAD lesion was 70% stenosed. intramyocardial.      First Diagonal Branch   1st Diag lesion was 99% stenosed. The lesion was 10 mm long. The lesion was previously treated using a stent of unknown type.      Left Circumflex   Previously placed Prox Cx to Mid Cx stent (unknown type) is widely patent.      Second Obtuse Marginal Branch   2nd Mrg lesion was 50% stenosed.      Right Coronary Artery   Previously placed Prox RCA-1 stent (unknown type) is widely patent.   Prox RCA-2 lesion was 30% stenosed with non-stenotic side branch in RV Branch.   Previously placed Prox RCA to Mid RCA stent (unknown type) is widely patent.      Right Posterior Descending  Artery   RPDA lesion was 70% stenosed. The lesion was 10 mm long.

## 2022-09-22 NOTE — NURSING
Bed alarm refused, pt educated on the importance of safety and bed alarm due to recent falls pt verbalized understanding and states she does not feel weak and doesn't need a bed alarm.

## 2022-09-22 NOTE — NURSING
Cabg teaching done with pt.  Brother, spouse, and nephew also present.  All questions answered.  15min spent with family and in discussion.

## 2022-09-22 NOTE — PROGRESS NOTES
"[facility] Medicine  Progress Note    Patient Name: Ca Diaz  MRN: [id number]  Patient Class: IP- Inpatient   Admission Date: 9/16/2022  Length of Stay: 5 days  Attending Physician: Leisa Plata, *  Primary Care Provider: Desiree Frye MD        Subjective:     Principal Problem:NSTEMI (non-ST elevated myocardial infarction)        HPI:  Ca Diaz is a 62 y.o. female patient with a PMHx of anxiety, CAD, h/o CVA, depression, DM II, RLS, seizures, fibromyalgia, HLD, HTN, PRAVEENA, and obesity who presented to the Emergency Department for evaluation of generalized weakness which onset gradually two days ago. Pt states she fell last night (9/15) and the night before last (9/14). Pt's  states that she "can't even walk through an open doorway." Symptoms are constant and moderate in severity. No mitigating or exacerbating factors reported. Associated sxs include N/D and dizziness. Pt reports the nausea began one day ago. Patient denies any HA, vomiting, light-headedness, visual disturbance, CP, SOB/MALAVE, diaphoresis, neck or jaw pain, upper extremity pain, numbness/tingling, dysuria, and all other sxs at this time. Work-up in the ED revealed NSTEMI with troponin of 0.502, EKG unrevealing, CXR unremarkable, CT of the head negative for acute process. 325 mg ASA given and UFH initiated. Hospital Medicine was contacted for admission and patient placed in Observation.         Overview/Hospital Course:  9/17:    Examination of patient done at bedside; pt was alert and oriented, c/o chest discomfort;   Cardiology has evaluated and planned for cardiac cath today- Lmca normal; lad prox eccentric plaque 70% hazzy d1 stent 99%  mid lad 70%  Lcx non obs cad om1 stent patent edge stent 40-50% stenosis.   Rca stent patent nopn obs cad pda 70%.  Lvedp normal    Lvf normal anterolateral hk; recommended cardiothoracic surgery consult;   Will f/u; currently on heparin drip; aspirin, BB, " statin;         9/18:    Examination done at bedside; denied any chest pain or SOB; currently on heparin drip;   Cardiothoracic surg on board - recommended keep the patient on heparin drip and watch for symptoms and her surgery is planned for Friday which is the same time for bypass surgery;   Electrolytes replaced to maintain K >4, Mg > 2;   Will f/u on BG;       9/19:    Examination done at bedside; denied any acute issues; awaiting CABG; cardio and cardiothoracic surg on board    9/20:  Examination done at bedside; denies any acute issues;  Awaiting CABG for Friday;   Currently on heparin drip, no signs of bleeding; Hb stable;     9/21:      Examination done at bedside; denies any acute issues;  Hemodynamically stable;    9/22:    Examination done at bedside; denies any acute issues;  Awaiting CABG for Friday;   Hemodynamically stable;  Aspirin, BB, statin, heparin drip --> Hb stable, no signs of bleeding;   CTS on board;               Review of Systems    Constitutional:  Negative for chills, diaphoresis, fatigue and fever.   HENT:  Negative for congestion and sore throat.    Respiratory:  Negative for cough, chest tightness, shortness of breath and wheezing.    Cardiovascular:  Negative for chest pain, palpitations and leg swelling.   Gastrointestinal:  Negative for abdominal pain, constipation and vomiting.   Genitourinary:  Negative for dysuria and hematuria.   Musculoskeletal:  Negative for arthralgias, back pain and myalgias.   Skin:  Negative for pallor and rash.   Neurological:   Negative for syncope, light-headedness, numbness and headaches.   Psychiatric/Behavioral:  Negative for confusion. The patient is not nervous/anxious.    All other systems reviewed and are negative.  Objective:     Vital Signs (Most Recent):  Temp: 98.3 °F (36.8 °C) (09/22/22 1147)  Pulse: 66 (09/22/22 1300)  Resp: 18 (09/22/22 1147)  BP: 118/77 (09/22/22 1147)  SpO2: 95 % (09/22/22 1147) Vital Signs (24h Range):  Temp:  [97.5 °F  (36.4 °C)-98.6 °F (37 °C)] 98.3 °F (36.8 °C)  Pulse:  [60-70] 66  Resp:  [18-20] 18  SpO2:  [93 %-98 %] 95 %  BP: (118-146)/(60-96) 118/77     Weight: 91.6 kg (202 lb)  Body mass index is 36.95 kg/m².    Intake/Output Summary (Last 24 hours) at 9/22/2022 1407  Last data filed at 9/22/2022 1200  Gross per 24 hour   Intake 420 ml   Output --   Net 420 ml      Physical Exam    Constitutional:       General: She is awake. She is not in acute distress.     Appearance: Normal appearance. She is well-developed. She is obese. She is not diaphoretic.   HENT:      Head: Normocephalic and atraumatic.   Eyes:      Conjunctiva/sclera: Conjunctivae normal.      Comments: PERRL; EOM intact.   Cardiovascular:      Rate and Rhythm: Normal rate and regular rhythm. No extrasystoles are present.     Heart sounds: S1 normal and S2 normal. No murmur heard.    No gallop.   Pulmonary:      Effort: Pulmonary effort is normal. No tachypnea.      Breath sounds: Normal breath sounds and air entry. No wheezing, rhonchi or rales.   Abdominal:      General: Bowel sounds are normal. There is no distension.      Palpations: Abdomen is soft.      Tenderness: There is no abdominal tenderness.   Musculoskeletal:         General: No tenderness or deformity. Normal range of motion.      Cervical back: Normal range of motion and neck supple.      Right lower leg: No edema.      Left lower leg: No edema.   Skin:     General: Skin is warm and dry.      Capillary Refill: Capillary refill takes less than 2 seconds.      Findings: No erythema or rash.   Neurological:      General: No focal deficit present.      Mental Status: She is alert and oriented to person, place, and time.      GCS: GCS eye subscore is 4. GCS verbal subscore is 5. GCS motor subscore is 6.      Cranial Nerves: Cranial nerves 2-12 are intact.      Sensory: Sensation is intact.      Motor: Motor function is intact.   Psychiatric:         Mood and Affect: Mood and affect normal.          Behavior: Behavior normal. Behavior is cooperative.     Significant Labs: All pertinent labs within the past 24 hours have been reviewed.  CBC:   Recent Labs   Lab 09/21/22  0544 09/22/22  0845   WBC 7.78 8.02   HGB 12.1 13.1   HCT 39.0 41.3    248     CMP:   Recent Labs   Lab 09/21/22  0544 09/22/22  0845    140   K 3.9 4.1    104   CO2 28 26   * 212*   BUN 11 9   CREATININE 0.7 0.6   CALCIUM 8.5* 8.9   ANIONGAP 10 10       Significant Imaging:     Imaging Results              CT Head Without Contrast (Final result)  Result time 09/17/22 08:33:37      Final result by Yo Larios MD (09/17/22 08:33:37)                   Impression:      Negative for acute intracranial abnormality.    All CT scans at this facility are performed  using dose modulation techniques as appropriate to performed exam including the following:  automated exposure control; adjustment of mA and/or kV according to the patients size (this includes techniques or standardized protocols for targeted exams where dose is matched to indication/reason for exam: i.e. extremities or head);  iterative reconstruction technique.      Electronically signed by: Yo Larios MD  Date:    09/17/2022  Time:    08:33               Narrative:    EXAMINATION:  CT HEAD WITHOUT CONTRAST    CLINICAL HISTORY:  Dizziness, persistent/recurrent, cardiac or vascular cause suspected;    TECHNIQUE:  Axial CT images obtained throughout the head without intravenous contrast.    COMPARISON:  December 2, 2018    FINDINGS:  Negative for acute hemorrhage, mass effect, extraaxial collection, hydrocephalus.    There is good gray white matter differentiation.  Mild chronic small vessel ischemic changes deep periventricular white matter.    The paranasal sinuses and mastoids are clear.    The calvarium is unremarkable with no fractures.                                       X-Ray Chest AP Portable (Final result)  Result time 09/17/22 00:09:52      Final  result by Roel lOiveira MD (09/17/22 00:09:52)                   Impression:      No acute abnormality.      Electronically signed by: Tee Sevilla  Date:    09/17/2022  Time:    00:09               Narrative:    EXAMINATION:  XR CHEST AP PORTABLE    CLINICAL HISTORY:  dizziness;    TECHNIQUE:  Single frontal view of the chest was performed.    COMPARISON:  None    FINDINGS:  The lungs are clear, with normal appearance of pulmonary vasculature and no pleural effusion or pneumothorax.    The cardiac silhouette is normal in size. The hilar and mediastinal contours are unremarkable.    Bones are intact.                        ED Interpretation by Miguel Johnson Jr., MD (09/17/22 00:08:13, [facility] - Emergency Dept[facility] Emergency Medicine)      No acute findings.                                     RADIOLOGY REPORT (Final result)  Result time 09/17/22 16:38:09      Final result by Unknown User (09/17/22 16:38:09)                                           Assessment/Plan:      * NSTEMI (non-ST elevated myocardial infarction)  - Troponin 0.502. EKG unrevealing. CP free on admission.    - Heparin drip per nomogram.  - Continue home ASA, BB, and high intensity statin.  - SL NTG as needed.     - Trend serial cardiac enzymes and EKG.  - Check FLP.  - Will obtain 2D echo.  - Cardiology consult.     9/17:    Cardiology has evaluated and planned for cardiac cath today- Lmca normal; lad prox eccentric plaque 70% hazzy d1 stent 99%  mid lad 70%  Lcx non obs cad om1 stent patent edge stent 40-50% stenosis.   Rca stent patent nopn obs cad pda 70%.  Lvedp normal    Lvf normal anterolateral hk; recommended cardiothoracic surgery consult;   Will f/u; currently on heparin drip; aspirin, BB, statin;       9/18:    Examination done at bedside; denied any chest pain or SOB; currently on heparin drip;   Cardiothoracic surg on board - recommended keep the patient on heparin drip and watch for symptoms and her surgery is planned for Friday  which is the same time for bypass surgery;   Electrolytes replaced to maintain K >4, Mg > 2; continue aspirin, BB, statin;     9/21:      Awaiting CABG for Friday;   Currently on heparin drip, no signs of bleeding; Hb stable;       9/22:      Awaiting CABG for Friday;   Hemodynamically stable;  Aspirin, BB, statin, heparin;   CTS on board;     Obesity  Body mass index is 37.02 kg/m². Morbid obesity complicates all aspects of disease management from diagnostic modalities to treatment. Weight loss encouraged and health benefits explained to patient.       Atherosclerosis of native coronary artery of native heart without angina pectoris  - Continue medical management as above.       Hyperlipidemia  - Continue home statin.       Essential (primary) hypertension  - Continue home antihypertensives.       Type 2 diabetes mellitus, with long-term current use of insulin  Patient's FSGs are on current medication regimen.  Last A1c reviewed-   Lab Results   Component Value Date    HGBA1C 10.9 (H) 09/17/2022     Most recent fingerstick glucose reviewed-   Recent Labs   Lab 09/20/22  1601 09/20/22  2053 09/21/22  0623 09/21/22  1132   POCTGLUCOSE 214* 262* 184* 286*     Current correctional scale  Medium  Maintain anti-hyperglycemic dose as follows-   Antihyperglycemics (From admission, onward)    Start     Stop Route Frequency Ordered    09/20/22 2100  insulin detemir U-100 pen 15 Units         -- SubQ 2 times daily 09/20/22 1454    09/17/22 0413  insulin aspart U-100 pen 1-10 Units         -- SubQ Before meals & nightly PRN 09/17/22 0316        Hold Oral hypoglycemics while patient is in the hospital.    9/18:    Ordered levemir 10 units BID; will monitor and titrate dose accordingly     9/20:    Increased levemir to 15 units BID;     9/21:    Increased levemir to 20 units BID;       VTE Risk Mitigation (From admission, onward)         Ordered     heparin 25,000 units in dextrose 5% (100 units/ml) IV bolus from bag -  ADDITIONAL PRN BOLUS - 60 units/kg (max bolus 4000 units)  As needed (PRN)        Question:  Heparin Infusion Adjustment (DO NOT MODIFY ANSWER)  Answer:  [email or web address]\epic\Images\Pharmacy\HeparinInfusions\heparin LOW INTENSITY nomogram for [facility] [id number]    09/17/22 1829     heparin 25,000 units in dextrose 5% (100 units/ml) IV bolus from bag - ADDITIONAL PRN BOLUS - 30 units/kg (max bolus 4000 units)  As needed (PRN)        Question:  Heparin Infusion Adjustment (DO NOT MODIFY ANSWER)  Answer:  [email or web address]\epic\Images\Pharmacy\HeparinInfusions\heparin LOW INTENSITY nomogram for [facility] [id number]    09/17/22 1829     heparin 25,000 units in dextrose 5% 250 mL (100 units/mL) infusion LOW INTENSITY nomogram - [facility]  Continuous        Question Answer Comment   Heparin Infusion Adjustment (DO NOT MODIFY ANSWER) [email or web address]\epic\Images\Pharmacy\HeparinInfusions\heparin LOW INTENSITY nomogram for [facility] [id number]    Begin at (in units/kg/hr) 12        09/17/22 1829     IP VTE HIGH RISK PATIENT  Once         09/17/22 0315     Place sequential compression device  Until discontinued         09/17/22 0315                Discharge Planning   JOHN:      Code Status: Full Code   Is the patient medically ready for discharge?:     Reason for patient still in hospital (select all that apply): CABG on Friday (9/23/2022)  Discharge Plan A: Home                  Leisa Plata MD  Department of Hospital Medicine   [facility] - Med Surg

## 2022-09-22 NOTE — SUBJECTIVE & OBJECTIVE
Review of Systems    Constitutional:  Negative for chills, diaphoresis, fatigue and fever.   HENT:  Negative for congestion and sore throat.    Respiratory:  Negative for cough, chest tightness, shortness of breath and wheezing.    Cardiovascular:  Negative for chest pain, palpitations and leg swelling.   Gastrointestinal:  Negative for abdominal pain, constipation and vomiting.   Genitourinary:  Negative for dysuria and hematuria.   Musculoskeletal:  Negative for arthralgias, back pain and myalgias.   Skin:  Negative for pallor and rash.   Neurological:   Negative for syncope, light-headedness, numbness and headaches.   Psychiatric/Behavioral:  Negative for confusion. The patient is not nervous/anxious.    All other systems reviewed and are negative.  Objective:     Vital Signs (Most Recent):  Temp: 98.3 °F (36.8 °C) (09/22/22 1147)  Pulse: 66 (09/22/22 1300)  Resp: 18 (09/22/22 1147)  BP: 118/77 (09/22/22 1147)  SpO2: 95 % (09/22/22 1147) Vital Signs (24h Range):  Temp:  [97.5 °F (36.4 °C)-98.6 °F (37 °C)] 98.3 °F (36.8 °C)  Pulse:  [60-70] 66  Resp:  [18-20] 18  SpO2:  [93 %-98 %] 95 %  BP: (118-146)/(60-96) 118/77     Weight: 91.6 kg (202 lb)  Body mass index is 36.95 kg/m².    Intake/Output Summary (Last 24 hours) at 9/22/2022 1407  Last data filed at 9/22/2022 1200  Gross per 24 hour   Intake 420 ml   Output --   Net 420 ml      Physical Exam    Constitutional:       General: She is awake. She is not in acute distress.     Appearance: Normal appearance. She is well-developed. She is obese. She is not diaphoretic.   HENT:      Head: Normocephalic and atraumatic.   Eyes:      Conjunctiva/sclera: Conjunctivae normal.      Comments: PERRL; EOM intact.   Cardiovascular:      Rate and Rhythm: Normal rate and regular rhythm. No extrasystoles are present.     Heart sounds: S1 normal and S2 normal. No murmur heard.    No gallop.   Pulmonary:      Effort: Pulmonary effort is normal. No tachypnea.      Breath sounds:  Normal breath sounds and air entry. No wheezing, rhonchi or rales.   Abdominal:      General: Bowel sounds are normal. There is no distension.      Palpations: Abdomen is soft.      Tenderness: There is no abdominal tenderness.   Musculoskeletal:         General: No tenderness or deformity. Normal range of motion.      Cervical back: Normal range of motion and neck supple.      Right lower leg: No edema.      Left lower leg: No edema.   Skin:     General: Skin is warm and dry.      Capillary Refill: Capillary refill takes less than 2 seconds.      Findings: No erythema or rash.   Neurological:      General: No focal deficit present.      Mental Status: She is alert and oriented to person, place, and time.      GCS: GCS eye subscore is 4. GCS verbal subscore is 5. GCS motor subscore is 6.      Cranial Nerves: Cranial nerves 2-12 are intact.      Sensory: Sensation is intact.      Motor: Motor function is intact.   Psychiatric:         Mood and Affect: Mood and affect normal.         Behavior: Behavior normal. Behavior is cooperative.     Significant Labs: All pertinent labs within the past 24 hours have been reviewed.  CBC:   Recent Labs   Lab 09/21/22  0544 09/22/22  0845   WBC 7.78 8.02   HGB 12.1 13.1   HCT 39.0 41.3    248     CMP:   Recent Labs   Lab 09/21/22  0544 09/22/22  0845    140   K 3.9 4.1    104   CO2 28 26   * 212*   BUN 11 9   CREATININE 0.7 0.6   CALCIUM 8.5* 8.9   ANIONGAP 10 10       Significant Imaging:     Imaging Results              CT Head Without Contrast (Final result)  Result time 09/17/22 08:33:37      Final result by Yo Larios MD (09/17/22 08:33:37)                   Impression:      Negative for acute intracranial abnormality.    All CT scans at this facility are performed  using dose modulation techniques as appropriate to performed exam including the following:  automated exposure control; adjustment of mA and/or kV according to the patients size (this  includes techniques or standardized protocols for targeted exams where dose is matched to indication/reason for exam: i.e. extremities or head);  iterative reconstruction technique.      Electronically signed by: Yo Larios MD  Date:    09/17/2022  Time:    08:33               Narrative:    EXAMINATION:  CT HEAD WITHOUT CONTRAST    CLINICAL HISTORY:  Dizziness, persistent/recurrent, cardiac or vascular cause suspected;    TECHNIQUE:  Axial CT images obtained throughout the head without intravenous contrast.    COMPARISON:  December 2, 2018    FINDINGS:  Negative for acute hemorrhage, mass effect, extraaxial collection, hydrocephalus.    There is good gray white matter differentiation.  Mild chronic small vessel ischemic changes deep periventricular white matter.    The paranasal sinuses and mastoids are clear.    The calvarium is unremarkable with no fractures.                                       X-Ray Chest AP Portable (Final result)  Result time 09/17/22 00:09:52      Final result by Roel Oliveira MD (09/17/22 00:09:52)                   Impression:      No acute abnormality.      Electronically signed by: Tee Sevilla  Date:    09/17/2022  Time:    00:09               Narrative:    EXAMINATION:  XR CHEST AP PORTABLE    CLINICAL HISTORY:  dizziness;    TECHNIQUE:  Single frontal view of the chest was performed.    COMPARISON:  None    FINDINGS:  The lungs are clear, with normal appearance of pulmonary vasculature and no pleural effusion or pneumothorax.    The cardiac silhouette is normal in size. The hilar and mediastinal contours are unremarkable.    Bones are intact.                        ED Interpretation by Miguel Johnson Jr., MD (09/17/22 00:08:13, [facility] - Emergency Dept[facility] Emergency Medicine)      No acute findings.                                     RADIOLOGY REPORT (Final result)  Result time 09/17/22 16:38:09      Final result by Unknown User (09/17/22 16:38:09)

## 2022-09-23 ENCOUNTER — ANESTHESIA (OUTPATIENT)
Dept: SURGERY | Facility: HOSPITAL | Age: 62
DRG: 233 | End: 2022-09-23
Payer: COMMERCIAL

## 2022-09-23 PROBLEM — Z95.1 S/P CABG X 3: Status: ACTIVE | Noted: 2022-09-23

## 2022-09-23 PROBLEM — R07.9 CHEST PAIN: Status: ACTIVE | Noted: 2022-09-23

## 2022-09-23 PROBLEM — R56.9 SEIZURE: Status: ACTIVE | Noted: 2022-09-23

## 2022-09-23 LAB
ALLENS TEST: ABNORMAL
ALLENS TEST: ABNORMAL
ANION GAP SERPL CALC-SCNC: 7 MMOL/L (ref 8–16)
ANION GAP SERPL CALC-SCNC: 9 MMOL/L (ref 8–16)
APTT BLDCRRT: 28 SEC (ref 21–32)
BASOPHILS # BLD AUTO: 0.05 K/UL (ref 0–0.2)
BASOPHILS NFR BLD: 0.3 % (ref 0–1.9)
BUN SERPL-MCNC: 10 MG/DL (ref 6–20)
BUN SERPL-MCNC: 9 MG/DL (ref 6–20)
CALCIUM SERPL-MCNC: 7.9 MG/DL (ref 8.7–10.5)
CALCIUM SERPL-MCNC: 8 MG/DL (ref 8.7–10.5)
CHLORIDE SERPL-SCNC: 110 MMOL/L (ref 95–110)
CHLORIDE SERPL-SCNC: 110 MMOL/L (ref 95–110)
CO2 SERPL-SCNC: 20 MMOL/L (ref 23–29)
CO2 SERPL-SCNC: 22 MMOL/L (ref 23–29)
CREAT SERPL-MCNC: 0.6 MG/DL (ref 0.5–1.4)
CREAT SERPL-MCNC: 0.6 MG/DL (ref 0.5–1.4)
DELSYS: ABNORMAL
DELSYS: ABNORMAL
DIFFERENTIAL METHOD: ABNORMAL
EOSINOPHIL # BLD AUTO: 0.1 K/UL (ref 0–0.5)
EOSINOPHIL NFR BLD: 0.9 % (ref 0–8)
ERYTHROCYTE [DISTWIDTH] IN BLOOD BY AUTOMATED COUNT: 14.4 % (ref 11.5–14.5)
ERYTHROCYTE [SEDIMENTATION RATE] IN BLOOD BY WESTERGREN METHOD: 20 MM/H
EST. GFR  (NO RACE VARIABLE): >60 ML/MIN/1.73 M^2
EST. GFR  (NO RACE VARIABLE): >60 ML/MIN/1.73 M^2
FIBRINOGEN PPP-MCNC: 282 MG/DL (ref 182–400)
FIO2: 100
FIO2: 40
GLUCOSE SERPL-MCNC: 161 MG/DL (ref 70–110)
GLUCOSE SERPL-MCNC: 200 MG/DL (ref 70–110)
GLUCOSE SERPL-MCNC: 201 MG/DL (ref 70–110)
GLUCOSE SERPL-MCNC: 220 MG/DL (ref 70–110)
GLUCOSE SERPL-MCNC: 221 MG/DL (ref 70–110)
GLUCOSE SERPL-MCNC: 221 MG/DL (ref 70–110)
GLUCOSE SERPL-MCNC: 228 MG/DL (ref 70–110)
GLUCOSE SERPL-MCNC: 251 MG/DL (ref 70–110)
GLUCOSE SERPL-MCNC: 252 MG/DL (ref 70–110)
GLUCOSE SERPL-MCNC: 274 MG/DL (ref 70–110)
HCO3 UR-SCNC: 24 MMOL/L (ref 24–28)
HCO3 UR-SCNC: 24.1 MMOL/L (ref 24–28)
HCO3 UR-SCNC: 24.1 MMOL/L (ref 24–28)
HCO3 UR-SCNC: 27.4 MMOL/L (ref 24–28)
HCO3 UR-SCNC: 27.7 MMOL/L (ref 24–28)
HCO3 UR-SCNC: 27.9 MMOL/L (ref 24–28)
HCO3 UR-SCNC: 30.5 MMOL/L (ref 24–28)
HCO3 UR-SCNC: 31.1 MMOL/L (ref 24–28)
HCT VFR BLD AUTO: 29.7 % (ref 37–48.5)
HCT VFR BLD CALC: 24 %PCV (ref 36–54)
HCT VFR BLD CALC: 24 %PCV (ref 36–54)
HCT VFR BLD CALC: 26 %PCV (ref 36–54)
HCT VFR BLD CALC: 27 %PCV (ref 36–54)
HCT VFR BLD CALC: 30 %PCV (ref 36–54)
HCT VFR BLD CALC: 36 %PCV (ref 36–54)
HGB BLD-MCNC: 9.9 G/DL (ref 12–16)
IMM GRANULOCYTES # BLD AUTO: 0.31 K/UL (ref 0–0.04)
IMM GRANULOCYTES NFR BLD AUTO: 2 % (ref 0–0.5)
INR PPP: 1.1 (ref 0.8–1.2)
LYMPHOCYTES # BLD AUTO: 2.4 K/UL (ref 1–4.8)
LYMPHOCYTES NFR BLD: 16 % (ref 18–48)
MAGNESIUM SERPL-MCNC: 2 MG/DL (ref 1.6–2.6)
MAGNESIUM SERPL-MCNC: 2.4 MG/DL (ref 1.6–2.6)
MAGNESIUM SERPL-MCNC: 2.9 MG/DL (ref 1.6–2.6)
MCH RBC QN AUTO: 27.6 PG (ref 27–31)
MCHC RBC AUTO-ENTMCNC: 33.3 G/DL (ref 32–36)
MCV RBC AUTO: 83 FL (ref 82–98)
MODE: ABNORMAL
MODE: ABNORMAL
MONOCYTES # BLD AUTO: 1.5 K/UL (ref 0.3–1)
MONOCYTES NFR BLD: 9.9 % (ref 4–15)
NEUTROPHILS # BLD AUTO: 10.8 K/UL (ref 1.8–7.7)
NEUTROPHILS NFR BLD: 70.9 % (ref 38–73)
NRBC BLD-RTO: 0 /100 WBC
PCO2 BLDA: 34.3 MMHG (ref 35–45)
PCO2 BLDA: 38.5 MMHG (ref 35–45)
PCO2 BLDA: 41 MMHG (ref 35–45)
PCO2 BLDA: 41.3 MMHG (ref 35–45)
PCO2 BLDA: 44 MMHG (ref 35–45)
PCO2 BLDA: 45.2 MMHG (ref 35–45)
PCO2 BLDA: 45.8 MMHG (ref 35–45)
PCO2 BLDA: 50.7 MMHG (ref 35–45)
PEEP: 5
PEEP: 5
PH SMN: 7.35 [PH] (ref 7.35–7.45)
PH SMN: 7.38 [PH] (ref 7.35–7.45)
PH SMN: 7.39 [PH] (ref 7.35–7.45)
PH SMN: 7.4 [PH] (ref 7.35–7.45)
PH SMN: 7.41 [PH] (ref 7.35–7.45)
PH SMN: 7.43 [PH] (ref 7.35–7.45)
PH SMN: 7.45 [PH] (ref 7.35–7.45)
PH SMN: 7.49 [PH] (ref 7.35–7.45)
PLATELET # BLD AUTO: 233 K/UL (ref 150–450)
PMV BLD AUTO: 11.6 FL (ref 9.2–12.9)
PO2 BLDA: 110 MMHG (ref 80–100)
PO2 BLDA: 178 MMHG (ref 80–100)
PO2 BLDA: 214 MMHG (ref 80–100)
PO2 BLDA: 218 MMHG (ref 80–100)
PO2 BLDA: 338 MMHG (ref 80–100)
PO2 BLDA: 370 MMHG (ref 80–100)
PO2 BLDA: 472 MMHG (ref 80–100)
PO2 BLDA: 591 MMHG (ref 80–100)
POC ACTIVATED CLOTTING TIME K: 132 SEC (ref 74–137)
POC ACTIVATED CLOTTING TIME K: 144 SEC (ref 74–137)
POC ACTIVATED CLOTTING TIME K: 538 SEC (ref 74–137)
POC ACTIVATED CLOTTING TIME K: 549 SEC (ref 74–137)
POC ACTIVATED CLOTTING TIME K: 688 SEC (ref 74–137)
POC ACTIVATED CLOTTING TIME K: 688 SEC (ref 74–137)
POC BE: -1 MMOL/L
POC BE: -2 MMOL/L
POC BE: 0 MMOL/L
POC BE: 2 MMOL/L
POC BE: 3 MMOL/L
POC BE: 3 MMOL/L
POC BE: 6 MMOL/L
POC BE: 8 MMOL/L
POC IONIZED CALCIUM: 1.08 MMOL/L (ref 1.06–1.42)
POC IONIZED CALCIUM: 1.14 MMOL/L (ref 1.06–1.42)
POC IONIZED CALCIUM: 1.16 MMOL/L (ref 1.06–1.42)
POC IONIZED CALCIUM: 1.19 MMOL/L (ref 1.06–1.42)
POC IONIZED CALCIUM: 1.25 MMOL/L (ref 1.06–1.42)
POC IONIZED CALCIUM: 1.25 MMOL/L (ref 1.06–1.42)
POC IONIZED CALCIUM: 1.26 MMOL/L (ref 1.06–1.42)
POC IONIZED CALCIUM: 1.3 MMOL/L (ref 1.06–1.42)
POC SATURATED O2: 100 % (ref 95–100)
POC SATURATED O2: 98 % (ref 95–100)
POCT GLUCOSE: 150 MG/DL (ref 70–110)
POCT GLUCOSE: 153 MG/DL (ref 70–110)
POCT GLUCOSE: 177 MG/DL (ref 70–110)
POCT GLUCOSE: 181 MG/DL (ref 70–110)
POCT GLUCOSE: 186 MG/DL (ref 70–110)
POCT GLUCOSE: 202 MG/DL (ref 70–110)
POCT GLUCOSE: 204 MG/DL (ref 70–110)
POCT GLUCOSE: 205 MG/DL (ref 70–110)
POCT GLUCOSE: 229 MG/DL (ref 70–110)
POCT GLUCOSE: 242 MG/DL (ref 70–110)
POTASSIUM BLD-SCNC: 3.6 MMOL/L (ref 3.5–5.1)
POTASSIUM BLD-SCNC: 3.6 MMOL/L (ref 3.5–5.1)
POTASSIUM BLD-SCNC: 3.8 MMOL/L (ref 3.5–5.1)
POTASSIUM BLD-SCNC: 3.8 MMOL/L (ref 3.5–5.1)
POTASSIUM BLD-SCNC: 3.9 MMOL/L (ref 3.5–5.1)
POTASSIUM BLD-SCNC: 4.4 MMOL/L (ref 3.5–5.1)
POTASSIUM BLD-SCNC: 4.4 MMOL/L (ref 3.5–5.1)
POTASSIUM BLD-SCNC: 4.5 MMOL/L (ref 3.5–5.1)
POTASSIUM SERPL-SCNC: 3.7 MMOL/L (ref 3.5–5.1)
POTASSIUM SERPL-SCNC: 3.8 MMOL/L (ref 3.5–5.1)
PROTHROMBIN TIME: 11.6 SEC (ref 9–12.5)
PS: 7
RBC # BLD AUTO: 3.59 M/UL (ref 4–5.4)
SAMPLE: ABNORMAL
SAMPLE: NORMAL
SITE: ABNORMAL
SITE: ABNORMAL
SODIUM BLD-SCNC: 136 MMOL/L (ref 136–145)
SODIUM BLD-SCNC: 138 MMOL/L (ref 136–145)
SODIUM BLD-SCNC: 138 MMOL/L (ref 136–145)
SODIUM BLD-SCNC: 139 MMOL/L (ref 136–145)
SODIUM BLD-SCNC: 140 MMOL/L (ref 136–145)
SODIUM BLD-SCNC: 140 MMOL/L (ref 136–145)
SODIUM BLD-SCNC: 141 MMOL/L (ref 136–145)
SODIUM BLD-SCNC: 142 MMOL/L (ref 136–145)
SODIUM SERPL-SCNC: 139 MMOL/L (ref 136–145)
SODIUM SERPL-SCNC: 139 MMOL/L (ref 136–145)
VT: 400
WBC # BLD AUTO: 15.19 K/UL (ref 3.9–12.7)

## 2022-09-23 PROCEDURE — 37799 UNLISTED PX VASCULAR SURGERY: CPT

## 2022-09-23 PROCEDURE — S0017 INJECTION, AMINOCAPROIC ACID: HCPCS | Performed by: FAMILY MEDICINE

## 2022-09-23 PROCEDURE — 85014 HEMATOCRIT: CPT

## 2022-09-23 PROCEDURE — 36000712 HC OR TIME LEV V 1ST 15 MIN: Performed by: THORACIC SURGERY (CARDIOTHORACIC VASCULAR SURGERY)

## 2022-09-23 PROCEDURE — 99292 PR CRITICAL CARE, ADDL 30 MIN: ICD-10-PCS | Mod: ,,, | Performed by: INTERNAL MEDICINE

## 2022-09-23 PROCEDURE — 85730 THROMBOPLASTIN TIME PARTIAL: CPT | Performed by: THORACIC SURGERY (CARDIOTHORACIC VASCULAR SURGERY)

## 2022-09-23 PROCEDURE — 99900035 HC TECH TIME PER 15 MIN (STAT)

## 2022-09-23 PROCEDURE — 25000003 PHARM REV CODE 250: Performed by: FAMILY MEDICINE

## 2022-09-23 PROCEDURE — 33533 PR CABG, ARTERIAL, SINGLE: ICD-10-PCS | Mod: ,,, | Performed by: THORACIC SURGERY (CARDIOTHORACIC VASCULAR SURGERY)

## 2022-09-23 PROCEDURE — 94002 VENT MGMT INPAT INIT DAY: CPT

## 2022-09-23 PROCEDURE — 82803 BLOOD GASES ANY COMBINATION: CPT

## 2022-09-23 PROCEDURE — 84132 ASSAY OF SERUM POTASSIUM: CPT

## 2022-09-23 PROCEDURE — C9290 INJ, BUPIVACAINE LIPOSOME: HCPCS | Performed by: THORACIC SURGERY (CARDIOTHORACIC VASCULAR SURGERY)

## 2022-09-23 PROCEDURE — 85025 COMPLETE CBC W/AUTO DIFF WBC: CPT | Performed by: THORACIC SURGERY (CARDIOTHORACIC VASCULAR SURGERY)

## 2022-09-23 PROCEDURE — C1729 CATH, DRAINAGE: HCPCS | Performed by: THORACIC SURGERY (CARDIOTHORACIC VASCULAR SURGERY)

## 2022-09-23 PROCEDURE — 99291 CRITICAL CARE FIRST HOUR: CPT | Mod: ,,, | Performed by: INTERNAL MEDICINE

## 2022-09-23 PROCEDURE — 25000003 PHARM REV CODE 250: Performed by: THORACIC SURGERY (CARDIOTHORACIC VASCULAR SURGERY)

## 2022-09-23 PROCEDURE — 33508 PR ENDOSCOPY W/VIDEO-ASST VEIN HARVEST,CABG: ICD-10-PCS | Mod: 59,,, | Performed by: THORACIC SURGERY (CARDIOTHORACIC VASCULAR SURGERY)

## 2022-09-23 PROCEDURE — 25000003 PHARM REV CODE 250: Performed by: INTERNAL MEDICINE

## 2022-09-23 PROCEDURE — 27201423 OPTIME MED/SURG SUP & DEVICES STERILE SUPPLY: Performed by: THORACIC SURGERY (CARDIOTHORACIC VASCULAR SURGERY)

## 2022-09-23 PROCEDURE — 83735 ASSAY OF MAGNESIUM: CPT | Mod: 91 | Performed by: THORACIC SURGERY (CARDIOTHORACIC VASCULAR SURGERY)

## 2022-09-23 PROCEDURE — 27000221 HC OXYGEN, UP TO 24 HOURS

## 2022-09-23 PROCEDURE — 33518 CABG ARTERY-VEIN TWO: CPT | Mod: ,,, | Performed by: THORACIC SURGERY (CARDIOTHORACIC VASCULAR SURGERY)

## 2022-09-23 PROCEDURE — C9399 UNCLASSIFIED DRUGS OR BIOLOG: HCPCS | Performed by: THORACIC SURGERY (CARDIOTHORACIC VASCULAR SURGERY)

## 2022-09-23 PROCEDURE — 27200667 HC PACEMAKER, TEMPORARY MONITORING, PER SHIFT

## 2022-09-23 PROCEDURE — 63600175 PHARM REV CODE 636 W HCPCS: Performed by: THORACIC SURGERY (CARDIOTHORACIC VASCULAR SURGERY)

## 2022-09-23 PROCEDURE — 82330 ASSAY OF CALCIUM: CPT

## 2022-09-23 PROCEDURE — C1713 ANCHOR/SCREW BN/BN,TIS/BN: HCPCS | Performed by: THORACIC SURGERY (CARDIOTHORACIC VASCULAR SURGERY)

## 2022-09-23 PROCEDURE — 85384 FIBRINOGEN ACTIVITY: CPT | Performed by: THORACIC SURGERY (CARDIOTHORACIC VASCULAR SURGERY)

## 2022-09-23 PROCEDURE — 63600175 PHARM REV CODE 636 W HCPCS: Performed by: INTERNAL MEDICINE

## 2022-09-23 PROCEDURE — 63600175 PHARM REV CODE 636 W HCPCS: Performed by: FAMILY MEDICINE

## 2022-09-23 PROCEDURE — 99291 PR CRITICAL CARE, E/M 30-74 MINUTES: ICD-10-PCS | Mod: ,,, | Performed by: INTERNAL MEDICINE

## 2022-09-23 PROCEDURE — 36000713 HC OR TIME LEV V EA ADD 15 MIN: Performed by: THORACIC SURGERY (CARDIOTHORACIC VASCULAR SURGERY)

## 2022-09-23 PROCEDURE — 94150 VITAL CAPACITY TEST: CPT

## 2022-09-23 PROCEDURE — 27800903 OPTIME MED/SURG SUP & DEVICES OTHER IMPLANTS: Performed by: THORACIC SURGERY (CARDIOTHORACIC VASCULAR SURGERY)

## 2022-09-23 PROCEDURE — C1898 LEAD, PMKR, OTHER THAN TRANS: HCPCS | Performed by: THORACIC SURGERY (CARDIOTHORACIC VASCULAR SURGERY)

## 2022-09-23 PROCEDURE — 80048 BASIC METABOLIC PNL TOTAL CA: CPT | Performed by: THORACIC SURGERY (CARDIOTHORACIC VASCULAR SURGERY)

## 2022-09-23 PROCEDURE — 99292 CRITICAL CARE ADDL 30 MIN: CPT | Mod: ,,, | Performed by: INTERNAL MEDICINE

## 2022-09-23 PROCEDURE — 37000008 HC ANESTHESIA 1ST 15 MINUTES: Performed by: THORACIC SURGERY (CARDIOTHORACIC VASCULAR SURGERY)

## 2022-09-23 PROCEDURE — 94640 AIRWAY INHALATION TREATMENT: CPT

## 2022-09-23 PROCEDURE — 33518 PR CABG, ARTERY-VEIN, TWO: ICD-10-PCS | Mod: ,,, | Performed by: THORACIC SURGERY (CARDIOTHORACIC VASCULAR SURGERY)

## 2022-09-23 PROCEDURE — 25000242 PHARM REV CODE 250 ALT 637 W/ HCPCS: Performed by: THORACIC SURGERY (CARDIOTHORACIC VASCULAR SURGERY)

## 2022-09-23 PROCEDURE — 84295 ASSAY OF SERUM SODIUM: CPT

## 2022-09-23 PROCEDURE — P9045 ALBUMIN (HUMAN), 5%, 250 ML: HCPCS | Mod: JG | Performed by: THORACIC SURGERY (CARDIOTHORACIC VASCULAR SURGERY)

## 2022-09-23 PROCEDURE — 94760 N-INVAS EAR/PLS OXIMETRY 1: CPT

## 2022-09-23 PROCEDURE — 33533 CABG ARTERIAL SINGLE: CPT | Mod: ,,, | Performed by: THORACIC SURGERY (CARDIOTHORACIC VASCULAR SURGERY)

## 2022-09-23 PROCEDURE — 33508 ENDOSCOPIC VEIN HARVEST: CPT | Mod: 59,,, | Performed by: THORACIC SURGERY (CARDIOTHORACIC VASCULAR SURGERY)

## 2022-09-23 PROCEDURE — 20000000 HC ICU ROOM

## 2022-09-23 PROCEDURE — 37000009 HC ANESTHESIA EA ADD 15 MINS: Performed by: THORACIC SURGERY (CARDIOTHORACIC VASCULAR SURGERY)

## 2022-09-23 PROCEDURE — 85610 PROTHROMBIN TIME: CPT | Performed by: THORACIC SURGERY (CARDIOTHORACIC VASCULAR SURGERY)

## 2022-09-23 DEVICE — LEAD PACING BIPOLAR TEMPORARY: Type: IMPLANTABLE DEVICE | Site: CHEST | Status: FUNCTIONAL

## 2022-09-23 DEVICE — HEMOSTASIS OSTENE MAT 2.5GM: Type: IMPLANTABLE DEVICE | Site: CHEST | Status: FUNCTIONAL

## 2022-09-23 RX ORDER — FENTANYL CITRATE 50 UG/ML
INJECTION, SOLUTION INTRAMUSCULAR; INTRAVENOUS
Status: DISCONTINUED | OUTPATIENT
Start: 2022-09-23 | End: 2022-09-23 | Stop reason: HOSPADM

## 2022-09-23 RX ORDER — ACETAMINOPHEN 10 MG/ML
INJECTION, SOLUTION INTRAVENOUS
Status: DISCONTINUED | OUTPATIENT
Start: 2022-09-23 | End: 2022-09-23 | Stop reason: HOSPADM

## 2022-09-23 RX ORDER — OXYCODONE HYDROCHLORIDE 5 MG/1
5 TABLET ORAL EVERY 4 HOURS PRN
Status: DISCONTINUED | OUTPATIENT
Start: 2022-09-23 | End: 2022-09-30 | Stop reason: HOSPADM

## 2022-09-23 RX ORDER — HYDROCODONE BITARTRATE AND ACETAMINOPHEN 500; 5 MG/1; MG/1
TABLET ORAL
Status: DISCONTINUED | OUTPATIENT
Start: 2022-09-23 | End: 2022-09-23 | Stop reason: HOSPADM

## 2022-09-23 RX ORDER — AMINOCAPROIC ACID 250 MG/ML
INJECTION, SOLUTION INTRAVENOUS
Status: DISCONTINUED | OUTPATIENT
Start: 2022-09-23 | End: 2022-09-23 | Stop reason: HOSPADM

## 2022-09-23 RX ORDER — BUPIVACAINE HYDROCHLORIDE 2.5 MG/ML
INJECTION, SOLUTION EPIDURAL; INFILTRATION; INTRACAUDAL
Status: DISCONTINUED | OUTPATIENT
Start: 2022-09-23 | End: 2022-09-23 | Stop reason: HOSPADM

## 2022-09-23 RX ORDER — VANCOMYCIN HCL IN 5 % DEXTROSE 1G/250ML
1000 PLASTIC BAG, INJECTION (ML) INTRAVENOUS
Status: COMPLETED | OUTPATIENT
Start: 2022-09-23 | End: 2022-09-23

## 2022-09-23 RX ORDER — DEXMEDETOMIDINE HYDROCHLORIDE 100 UG/ML
INJECTION, SOLUTION INTRAVENOUS CONTINUOUS PRN
Status: DISCONTINUED | OUTPATIENT
Start: 2022-09-23 | End: 2022-09-23 | Stop reason: HOSPADM

## 2022-09-23 RX ORDER — MAGNESIUM SULFATE HEPTAHYDRATE 40 MG/ML
INJECTION, SOLUTION INTRAVENOUS
Status: DISCONTINUED | OUTPATIENT
Start: 2022-09-23 | End: 2022-09-23

## 2022-09-23 RX ORDER — PHENYLEPHRINE HYDROCHLORIDE 10 MG/ML
INJECTION INTRAVENOUS
Status: DISCONTINUED | OUTPATIENT
Start: 2022-09-23 | End: 2022-09-23 | Stop reason: HOSPADM

## 2022-09-23 RX ORDER — CEFAZOLIN SODIUM 2 G/50ML
2 SOLUTION INTRAVENOUS
Status: COMPLETED | OUTPATIENT
Start: 2022-09-23 | End: 2022-09-24

## 2022-09-23 RX ORDER — MIDAZOLAM HYDROCHLORIDE 1 MG/ML
INJECTION, SOLUTION INTRAMUSCULAR; INTRAVENOUS
Status: DISCONTINUED | OUTPATIENT
Start: 2022-09-23 | End: 2022-09-23 | Stop reason: HOSPADM

## 2022-09-23 RX ORDER — POTASSIUM CHLORIDE 29.8 MG/ML
40 INJECTION INTRAVENOUS
Status: DISCONTINUED | OUTPATIENT
Start: 2022-09-23 | End: 2022-09-30 | Stop reason: HOSPADM

## 2022-09-23 RX ORDER — ADHESIVE BANDAGE
30 BANDAGE TOPICAL DAILY
Status: DISCONTINUED | OUTPATIENT
Start: 2022-09-24 | End: 2022-09-27

## 2022-09-23 RX ORDER — ROCURONIUM BROMIDE 10 MG/ML
INJECTION, SOLUTION INTRAVENOUS
Status: DISCONTINUED | OUTPATIENT
Start: 2022-09-23 | End: 2022-09-23 | Stop reason: HOSPADM

## 2022-09-23 RX ORDER — MAGNESIUM SULFATE HEPTAHYDRATE 40 MG/ML
4 INJECTION, SOLUTION INTRAVENOUS
Status: DISCONTINUED | OUTPATIENT
Start: 2022-09-23 | End: 2022-09-30 | Stop reason: HOSPADM

## 2022-09-23 RX ORDER — NEOMYCIN AND POLYMYXIN B SULFATES 40; 200000 MG/ML; [USP'U]/ML
SOLUTION IRRIGATION
Status: DISCONTINUED | OUTPATIENT
Start: 2022-09-23 | End: 2022-09-23 | Stop reason: HOSPADM

## 2022-09-23 RX ORDER — HEPARIN SODIUM 1000 [USP'U]/ML
INJECTION, SOLUTION INTRAVENOUS; SUBCUTANEOUS
Status: DISCONTINUED | OUTPATIENT
Start: 2022-09-23 | End: 2022-09-23 | Stop reason: HOSPADM

## 2022-09-23 RX ORDER — POTASSIUM CHLORIDE 20 MEQ/1
20 TABLET, EXTENDED RELEASE ORAL EVERY 12 HOURS
Status: DISCONTINUED | OUTPATIENT
Start: 2022-09-24 | End: 2022-09-25

## 2022-09-23 RX ORDER — POLYETHYLENE GLYCOL 3350 17 G/17G
17 POWDER, FOR SOLUTION ORAL DAILY
Status: DISCONTINUED | OUTPATIENT
Start: 2022-09-24 | End: 2022-09-30 | Stop reason: HOSPADM

## 2022-09-23 RX ORDER — MORPHINE SULFATE 2 MG/ML
2 INJECTION, SOLUTION INTRAMUSCULAR; INTRAVENOUS
Status: DISCONTINUED | OUTPATIENT
Start: 2022-09-23 | End: 2022-09-30 | Stop reason: HOSPADM

## 2022-09-23 RX ORDER — FUROSEMIDE 10 MG/ML
40 INJECTION INTRAMUSCULAR; INTRAVENOUS 2 TIMES DAILY
Status: DISCONTINUED | OUTPATIENT
Start: 2022-09-24 | End: 2022-09-27

## 2022-09-23 RX ORDER — NICARDIPINE HYDROCHLORIDE 0.2 MG/ML
0-15 INJECTION INTRAVENOUS CONTINUOUS
Status: DISCONTINUED | OUTPATIENT
Start: 2022-09-23 | End: 2022-09-26

## 2022-09-23 RX ORDER — CYANOCOBALAMIN (VITAMIN B-12) 250 MCG
1000 TABLET ORAL DAILY
Status: DISCONTINUED | OUTPATIENT
Start: 2022-09-25 | End: 2022-09-30 | Stop reason: HOSPADM

## 2022-09-23 RX ORDER — MIDAZOLAM HYDROCHLORIDE 1 MG/ML
1 INJECTION INTRAMUSCULAR; INTRAVENOUS
Status: ACTIVE | OUTPATIENT
Start: 2022-09-23 | End: 2022-09-24

## 2022-09-23 RX ORDER — CALCIUM GLUCONATE 20 MG/ML
3 INJECTION, SOLUTION INTRAVENOUS
Status: DISCONTINUED | OUTPATIENT
Start: 2022-09-23 | End: 2022-09-30 | Stop reason: HOSPADM

## 2022-09-23 RX ORDER — KETAMINE HYDROCHLORIDE 50 MG/ML
INJECTION, SOLUTION INTRAMUSCULAR; INTRAVENOUS
Status: DISCONTINUED | OUTPATIENT
Start: 2022-09-23 | End: 2022-09-23 | Stop reason: HOSPADM

## 2022-09-23 RX ORDER — POTASSIUM CHLORIDE 14.9 MG/ML
60 INJECTION INTRAVENOUS
Status: DISCONTINUED | OUTPATIENT
Start: 2022-09-23 | End: 2022-09-30 | Stop reason: HOSPADM

## 2022-09-23 RX ORDER — PROPOFOL 10 MG/ML
VIAL (ML) INTRAVENOUS
Status: DISCONTINUED | OUTPATIENT
Start: 2022-09-23 | End: 2022-09-23 | Stop reason: HOSPADM

## 2022-09-23 RX ORDER — ONDANSETRON 2 MG/ML
INJECTION INTRAMUSCULAR; INTRAVENOUS
Status: DISCONTINUED | OUTPATIENT
Start: 2022-09-23 | End: 2022-09-23

## 2022-09-23 RX ORDER — NOREPINEPHRINE BITARTRATE/D5W 4MG/250ML
0-3 PLASTIC BAG, INJECTION (ML) INTRAVENOUS CONTINUOUS
Status: DISCONTINUED | OUTPATIENT
Start: 2022-09-23 | End: 2022-09-26

## 2022-09-23 RX ORDER — POTASSIUM CHLORIDE 14.9 MG/ML
20 INJECTION INTRAVENOUS
Status: DISCONTINUED | OUTPATIENT
Start: 2022-09-23 | End: 2022-09-30 | Stop reason: HOSPADM

## 2022-09-23 RX ORDER — FOLIC ACID 1 MG/1
1 TABLET ORAL DAILY
Status: DISCONTINUED | OUTPATIENT
Start: 2022-09-25 | End: 2022-09-30 | Stop reason: HOSPADM

## 2022-09-23 RX ORDER — DEXMEDETOMIDINE HYDROCHLORIDE 4 UG/ML
0-1.4 INJECTION INTRAVENOUS CONTINUOUS
Status: DISCONTINUED | OUTPATIENT
Start: 2022-09-23 | End: 2022-09-30 | Stop reason: HOSPADM

## 2022-09-23 RX ORDER — METOPROLOL TARTRATE 25 MG/1
25 TABLET, FILM COATED ORAL 2 TIMES DAILY
Status: DISCONTINUED | OUTPATIENT
Start: 2022-09-24 | End: 2022-09-26

## 2022-09-23 RX ORDER — PAPAVERINE HYDROCHLORIDE 30 MG/ML
INJECTION INTRAMUSCULAR; INTRAVENOUS
Status: DISCONTINUED | OUTPATIENT
Start: 2022-09-23 | End: 2022-09-23 | Stop reason: HOSPADM

## 2022-09-23 RX ORDER — CLOPIDOGREL BISULFATE 75 MG/1
75 TABLET ORAL DAILY
Status: DISCONTINUED | OUTPATIENT
Start: 2022-09-24 | End: 2022-09-30 | Stop reason: HOSPADM

## 2022-09-23 RX ORDER — ACETAMINOPHEN 650 MG/20.3ML
650 LIQUID ORAL EVERY 6 HOURS PRN
Status: DISCONTINUED | OUTPATIENT
Start: 2022-09-23 | End: 2022-09-23 | Stop reason: SDUPTHER

## 2022-09-23 RX ORDER — SODIUM CHLORIDE, SODIUM LACTATE, POTASSIUM CHLORIDE, CALCIUM CHLORIDE 600; 310; 30; 20 MG/100ML; MG/100ML; MG/100ML; MG/100ML
500 INJECTION, SOLUTION INTRAVENOUS
Status: DISCONTINUED | OUTPATIENT
Start: 2022-09-23 | End: 2022-09-30 | Stop reason: HOSPADM

## 2022-09-23 RX ORDER — ALBUMIN HUMAN 50 G/1000ML
25 SOLUTION INTRAVENOUS
Status: DISCONTINUED | OUTPATIENT
Start: 2022-09-23 | End: 2022-09-30 | Stop reason: HOSPADM

## 2022-09-23 RX ORDER — VANCOMYCIN HYDROCHLORIDE 1 G/20ML
INJECTION, POWDER, LYOPHILIZED, FOR SOLUTION INTRAVENOUS
Status: DISCONTINUED | OUTPATIENT
Start: 2022-09-23 | End: 2022-09-23 | Stop reason: HOSPADM

## 2022-09-23 RX ORDER — IPRATROPIUM BROMIDE AND ALBUTEROL SULFATE 2.5; .5 MG/3ML; MG/3ML
3 SOLUTION RESPIRATORY (INHALATION) EVERY 4 HOURS
Status: DISPENSED | OUTPATIENT
Start: 2022-09-23 | End: 2022-09-24

## 2022-09-23 RX ORDER — DEXTROSE, SODIUM CHLORIDE, SODIUM LACTATE, POTASSIUM CHLORIDE, AND CALCIUM CHLORIDE 5; .6; .31; .03; .02 G/100ML; G/100ML; G/100ML; G/100ML; G/100ML
INJECTION, SOLUTION INTRAVENOUS CONTINUOUS
Status: ACTIVE | OUTPATIENT
Start: 2022-09-23 | End: 2022-09-24

## 2022-09-23 RX ORDER — CHLORHEXIDINE GLUCONATE ORAL RINSE 1.2 MG/ML
10 SOLUTION DENTAL 2 TIMES DAILY
Status: COMPLETED | OUTPATIENT
Start: 2022-09-23 | End: 2022-09-28

## 2022-09-23 RX ORDER — VASOPRESSIN IN DEXTROSE 5 % 25/250 ML
0.04 PLASTIC BAG, INJECTION (ML) INTRAVENOUS CONTINUOUS
Status: DISCONTINUED | OUTPATIENT
Start: 2022-09-23 | End: 2022-09-23 | Stop reason: SDUPTHER

## 2022-09-23 RX ORDER — CEFAZOLIN SODIUM 1 G/3ML
INJECTION, POWDER, FOR SOLUTION INTRAMUSCULAR; INTRAVENOUS
Status: DISCONTINUED | OUTPATIENT
Start: 2022-09-23 | End: 2022-09-23 | Stop reason: HOSPADM

## 2022-09-23 RX ORDER — PROTAMINE SULFATE 10 MG/ML
INJECTION, SOLUTION INTRAVENOUS
Status: DISCONTINUED | OUTPATIENT
Start: 2022-09-23 | End: 2022-09-23 | Stop reason: HOSPADM

## 2022-09-23 RX ORDER — ASCORBIC ACID 500 MG
500 TABLET ORAL 2 TIMES DAILY
Status: DISCONTINUED | OUTPATIENT
Start: 2022-09-24 | End: 2022-09-30 | Stop reason: HOSPADM

## 2022-09-23 RX ADMIN — DIVALPROEX SODIUM 500 MG: 250 TABLET, DELAYED RELEASE ORAL at 09:09

## 2022-09-23 RX ADMIN — Medication 0.04 MCG/KG/MIN: at 02:09

## 2022-09-23 RX ADMIN — PHENYLEPHRINE HYDROCHLORIDE 50 MCG: 10 INJECTION INTRAVENOUS at 08:09

## 2022-09-23 RX ADMIN — CEFAZOLIN 2 G: 1 INJECTION, POWDER, FOR SOLUTION INTRAMUSCULAR; INTRAVENOUS at 12:09

## 2022-09-23 RX ADMIN — GLYCOPYRROLATE 0.2 MG: 0.2 INJECTION, SOLUTION INTRAMUSCULAR; INTRAVENOUS at 08:09

## 2022-09-23 RX ADMIN — MORPHINE SULFATE 2 MG: 2 INJECTION, SOLUTION INTRAMUSCULAR; INTRAVENOUS at 04:09

## 2022-09-23 RX ADMIN — DIVALPROEX SODIUM 500 MG: 250 TABLET, DELAYED RELEASE ORAL at 05:09

## 2022-09-23 RX ADMIN — DEXTROSE, SODIUM CHLORIDE, SODIUM LACTATE, POTASSIUM CHLORIDE, AND CALCIUM CHLORIDE: 5; .6; .31; .03; .02 INJECTION, SOLUTION INTRAVENOUS at 01:09

## 2022-09-23 RX ADMIN — FENTANYL CITRATE 50 MCG: 50 INJECTION, SOLUTION INTRAMUSCULAR; INTRAVENOUS at 07:09

## 2022-09-23 RX ADMIN — ROCURONIUM BROMIDE 30 MG: 10 INJECTION, SOLUTION INTRAVENOUS at 11:09

## 2022-09-23 RX ADMIN — FENTANYL CITRATE 50 MCG: 50 INJECTION, SOLUTION INTRAMUSCULAR; INTRAVENOUS at 08:09

## 2022-09-23 RX ADMIN — VANCOMYCIN HYDROCHLORIDE 1400 MG: 1 INJECTION, POWDER, LYOPHILIZED, FOR SOLUTION INTRAVENOUS at 07:09

## 2022-09-23 RX ADMIN — PROPOFOL 140 MG: 10 INJECTION, EMULSION INTRAVENOUS at 07:09

## 2022-09-23 RX ADMIN — MAGNESIUM SULFATE HEPTAHYDRATE 2 G: 2 INJECTION, SOLUTION INTRAVENOUS at 08:09

## 2022-09-23 RX ADMIN — PROTAMINE SULFATE 400 MG: 10 INJECTION, SOLUTION INTRAVENOUS at 11:09

## 2022-09-23 RX ADMIN — PHENYLEPHRINE HYDROCHLORIDE 100 MCG: 10 INJECTION INTRAVENOUS at 07:09

## 2022-09-23 RX ADMIN — MAGNESIUM SULFATE HEPTAHYDRATE 4 G: 2 INJECTION, SOLUTION INTRAVENOUS at 10:09

## 2022-09-23 RX ADMIN — VANCOMYCIN HYDROCHLORIDE 1000 MG: 1 INJECTION, POWDER, LYOPHILIZED, FOR SOLUTION INTRAVENOUS at 08:09

## 2022-09-23 RX ADMIN — METOPROLOL TARTRATE 25 MG: 25 TABLET, FILM COATED ORAL at 05:09

## 2022-09-23 RX ADMIN — CEFAZOLIN 2 G: 1 INJECTION, POWDER, FOR SOLUTION INTRAMUSCULAR; INTRAVENOUS at 08:09

## 2022-09-23 RX ADMIN — ACETAMINOPHEN 1000 MG: 10 INJECTION, SOLUTION INTRAVENOUS at 08:09

## 2022-09-23 RX ADMIN — MIDAZOLAM 2 MG: 1 INJECTION INTRAMUSCULAR; INTRAVENOUS at 09:09

## 2022-09-23 RX ADMIN — DEXMEDETOMIDINE HYDROCHLORIDE 0.3 MCG/KG/HR: 4 INJECTION INTRAVENOUS at 01:09

## 2022-09-23 RX ADMIN — MORPHINE SULFATE 2 MG: 2 INJECTION, SOLUTION INTRAMUSCULAR; INTRAVENOUS at 01:09

## 2022-09-23 RX ADMIN — ALBUMIN (HUMAN) 25 G: 2.5 SOLUTION INTRAVENOUS at 04:09

## 2022-09-23 RX ADMIN — PROPOFOL 50 MG: 10 INJECTION, EMULSION INTRAVENOUS at 08:09

## 2022-09-23 RX ADMIN — ROCURONIUM BROMIDE 50 MG: 10 INJECTION, SOLUTION INTRAVENOUS at 10:09

## 2022-09-23 RX ADMIN — CALCIUM CHLORIDE 1 G: 100 INJECTION, SOLUTION INTRAVENOUS at 11:09

## 2022-09-23 RX ADMIN — AMIODARONE HYDROCHLORIDE 200 MG: 200 TABLET ORAL at 05:09

## 2022-09-23 RX ADMIN — INSULIN ASPART 4 UNITS: 100 INJECTION, SOLUTION INTRAVENOUS; SUBCUTANEOUS at 05:09

## 2022-09-23 RX ADMIN — MORPHINE SULFATE 2 MG: 2 INJECTION, SOLUTION INTRAMUSCULAR; INTRAVENOUS at 07:09

## 2022-09-23 RX ADMIN — PREGABALIN 100 MG: 100 CAPSULE ORAL at 09:09

## 2022-09-23 RX ADMIN — DEXMEDETOMIDINE HYDROCHLORIDE 0.3 MCG/KG/HR: 100 INJECTION, SOLUTION, CONCENTRATE INTRAVENOUS at 11:09

## 2022-09-23 RX ADMIN — KETAMINE HYDROCHLORIDE 25 MG: 50 INJECTION, SOLUTION, CONCENTRATE INTRAMUSCULAR; INTRAVENOUS at 12:09

## 2022-09-23 RX ADMIN — OXYCODONE HYDROCHLORIDE 5 MG: 5 TABLET ORAL at 09:09

## 2022-09-23 RX ADMIN — ROCURONIUM BROMIDE 100 MG: 10 INJECTION, SOLUTION INTRAVENOUS at 07:09

## 2022-09-23 RX ADMIN — MAGNESIUM SULFATE HEPTAHYDRATE 4 G: 2 INJECTION, SOLUTION INTRAVENOUS at 03:09

## 2022-09-23 RX ADMIN — ALBUMIN (HUMAN) 12.5 G: 2.5 SOLUTION INTRAVENOUS at 08:09

## 2022-09-23 RX ADMIN — IPRATROPIUM BROMIDE AND ALBUTEROL SULFATE 3 ML: 2.5; .5 SOLUTION RESPIRATORY (INHALATION) at 04:09

## 2022-09-23 RX ADMIN — CHLORHEXIDINE GLUCONATE 0.12% ORAL RINSE 10 ML: 1.2 LIQUID ORAL at 09:09

## 2022-09-23 RX ADMIN — PHENYLEPHRINE HYDROCHLORIDE 100 MCG: 10 INJECTION INTRAVENOUS at 11:09

## 2022-09-23 RX ADMIN — SODIUM CHLORIDE, SODIUM LACTATE, POTASSIUM CHLORIDE, AND CALCIUM CHLORIDE 500 ML: .6; .31; .03; .02 INJECTION, SOLUTION INTRAVENOUS at 02:09

## 2022-09-23 RX ADMIN — CEFAZOLIN SODIUM 2 G: 2 SOLUTION INTRAVENOUS at 08:09

## 2022-09-23 RX ADMIN — SODIUM CHLORIDE 3 UNITS/HR: 9 INJECTION, SOLUTION INTRAVENOUS at 01:09

## 2022-09-23 RX ADMIN — POTASSIUM CHLORIDE 20 MEQ: 14.9 INJECTION, SOLUTION INTRAVENOUS at 01:09

## 2022-09-23 RX ADMIN — IPRATROPIUM BROMIDE AND ALBUTEROL SULFATE 3 ML: 2.5; .5 SOLUTION RESPIRATORY (INHALATION) at 07:09

## 2022-09-23 RX ADMIN — HEPARIN SODIUM 40000 UNITS: 1000 INJECTION, SOLUTION INTRAVENOUS; SUBCUTANEOUS at 09:09

## 2022-09-23 RX ADMIN — POTASSIUM CHLORIDE 20 MEQ: 14.9 INJECTION, SOLUTION INTRAVENOUS at 06:09

## 2022-09-23 RX ADMIN — AMINOCAPROIC ACID 5 G: 250 INJECTION, SOLUTION INTRAVENOUS at 08:09

## 2022-09-23 RX ADMIN — MIDAZOLAM 2 MG: 1 INJECTION INTRAMUSCULAR; INTRAVENOUS at 07:09

## 2022-09-23 RX ADMIN — SODIUM CHLORIDE, SODIUM LACTATE, POTASSIUM CHLORIDE, AND CALCIUM CHLORIDE: .6; .31; .03; .02 INJECTION, SOLUTION INTRAVENOUS at 07:09

## 2022-09-23 RX ADMIN — ONDANSETRON 4 MG: 2 INJECTION INTRAMUSCULAR; INTRAVENOUS at 11:09

## 2022-09-23 RX ADMIN — PHENYLEPHRINE HYDROCHLORIDE 50 MCG: 10 INJECTION INTRAVENOUS at 09:09

## 2022-09-23 RX ADMIN — AMINOCAPROIC ACID 5 G: 250 INJECTION, SOLUTION INTRAVENOUS at 12:09

## 2022-09-23 NOTE — HOSPITAL COURSE
9/23: 3v CABG.  Post-CABG care in ICU.    9/24:  Extubated last night.  Remains on low dose norepi and insulin drips.  Chest tubes with no air leak.    9/25:  Remains extubated. Off pressors.  Transitioned to subcu insulin yesterday.  Will increase insulin regimen today.  Chest with no air leaks.  Chest tube out per Dr. Marin.  Delirium present.  Mild hyperkalemia.  Stop K+ tablets.  Recheck later today.

## 2022-09-23 NOTE — ASSESSMENT & PLAN NOTE
3v CABG on 9/23  Extubated evening of 9/23.      Sedation: Off  Pressors:Off  Chest tubes with no air leaks.  Subsequently pulled this morning 9/25 by Dr. Marin,  Epicardial wires.  Not currently paced.    Continue ASA, atorva.  Plavix  Metoprolol as BP allows  Lasix BID

## 2022-09-23 NOTE — PROGRESS NOTES
Pt arrived from OR s/p CABx3 with levophed @ 0.04mcg/kg/min and precedex @ 0.3mcg/kg/hr infusing on arrival. Connected to ICU cont monitor, hemodynamics stable on current infusions. Anesthesia not reversed per MD, pt slowly starting to wake up and move arms/shake head. MD to bedside, reviewed labs and CXR, plan to allow pt to slowly wake up and extubate later when appropriate.

## 2022-09-23 NOTE — PLAN OF CARE
S/p CABx3v today. Currently off all sedation, arouses spontaneously and follows commands but does not stay awake; 2 episodes of apnea noted with attempts to switch to spontaneous vent mode in prep for extubation. Levophed titrated to maintain MAP>70; 500ml LR bolus and albumin x2 given this shift with improvement in BP and CVP. SR on monitor; epicardial pacemaker on standby at bedside per MD directive, ventricular epicardial pacer wire secured to abdomen. CT x4 with sang output slowing down, approx 10-20ml/hr. Prevena wound vac with seal intact. L leg PIPO drain to bulb suction with small sang output. Bello with approx 40-60ml/hr UOP. Insulin gtt infusing per nomogram. Electrolytes replaced per prn protocol. PM labs pending. Minimal weight shifting done with tilting left/right, heels elevated on pillow. Charge nurse attempted to call spouse for update/visitation, no answer. Frequent reorientation and education provided to pt, pt nods understanding but needs reinforcement.

## 2022-09-23 NOTE — ASSESSMENT & PLAN NOTE
- Troponin 0.502. EKG unrevealing. CP free on admission.    - Heparin drip per nomogram.  - Continue home ASA, BB, and high intensity statin.  - SL NTG as needed.     - Trend serial cardiac enzymes and EKG.  - Check FLP.  - Will obtain 2D echo.  - Cardiology consult.     9/17:    Cardiology has evaluated and planned for cardiac cath today- Lmca normal; lad prox eccentric plaque 70% hazzy d1 stent 99%  mid lad 70%  Lcx non obs cad om1 stent patent edge stent 40-50% stenosis.   Rca stent patent nopn obs cad pda 70%.  Lvedp normal    Lvf normal anterolateral hk; recommended cardiothoracic surgery consult;   Will f/u; currently on heparin drip; aspirin, BB, statin;       9/18:    Examination done at bedside; denied any chest pain or SOB; currently on heparin drip;   Cardiothoracic surg on board - recommended keep the patient on heparin drip and watch for symptoms and her surgery is planned for Friday which is the same time for bypass surgery;   Electrolytes replaced to maintain K >4, Mg > 2; continue aspirin, BB, statin;     9/21:  Awaiting CABG for Friday;   Currently on heparin drip, no signs of bleeding; Hb stable;       9/22:  Awaiting CABG for Friday;   Hemodynamically stable;  Aspirin, BB, statin, heparin;   CTS on board;     9/23  Status post cabg per CTS

## 2022-09-23 NOTE — OP NOTE
[facility] -   Cardiothoracic Surgery  Operative Note      DATE OF PROCEDURE: 09/23/2022    ATTENDING SURGEON:  Krystal Marin M.D.     ASSISTANT:      ANESTHESIOLOGIST:  Dr. Pandya    Pre-op Diagnosis: NSTEMI (non-ST elevated myocardial infarction) [I21.4]  Morbid obesity  Type 1 diabetes mellitus  NSTEMI  Coronary artery disease  Previous C PCI  Sleep apnea  Hypertension  Hyperlipidemia  Seizure disorder      Post-op Diagnosis:   Same     Procedure(s):  CORONARY ARTERY BYPASS GRAFT (CABG)  ECHOCARDIOGRAM,TRANSESOPHAGEAL  SURGICAL PROCUREMENT, VEIN, ENDOSCOPIC  BLOCK, NERVE, INTERCOSTAL, 2 OR MORE      PROCEDURAL SUMMARY:   Coronary artery bypass graft, x 3  1) Pedicled LIMA to LAD  2) Saphenous vein graft to PDA  3) Saphenous vein graft to diagonal     Endoscopic vein harvesting from the left leg   Multiple intercostal nerve blocks with 0.25% Marcaine   Transesophageal echocardiogram findings:   1) Estimated ejection fraction 55%   2) Ascending aorta without significant calcifications   3) Apical motion improved status post bypass completion     ANESTHESIA:  General endotracheal    PATIENT POSITION: Supine     ESTIMATED BLOOD LOSS:  500 mL     DESCRIPTION OF OPERATIVE FINDINGS AND OUTCOME: Successful coronary revascularization without apparent complications    IMPLANTS:   Implant Name Type Inv. Item Serial No.  Lot No. LRB No. Used Action   HEMOSTASIS OSTENE MAT 2.5GM - SN/A  HEMOSTASIS OSTENE MAT 2.5GM N/A [employer] [id number] N/A 1 Implanted   LEAD PACING BIPOLAR TEMPORARY - SN/A  LEAD PACING BIPOLAR TEMPORARY N/A [employer] [id number] N/A 1 Implanted   ELSY RADIOMARK GRAFT MARKER   N/A  [id number] N/A 2 Implanted   Sensegon STERNAL ZIPFIX WITH NEEDLE 5 PACK   N/A  [id number] N/A 4 Implanted       SPECIMENS:   Specimen (24h ago, onward)      None             DISPOSITION: ICU - intubated and critically ill.    ATTESTATION: I was present and scrubbed for the entire  procedure.       BRIEF HISTORY:  Ca Diaz is a 62 y.o. female that presented urgently and was found to have NSTEMI.  Patient has significant cardiac history with the multiple stents to her LAD diagonal circumflex and right coronary artery in the past the patient was on Brilinta and she was admitted after the cath for Brilinta washout before we took her for urgent coronary artery bypass grafting x3  A cardiac cath was done and the patient was found to have 3 vessel disease.  InStent restenoses of the LAD and a separate lesion at the mid LAD ostial stenosis of the diagonal 80% and 80% stenosis of the right PDA the obtuse marginal which was a large vessel was widely 50% stenosis  The patients echocardiogram showed  55. The carotid ultrasound showed 55% ejection fraction trivial mitral regurgitation aortic valve area 1.8 cm choir. Cardiac risk factors include HTN, HLD, Diabetes mellitus, Obesity, PAD, Cerebrovascular disease, PRAVEENA, and FHx of early heart disease. The patient's NYHA Functional Classification score is NYHA II: slight limitation of physical activity, comfortable at rest.   The patient was consented for coronary artery bypass grafting after explaining the risks (ncluding but not limited to stroke, bleeding, kidney injury, and death), benefits and alternatives. All the patients questions were answered and the patient agrees with the plan to proceed with surgery.     PROCEDURE IN DETAIL:  The patient was brought to the operating room and placed on the operating room table in the supine position. A sena catheter placed by the surgical team. After adequate induction of general endotracheal anesthesia and placement of an arterial line, a central venous line, and a Chimayo-Heidy catheter by the anesthesia team, the patient's chest, abdomen, pelvis, and bilateral lower extremities were prepped and draped in the usual sterile fashion.  Surgical time-out was then done.     Skin incisions were made over the  left lower extremity. The saphenous vein was harvested using an endoscopic technique, and the vein was checked for its quality. Once the vein was harvested, the leg was closed in multiple layers of absorbable suture. A PIPO drain was left in the leg, and then the leg was wrapped and dressed. The vein was kept in a vein preservation solution.    A standard median sternotomy was performed. Hemostasis was achieved to the sternal edges. Rultract retractor was placed. The left internal mammary artery was dissected as a pedicle with Bovie electrocautery. A pleural drain was placed. Full heparin was given, and the ACT was checked to ensure that the level was greater than 500 seconds. The left internal mammary artery was then divided and checked to ensure that it had good flow. The pedicle was sprayed with dilute papaverine.    A chest retractor was placed. Next, the thymic remnants were divided and the pericardium was opened along the midline. A pericardial well was then created by placing stay sutures.     Cannulation sutures were placed first in the aorta and then in the right atrial appendage. The aortic cannula was inserted, followed by the venous cannula. Antegrade and retrograde cardioplegia catheters were then placed.  Cardiopulmonary bypass was then begun.  Once on bypass, the aortic crossclamp was applied and the heart was arrested using cold blood enhanced antegrade cardioplegia followed by retrograde cardioplegia.  A prompt electromechanical arrest was achieved. Retrograde cardioplegia was administered every 15 minutes while the aorta was crossclamped.     We first addressed the anastomosis between the Saphenous vein graft to PDA. A site suitable for grafting on the heart was first located, and an arteriotomy was then made. The graft was then brought up into the surgical field, and its end was spatulated. The distal end to side anastomosis was performed using a running 7-0 Prolene suture.  After completion of the  anastomosis, it was tested to ensure hemostasis and suitable flow. A dose of cardioplegia was given antegrade down this graft.     We then turned our attention to the second bypass between the Saphenous vein graft to diagonal. A site suitable for grafting on the heart was first located, and an arteriotomy was then made. The graft was then brought up into the surgical field, and its end was spatulated. The distal end to side anastomosis was performed using a running 7-0 Prolene suture.  After completion of the anastomosis, it was tested to ensure hemostasis and adequate flow. A dose of cardioplegia was given antegrade down this graft.      Next, we turned our attention to the anterior wall of the heart. We identified a site along the Distal LAD that was suitable for grafting. An arteriotomy was made, and the internal mammary artery was checked to ensure appropriate length and suitable flow. The end-to-side anastomosis was performed using a running 7-0 Prolene suture.  After completion, it was tested and found to be hemostatic. The mammary pedicle was tacked to the heart using two 6-0 Prolene sutures.    The aortic cross-clamp was released, and we checked to ensure that the patient returned to sinus rhythm. We then applied a side-biting clamp the aorta and performed 2 aortotomies for the proximal anastomoses of the remaining grafts.  These were completed in a similar fashion, using an #11 blade to incise the aorta and a 4.4 mm aortic punch device to enlarge the aortotomy. The grafts were checked to ensure that there was no kinking or torsion, and then they were cut to an appropriate length. Their ends were spatulated, and a running 6 0 Prolene suture was used to complete the proximal end to side anastomoses.  Afterwards, the aortic side-biting clamp was released. The grafts were de-aired before reestablishing the blood flow. The patient was rewarmed to 37° centigrade  The patient was subsequently weaned from  cardiopulmonary bypass.    The patient did wean from bypass without any issues. Once off bypass, all surgical sites were inspected to ensure suitable hemostasis. A test dose of protamine was administered, and this was well tolerated. The total dose was then given. assisted through the total dose, all cannulas were removed. All surgical sites were again inspected and checked for good hemostasis. Ventricular pacing wires were placed. Mediastinal drains were placed. After checking for adequate hemostasis again, the patient's chest was closed using 3 #6 stainless steel wires in a simple, interrupted fashion to close the manubrium and 4 zip ties to close the body of the sternum and the xiphoid process. Multiple intercostal nerve blocks with 0.25% Marcaine was administered parasternally for postoperative pain control.  The overlying soft tissues were re-approximated in multiple layers using vicryl and monocryl sutures. The patient's chest was washed and dried, and a sterile dressing was applied. At the conclusion of the case, sponge and instrument counts were correct. The patient tolerated the procedure without apparent complications and was promptly moved to the ICU, and I was present for the handoff.      1:29 PM   09/23/2022    Krystal Marin MD  Cardiothoracic Surgery  [facility] -

## 2022-09-23 NOTE — HPI
62W pmh CAD, DM2, anxiety, h/o CVA, depression, DM II, RLS, seizures, fibromyalgia, HLD, HTN, PRAVEENA, and obesity admitted 9/16 with NSTEMI.  [facility] cath revealed multivessel disease.  She had been on Brilinta.  Pt reamined in the hospital on a heparin drip during the Brilinta washout period.  She went for 3v CABG today.  Per anesthesia, operative course was uncomplicated.  Specifically, no hemodynamic or bleeding problems.  No transfusions and no CellSaver given.  She is now in the ICU intubated on vent, sedated on Precedex, norepinephrine @ 0.04 mcg/kg/min.

## 2022-09-23 NOTE — SUBJECTIVE & OBJECTIVE
Interval History: See hospital course for today      Review of Systems   Unable to perform ROS: Intubated   Objective:     Vital Signs (Most Recent):  Temp: 97.7 °F (36.5 °C) (09/23/22 0433)  Pulse: 63 (09/23/22 0556)  Resp: 18 (09/23/22 0433)  BP: (!) 165/72 (09/23/22 0556)  SpO2: (!) 94 % (09/23/22 0433)   Vital Signs (24h Range):  Temp:  [97.7 °F (36.5 °C)-98 °F (36.7 °C)] 97.7 °F (36.5 °C)  Pulse:  [62-68] 63  Resp:  [18] 18  SpO2:  [94 %-96 %] 94 %  BP: (133-165)/(61-72) 165/72     Weight: 91.6 kg (202 lb)  Body mass index is 36.95 kg/m².    Intake/Output Summary (Last 24 hours) at 9/23/2022 1306  Last data filed at 9/23/2022 1208  Gross per 24 hour   Intake 1100 ml   Output --   Net 1100 ml      Physical Exam  Vitals and nursing note reviewed. Exam conducted with a chaperone present (nursing).   Constitutional:       General: She is not in acute distress.     Appearance: She is obese. She is ill-appearing. She is not toxic-appearing.      Interventions: She is sedated and intubated.   HENT:      Head: Normocephalic and atraumatic.   Cardiovascular:      Rate and Rhythm: Normal rate.   Pulmonary:      Effort: She is intubated.      Comments: Mechanical breath sounds  Abdominal:      Palpations: Abdomen is soft.   Genitourinary:     Comments: sena  Skin:     General: Skin is warm.       Significant Labs: All pertinent labs within the past 24 hours have been reviewed.  CBC:   Recent Labs   Lab 09/22/22  0845   WBC 8.02   HGB 13.1   HCT 41.3        CMP:   Recent Labs   Lab 09/22/22  0845 09/22/22  1647    140   K 4.1 4.5    101   CO2 26 30*   * 204*   BUN 9 10   CREATININE 0.6 0.7   CALCIUM 8.9 8.9   PROT  --  5.9*   ALBUMIN  --  2.8*   BILITOT  --  0.4   ALKPHOS  --  64   AST  --  23   ALT  --  26   ANIONGAP 10 9       Significant Imaging: I have reviewed all pertinent imaging results/findings within the past 24 hours.

## 2022-09-23 NOTE — CONSULTS
[facility] - Intensive Care ([facility])  Critical Care Medicine  Consult Note    Patient Name: Ca Diaz  MRN: [id number]  Admission Date: 2022  Hospital Length of Stay: 6 days  Code Status: Full Code  Attending Physician: Madelaine Garcia MD   Primary Care Provider: Desiree Frye MD   Principal Problem: S/P CABG x 3    Inpatient consult to Critical Care Medicine  Consult performed by: Gaurang Sue MD  Consult ordered by: Madelaine Garcia MD      Subjective:     HPI:  62W pmh CAD, DM2, anxiety, h/o CVA, depression, DM II, RLS, seizures, fibromyalgia, HLD, HTN, PRAVEENA, and obesity admitted  with NSTEMI.  [facility] cath revealed multivessel disease.  She had been on Brilinta.  Pt reamined in the hospital on a heparin drip during the Brilinta washout period.  She went for 3v CABG today.  Per anesthesia, operative course was uncomplicated.  Specifically, no hemodynamic or bleeding problems.  No transfusions and no CellSaver given.  She is now in the ICU intubated on vent, sedated on Precedex, norepinephrine @ 0.04 mcg/kg/min.       Hospital/ICU Course:  : 3v CABG.  Post-CABG care in ICU.      Past Medical History:   Diagnosis Date    Anxiety and depression 3/13/2014    Essential (primary) hypertension 2013    Fibromyalgia 2012    Hyperlipidemia     Obstructive sleep apnea syndrome 2014    RLS (restless legs syndrome) 10/18/2018    Overview:  Rheum Dr Tam    Seizures     Type 2 diabetes mellitus 2012    ICD-10 Transition Last Assessment & Plan:  Continue with sliding scale insulin.  Control improved on low-dose Lantus presently       Past Surgical History:   Procedure Laterality Date    ABDOMINAL SURGERY      ARTERIOGRAPHY OF SUBCLAVIAN ARTERY  2022    Procedure: ARTERIOGRAM, SUBCLAVIAN;  Surgeon: Vale Pa MD;  Location: [facility] CATH LAB;  Service: Cardiology;;     SECTION      LEFT HEART CATHETERIZATION Left 2022    Procedure: CATHETERIZATION, HEART, LEFT;   Surgeon: Vale Pa MD;  Location: [facility] CATH LAB;  Service: Cardiology;  Laterality: Left;       Review of patient's allergies indicates:   Allergen Reactions    Epinephrine Anaphylaxis    Lidocaine Anaphylaxis     Dental visit pt stopped breathing after given lidocaine    Ace inhibitors     Sulfadiazine Other (See Comments)       Family History    None       Tobacco Use    Smoking status: Former     Types: Cigarettes     Quit date:      Years since quittin.7    Smokeless tobacco: Never   Substance and Sexual Activity    Alcohol use: Not Currently    Drug use: Never    Sexual activity: Not on file         Review of Systems   Unable to perform ROS: Intubated   Objective:     Vital Signs (Most Recent):  Temp: 97.7 °F (36.5 °C) (22)  Pulse: 63 (22 0556)  Resp: 18 (22)  BP: (!) 165/72 (22 0556)  SpO2: (!) 94 % (22)   Vital Signs (24h Range):  Temp:  [97.7 °F (36.5 °C)-98 °F (36.7 °C)] 97.7 °F (36.5 °C)  Pulse:  [62-68] 63  Resp:  [18] 18  SpO2:  [94 %-96 %] 94 %  BP: (133-165)/(61-72) 165/72     Weight: 91.6 kg (202 lb)  Body mass index is 36.95 kg/m².      Intake/Output Summary (Last 24 hours) at 2022 1219  Last data filed at 2022 1208  Gross per 24 hour   Intake 1100 ml   Output --   Net 1100 ml       Physical Exam  Vitals reviewed.   Constitutional:       Appearance: She is obese.   HENT:      Head: Normocephalic and atraumatic.      Nose: Nose normal.   Eyes:      General: No scleral icterus.     Pupils: Pupils are equal, round, and reactive to light.   Cardiovascular:      Rate and Rhythm: Normal rate and regular rhythm.      Comments: Chest tubes x4.  Epicardial wires.  Transparent sternal dressing in place.  Pulmonary:      Effort: Pulmonary effort is normal. No respiratory distress.      Comments: Symmetric chest rise.  No distress or dyssynchrony on the ventilator.  Abdominal:      General: Abdomen is flat. There is no distension.       Palpations: Abdomen is soft.   Musculoskeletal:         General: No deformity or signs of injury.      Right lower leg: No edema.      Left lower leg: No edema.   Skin:     General: Skin is warm and dry.   Neurological:      Comments: Sedated.  Does not open eyes.  Does not follow commands.       Vents:       Lines/Drains/Airways       Drain  Duration                  Closed/Suction Drain 09/23/22 1011 Left Leg Bulb 19 Fr. <1 day         Urethral Catheter 09/23/22 0730 Silicone;Temperature probe;Straight-tip 16 Fr. <1 day         Y Chest Tube 1 and 2 09/23/22 1150 1 Right Mediastinal 24 Fr. 2 Left Mediastinal 24 Fr. <1 day         Y Chest Tube 3 and 4 09/23/22 1150 3 Left Pleural 24 Fr. 4 Right Pleural 24 Fr. <1 day              Airway  Duration                  Airway - Non-Surgical 09/23/22 0727 <1 day              Peripheral Intravenous Line  Duration                  Peripheral IV - Single Lumen 09/17/22 0001 20 G Left;Posterior Hand 6 days         Peripheral IV - Single Lumen 09/17/22 0150 20 G Posterior;Right Hand 6 days                    Significant Labs:    CBC/Anemia Profile:  Recent Labs   Lab 09/22/22  0845   WBC 8.02   HGB 13.1   HCT 41.3      MCV 84   RDW 14.6*        Chemistries:  Recent Labs   Lab 09/22/22  0845 09/22/22  1647    140   K 4.1 4.5    101   CO2 26 30*   BUN 9 10   CREATININE 0.6 0.7   CALCIUM 8.9 8.9   ALBUMIN  --  2.8*   PROT  --  5.9*   BILITOT  --  0.4   ALKPHOS  --  64   ALT  --  26   AST  --  23       A1C: No results for input(s): HGBA1C in the last 48 hours.  Blood Culture: No results for input(s): LABBLOO in the last 48 hours.  Coagulation:   Recent Labs   Lab 09/22/22  0530 09/22/22  1647   INR  --  1.0   APTT 43.6*  --      Lactic Acid: No results for input(s): LACTATE in the last 48 hours.  Pathology Results  (Last 10 years)      None          POCT Glucose:   Recent Labs   Lab 09/22/22  2116 09/23/22  0526 09/23/22  1320   POCTGLUCOSE 247* 202* 205*      Respiratory Culture: No results for input(s): GSRESP, RESPIRATORYC in the last 48 hours.  Troponin: No results for input(s): TROPONINI in the last 48 hours.  Urine Culture: No results for input(s): LABURIN in the last 48 hours.  Urine Studies: No results for input(s): COLORU, APPEARANCEUA, PHUR, SPECGRAV, PROTEINUA, GLUCUA, KETONESU, BILIRUBINUA, OCCULTUA, NITRITE, UROBILINOGEN, LEUKOCYTESUR, RBCUA, WBCUA, BACTERIA, SQUAMEPITHEL, HYALINECASTS in the last 48 hours.    Invalid input(s): WRIGHTTREY    Significant Imaging:   I have reviewed all pertinent imaging results/findings within the past 24 hours.      ABG  Recent Labs   Lab 09/23/22  1212   PH 7.405   PO2 218*   PCO2 38.5   HCO3 24.1   BE -1     Assessment/Plan:     Neuro  Seizure  Continue depakote    Psychiatric  Anxiety and depression  Fluoxetine    Cardiac/Vascular  * S/P CABG x 3  3v CABG on 9/23  Remains intubated.  Work towards extubation this afternoon/evening.  Sedation: Precedex  Pressors: Currently on norepi  Chest tubes and epicardial wires as per CVS  No currently paced.    Continue ASA, atorva.  Plavix first dose tomorrow if no issues  Metoprolol as BP allows  Lasix BID    NSTEMI (non-ST elevated myocardial infarction)  Treated with heparin  Now s/p CABG 9/23/22    Endocrine  Obesity  Encourage weight loss when pt doing better following CABG    Type 2 diabetes mellitus, with long-term current use of insulin  Insulin drip         Critical Care Daily Checklist:    A: Awake: RASS Goal/Actual Goal: RASS Goal: 0-->alert and calm  Actual:     B: Spontaneous Breathing Trial Performed?     C: SAT & SBT Coordinated?  Will perform             D: Delirium: CAM-ICU     E: Early Mobility Performed? No, not appropriate at this time   F: Feeding Goal:    Status:     Current Diet Order   No orders of the defined types were placed in this encounter.      AS: Analgesia/Sedation No sedation indicated.   T: Thromboembolic Prophylaxis    H: HOB > 300 Yes   U: Stress  Ulcer Prophylaxis (if needed) PPI   G: Glucose Control Insulin drip   B: Bowel Function Stool Occurrence: 1   I: Indwelling Catheter (Lines & Sena) Necessity Right IJ CVC, arterial line, sena  Chest tubes and epicardial wires   D: De-escalation of Antimicrobials/Pharmacotherapies Any perio-op abx as per CVS    Plan for the day/ETD Work towards extubation   Wean pressors and sedation as able  Lasix  Insulin drip    Code Status:  Family/Goals of Care: Full Code       Critical Care Time: 80 minutes    Critical secondary to Patient has a condition that poses threat to life and bodily function: NSTEMI now s/p CABG undergoing post-CABG care in ICU on mechanical ventilator, IV sedation, pressors, insulin drip.     Critical care was time spent personally by me on the following activities: development of treatment plan with patient or surrogate and bedside caregivers, discussions with consultants, evaluation of patient's response to treatment, examination of patient, ordering and performing treatments and interventions, ordering and review of laboratory studies, ordering and review of radiographic studies, pulse oximetry, re-evaluation of patient's condition. This critical care time did not overlap with that of any other provider or involve time for any procedures.    Thank you for your consult.      Gaurang Sue MD  Critical Care Medicine  [facility]

## 2022-09-23 NOTE — ANESTHESIA PROCEDURE NOTES
Intubation    Date/Time: 9/23/2022 7:27 AM  Performed by: Steven Mayen CRNA  Authorized by: Steven Mayen CRNA     Intubation:     Induction:  Intravenous    Intubated:  Postinduction    Attempts:  1    Attempted By:  Student    Method of Intubation:  Direct    Blade:  Hira 3    Laryngeal View Grade: Grade I - full view of cords      Difficult Airway Encountered?: No      Complications:  None    Airway Device:  Oral endotracheal tube    Airway Device Size:  8.0    Style/Cuff Inflation:  Cuffed (inflated to minimal occlusive pressure)    Tube secured:  21    Secured at:  The lips    Placement Verified By:  Capnometry    Complicating Factors:  None    Findings Post-Intubation:  BS equal bilateral

## 2022-09-23 NOTE — ANESTHESIA PROCEDURE NOTES
Monitor KALYAN    Diagnosis: Coronary a disease  Patient location during procedure: OR  Procedure start time: 9/23/2022 7:50 AM  Procedure end time: 9/23/2022 8:00 AM  Surgery related to: CABG    Staffing  Authorizing Provider: Jonathon Isabel MD  Performing Provider: Jonathon Isabel MD    Staffing  Anesthesiologist Present  Yes  Setup & Induction  Probe Insertion: easy      Findings    LV - Concentric hypertrophy. No wall motion abnormalities.  ER 60%  RV - Normal structure and function  Atria - Normal size without apparent septal defects  Mitral valve - Trace MR.  No MS  Aortic valve - Mild stenosis with ROOPA 1.8cm2.  No AR  Tricuspid valve - Normal structure and function  Impression     As noted  Other Findings    Probe Removal     KALYAN probe removed without event.No blood on removal of probe.

## 2022-09-23 NOTE — PLAN OF CARE
[facility] - Surgery (Hospital)  Discharge Reassessment    Primary Care Provider: Desiree Frye MD    Expected Discharge Date:     Reassessment (most recent)       Discharge Reassessment - 09/23/22 0817          Discharge Reassessment    Assessment Type Discharge Planning Reassessment     Did the patient's condition or plan change since previous assessment? No     Discharge Plan discussed with: Patient     Communicated JOHN with patient/caregiver Date not available/Unable to determine     Discharge Plan A Rehab     Discharge Plan B Home Health     DME Needed Upon Discharge  none     Discharge Barriers Identified None        Post-Acute Status    Discharge Delays None known at this time                   Cm will continue to follow patient to assess for dc needs. Patients DC disposition TBD, dependent upon patients progress post procedure. Patient may need Rehab [facility] at dc.

## 2022-09-23 NOTE — TRANSFER OF CARE
"Anesthesia Transfer of Care Note    Patient: Ca Diaz    Procedure(s) Performed: Procedure(s) (LRB):  CORONARY ARTERY BYPASS GRAFT (CABG) (N/A)  ECHOCARDIOGRAM,TRANSESOPHAGEAL (N/A)  SURGICAL PROCUREMENT, VEIN, ENDOSCOPIC (Left)  BLOCK, NERVE, INTERCOSTAL, 2 OR MORE (N/A)    Patient location: ICU    Anesthesia Type: general    Transport from OR: Continuous ECG monitoring in transport. Continuous SpO2 monitoring in transport. Continuos invasive BP monitoring in transport. Continuous CVP monitoring in transport. Upon arrival to PACU/ICU, patient attached to ventilator and auscultated to confirm bilateral breath sounds and adequate TV    Post pain: adequate analgesia    Post assessment: no apparent anesthetic complications    Post vital signs: stable    Level of consciousness: sedated    Nausea/Vomiting: no nausea/vomiting    Complications: none    Transfer of care protocol was followed      Last vitals:   Visit Vitals  BP (!) 165/72   Pulse 63   Temp 36.5 °C (97.7 °F) (Oral)   Resp 18   Ht 5' 2" (1.575 m)   Wt 91.6 kg (202 lb)   SpO2 (!) 94%   Breastfeeding No   BMI 36.95 kg/m²     "

## 2022-09-23 NOTE — SUBJECTIVE & OBJECTIVE
Interval History:  Complains of generalized aches and chest soreness.    Denies HA, SOB, abd pain, nausea, vomiting, diarrhea, fevers, sweats, chills.    Objective:     Vital Signs (Most Recent):  Temp: 99 °F (37.2 °C) (09/25/22 0705)  Pulse: 83 (09/25/22 0750)  Resp: 10 (09/25/22 0750)  BP: 135/63 (09/25/22 0705)  SpO2: 98 % (09/25/22 0750)   Vital Signs (24h Range):  Temp:  [97.5 °F (36.4 °C)-101.3 °F (38.5 °C)] 99 °F (37.2 °C)  Pulse:  [74-97] 83  Resp:  [10-40] 10  SpO2:  [88 %-100 %] 98 %  BP: ()/(42-68) 135/63     Weight: 97.2 kg (214 lb 4.6 oz)  Body mass index is 39.19 kg/m².      Intake/Output Summary (Last 24 hours) at 9/25/2022 0809  Last data filed at 9/25/2022 0700  Gross per 24 hour   Intake 548.84 ml   Output 2505 ml   Net -1956.16 ml       Physical Exam  Vitals reviewed.   Constitutional:       Appearance: She is obese.   HENT:      Head: Normocephalic and atraumatic.      Nose: Nose normal.   Eyes:      General: No scleral icterus.     Extraocular Movements: Extraocular movements intact.      Pupils: Pupils are equal, round, and reactive to light.   Cardiovascular:      Rate and Rhythm: Normal rate and regular rhythm.      Comments: Chest tubes no longer present.  Pulled this morning.  Epicardial wires.  Transparent sternal dressing in place.  Pulmonary:      Effort: Pulmonary effort is normal. No respiratory distress.      Comments: Symmetric chest rise.      Abdominal:      General: Abdomen is flat. There is no distension.      Palpations: Abdomen is soft.   Musculoskeletal:         General: No deformity or signs of injury.      Right lower leg: No edema.      Left lower leg: No edema.   Skin:     General: Skin is warm and dry.   Neurological:      General: No focal deficit present.      Mental Status: She is alert and oriented to person, place, and time.       Vents:  Vent Mode: Spont (09/23/22 1946)  Ventilator Initiated: Yes (09/23/22 1339)  Set Rate: 20 BPM (09/23/22 1701)  Vt Set: 400  mL (09/23/22 1704)  Pressure Support: 7 cmH20 (09/23/22 1946)  PEEP/CPAP: 5 cmH20 (09/23/22 1946)  Oxygen Concentration (%): 36 (09/25/22 0803)  Peak Airway Pressure: 13 cmH2O (09/23/22 1946)  Plateau Pressure: 0 cmH20 (09/23/22 1946)  Total Ve: 6.34 mL (09/23/22 1946)  Negative Inspiratory Force (cm H2O): -22 (09/23/22 2110)  F/VT Ratio<105 (RSBI): (!) 52.63 (09/23/22 1946)    Lines/Drains/Airways       Central Venous Catheter Line  Duration             Percutaneous Central Line Insertion/Assessment - Triple Lumen  09/23/22 0739 right internal jugular 2 days              Drain  Duration                  Urethral Catheter 09/23/22 0730 Silicone;Temperature probe;Straight-tip 16 Fr. 2 days         Y Chest Tube 1 and 2 09/23/22 1150 1 Right Mediastinal 24 Fr. 2 Left Mediastinal 24 Fr. 1 day         Y Chest Tube 3 and 4 09/23/22 1150 3 Left Pleural 24 Fr. 4 Right Pleural 24 Fr. 1 day              Peripheral Intravenous Line  Duration                  Peripheral IV - Single Lumen 09/17/22 0150 20 G Posterior;Right Hand 8 days         Peripheral IV - Single Lumen 09/23/22 1317 16 G Right Antecubital 1 day                    Significant Labs:    CBC/Anemia Profile:  Recent Labs   Lab 09/23/22  1322 09/23/22  1326 09/23/22 2029 09/24/22  0505 09/25/22  0512   WBC 15.19*  --   --  12.31 12.45   HGB 9.9*  --   --  8.4* 7.6*   HCT 29.7*   < > 26* 25.6* 23.7*     --   --  226 168   MCV 83  --   --  84 87   RDW 14.4  --   --  15.1* 15.8*    < > = values in this interval not displayed.        Chemistries:  Recent Labs   Lab 09/24/22  0505 09/24/22  1611 09/25/22  0512    138 138   K 4.0 4.9 5.2*    104 105   CO2 23 23 25   BUN 8 12 14   CREATININE 0.6 0.7 0.7   CALCIUM 8.0* 8.3* 8.3*   MG 2.7* 2.4 2.1       A1C: No results for input(s): HGBA1C in the last 48 hours.  Blood Culture: No results for input(s): LABBLOO in the last 48 hours.  Coagulation:   Recent Labs   Lab 09/24/22  0505   INR 1.0   APTT 34.2*      Lactic Acid: No results for input(s): LACTATE in the last 48 hours.  Pathology Results  (Last 10 years)      None          POCT Glucose:   Recent Labs   Lab 09/24/22  2104 09/25/22  0135 09/25/22  0755   POCTGLUCOSE 208* 256* 231*     Respiratory Culture: No results for input(s): GSRESP, RESPIRATORYC in the last 48 hours.  Troponin: No results for input(s): TROPONINI in the last 48 hours.  Urine Culture: No results for input(s): LABURIN in the last 48 hours.  Urine Studies: No results for input(s): COLORU, APPEARANCEUA, PHUR, SPECGRAV, PROTEINUA, GLUCUA, KETONESU, BILIRUBINUA, OCCULTUA, NITRITE, UROBILINOGEN, LEUKOCYTESUR, RBCUA, WBCUA, BACTERIA, SQUAMEPITHEL, HYALINECASTS in the last 48 hours.    Invalid input(s): WRIGHTSUR    Significant Imaging:   I have reviewed all pertinent imaging results/findings within the past 24 hours.

## 2022-09-23 NOTE — ASSESSMENT & PLAN NOTE
Patient's FSGs are on current medication regimen.  Last A1c reviewed-   Lab Results   Component Value Date    HGBA1C 10.9 (H) 09/17/2022     Most recent fingerstick glucose reviewed-   Recent Labs   Lab 09/22/22  1636 09/22/22 2014 09/22/22  2116 09/23/22  0526   POCTGLUCOSE 198* 231* 247* 202*     Current correctional scale  Medium  Maintain anti-hyperglycemic dose as follows-   Antihyperglycemics (From admission, onward)    Start     Stop Route Frequency Ordered    09/23/22 1045  insulin regular 1 Units/mL in sodium chloride 0.9% 100 mL infusion        Question:  Enter initial dose from Infusion Protocol Chart (Units/hr):  Answer:  2    -- IV Continuous 09/23/22 0945    09/21/22 2100  insulin detemir U-100 pen 20 Units         -- SubQ 2 times daily 09/21/22 1426        Hold Oral hypoglycemics while patient is in the hospital.    9/18:    Ordered levemir 10 units BID; will monitor and titrate dose accordingly     9/20:    Increased levemir to 15 units BID;     9/23  Uncontrolled   Npo  Adjust insulin per poct glucose

## 2022-09-23 NOTE — PLAN OF CARE
-call button within reach.- Hibiclens bath completed with PCT - blood sugar monitored per MD order  Problem: Adult Inpatient Plan of Care  Goal: Plan of Care Review  Outcome: Ongoing, Progressing  Goal: Patient-Specific Goal (Individualized)  Outcome: Ongoing, Progressing  Goal: Absence of Hospital-Acquired Illness or Injury  Outcome: Ongoing, Progressing  Goal: Optimal Comfort and Wellbeing  Outcome: Ongoing, Progressing  Goal: Readiness for Transition of Care  Outcome: Ongoing, Progressing     Problem: Diabetes Comorbidity  Goal: Blood Glucose Level Within Targeted Range  Outcome: Ongoing, Progressing     Problem: Fall Injury Risk  Goal: Absence of Fall and Fall-Related Injury  Outcome: Ongoing, Progressing     Problem: Tissue Perfusion Altered  Goal: Improved Tissue Perfusion  Outcome: Ongoing, Progressing

## 2022-09-23 NOTE — PROGRESS NOTES
"[facility] - Intensive Care ([facility] Medicine  Progress Note    Patient Name: Ca Diaz  MRN: [id number]  Patient Class: IP- Inpatient   Admission Date: 9/16/2022  Length of Stay: 6 days  Attending Physician: Madelaine Garcia MD  Primary Care Provider: Desiree Frye MD        Subjective:     Principal Problem:NSTEMI (non-ST elevated myocardial infarction)        HPI:  Ca Diaz is a 62 y.o. female patient with a PMHx of anxiety, CAD, h/o CVA, depression, DM II, RLS, seizures, fibromyalgia, HLD, HTN, RPAVEENA, and obesity who presented to the Emergency Department for evaluation of generalized weakness which onset gradually two days ago. Pt states she fell last night (9/15) and the night before last (9/14). Pt's  states that she "can't even walk through an open doorway." Symptoms are constant and moderate in severity. No mitigating or exacerbating factors reported. Associated sxs include N/D and dizziness. Pt reports the nausea began one day ago. Patient denies any HA, vomiting, light-headedness, visual disturbance, CP, SOB/MALAVE, diaphoresis, neck or jaw pain, upper extremity pain, numbness/tingling, dysuria, and all other sxs at this time. Work-up in the ED revealed NSTEMI with troponin of 0.502, EKG unrevealing, CXR unremarkable, CT of the head negative for acute process. 325 mg ASA given and UFH initiated. Hospital Medicine was contacted for admission and patient placed in Observation.         Overview/Hospital Course:  9/17:  Examination of patient done at bedside; pt was alert and oriented, c/o chest discomfort;   Cardiology has evaluated and planned for cardiac cath today- Lmca normal; lad prox eccentric plaque 70% hazzy d1 stent 99%  mid lad 70%  Lcx non obs cad om1 stent patent edge stent 40-50% stenosis.   Rca stent patent nopn obs cad pda 70%.  Lvedp normal    Lvf normal anterolateral hk; recommended cardiothoracic surgery consult;   Will f/u; currently on heparin drip; " aspirin, BB, statin;   9/18:  Examination done at bedside; denied any chest pain or SOB; currently on heparin drip;   Cardiothoracic surg on board - recommended keep the patient on heparin drip and watch for symptoms and her surgery is planned for Friday which is the same time for bypass surgery;   Electrolytes replaced to maintain K >4, Mg > 2;   Will f/u on BG;   9/19:  Examination done at bedside; denied any acute issues; awaiting CABG; cardio and cardiothoracic surg on board  9/20:  Examination done at bedside; denies any acute issues;  Awaiting CABG for Friday;   Currently on heparin drip, no signs of bleeding; Hb stable;   9/21:  Examination done at bedside; denies any acute issues;  Hemodynamically stable;  9/22:  Examination done at bedside; denies any acute issues;  Awaiting CABG for Friday;   Hemodynamically stable;  Aspirin, BB, statin, heparin drip --> Hb stable, no signs of bleeding;   CTS on board  9/23 status post cabg per cts. Transferred to icu, intubated, sedated.      Interval History: See hospital course for today      Review of Systems   Unable to perform ROS: Intubated   Objective:     Vital Signs (Most Recent):  Temp: 97.7 °F (36.5 °C) (09/23/22 0433)  Pulse: 63 (09/23/22 0556)  Resp: 18 (09/23/22 0433)  BP: (!) 165/72 (09/23/22 0556)  SpO2: (!) 94 % (09/23/22 0433)   Vital Signs (24h Range):  Temp:  [97.7 °F (36.5 °C)-98 °F (36.7 °C)] 97.7 °F (36.5 °C)  Pulse:  [62-68] 63  Resp:  [18] 18  SpO2:  [94 %-96 %] 94 %  BP: (133-165)/(61-72) 165/72     Weight: 91.6 kg (202 lb)  Body mass index is 36.95 kg/m².    Intake/Output Summary (Last 24 hours) at 9/23/2022 1306  Last data filed at 9/23/2022 1208  Gross per 24 hour   Intake 1100 ml   Output --   Net 1100 ml      Physical Exam  Vitals and nursing note reviewed. Exam conducted with a chaperone present (nursing).   Constitutional:       General: She is not in acute distress.     Appearance: She is obese. She is ill-appearing. She is not  toxic-appearing.      Interventions: She is sedated and intubated.   HENT:      Head: Normocephalic and atraumatic.   Cardiovascular:      Rate and Rhythm: Normal rate.   Pulmonary:      Effort: She is intubated.      Comments: Mechanical breath sounds  Abdominal:      Palpations: Abdomen is soft.   Genitourinary:     Comments: sena  Skin:     General: Skin is warm.       Significant Labs: All pertinent labs within the past 24 hours have been reviewed.  CBC:   Recent Labs   Lab 09/22/22  0845   WBC 8.02   HGB 13.1   HCT 41.3        CMP:   Recent Labs   Lab 09/22/22  0845 09/22/22  1647    140   K 4.1 4.5    101   CO2 26 30*   * 204*   BUN 9 10   CREATININE 0.6 0.7   CALCIUM 8.9 8.9   PROT  --  5.9*   ALBUMIN  --  2.8*   BILITOT  --  0.4   ALKPHOS  --  64   AST  --  23   ALT  --  26   ANIONGAP 10 9       Significant Imaging: I have reviewed all pertinent imaging results/findings within the past 24 hours.      Assessment/Plan:      * NSTEMI (non-ST elevated myocardial infarction)  - Troponin 0.502. EKG unrevealing. CP free on admission.    - Heparin drip per nomogram.  - Continue home ASA, BB, and high intensity statin.  - SL NTG as needed.     - Trend serial cardiac enzymes and EKG.  - Check FLP.  - Will obtain 2D echo.  - Cardiology consult.     9/17:    Cardiology has evaluated and planned for cardiac cath today- Lmca normal; lad prox eccentric plaque 70% hazzy d1 stent 99%  mid lad 70%  Lcx non obs cad om1 stent patent edge stent 40-50% stenosis.   Rca stent patent nopn obs cad pda 70%.  Lvedp normal    Lvf normal anterolateral hk; recommended cardiothoracic surgery consult;   Will f/u; currently on heparin drip; aspirin, BB, statin;       9/18:    Examination done at bedside; denied any chest pain or SOB; currently on heparin drip;   Cardiothoracic surg on board - recommended keep the patient on heparin drip and watch for symptoms and her surgery is planned for Friday which is the same  time for bypass surgery;   Electrolytes replaced to maintain K >4, Mg > 2; continue aspirin, BB, statin;     9/21:  Awaiting CABG for Friday;   Currently on heparin drip, no signs of bleeding; Hb stable;       9/22:  Awaiting CABG for Friday;   Hemodynamically stable;  Aspirin, BB, statin, heparin;   CTS on board;     9/23  Status post cabg per CTS    Obesity  Body mass index is 37.02 kg/m². Morbid obesity complicates all aspects of disease management from diagnostic modalities to treatment. Weight loss encouraged and health benefits explained to patient.       Atherosclerosis of native coronary artery of native heart without angina pectoris  - Continue medical management as above.       Hyperlipidemia  Npo       Essential (primary) hypertension  - Continue home antihypertensives.     9/23  Hold antihtn medication(s) while sedated      Type 2 diabetes mellitus, with long-term current use of insulin  Patient's FSGs are on current medication regimen.  Last A1c reviewed-   Lab Results   Component Value Date    HGBA1C 10.9 (H) 09/17/2022     Most recent fingerstick glucose reviewed-   Recent Labs   Lab 09/22/22  1636 09/22/22 2014 09/22/22  2116 09/23/22  0526   POCTGLUCOSE 198* 231* 247* 202*     Current correctional scale  Medium  Maintain anti-hyperglycemic dose as follows-   Antihyperglycemics (From admission, onward)    Start     Stop Route Frequency Ordered    09/23/22 1045  insulin regular 1 Units/mL in sodium chloride 0.9% 100 mL infusion        Question:  Enter initial dose from Infusion Protocol Chart (Units/hr):  Answer:  2    -- IV Continuous 09/23/22 0945    09/21/22 2100  insulin detemir U-100 pen 20 Units         -- SubQ 2 times daily 09/21/22 1426        Hold Oral hypoglycemics while patient is in the hospital.    9/18:    Ordered levemir 10 units BID; will monitor and titrate dose accordingly     9/20:    Increased levemir to 15 units BID;     9/23  Uncontrolled   Npo  Adjust insulin per poct  glucose    VTE Risk Mitigation (From admission, onward)         Ordered     Place BENITEZ hose  Until discontinued         09/23/22 1307     Place sequential compression device  Until discontinued         09/23/22 1307     Place sequential compression device  Until discontinued         09/22/22 1557     heparin 25,000 units in dextrose 5% (100 units/ml) IV bolus from bag - ADDITIONAL PRN BOLUS - 60 units/kg (max bolus 4000 units)  As needed (PRN)        Question:  Heparin Infusion Adjustment (DO NOT MODIFY ANSWER)  Answer:  [email or web address]\Images\Pharmacy\HeparinInfusions\heparin LOW INTENSITY nomogram for [facility] [id number]    09/17/22 1829     heparin 25,000 units in dextrose 5% (100 units/ml) IV bolus from bag - ADDITIONAL PRN BOLUS - 30 units/kg (max bolus 4000 units)  As needed (PRN)        Question:  Heparin Infusion Adjustment (DO NOT MODIFY ANSWER)  Answer:  [email or web address]\Images\Pharmacy\HeparinInfusions\heparin LOW INTENSITY nomogram for [facility] [id number].pdf    09/17/22 1829     heparin 25,000 units in dextrose 5% 250 mL (100 units/mL) infusion LOW INTENSITY nomogram - [facility]  Continuous        Question Answer Comment   Heparin Infusion Adjustment (DO NOT MODIFY ANSWER) [email or web address]\[email or web address]\Images\Pharmacy\HeparinInfusions\heparin LOW INTENSITY nomogram for [facility] [id number].pdf    Begin at (in units/kg/hr) 12        09/17/22 1829     Place sequential compression device  Until discontinued         09/17/22 0315                Discharge Planning   JOHN:      Code Status: Full Code   Is the patient medically ready for discharge?:     Reason for patient still in hospital (select all that apply): Patient new problem  Discharge Plan A: Rehab   Discharge Delays: None known at this time    CTS to assume primary care    Critical care time spent on the evaluation and treatment of severe organ dysfunction, review of pertinent labs and imaging studies, discussions with consulting providers and discussions with patient/family: 20  minutes.      Madelaine Garcia MD  Department of Hospital Medicine   Replaced by [facility] - Intensive Care ([facility])

## 2022-09-24 LAB
ALLENS TEST: ABNORMAL
ANION GAP SERPL CALC-SCNC: 11 MMOL/L (ref 8–16)
ANION GAP SERPL CALC-SCNC: 7 MMOL/L (ref 8–16)
APTT BLDCRRT: 34.2 SEC (ref 21–32)
BASOPHILS # BLD AUTO: 0.05 K/UL (ref 0–0.2)
BASOPHILS NFR BLD: 0.4 % (ref 0–1.9)
BUN SERPL-MCNC: 12 MG/DL (ref 6–20)
BUN SERPL-MCNC: 8 MG/DL (ref 6–20)
CALCIUM SERPL-MCNC: 8 MG/DL (ref 8.7–10.5)
CALCIUM SERPL-MCNC: 8.3 MG/DL (ref 8.7–10.5)
CHLORIDE SERPL-SCNC: 104 MMOL/L (ref 95–110)
CHLORIDE SERPL-SCNC: 109 MMOL/L (ref 95–110)
CO2 SERPL-SCNC: 23 MMOL/L (ref 23–29)
CO2 SERPL-SCNC: 23 MMOL/L (ref 23–29)
CREAT SERPL-MCNC: 0.6 MG/DL (ref 0.5–1.4)
CREAT SERPL-MCNC: 0.7 MG/DL (ref 0.5–1.4)
DELSYS: ABNORMAL
DIFFERENTIAL METHOD: ABNORMAL
EOSINOPHIL # BLD AUTO: 0 K/UL (ref 0–0.5)
EOSINOPHIL NFR BLD: 0 % (ref 0–8)
ERYTHROCYTE [DISTWIDTH] IN BLOOD BY AUTOMATED COUNT: 15.1 % (ref 11.5–14.5)
EST. GFR  (NO RACE VARIABLE): >60 ML/MIN/1.73 M^2
EST. GFR  (NO RACE VARIABLE): >60 ML/MIN/1.73 M^2
FLOW: 3
GLUCOSE SERPL-MCNC: 122 MG/DL (ref 70–110)
GLUCOSE SERPL-MCNC: 179 MG/DL (ref 70–110)
HCO3 UR-SCNC: 24.5 MMOL/L (ref 24–28)
HCT VFR BLD AUTO: 25.6 % (ref 37–48.5)
HGB BLD-MCNC: 8.4 G/DL (ref 12–16)
IMM GRANULOCYTES # BLD AUTO: 0.07 K/UL (ref 0–0.04)
IMM GRANULOCYTES NFR BLD AUTO: 0.6 % (ref 0–0.5)
INR PPP: 1 (ref 0.8–1.2)
LYMPHOCYTES # BLD AUTO: 1.1 K/UL (ref 1–4.8)
LYMPHOCYTES NFR BLD: 9 % (ref 18–48)
MAGNESIUM SERPL-MCNC: 2.4 MG/DL (ref 1.6–2.6)
MAGNESIUM SERPL-MCNC: 2.7 MG/DL (ref 1.6–2.6)
MCH RBC QN AUTO: 27.6 PG (ref 27–31)
MCHC RBC AUTO-ENTMCNC: 32.8 G/DL (ref 32–36)
MCV RBC AUTO: 84 FL (ref 82–98)
MODE: ABNORMAL
MONOCYTES # BLD AUTO: 1.4 K/UL (ref 0.3–1)
MONOCYTES NFR BLD: 11 % (ref 4–15)
NEUTROPHILS # BLD AUTO: 9.7 K/UL (ref 1.8–7.7)
NEUTROPHILS NFR BLD: 79 % (ref 38–73)
NRBC BLD-RTO: 0 /100 WBC
PCO2 BLDA: 39.3 MMHG (ref 35–45)
PH SMN: 7.4 [PH] (ref 7.35–7.45)
PLATELET # BLD AUTO: 226 K/UL (ref 150–450)
PMV BLD AUTO: 11.5 FL (ref 9.2–12.9)
PO2 BLDA: 59 MMHG (ref 80–100)
POC BE: 0 MMOL/L
POC SATURATED O2: 90 % (ref 95–100)
POCT GLUCOSE: 100 MG/DL (ref 70–110)
POCT GLUCOSE: 101 MG/DL (ref 70–110)
POCT GLUCOSE: 102 MG/DL (ref 70–110)
POCT GLUCOSE: 102 MG/DL (ref 70–110)
POCT GLUCOSE: 104 MG/DL (ref 70–110)
POCT GLUCOSE: 108 MG/DL (ref 70–110)
POCT GLUCOSE: 110 MG/DL (ref 70–110)
POCT GLUCOSE: 112 MG/DL (ref 70–110)
POCT GLUCOSE: 113 MG/DL (ref 70–110)
POCT GLUCOSE: 115 MG/DL (ref 70–110)
POCT GLUCOSE: 121 MG/DL (ref 70–110)
POCT GLUCOSE: 125 MG/DL (ref 70–110)
POCT GLUCOSE: 134 MG/DL (ref 70–110)
POCT GLUCOSE: 137 MG/DL (ref 70–110)
POCT GLUCOSE: 140 MG/DL (ref 70–110)
POCT GLUCOSE: 169 MG/DL (ref 70–110)
POCT GLUCOSE: 193 MG/DL (ref 70–110)
POCT GLUCOSE: 208 MG/DL (ref 70–110)
POTASSIUM SERPL-SCNC: 4 MMOL/L (ref 3.5–5.1)
POTASSIUM SERPL-SCNC: 4.9 MMOL/L (ref 3.5–5.1)
PROTHROMBIN TIME: 11 SEC (ref 9–12.5)
RBC # BLD AUTO: 3.04 M/UL (ref 4–5.4)
SAMPLE: ABNORMAL
SITE: ABNORMAL
SODIUM SERPL-SCNC: 138 MMOL/L (ref 136–145)
SODIUM SERPL-SCNC: 139 MMOL/L (ref 136–145)
WBC # BLD AUTO: 12.31 K/UL (ref 3.9–12.7)

## 2022-09-24 PROCEDURE — 20000000 HC ICU ROOM

## 2022-09-24 PROCEDURE — 63600175 PHARM REV CODE 636 W HCPCS: Performed by: INTERNAL MEDICINE

## 2022-09-24 PROCEDURE — 94640 AIRWAY INHALATION TREATMENT: CPT

## 2022-09-24 PROCEDURE — 94760 N-INVAS EAR/PLS OXIMETRY 1: CPT

## 2022-09-24 PROCEDURE — 97167 OT EVAL HIGH COMPLEX 60 MIN: CPT

## 2022-09-24 PROCEDURE — 85025 COMPLETE CBC W/AUTO DIFF WBC: CPT | Performed by: THORACIC SURGERY (CARDIOTHORACIC VASCULAR SURGERY)

## 2022-09-24 PROCEDURE — 94799 UNLISTED PULMONARY SVC/PX: CPT

## 2022-09-24 PROCEDURE — 99291 CRITICAL CARE FIRST HOUR: CPT | Mod: ,,, | Performed by: INTERNAL MEDICINE

## 2022-09-24 PROCEDURE — 85730 THROMBOPLASTIN TIME PARTIAL: CPT | Performed by: THORACIC SURGERY (CARDIOTHORACIC VASCULAR SURGERY)

## 2022-09-24 PROCEDURE — 25000003 PHARM REV CODE 250: Performed by: PHYSICIAN ASSISTANT

## 2022-09-24 PROCEDURE — 25000003 PHARM REV CODE 250: Performed by: INTERNAL MEDICINE

## 2022-09-24 PROCEDURE — 99900035 HC TECH TIME PER 15 MIN (STAT)

## 2022-09-24 PROCEDURE — 99233 PR SUBSEQUENT HOSPITAL CARE,LEVL III: ICD-10-PCS | Mod: ,,, | Performed by: STUDENT IN AN ORGANIZED HEALTH CARE EDUCATION/TRAINING PROGRAM

## 2022-09-24 PROCEDURE — 25000003 PHARM REV CODE 250: Performed by: THORACIC SURGERY (CARDIOTHORACIC VASCULAR SURGERY)

## 2022-09-24 PROCEDURE — 80048 BASIC METABOLIC PNL TOTAL CA: CPT | Performed by: THORACIC SURGERY (CARDIOTHORACIC VASCULAR SURGERY)

## 2022-09-24 PROCEDURE — 99291 PR CRITICAL CARE, E/M 30-74 MINUTES: ICD-10-PCS | Mod: ,,, | Performed by: INTERNAL MEDICINE

## 2022-09-24 PROCEDURE — 27000221 HC OXYGEN, UP TO 24 HOURS

## 2022-09-24 PROCEDURE — 85610 PROTHROMBIN TIME: CPT | Performed by: THORACIC SURGERY (CARDIOTHORACIC VASCULAR SURGERY)

## 2022-09-24 PROCEDURE — 27200667 HC PACEMAKER, TEMPORARY MONITORING, PER SHIFT

## 2022-09-24 PROCEDURE — 83735 ASSAY OF MAGNESIUM: CPT | Performed by: THORACIC SURGERY (CARDIOTHORACIC VASCULAR SURGERY)

## 2022-09-24 PROCEDURE — 99233 SBSQ HOSP IP/OBS HIGH 50: CPT | Mod: ,,, | Performed by: STUDENT IN AN ORGANIZED HEALTH CARE EDUCATION/TRAINING PROGRAM

## 2022-09-24 PROCEDURE — 63600175 PHARM REV CODE 636 W HCPCS: Performed by: THORACIC SURGERY (CARDIOTHORACIC VASCULAR SURGERY)

## 2022-09-24 PROCEDURE — 97162 PT EVAL MOD COMPLEX 30 MIN: CPT

## 2022-09-24 PROCEDURE — 25000242 PHARM REV CODE 250 ALT 637 W/ HCPCS: Performed by: THORACIC SURGERY (CARDIOTHORACIC VASCULAR SURGERY)

## 2022-09-24 RX ORDER — IBUPROFEN 200 MG
16 TABLET ORAL
Status: DISCONTINUED | OUTPATIENT
Start: 2022-09-24 | End: 2022-09-30 | Stop reason: HOSPADM

## 2022-09-24 RX ORDER — INSULIN ASPART 100 [IU]/ML
1-10 INJECTION, SOLUTION INTRAVENOUS; SUBCUTANEOUS
Status: DISCONTINUED | OUTPATIENT
Start: 2022-09-24 | End: 2022-09-30 | Stop reason: HOSPADM

## 2022-09-24 RX ORDER — IBUPROFEN 200 MG
24 TABLET ORAL
Status: DISCONTINUED | OUTPATIENT
Start: 2022-09-24 | End: 2022-09-30 | Stop reason: HOSPADM

## 2022-09-24 RX ORDER — GLUCAGON 1 MG
1 KIT INJECTION
Status: DISCONTINUED | OUTPATIENT
Start: 2022-09-24 | End: 2022-09-30 | Stop reason: HOSPADM

## 2022-09-24 RX ORDER — MUPIROCIN 20 MG/G
OINTMENT TOPICAL 2 TIMES DAILY
Status: DISPENSED | OUTPATIENT
Start: 2022-09-24 | End: 2022-09-29

## 2022-09-24 RX ORDER — INSULIN ASPART 100 [IU]/ML
7 INJECTION, SOLUTION INTRAVENOUS; SUBCUTANEOUS
Status: DISCONTINUED | OUTPATIENT
Start: 2022-09-24 | End: 2022-09-25

## 2022-09-24 RX ADMIN — Medication 6 MG: at 10:09

## 2022-09-24 RX ADMIN — CHLORHEXIDINE GLUCONATE 0.12% ORAL RINSE 10 ML: 1.2 LIQUID ORAL at 08:09

## 2022-09-24 RX ADMIN — FLUOXETINE 40 MG: 20 CAPSULE ORAL at 08:09

## 2022-09-24 RX ADMIN — OXYCODONE HYDROCHLORIDE 5 MG: 5 TABLET ORAL at 03:09

## 2022-09-24 RX ADMIN — INSULIN ASPART 7 UNITS: 100 INJECTION, SOLUTION INTRAVENOUS; SUBCUTANEOUS at 04:09

## 2022-09-24 RX ADMIN — MORPHINE SULFATE 2 MG: 2 INJECTION, SOLUTION INTRAMUSCULAR; INTRAVENOUS at 01:09

## 2022-09-24 RX ADMIN — OXYCODONE HYDROCHLORIDE 5 MG: 5 TABLET ORAL at 07:09

## 2022-09-24 RX ADMIN — POTASSIUM CHLORIDE 20 MEQ: 1500 TABLET, EXTENDED RELEASE ORAL at 08:09

## 2022-09-24 RX ADMIN — PANTOPRAZOLE SODIUM 40 MG: 40 TABLET, DELAYED RELEASE ORAL at 08:09

## 2022-09-24 RX ADMIN — INSULIN DETEMIR 13 UNITS: 100 INJECTION, SOLUTION SUBCUTANEOUS at 03:09

## 2022-09-24 RX ADMIN — CLOPIDOGREL 75 MG: 75 TABLET, FILM COATED ORAL at 08:09

## 2022-09-24 RX ADMIN — PREGABALIN 100 MG: 100 CAPSULE ORAL at 08:09

## 2022-09-24 RX ADMIN — DIVALPROEX SODIUM 500 MG: 250 TABLET, DELAYED RELEASE ORAL at 03:09

## 2022-09-24 RX ADMIN — DIVALPROEX SODIUM 500 MG: 250 TABLET, DELAYED RELEASE ORAL at 06:09

## 2022-09-24 RX ADMIN — DIVALPROEX SODIUM 500 MG: 250 TABLET, DELAYED RELEASE ORAL at 09:09

## 2022-09-24 RX ADMIN — INSULIN ASPART 2 UNITS: 100 INJECTION, SOLUTION INTRAVENOUS; SUBCUTANEOUS at 09:09

## 2022-09-24 RX ADMIN — NALXONE HYDROCHLORIDE 0.02 MG: 0.4 INJECTION INTRAMUSCULAR; INTRAVENOUS; SUBCUTANEOUS at 05:09

## 2022-09-24 RX ADMIN — FUROSEMIDE 40 MG: 10 INJECTION, SOLUTION INTRAMUSCULAR; INTRAVENOUS at 08:09

## 2022-09-24 RX ADMIN — ACETAMINOPHEN 650 MG: 325 TABLET ORAL at 03:09

## 2022-09-24 RX ADMIN — IPRATROPIUM BROMIDE AND ALBUTEROL SULFATE 3 ML: 2.5; .5 SOLUTION RESPIRATORY (INHALATION) at 01:09

## 2022-09-24 RX ADMIN — PREGABALIN 100 MG: 100 CAPSULE ORAL at 03:09

## 2022-09-24 RX ADMIN — OXYCODONE HYDROCHLORIDE AND ACETAMINOPHEN 500 MG: 500 TABLET ORAL at 08:09

## 2022-09-24 RX ADMIN — IPRATROPIUM BROMIDE AND ALBUTEROL SULFATE 3 ML: 2.5; .5 SOLUTION RESPIRATORY (INHALATION) at 08:09

## 2022-09-24 RX ADMIN — INSULIN DETEMIR 13 UNITS: 100 INJECTION, SOLUTION SUBCUTANEOUS at 09:09

## 2022-09-24 RX ADMIN — ACETAMINOPHEN 650 MG: 325 TABLET ORAL at 09:09

## 2022-09-24 RX ADMIN — POLYETHYLENE GLYCOL 3350 17 G: 17 POWDER, FOR SOLUTION ORAL at 08:09

## 2022-09-24 RX ADMIN — POTASSIUM CHLORIDE 20 MEQ: 1500 TABLET, EXTENDED RELEASE ORAL at 09:09

## 2022-09-24 RX ADMIN — MUPIROCIN: 20 OINTMENT TOPICAL at 08:09

## 2022-09-24 RX ADMIN — DEXTROSE, SODIUM CHLORIDE, SODIUM LACTATE, POTASSIUM CHLORIDE, AND CALCIUM CHLORIDE: 5; .6; .31; .03; .02 INJECTION, SOLUTION INTRAVENOUS at 04:09

## 2022-09-24 RX ADMIN — CEFAZOLIN SODIUM 2 G: 2 SOLUTION INTRAVENOUS at 04:09

## 2022-09-24 RX ADMIN — METOPROLOL TARTRATE 25 MG: 25 TABLET, FILM COATED ORAL at 08:09

## 2022-09-24 RX ADMIN — MAGNESIUM HYDROXIDE 2400 MG: 400 SUSPENSION ORAL at 08:09

## 2022-09-24 RX ADMIN — ASPIRIN 81 MG: 81 TABLET, COATED ORAL at 08:09

## 2022-09-24 RX ADMIN — Medication 0.04 MCG/KG/MIN: at 05:09

## 2022-09-24 RX ADMIN — ATORVASTATIN CALCIUM 80 MG: 40 TABLET, FILM COATED ORAL at 08:09

## 2022-09-24 NOTE — PT/OT/SLP EVAL
Physical Therapy Evaluation    Patient Name:  Ca Diaz   MRN:  [id number]    Recommendations:     Discharge Recommendations:   (TBD)   Discharge Equipment Recommendations:  (TBD)   Barriers to discharge: None    Assessment:     Ca Diaz is a 62 y.o. female admitted with a medical diagnosis of S/P CABG x 3.  She presents with the following impairments/functional limitations:  weakness, impaired endurance, impaired functional mobility, gait instability, impaired balance, impaired cognition, decreased safety awareness which limit safe functional mobility.    Rehab Prognosis: Good; patient would benefit from acute skilled PT services to address these deficits and reach maximum level of function.    Recent Surgery: Procedure(s) (LRB):  CORONARY ARTERY BYPASS GRAFT (CABG) (N/A)  ECHOCARDIOGRAM,TRANSESOPHAGEAL (N/A)  SURGICAL PROCUREMENT, VEIN, ENDOSCOPIC (Left)  BLOCK, NERVE, INTERCOSTAL, 2 OR MORE (N/A) 1 Day Post-Op    Plan:     During this hospitalization, patient to be seen 3 x/week (3-5x/week) to address the identified rehab impairments via gait training, therapeutic activities, therapeutic exercises, neuromuscular re-education and progress toward the following goals:    Plan of Care Expires:  10/08/22    Subjective     Chief Complaint: S/P CABD x 3  Patient/Family Comments/goals: unable to provide clear goals  Pain/Comfort:  Pain Rating 1: 0/10    Patients cultural, spiritual, Spiritism conflicts given the current situation: no    Living Environment:  Unable to determine due to decrease level of alertness  Prior to admission, patients level of function was unknown at this time.  Equipment used at home:  (family member stepped out of room shortly after PT arrival and pt unable to provide subj hx).  DME owned (not currently used):  unknown .  Upon discharge, patient will have assistance from unknown.    Objective:     Communicated with nurse Osorio prior to session.  Patient found up in  chair with peripheral IV, telemetry, oxygen, sena catheter, chest tube, wound vac  upon PT entry to room.    General Precautions: Standard, fall, sternal   Orthopedic Precautions:N/A   Braces: N/A      Exams:  RLE ROM: WFL  RLE Strength: WFL  LLE ROM: WFL  LLE Strength: WFL      Functional Mobility:    Transfers:  Pt set-up and required total assist to scoot to EOB, increased cuing to recall sternal precautions but continued to try to pull from armrests of chair. Unable to perform forward weight shifting without eyes closing, falling asleep.         AM-PAC 6 CLICK MOBILITY  Total Score:10     Patient left up in recliner with BLE elevated, all lines intact, call button in reach, and nursing notified.    GOALS:   Multidisciplinary Problems       Physical Therapy Goals          Problem: Physical Therapy    Goal Priority Disciplines Outcome Goal Variances Interventions   Physical Therapy Goal     PT, PT/OT      Description: Pt will perform bed mobility independently in order to participate in EOB activity.  Pt will perform transfers independently in order to participate in OOB activity.   Pt will ambulate 100ft mod I with LRAD in order to participate in daily tasks.                         History:     Past Medical History:   Diagnosis Date    Anxiety and depression 3/13/2014    Essential (primary) hypertension 2013    Fibromyalgia 2012    Hyperlipidemia     Obstructive sleep apnea syndrome 2014    RLS (restless legs syndrome) 10/18/2018    Overview:  Rheum Dr Tam    Seizures     Type 2 diabetes mellitus 2012    ICD-10 Transition Last Assessment & Plan:  Continue with sliding scale insulin.  Control improved on low-dose Lantus presently       Past Surgical History:   Procedure Laterality Date    ABDOMINAL SURGERY      ARTERIOGRAPHY OF SUBCLAVIAN ARTERY  2022    Procedure: ARTERIOGRAM, SUBCLAVIAN;  Surgeon: Vale Pa MD;  Location: [facility] CATH LAB;  Service: Cardiology;;      SECTION      LEFT HEART CATHETERIZATION Left 9/17/2022    Procedure: CATHETERIZATION, HEART, LEFT;  Surgeon: Vale Pa MD;  Location: [facility] CATH LAB;  Service: Cardiology;  Laterality: Left;       Time Tracking:     PT Received On: 09/24/22  PT Start Time: 1025     PT Stop Time: 1040  PT Total Time (min): 15 min     Billable Minutes: Evaluation 15      09/24/2022

## 2022-09-24 NOTE — ASSESSMENT & PLAN NOTE
Multiple prior stents last 2020. Recurrent symptoms and pos troponin.  Plan  Cath today . Patient agrees    9/18/22-Patient stable post cath, 3 vessel CAD and plan CABG     9/19/22  -s/p cath that showed multivessel CAD  -Stable this AM, awaiting CABG after Brilinta washout  -Continue OMT-ASA, statin, BB, amlodipine, heparin gtt    9/20/22  -CABG planned Friday after Brilinta washout  -Cont OMT- ASA, statin, BB, amlodipine, hep gtt    9/21/22  -Stable, no acute events  -Continue ASA, statin, BB, amlodipine, heparin gtt  -Awaiting CABG    9/24/22  POD1 s/p CABG x 3  Continue aspirin, metoprolol, statin, Plavix  IV diuresis  Being managed by CT surgery

## 2022-09-24 NOTE — ANESTHESIA POSTPROCEDURE EVALUATION
Anesthesia Post Evaluation    Patient: Ca Diaz    Procedure(s) Performed: Procedure(s) (LRB):  CORONARY ARTERY BYPASS GRAFT (CABG) (N/A)  ECHOCARDIOGRAM,TRANSESOPHAGEAL (N/A)  SURGICAL PROCUREMENT, VEIN, ENDOSCOPIC (Left)  BLOCK, NERVE, INTERCOSTAL, 2 OR MORE (N/A)    Final Anesthesia Type: general      Patient location during evaluation: PACU  Patient participation: Yes- Able to Participate  Level of consciousness: awake  Post-procedure vital signs: reviewed and stable  Pain management: adequate  Airway patency: patent    PONV status at discharge: No PONV  Anesthetic complications: no      Cardiovascular status: hemodynamically stable  Respiratory status: unassisted  Hydration status: euvolemic  Follow-up not needed.          Vitals Value Taken Time   BP 91/52 09/24/22 1002   Temp 38.1 °C (100.58 °F) 09/24/22 1012   Pulse 79 09/24/22 1012   Resp 11 09/24/22 1012   SpO2 99 % 09/24/22 1012   Vitals shown include unvalidated device data.      No case tracking events are documented in the log.      Pain/Javier Score: Pain Rating Prior to Med Admin: 6 (9/24/2022  7:24 AM)  Pain Rating Post Med Admin: 2 (9/24/2022  4:49 AM)

## 2022-09-24 NOTE — SUBJECTIVE & OBJECTIVE
Review of Systems   Constitutional: Positive for malaise/fatigue. Negative for diaphoresis, weight gain and weight loss.   HENT:  Negative for congestion and nosebleeds.    Cardiovascular:  Positive for chest pain. Negative for claudication, cyanosis, dyspnea on exertion, irregular heartbeat, leg swelling, near-syncope, orthopnea, palpitations, paroxysmal nocturnal dyspnea and syncope.   Respiratory:  Negative for cough, hemoptysis, shortness of breath, sleep disturbances due to breathing, snoring, sputum production and wheezing.    Hematologic/Lymphatic: Negative for bleeding problem. Does not bruise/bleed easily.   Skin:  Negative for rash.   Musculoskeletal:  Negative for arthritis, back pain, falls, joint pain, muscle cramps and muscle weakness.   Gastrointestinal:  Negative for abdominal pain, constipation, diarrhea, heartburn, hematemesis, hematochezia, melena, nausea and vomiting.   Genitourinary:  Negative for dysuria, hematuria and nocturia.   Neurological:  Negative for excessive daytime sleepiness, dizziness, headaches, light-headedness, loss of balance, numbness, vertigo and weakness.   Objective:     Vital Signs (Most Recent):  Temp: (!) 101.3 °F (38.5 °C) (09/24/22 1600)  Pulse: 78 (09/24/22 1600)  Resp: (!) 21 (09/24/22 1600)  BP: (!) 102/53 (09/24/22 1600)  SpO2: 98 % (09/24/22 1600)   Vital Signs (24h Range):  Temp:  [98.6 °F (37 °C)-101.3 °F (38.5 °C)] 101.3 °F (38.5 °C)  Pulse:  [74-93] 78  Resp:  [11-37] 21  SpO2:  [87 %-100 %] 98 %  BP: ()/(42-68) 102/53  Arterial Line BP: (100-125)/(47-63) 118/51     Weight: 93 kg (205 lb 0.4 oz)  Body mass index is 37.5 kg/m².     SpO2: 98 %  O2 Device (Oxygen Therapy): nasal cannula      Intake/Output Summary (Last 24 hours) at 9/24/2022 1736  Last data filed at 9/24/2022 1700  Gross per 24 hour   Intake 1852.17 ml   Output 1551 ml   Net 301.17 ml       Lines/Drains/Airways       Central Venous Catheter Line  Duration             Percutaneous Central  Line Insertion/Assessment - Triple Lumen  09/23/22 0739 right internal jugular 1 day              Drain  Duration                  Urethral Catheter 09/23/22 0730 Silicone;Temperature probe;Straight-tip 16 Fr. 1 day         Y Chest Tube 1 and 2 09/23/22 1150 1 Right Mediastinal 24 Fr. 2 Left Mediastinal 24 Fr. 1 day         Y Chest Tube 3 and 4 09/23/22 1150 3 Left Pleural 24 Fr. 4 Right Pleural 24 Fr. 1 day              Arterial Line  Duration             Arterial Line 09/23/22 0715 Right Radial 1 day              Peripheral Intravenous Line  Duration                  Peripheral IV - Single Lumen 09/17/22 0150 20 G Posterior;Right Hand 7 days         Peripheral IV - Single Lumen 09/23/22 1317 16 G Right Antecubital 1 day                    Physical Exam  Vitals and nursing note reviewed.   Constitutional:       Appearance: Normal appearance. She is well-developed.   HENT:      Head: Normocephalic.      Mouth/Throat:      Mouth: Mucous membranes are moist.   Neck:      Vascular: No carotid bruit or JVD.   Cardiovascular:      Rate and Rhythm: Normal rate and regular rhythm.      Pulses: Normal pulses.      Heart sounds: Normal heart sounds. No murmur heard.    No friction rub.   Pulmonary:      Effort: Pulmonary effort is normal. No respiratory distress.      Breath sounds: Normal breath sounds. No wheezing or rales.   Abdominal:      General: Bowel sounds are normal. There is no distension.      Palpations: Abdomen is soft.      Tenderness: There is no abdominal tenderness. There is no guarding.   Musculoskeletal:         General: No swelling or tenderness.      Cervical back: Neck supple. No tenderness.      Right lower leg: No edema.      Left lower leg: No edema.   Skin:     General: Skin is warm and dry.      Capillary Refill: Capillary refill takes less than 2 seconds.      Findings: No rash.   Neurological:      General: No focal deficit present.      Mental Status: She is alert and oriented to person, place,  and time.   Psychiatric:         Mood and Affect: Mood normal.         Behavior: Behavior normal.         Thought Content: Thought content normal.       Significant Labs: BMP:   Recent Labs   Lab 09/23/22  1740 09/23/22 2008 09/24/22  0505 09/24/22  1611   *  --  122* 179*     --  139 138   K 3.8  --  4.0 4.9     --  109 104   CO2 22*  --  23 23   BUN 10  --  8 12   CREATININE 0.6  --  0.6 0.7   CALCIUM 7.9*  --  8.0* 8.3*   MG 2.9* 2.4 2.7* 2.4   , CBC   Recent Labs   Lab 09/23/22  1322 09/23/22  1326 09/24/22  0505   WBC 15.19*  --  12.31   HGB 9.9*  --  8.4*   HCT 29.7*   < > 25.6*     --  226    < > = values in this interval not displayed.   , INR   Recent Labs   Lab 09/23/22 1322 09/24/22  0505   INR 1.1 1.0   , Lipid Panel No results for input(s): CHOL, HDL, LDLCALC, TRIG, CHOLHDL in the last 48 hours., and Troponin No results for input(s): TROPONINI in the last 48 hours.    Significant Imaging: Echocardiogram: 2D echo with color flow doppler: No results found for this or any previous visit. and Transthoracic echo (TTE) complete (Cupid Only):   Results for orders placed or performed during the hospital encounter of 09/16/22   Echo   Result Value Ref Range    BSA 2 m2    TDI SEPTAL 0.06 m/s    LV LATERAL E/E' RATIO 16.00 m/s    LV SEPTAL E/E' RATIO 16.00 m/s    LA WIDTH 3.50 cm    IVC diameter 1.78 cm    Left Ventricular Outflow Tract Mean Velocity 0.90 cm/s    Left Ventricular Outflow Tract Mean Gradient 3.28 mmHg    TDI LATERAL 0.06 m/s    LVIDd 3.49 (A) 3.5 - 6.0 cm    IVS 1.28 (A) 0.6 - 1.1 cm    Posterior Wall 1.18 (A) 0.6 - 1.1 cm    Ao root annulus 2.57 cm    LVIDs 2.43 2.1 - 4.0 cm    FS 30 28 - 44 %    LA volume 53.78 cm3    STJ 2.92 cm    Ascending aorta 2.90 cm    LV mass 140.49 g    LA size 3.58 cm    TAPSE 1.90 cm    Left Ventricle Relative Wall Thickness 0.68 cm    AV mean gradient 8 mmHg    AV valve area 1.96 cm2    AV Velocity Ratio 0.63     AV index (prosthetic) 0.65      E/A ratio 0.98     Mean e' 0.06 m/s    E wave deceleration time 236.57 msec    IVRT 79.92 msec    LVOT diameter 1.96 cm    LVOT area 3.0 cm2    LVOT peak nilay 1.07 m/s    LVOT peak VTI 27.30 cm    Ao peak nilay 1.71 m/s    Ao VTI 42.1 cm    RVOT peak nilay 0.70 m/s    RVOT peak VTI 15.6 cm    LVOT stroke volume 82.33 cm3    AV peak gradient 12 mmHg    PV mean gradient 1.22 mmHg    E/E' ratio 16.00 m/s    MV Peak E Nilay 0.96 m/s    MV Peak A Nilay 0.98 m/s    LV Systolic Volume 20.74 mL    LV Systolic Volume Index 10.8 mL/m2    LV Diastolic Volume 50.49 mL    LV Diastolic Volume Index 26.30 mL/m2    LA Volume Index 28.0 mL/m2    LV Mass Index 73 g/m2    RA Major Axis 3.84 cm    Left Atrium Minor Axis 5.02 cm    Left Atrium Major Axis 5.08 cm    RA Width 2.80 cm    Right Atrial Pressure (from IVC) 8 mmHg    EF 60 %    Narrative    · The left ventricle is normal in size with concentric remodeling and   normal systolic function.  · The estimated ejection fraction is 60%.  · Indeterminate left ventricular diastolic function.  · Normal right ventricular size with normal right ventricular systolic   function.  · There is mild aortic valve stenosis.  · Aortic valve area is 1.96 cm2; peak velocity is 1.71 m/s; mean gradient   is 8 mmHg.  · Intermediate central venous pressure (8 mmHg).

## 2022-09-24 NOTE — PROGRESS NOTES
"[facility] - Intensive Care ([facility])  Cardiology  Progress Note    Patient Name: Ca Diaz  MRN: [id number]  Admission Date: 9/16/2022  Hospital Length of Stay: 7 days  Code Status: Full Code   Attending Physician: Krystal Marin MD   Primary Care Physician: Desiree Frye MD  Expected Discharge Date:   Principal Problem:S/P CABG x 3    Subjective:     Hospital Course:      Ca Diaz is a 62 y.o. female patient with a PMHx of anxiety, CAD, h/o CVA, depression, DM II, RLS, seizures, fibromyalgia, HLD, HTN, PRAVEENA, and obesity who presented to the Emergency Department for evaluation of generalized weakness which onset gradually two days ago. Pt states she fell last night (9/15) and the night before last (9/14). Pt's  states that she "can't even walk through an open doorway." Symptoms are constant and moderate in severity. No mitigating or exacerbating factors reported. Associated sxs include N/D and dizziness. Pt reports the nausea began one day ago. Patient denies any HA, vomiting, light-headedness, visual disturbance, CP, SOB/MALAVE, diaphoresis, neck or jaw pain, upper extremity pain, numbness/tingling, dysuria, and all other sxs at this time. Work-up in the ED revealed NSTEMI with troponin of 0.502, EKG unrevealing, CXR unremarkable, CT of the head negative for acute process. 325 mg ASA given and UFH initiated. Hospital Medicine was contacted for admission and patient placed in Observation.   9/18/22-Patient seen and examined today. No chest pain. Plan CABG soon due to complex 3 vessel CAD.        9/19/22-Patient seen and examined today. Reported some indigestion symptoms earlier this AM, has since resolved. No alden chest pain or tightness. No other CV complaints. Labs stable. Awaiting CABG after Brilinta washout period.    9/20/22- Patient seen and examined today. No complaints overnight. No c/o cp or SOB.  Labs reviewed, on Hep gtt. CABG planned for Friday after Brilinta washout " period    9/21/22-Patient seen and examined today. Stable overnight. No issues. Labs reviewed. Awaiting CABG.    9/24/22- POD1. The patient had successful CABG x3 on 9/23/22. SVG-diagonal, SVG-PDA, LIMA-LAD.  Doing well.  Sitting up in the chair, feeling tired.  No acute events overnight.        Review of Systems   Constitutional: Positive for malaise/fatigue. Negative for diaphoresis, weight gain and weight loss.   HENT:  Negative for congestion and nosebleeds.    Cardiovascular:  Positive for chest pain. Negative for claudication, cyanosis, dyspnea on exertion, irregular heartbeat, leg swelling, near-syncope, orthopnea, palpitations, paroxysmal nocturnal dyspnea and syncope.   Respiratory:  Negative for cough, hemoptysis, shortness of breath, sleep disturbances due to breathing, snoring, sputum production and wheezing.    Hematologic/Lymphatic: Negative for bleeding problem. Does not bruise/bleed easily.   Skin:  Negative for rash.   Musculoskeletal:  Negative for arthritis, back pain, falls, joint pain, muscle cramps and muscle weakness.   Gastrointestinal:  Negative for abdominal pain, constipation, diarrhea, heartburn, hematemesis, hematochezia, melena, nausea and vomiting.   Genitourinary:  Negative for dysuria, hematuria and nocturia.   Neurological:  Negative for excessive daytime sleepiness, dizziness, headaches, light-headedness, loss of balance, numbness, vertigo and weakness.   Objective:     Vital Signs (Most Recent):  Temp: (!) 101.3 °F (38.5 °C) (09/24/22 1600)  Pulse: 78 (09/24/22 1600)  Resp: (!) 21 (09/24/22 1600)  BP: (!) 102/53 (09/24/22 1600)  SpO2: 98 % (09/24/22 1600)   Vital Signs (24h Range):  Temp:  [98.6 °F (37 °C)-101.3 °F (38.5 °C)] 101.3 °F (38.5 °C)  Pulse:  [74-93] 78  Resp:  [11-37] 21  SpO2:  [87 %-100 %] 98 %  BP: ()/(42-68) 102/53  Arterial Line BP: (100-125)/(47-63) 118/51     Weight: 93 kg (205 lb 0.4 oz)  Body mass index is 37.5 kg/m².     SpO2: 98 %  O2 Device (Oxygen  Therapy): nasal cannula      Intake/Output Summary (Last 24 hours) at 9/24/2022 1736  Last data filed at 9/24/2022 1700  Gross per 24 hour   Intake 1852.17 ml   Output 1551 ml   Net 301.17 ml       Lines/Drains/Airways       Central Venous Catheter Line  Duration             Percutaneous Central Line Insertion/Assessment - Triple Lumen  09/23/22 0739 right internal jugular 1 day              Drain  Duration                  Urethral Catheter 09/23/22 0730 Silicone;Temperature probe;Straight-tip 16 Fr. 1 day         Y Chest Tube 1 and 2 09/23/22 1150 1 Right Mediastinal 24 Fr. 2 Left Mediastinal 24 Fr. 1 day         Y Chest Tube 3 and 4 09/23/22 1150 3 Left Pleural 24 Fr. 4 Right Pleural 24 Fr. 1 day              Arterial Line  Duration             Arterial Line 09/23/22 0715 Right Radial 1 day              Peripheral Intravenous Line  Duration                  Peripheral IV - Single Lumen 09/17/22 0150 20 G Posterior;Right Hand 7 days         Peripheral IV - Single Lumen 09/23/22 1317 16 G Right Antecubital 1 day                    Physical Exam  Vitals and nursing note reviewed.   Constitutional:       Appearance: Normal appearance. She is well-developed.   HENT:      Head: Normocephalic.      Mouth/Throat:      Mouth: Mucous membranes are moist.   Neck:      Vascular: No carotid bruit or JVD.   Cardiovascular:      Rate and Rhythm: Normal rate and regular rhythm.      Pulses: Normal pulses.      Heart sounds: Normal heart sounds. No murmur heard.    No friction rub.   Pulmonary:      Effort: Pulmonary effort is normal. No respiratory distress.      Breath sounds: Normal breath sounds. No wheezing or rales.   Abdominal:      General: Bowel sounds are normal. There is no distension.      Palpations: Abdomen is soft.      Tenderness: There is no abdominal tenderness. There is no guarding.   Musculoskeletal:         General: No swelling or tenderness.      Cervical back: Neck supple. No tenderness.      Right lower  leg: No edema.      Left lower leg: No edema.   Skin:     General: Skin is warm and dry.      Capillary Refill: Capillary refill takes less than 2 seconds.      Findings: No rash.   Neurological:      General: No focal deficit present.      Mental Status: She is alert and oriented to person, place, and time.   Psychiatric:         Mood and Affect: Mood normal.         Behavior: Behavior normal.         Thought Content: Thought content normal.       Significant Labs: BMP:   Recent Labs   Lab 09/23/22  1740 09/23/22  2008 09/24/22  0505 09/24/22  1611   *  --  122* 179*     --  139 138   K 3.8  --  4.0 4.9     --  109 104   CO2 22*  --  23 23   BUN 10  --  8 12   CREATININE 0.6  --  0.6 0.7   CALCIUM 7.9*  --  8.0* 8.3*   MG 2.9* 2.4 2.7* 2.4   , CBC   Recent Labs   Lab 09/23/22  1322 09/23/22  1326 09/24/22  0505   WBC 15.19*  --  12.31   HGB 9.9*  --  8.4*   HCT 29.7*   < > 25.6*     --  226    < > = values in this interval not displayed.   , INR   Recent Labs   Lab 09/23/22  1322 09/24/22  0505   INR 1.1 1.0   , Lipid Panel No results for input(s): CHOL, HDL, LDLCALC, TRIG, CHOLHDL in the last 48 hours., and Troponin No results for input(s): TROPONINI in the last 48 hours.    Significant Imaging: Echocardiogram: 2D echo with color flow doppler: No results found for this or any previous visit. and Transthoracic echo (TTE) complete (Cupid Only):   Results for orders placed or performed during the hospital encounter of 09/16/22   Echo   Result Value Ref Range    BSA 2 m2    TDI SEPTAL 0.06 m/s    LV LATERAL E/E' RATIO 16.00 m/s    LV SEPTAL E/E' RATIO 16.00 m/s    LA WIDTH 3.50 cm    IVC diameter 1.78 cm    Left Ventricular Outflow Tract Mean Velocity 0.90 cm/s    Left Ventricular Outflow Tract Mean Gradient 3.28 mmHg    TDI LATERAL 0.06 m/s    LVIDd 3.49 (A) 3.5 - 6.0 cm    IVS 1.28 (A) 0.6 - 1.1 cm    Posterior Wall 1.18 (A) 0.6 - 1.1 cm    Ao root annulus 2.57 cm    LVIDs 2.43 2.1 - 4.0 cm     FS 30 28 - 44 %    LA volume 53.78 cm3    STJ 2.92 cm    Ascending aorta 2.90 cm    LV mass 140.49 g    LA size 3.58 cm    TAPSE 1.90 cm    Left Ventricle Relative Wall Thickness 0.68 cm    AV mean gradient 8 mmHg    AV valve area 1.96 cm2    AV Velocity Ratio 0.63     AV index (prosthetic) 0.65     E/A ratio 0.98     Mean e' 0.06 m/s    E wave deceleration time 236.57 msec    IVRT 79.92 msec    LVOT diameter 1.96 cm    LVOT area 3.0 cm2    LVOT peak nilay 1.07 m/s    LVOT peak VTI 27.30 cm    Ao peak nilay 1.71 m/s    Ao VTI 42.1 cm    RVOT peak nilay 0.70 m/s    RVOT peak VTI 15.6 cm    LVOT stroke volume 82.33 cm3    AV peak gradient 12 mmHg    PV mean gradient 1.22 mmHg    E/E' ratio 16.00 m/s    MV Peak E Nilay 0.96 m/s    MV Peak A Nilay 0.98 m/s    LV Systolic Volume 20.74 mL    LV Systolic Volume Index 10.8 mL/m2    LV Diastolic Volume 50.49 mL    LV Diastolic Volume Index 26.30 mL/m2    LA Volume Index 28.0 mL/m2    LV Mass Index 73 g/m2    RA Major Axis 3.84 cm    Left Atrium Minor Axis 5.02 cm    Left Atrium Major Axis 5.08 cm    RA Width 2.80 cm    Right Atrial Pressure (from IVC) 8 mmHg    EF 60 %    Narrative    · The left ventricle is normal in size with concentric remodeling and   normal systolic function.  · The estimated ejection fraction is 60%.  · Indeterminate left ventricular diastolic function.  · Normal right ventricular size with normal right ventricular systolic   function.  · There is mild aortic valve stenosis.  · Aortic valve area is 1.96 cm2; peak velocity is 1.71 m/s; mean gradient   is 8 mmHg.  · Intermediate central venous pressure (8 mmHg).        Assessment and Plan:         Atherosclerosis of native coronary artery of native heart without angina pectoris  Multiple prior stents last 2020. Recurrent symptoms and pos troponin.  Plan  Cath today . Patient agrees    9/18/22-Patient stable post cath, 3 vessel CAD and plan CABG     9/19/22  -s/p cath that showed multivessel CAD  -Stable this  AM, awaiting CABG after Brilinta washout  -Continue OMT-ASA, statin, BB, amlodipine, heparin gtt    9/20/22  -CABG planned Friday after Brilinta washout  -Cont OMT- ASA, statin, BB, amlodipine, hep gtt    9/21/22  -Stable, no acute events  -Continue ASA, statin, BB, amlodipine, heparin gtt  -Awaiting CABG    9/24/22  POD1 s/p CABG x 3  Continue aspirin, metoprolol, statin, Plavix  IV diuresis  Being managed by CT surgery    NSTEMI (non-ST elevated myocardial infarction)  Plan cath today    9/18/22- no further symptoms    9/19/22-See plan above    Hyperlipidemia  -Continue statin    Essential (primary) hypertension  -Continue metoprolol     Type 2 diabetes mellitus, with long-term current use of insulin  Per hospital medicine        VTE Risk Mitigation (From admission, onward)         Ordered     Place BENITEZ hose  Until discontinued         09/23/22 1307     Place sequential compression device  Until discontinued         09/23/22 1307                Gisela Velázquez MD  Cardiology  [facility] - Intensive Care ([facility])

## 2022-09-24 NOTE — CONSULTS
Food & Nutrition  Education    Diet Education: Post OP, Cardiac diet   Time Spent: RD remote  Learners:Patient       Nutrition Education provided with handouts:   + Clinical Reference attachments to d/c documents      Comments: Patient s/p CABG. Extubated and diet advanced to DM, cardiac, low na, low cholesterol.  NKFA.  LBM: 9/22/2022  Lab Results   Component Value Date    HGBA1C 10.9 (H) 09/17/2022         All questions and concerns answered. Dietitian's contact information provided.       Follow-Up:Please Re-consult as needed        Thanks!  Geneva Lemus, MS, RDN, LDN

## 2022-09-24 NOTE — PLAN OF CARE
SOFYA COMPLETED: Pt presents sitting in recliner. Increased attempts and set-up for out of chair mobility however pt was unable to remain awake, demonstrated decreased safety as she repeatedly attempted to pull herself forward in chair despite being educated immediately prior to attempts to scoot EOB. Recommendations pending further assessment.

## 2022-09-24 NOTE — PROGRESS NOTES
[facility] [place])  Critical Care Medicine  Progress Note    Patient Name: Ca Diaz  MRN: [id number]  Admission Date: 9/16/2022  Hospital Length of Stay: 7 days  Code Status: Full Code  Attending Provider: Krystal Marin MD  Primary Care Provider: Desiree Frye MD   Principal Problem: S/P CABG x 3    Subjective:     HPI:  62W pmh CAD, DM2, anxiety, h/o CVA, depression, DM II, RLS, seizures, fibromyalgia, HLD, HTN, PRAVEENA, and obesity admitted 9/16 with NSTEMI.  [facility] cath revealed multivessel disease.  She had been on Brilinta.  Pt reamined in the hospital on a heparin drip during the Brilinta washout period.  She went for 3v CABG today.  Per anesthesia, operative course was uncomplicated.  Specifically, no hemodynamic or bleeding problems.  No transfusions and no CellSaver given.  She is now in the ICU intubated on vent, sedated on Precedex, norepinephrine @ 0.04 mcg/kg/min.       Hospital/ICU Course:  9/23: 3v CABG.  Post-CABG care in ICU.    9/24:  Extubated last night.  Remains on low dose norepi and insulin drips.  Chest tubes with no air leak.      Interval History:  Complains of generalized aches and chest soreness.  Otherwise out of bed to chair.  Denies HA, SOB, abd pain, nausea, vomiting, diarrhea, fevers, sweats, chills.    Objective:     Vital Signs (Most Recent):  Temp: (!) 100.4 °F (38 °C) (09/24/22 0701)  Pulse: 86 (09/24/22 0701)  Resp: 19 (09/24/22 0724)  BP: (!) 106/55 (09/24/22 0701)  SpO2: 96 % (09/24/22 0645)   Vital Signs (24h Range):  Temp:  [98.6 °F (37 °C)-100.4 °F (38 °C)] 100.4 °F (38 °C)  Pulse:  [73-93] 86  Resp:  [6-37] 19  SpO2:  [87 %-100 %] 96 %  BP: ()/(43-68) 106/55  Arterial Line BP: ()/(47-64) 119/51     Weight: 93 kg (205 lb 0.4 oz)  Body mass index is 37.5 kg/m².      Intake/Output Summary (Last 24 hours) at 9/24/2022 0741  Last data filed at 9/24/2022 0600  Gross per 24 hour   Intake 4842.57 ml   Output 1585 ml   Net 3257.57 ml        Physical Exam  Vitals reviewed.   Constitutional:       Appearance: She is obese.   HENT:      Head: Normocephalic and atraumatic.      Nose: Nose normal.   Eyes:      General: No scleral icterus.     Extraocular Movements: Extraocular movements intact.      Pupils: Pupils are equal, round, and reactive to light.   Cardiovascular:      Rate and Rhythm: Normal rate and regular rhythm.      Comments: Chest tubes x4 with no air leak.  Epicardial wires.  Transparent sternal dressing in place.  Pulmonary:      Effort: Pulmonary effort is normal. No respiratory distress.      Comments: Symmetric chest rise.      Abdominal:      General: Abdomen is flat. There is no distension.      Palpations: Abdomen is soft.   Musculoskeletal:         General: No deformity or signs of injury.      Right lower leg: No edema.      Left lower leg: No edema.   Skin:     General: Skin is warm and dry.   Neurological:      General: No focal deficit present.      Mental Status: She is alert and oriented to person, place, and time.       Vents:  Vent Mode: Spont (09/23/22 1946)  Ventilator Initiated: Yes (09/23/22 1339)  Set Rate: 20 BPM (09/23/22 1704)  Vt Set: 400 mL (09/23/22 1704)  Pressure Support: 7 cmH20 (09/23/22 1946)  PEEP/CPAP: 5 cmH20 (09/23/22 1946)  Oxygen Concentration (%): 40 (09/23/22 2100)  Peak Airway Pressure: 13 cmH2O (09/23/22 1946)  Plateau Pressure: 0 cmH20 (09/23/22 1946)  Total Ve: 6.34 mL (09/23/22 1946)  Negative Inspiratory Force (cm H2O): -22 (09/23/22 2110)  F/VT Ratio<105 (RSBI): (!) 52.63 (09/23/22 1946)    Lines/Drains/Airways       Central Venous Catheter Line  Duration             Percutaneous Central Line Insertion/Assessment - Triple Lumen  09/23/22 0739 right internal jugular 1 day              Drain  Duration                  Urethral Catheter 09/23/22 0730 Silicone;Temperature probe;Straight-tip 16 Fr. 1 day         Closed/Suction Drain 09/23/22 1011 Left Leg Bulb 19 Fr. <1 day         Y Chest Tube  1 and 2 09/23/22 1150 1 Right Mediastinal 24 Fr. 2 Left Mediastinal 24 Fr. <1 day         Y Chest Tube 3 and 4 09/23/22 1150 3 Left Pleural 24 Fr. 4 Right Pleural 24 Fr. <1 day              Airway  Duration                  Airway - Non-Surgical 09/23/22 0727 1 day              Arterial Line  Duration             Arterial Line 09/23/22 0715 Right Radial 1 day              Peripheral Intravenous Line  Duration                  Peripheral IV - Single Lumen 09/17/22 0150 20 G Posterior;Right Hand 7 days         Peripheral IV - Single Lumen 09/23/22 1317 16 G Right Antecubital <1 day                    Significant Labs:    CBC/Anemia Profile:  Recent Labs   Lab 09/22/22  0845 09/23/22  0733 09/23/22  1322 09/23/22  1326 09/23/22 2029 09/24/22  0505   WBC 8.02  --  15.19*  --   --  12.31   HGB 13.1  --  9.9*  --   --  8.4*   HCT 41.3   < > 29.7* 27* 26* 25.6*     --  233  --   --  226   MCV 84  --  83  --   --  84   RDW 14.6*  --  14.4  --   --  15.1*    < > = values in this interval not displayed.        Chemistries:  Recent Labs   Lab 09/22/22  1647 09/22/22  1647 09/23/22  1322 09/23/22 1740 09/23/22 2008 09/24/22  0505     --  139 139  --  139   K 4.5  --  3.7 3.8  --  4.0     --  110 110  --  109   CO2 30*  --  20* 22*  --  23   BUN 10  --  9 10  --  8   CREATININE 0.7  --  0.6 0.6  --  0.6   CALCIUM 8.9  --  8.0* 7.9*  --  8.0*   ALBUMIN 2.8*  --   --   --   --   --    PROT 5.9*  --   --   --   --   --    BILITOT 0.4  --   --   --   --   --    ALKPHOS 64  --   --   --   --   --    ALT 26  --   --   --   --   --    AST 23  --   --   --   --   --    MG  --    < > 2.0 2.9* 2.4 2.7*    < > = values in this interval not displayed.       A1C: No results for input(s): HGBA1C in the last 48 hours.  Blood Culture: No results for input(s): LABBLOO in the last 48 hours.  Coagulation:   Recent Labs   Lab 09/24/22  0505   INR 1.0   APTT 34.2*     Lactic Acid: No results for input(s): LACTATE in the last 48  hours.  Pathology Results  (Last 10 years)      None          POCT Glucose:   Recent Labs   Lab 09/24/22  0505 09/24/22  0606 09/24/22  0709   POCTGLUCOSE 125* 110 115*     Respiratory Culture: No results for input(s): GSRESP, RESPIRATORYC in the last 48 hours.  Troponin: No results for input(s): TROPONINI in the last 48 hours.  Urine Culture: No results for input(s): LABURIN in the last 48 hours.  Urine Studies: No results for input(s): COLORU, APPEARANCEUA, PHUR, SPECGRAV, PROTEINUA, GLUCUA, KETONESU, BILIRUBINUA, OCCULTUA, NITRITE, UROBILINOGEN, LEUKOCYTESUR, RBCUA, WBCUA, BACTERIA, SQUAMEPITHEL, HYALINECASTS in the last 48 hours.    Invalid input(s): ALEC    Significant Imaging:   I have reviewed all pertinent imaging results/findings within the past 24 hours.      ABG  Recent Labs   Lab 09/23/22 2029   PH 7.347*   PO2 110*   PCO2 44.0   HCO3 24.1   BE -2     Assessment/Plan:     Neuro  Seizure  Continue depakote    Psychiatric  Anxiety and depression  Fluoxetine    Cardiac/Vascular  * S/P CABG x 3  3v CABG on 9/23  Extubated evening of 9/23.      Sedation: Off  Pressors: Currently on low dose norepi  Chest tubes with no air leaks.  Epicardial wires.  Not currently paced.    Continue ASA, atorva.  Plavix  Metoprolol as BP allows  Lasix BID    Hyperlipidemia  Atorva    NSTEMI (non-ST elevated myocardial infarction)  Treated with heparin  Now s/p CABG 9/23/22    Endocrine  Obesity  Encourage weight loss when pt doing better following CABG    Type 2 diabetes mellitus, with long-term current use of insulin  Insulin drip   Convert to insulin drip in near future     Critical Care Daily Checklist:    A: Awake: RASS Goal/Actual Goal: RASS Goal: 0-->alert and calm  Actual: Shaffer Agitation Sedation Scale (RASS): Alert and calm   B: Spontaneous Breathing Trial Performed? Spon. Breathing Trial Initiated?: Initiated (09/23/22 2019)   C: SAT & SBT Coordinated?  No longer intubated                D: Delirium: CAM-ICU  Overall CAM-ICU: Negative   E: Early Mobility Performed? Yes   F: Feeding Goal:    Status:     Current Diet Order   No orders of the defined types were placed in this encounter.      AS: Analgesia/Sedation Off precedex   T: Thromboembolic Prophylaxis SCDs   H: HOB > 300 Yes   U: Stress Ulcer Prophylaxis (if needed) PPI   G: Glucose Control Insulin drip   B: Bowel Function Stool Occurrence: 0   I: Indwelling Catheter (Lines & Bello) Necessity Continue central line  If able to wean off norepi, dc A-line  Bello  Epicardial wires  Chest tubes   D: De-escalation of Antimicrobials/Pharmacotherapies No abx at this time    Plan for the day/ETD Wean norepi  Metoprolol as able  Lasix BID  ASA, Plavix  Out of bed as able        Code Status:  Family/Goals of Care: Full Code       Discussed on multi-disciplinary rounds w/ Dr. Marin.    Critical Care Time: 45 minutes    Critical secondary to Patient has a condition that poses threat to life and bodily function: NSTEMI now s/p CABG undergoing post-CABG care in ICU still on pressors, insulin drip.     Critical care was time spent personally by me on the following activities: development of treatment plan with patient or surrogate and bedside caregivers, discussions with consultants, evaluation of patient's response to treatment, examination of patient, ordering and performing treatments and interventions, ordering and review of laboratory studies, ordering and review of radiographic studies, pulse oximetry, re-evaluation of patient's condition. This critical care time did not overlap with that of any other provider or involve time for any procedures.     Gaurang Sue MD  Critical Care Medicine  [facility] - Intensive [facility])

## 2022-09-24 NOTE — PROGRESS NOTES
Subjective:      Patient ID: Ca Diaz is a 62 y.o. female.    Chief Complaint: Fatigue (Pt reports generalized weakness, fell today and yesterday. Incontinent episodes before the fall. Hx: CVA 2017)    HPI: Ca Diaz is a 62 y.o. female that presented urgently and was found to have NSTEMI.  Patient has significant cardiac history with the multiple stents to her LAD diagonal circumflex and right coronary artery in the past the patient was on Brilinta and she was admitted after the cath for Brilinta washout before we took her for urgent coronary artery bypass grafting x3  A cardiac cath was done and the patient was found to have 3 vessel disease.  InStent restenoses of the LAD and a separate lesion at the mid LAD ostial stenosis of the diagonal 80% and 80% stenosis of the right PDA the obtuse marginal which was a large vessel was widely 50% stenosis  The patients echocardiogram showed  55. The carotid ultrasound showed 55% ejection fraction trivial mitral regurgitation aortic valve area 1.8 cm choir. Cardiac risk factors include HTN, HLD, Diabetes mellitus, Obesity, PAD, Cerebrovascular disease, PRAVEENA, and FHx of early heart disease. The patient's NYHA Functional Classification score is NYHA II: slight limitation of physical activity, comfortable at rest.     09/24/2022 the patient had successful CABG x3 with the endoscopic vein harvesting on 09/23/2022 she was extubated on the day of surgery and she was pain controlled overnight hemodynamically stable minimal ionotropic support blood sugar control has been fairly well chest tube output is serosanguineous 300 cc overnight no air leak she is sitting up in a chair.    Review of patient's allergies indicates:   Allergen Reactions    Epinephrine Anaphylaxis    Lidocaine Anaphylaxis     Dental visit pt stopped breathing after given lidocaine    Ace inhibitors     Sulfadiazine Other (See Comments)       Past Medical History:   Diagnosis Date     Anxiety and depression 3/13/2014    Essential (primary) hypertension 2013    Fibromyalgia 2012    Hyperlipidemia     Obstructive sleep apnea syndrome 2014    RLS (restless legs syndrome) 10/18/2018    Overview:  Rheum Dr Tam    Seizures     Type 2 diabetes mellitus 2012    ICD-10 Transition Last Assessment & Plan:  Continue with sliding scale insulin.  Control improved on low-dose Lantus presently       History reviewed. No pertinent family history.    Social History     Socioeconomic History    Marital status:    Tobacco Use    Smoking status: Former     Types: Cigarettes     Quit date:      Years since quittin.7    Smokeless tobacco: Never   Substance and Sexual Activity    Alcohol use: Not Currently    Drug use: Never       Past Surgical History:   Procedure Laterality Date    ABDOMINAL SURGERY      ARTERIOGRAPHY OF SUBCLAVIAN ARTERY  2022    Procedure: ARTERIOGRAM, SUBCLAVIAN;  Surgeon: Vale Pa MD;  Location: [facility] CATH LAB;  Service: Cardiology;;     SECTION      LEFT HEART CATHETERIZATION Left 2022    Procedure: CATHETERIZATION, HEART, LEFT;  Surgeon: Vale Pa MD;  Location: [facility] CATH LAB;  Service: Cardiology;  Laterality: Left;       Review of Systems   Constitutional:  Positive for activity change and appetite change.   HENT:  Positive for congestion. Negative for dental problem, nosebleeds and sore throat.    Eyes:  Negative for discharge and visual disturbance.   Respiratory:  Negative for cough, chest tightness and stridor.    Cardiovascular:  Negative for leg swelling.   Gastrointestinal:  Negative for abdominal distention and abdominal pain.   Genitourinary:  Negative for difficulty urinating and dysuria.   Musculoskeletal:  Negative for arthralgias, back pain and joint swelling.   Allergic/Immunologic: Negative for environmental allergies.   Neurological:  Negative for dizziness, syncope and headaches.   Hematological:  Does not  "bruise/bleed easily.   Psychiatric/Behavioral:  Negative for behavioral problems.         Objective:   BP (!) 109/56   Pulse 82   Temp (!) 100.4 °F (38 °C)   Resp 18   Ht 5' 2" (1.575 m)   Wt 93 kg (205 lb 0.4 oz)   SpO2 (!) 94%   Breastfeeding No   BMI 37.50 kg/m²     X-Ray Chest AP Portable  Narrative: EXAMINATION:  XR CHEST AP PORTABLE    CLINICAL HISTORY:  Non-ST elevation (NSTEMI) myocardial infarctionPost-op;    COMPARISON:  09/23/2022    FINDINGS:  There are surgical changes associated with a prior sternotomy.  There has been interval removal of the endotracheal tube.  The right internal jugular venous line remains in place.  The borders of the heart are not well seen.  There is a mild amount of haziness in the medial aspect of both lungs.  There is a mild amount of haziness in the base of the right lung.  There is no pneumothorax.  The right costophrenic angle is sharp.  The left costophrenic angle is opacified.  Impression: 1. There has been interval removal of the endotracheal tube. The right internal jugular venous line remains in place.  2. The borders of the heart are not well seen.  There is a mild amount of haziness in the medial aspect of both lungs.  There is a mild amount of haziness in the base of the right lung.  This is characteristic of atelectasis and/or pneumonia.  3. The left costophrenic angle is opacified.  This is characteristic of a tiny pleural effusion.  4. There are surgical changes associated with a prior sternotomy.  .    Electronically signed by: Elmer Clarke MD  Date:    09/24/2022  Time:    06:28         Physical Exam  Vitals and nursing note reviewed.   Constitutional:       Appearance: She is obese.   HENT:      Head: Normocephalic and atraumatic.      Nose: Congestion present.      Mouth/Throat:      Mouth: Mucous membranes are moist.   Eyes:      Extraocular Movements: Extraocular movements intact.      Pupils: Pupils are equal, round, and reactive to light. "   Cardiovascular:      Rate and Rhythm: Normal rate and regular rhythm.      Pulses: Normal pulses.      Heart sounds: Normal heart sounds.      Comments: Patient midline sternotomy looks clean with the Prevena leg drains are minimal  Looks clean and dry.  Pulmonary:      Effort: Pulmonary effort is normal.      Breath sounds: Wheezing and rales present.   Abdominal:      Palpations: Abdomen is soft.   Musculoskeletal:      Cervical back: Normal range of motion and neck supple.   Skin:     General: Skin is warm.      Capillary Refill: Capillary refill takes less than 2 seconds.   Neurological:      General: No focal deficit present.      Mental Status: She is alert and oriented to person, place, and time.   Psychiatric:         Mood and Affect: Mood normal.     Chest x-ray shows postsurgical changes with bilateral atelectasis no pleural effusion  Assessment:     1. NSTEMI (non-ST elevated myocardial infarction)    2. Dizziness    3. Hyperglycemia    4. Chest pain    5. Pre-operative cardiovascular examination    6. Post-operative state    7. Chest pain, unspecified type    8. Hyperlipidemia, unspecified hyperlipidemia type    9. Essential (primary) hypertension    10. S/P CABG x 3        Plan   Postop day 1 status post CABG x3 lima to LAD vein graft to diagonal and vein graft to posterior descending artery patient extubated on day 1 hemodynamically stable  Wean the Levophed with a map of 70 mmHg  Cardiovascular aspirin and Plavix  Hypertension beta-blocker  Respiratory aggressive pulmonary toilet incentive spirometry out of bed  DuoNeb as needed for  Wheezing  CPAP at night for sleep apnea  Hyperglycemia on insulin drip start diabetic diet  Electrolytes replacement protocol  DC the arterial line today  Anemia multifactorial acute blood loss continue  To monitor DVT and GI prophylaxis with Pepcid and bilateral SCDs      Spent 25 minutes with the bedside taking around the patient's running with the ICU team and  removing the drains.          Krystal Marin MD,   [facility]  [place]

## 2022-09-24 NOTE — PROGRESS NOTES
Pt on 3LNC remained in the chair for most of the shift. Pt lethargic through shift. Received ABG, results within normal limits.     1800: Pt is perseverating at this time. Repeating her birthday well after question was asked. Notified MD at this time of neuro change and requested CT.

## 2022-09-24 NOTE — PLAN OF CARE
Today pt is POD 1 for CAB x3.  Pt extubated at 2110 last PM to NC, still requires small dose of Levophed for map goal >70. Pain not well controlled w/PO/IV prns overnight, pt moans constantly no matter what position she is assisted in. Slept on and off during the shift. Insulin infusion continued per protocol. Pt undated on POC, all questions answered, verbalized understanding.

## 2022-09-25 LAB
ANION GAP SERPL CALC-SCNC: 7 MMOL/L (ref 8–16)
ANION GAP SERPL CALC-SCNC: 8 MMOL/L (ref 8–16)
ANION GAP SERPL CALC-SCNC: 9 MMOL/L (ref 8–16)
BASOPHILS # BLD AUTO: 0.02 K/UL (ref 0–0.2)
BASOPHILS NFR BLD: 0.2 % (ref 0–1.9)
BUN SERPL-MCNC: 12 MG/DL (ref 6–20)
BUN SERPL-MCNC: 13 MG/DL (ref 6–20)
BUN SERPL-MCNC: 14 MG/DL (ref 6–20)
CALCIUM SERPL-MCNC: 8.3 MG/DL (ref 8.7–10.5)
CHLORIDE SERPL-SCNC: 103 MMOL/L (ref 95–110)
CHLORIDE SERPL-SCNC: 103 MMOL/L (ref 95–110)
CHLORIDE SERPL-SCNC: 105 MMOL/L (ref 95–110)
CO2 SERPL-SCNC: 25 MMOL/L (ref 23–29)
CO2 SERPL-SCNC: 27 MMOL/L (ref 23–29)
CO2 SERPL-SCNC: 29 MMOL/L (ref 23–29)
CREAT SERPL-MCNC: 0.5 MG/DL (ref 0.5–1.4)
CREAT SERPL-MCNC: 0.6 MG/DL (ref 0.5–1.4)
CREAT SERPL-MCNC: 0.7 MG/DL (ref 0.5–1.4)
DIFFERENTIAL METHOD: ABNORMAL
EOSINOPHIL # BLD AUTO: 0 K/UL (ref 0–0.5)
EOSINOPHIL NFR BLD: 0.2 % (ref 0–8)
ERYTHROCYTE [DISTWIDTH] IN BLOOD BY AUTOMATED COUNT: 15.8 % (ref 11.5–14.5)
EST. GFR  (NO RACE VARIABLE): >60 ML/MIN/1.73 M^2
GLUCOSE SERPL-MCNC: 225 MG/DL (ref 70–110)
GLUCOSE SERPL-MCNC: 89 MG/DL (ref 70–110)
GLUCOSE SERPL-MCNC: 92 MG/DL (ref 70–110)
HCT VFR BLD AUTO: 23.7 % (ref 37–48.5)
HGB BLD-MCNC: 7.6 G/DL (ref 12–16)
IMM GRANULOCYTES # BLD AUTO: 0.12 K/UL (ref 0–0.04)
IMM GRANULOCYTES NFR BLD AUTO: 1 % (ref 0–0.5)
LYMPHOCYTES # BLD AUTO: 2.2 K/UL (ref 1–4.8)
LYMPHOCYTES NFR BLD: 18 % (ref 18–48)
MAGNESIUM SERPL-MCNC: 1.8 MG/DL (ref 1.6–2.6)
MAGNESIUM SERPL-MCNC: 2.1 MG/DL (ref 1.6–2.6)
MCH RBC QN AUTO: 27.7 PG (ref 27–31)
MCHC RBC AUTO-ENTMCNC: 32.1 G/DL (ref 32–36)
MCV RBC AUTO: 87 FL (ref 82–98)
MONOCYTES # BLD AUTO: 1.5 K/UL (ref 0.3–1)
MONOCYTES NFR BLD: 12 % (ref 4–15)
NEUTROPHILS # BLD AUTO: 8.6 K/UL (ref 1.8–7.7)
NEUTROPHILS NFR BLD: 68.6 % (ref 38–73)
NRBC BLD-RTO: 1 /100 WBC
PLATELET # BLD AUTO: 168 K/UL (ref 150–450)
PMV BLD AUTO: 11.4 FL (ref 9.2–12.9)
POCT GLUCOSE: 103 MG/DL (ref 70–110)
POCT GLUCOSE: 104 MG/DL (ref 70–110)
POCT GLUCOSE: 117 MG/DL (ref 70–110)
POCT GLUCOSE: 231 MG/DL (ref 70–110)
POCT GLUCOSE: 256 MG/DL (ref 70–110)
POTASSIUM SERPL-SCNC: 3.9 MMOL/L (ref 3.5–5.1)
POTASSIUM SERPL-SCNC: 4 MMOL/L (ref 3.5–5.1)
POTASSIUM SERPL-SCNC: 5.2 MMOL/L (ref 3.5–5.1)
RBC # BLD AUTO: 2.74 M/UL (ref 4–5.4)
SODIUM SERPL-SCNC: 138 MMOL/L (ref 136–145)
SODIUM SERPL-SCNC: 139 MMOL/L (ref 136–145)
SODIUM SERPL-SCNC: 139 MMOL/L (ref 136–145)
WBC # BLD AUTO: 12.45 K/UL (ref 3.9–12.7)

## 2022-09-25 PROCEDURE — 85025 COMPLETE CBC W/AUTO DIFF WBC: CPT | Performed by: THORACIC SURGERY (CARDIOTHORACIC VASCULAR SURGERY)

## 2022-09-25 PROCEDURE — 27000221 HC OXYGEN, UP TO 24 HOURS

## 2022-09-25 PROCEDURE — 99233 SBSQ HOSP IP/OBS HIGH 50: CPT | Mod: ,,, | Performed by: INTERNAL MEDICINE

## 2022-09-25 PROCEDURE — 99900035 HC TECH TIME PER 15 MIN (STAT)

## 2022-09-25 PROCEDURE — 94799 UNLISTED PULMONARY SVC/PX: CPT

## 2022-09-25 PROCEDURE — 99233 SBSQ HOSP IP/OBS HIGH 50: CPT | Mod: ,,, | Performed by: STUDENT IN AN ORGANIZED HEALTH CARE EDUCATION/TRAINING PROGRAM

## 2022-09-25 PROCEDURE — 83735 ASSAY OF MAGNESIUM: CPT | Mod: 91 | Performed by: THORACIC SURGERY (CARDIOTHORACIC VASCULAR SURGERY)

## 2022-09-25 PROCEDURE — 99233 PR SUBSEQUENT HOSPITAL CARE,LEVL III: ICD-10-PCS | Mod: ,,, | Performed by: STUDENT IN AN ORGANIZED HEALTH CARE EDUCATION/TRAINING PROGRAM

## 2022-09-25 PROCEDURE — 25000003 PHARM REV CODE 250: Performed by: INTERNAL MEDICINE

## 2022-09-25 PROCEDURE — 20000000 HC ICU ROOM

## 2022-09-25 PROCEDURE — 25000003 PHARM REV CODE 250: Performed by: THORACIC SURGERY (CARDIOTHORACIC VASCULAR SURGERY)

## 2022-09-25 PROCEDURE — 99233 PR SUBSEQUENT HOSPITAL CARE,LEVL III: ICD-10-PCS | Mod: ,,, | Performed by: INTERNAL MEDICINE

## 2022-09-25 PROCEDURE — 63600175 PHARM REV CODE 636 W HCPCS: Performed by: THORACIC SURGERY (CARDIOTHORACIC VASCULAR SURGERY)

## 2022-09-25 PROCEDURE — 80048 BASIC METABOLIC PNL TOTAL CA: CPT | Performed by: THORACIC SURGERY (CARDIOTHORACIC VASCULAR SURGERY)

## 2022-09-25 PROCEDURE — 25000003 PHARM REV CODE 250: Performed by: PHYSICIAN ASSISTANT

## 2022-09-25 PROCEDURE — 94760 N-INVAS EAR/PLS OXIMETRY 1: CPT

## 2022-09-25 RX ORDER — POTASSIUM CHLORIDE 20 MEQ/1
20 TABLET, EXTENDED RELEASE ORAL 2 TIMES DAILY
Status: DISCONTINUED | OUTPATIENT
Start: 2022-09-25 | End: 2022-09-29

## 2022-09-25 RX ORDER — INSULIN ASPART 100 [IU]/ML
10 INJECTION, SOLUTION INTRAVENOUS; SUBCUTANEOUS
Status: DISCONTINUED | OUTPATIENT
Start: 2022-09-25 | End: 2022-09-30 | Stop reason: HOSPADM

## 2022-09-25 RX ORDER — HALOPERIDOL 5 MG/ML
5 INJECTION INTRAMUSCULAR EVERY 6 HOURS PRN
Status: DISCONTINUED | OUTPATIENT
Start: 2022-09-25 | End: 2022-09-30 | Stop reason: HOSPADM

## 2022-09-25 RX ADMIN — METOPROLOL TARTRATE 25 MG: 25 TABLET, FILM COATED ORAL at 08:09

## 2022-09-25 RX ADMIN — FUROSEMIDE 40 MG: 10 INJECTION, SOLUTION INTRAMUSCULAR; INTRAVENOUS at 08:09

## 2022-09-25 RX ADMIN — CHLORHEXIDINE GLUCONATE 0.12% ORAL RINSE 10 ML: 1.2 LIQUID ORAL at 08:09

## 2022-09-25 RX ADMIN — INSULIN DETEMIR 15 UNITS: 100 INJECTION, SOLUTION SUBCUTANEOUS at 08:09

## 2022-09-25 RX ADMIN — OXYCODONE HYDROCHLORIDE AND ACETAMINOPHEN 500 MG: 500 TABLET ORAL at 08:09

## 2022-09-25 RX ADMIN — ATORVASTATIN CALCIUM 80 MG: 40 TABLET, FILM COATED ORAL at 08:09

## 2022-09-25 RX ADMIN — PANTOPRAZOLE SODIUM 40 MG: 40 TABLET, DELAYED RELEASE ORAL at 08:09

## 2022-09-25 RX ADMIN — MAGNESIUM SULFATE HEPTAHYDRATE 4 G: 2 INJECTION, SOLUTION INTRAVENOUS at 07:09

## 2022-09-25 RX ADMIN — HALOPERIDOL LACTATE 5 MG: 5 INJECTION, SOLUTION INTRAMUSCULAR at 08:09

## 2022-09-25 RX ADMIN — INSULIN ASPART 4 UNITS: 100 INJECTION, SOLUTION INTRAVENOUS; SUBCUTANEOUS at 08:09

## 2022-09-25 RX ADMIN — POLYETHYLENE GLYCOL 3350 17 G: 17 POWDER, FOR SOLUTION ORAL at 08:09

## 2022-09-25 RX ADMIN — POTASSIUM CHLORIDE 20 MEQ: 1500 TABLET, EXTENDED RELEASE ORAL at 08:09

## 2022-09-25 RX ADMIN — MUPIROCIN: 20 OINTMENT TOPICAL at 08:09

## 2022-09-25 RX ADMIN — CYANOCOBALAMIN TAB 1000 MCG 1000 MCG: 1000 TAB at 08:09

## 2022-09-25 RX ADMIN — ACETAMINOPHEN 650 MG: 325 TABLET ORAL at 08:09

## 2022-09-25 RX ADMIN — ASPIRIN 81 MG: 81 TABLET, COATED ORAL at 08:09

## 2022-09-25 RX ADMIN — INSULIN ASPART 10 UNITS: 100 INJECTION, SOLUTION INTRAVENOUS; SUBCUTANEOUS at 08:09

## 2022-09-25 RX ADMIN — POTASSIUM CHLORIDE 20 MEQ: 14.9 INJECTION, SOLUTION INTRAVENOUS at 07:09

## 2022-09-25 RX ADMIN — INSULIN ASPART 3 UNITS: 100 INJECTION, SOLUTION INTRAVENOUS; SUBCUTANEOUS at 01:09

## 2022-09-25 RX ADMIN — CLOPIDOGREL 75 MG: 75 TABLET, FILM COATED ORAL at 08:09

## 2022-09-25 RX ADMIN — ACETAMINOPHEN 650 MG: 325 TABLET ORAL at 01:09

## 2022-09-25 RX ADMIN — FLUOXETINE 40 MG: 20 CAPSULE ORAL at 08:09

## 2022-09-25 RX ADMIN — FOLIC ACID 1 MG: 1 TABLET ORAL at 08:09

## 2022-09-25 RX ADMIN — DIVALPROEX SODIUM 500 MG: 250 TABLET, DELAYED RELEASE ORAL at 05:09

## 2022-09-25 NOTE — PROGRESS NOTES
MD Marin at beside. CTx4 removed by MD, gauze and silk tape applied, scant amount of serosang drainage noted. Ventricular pacer wire removed per MD, no complications noted. Will continue to monitor

## 2022-09-25 NOTE — PLAN OF CARE
SEE EVAL FOR DETAILS\the patient DISPLAYED DEFICITS WITH ADL'S SKILLS, DECREASE B UE STRENGTH/ENDURANCE, AS WELL AS DEFICITS WITH FUNCTIONAL MOBILITY/ TRANSFERS. RECOMMEND : SNF

## 2022-09-25 NOTE — EICU
Rounding (Video Assessment):  No    Intervention Initiated From:  Bedside    Pallavi Communicated with Bedside Nurse regarding:  Other    Nurse Notified:  Yes    Doctor Notified:  Yes    Comments: bedside nurse called to get order for tramaine wrist restraints for pt that is pulling ut lines. Informed Dr. Atwood.

## 2022-09-25 NOTE — PROGRESS NOTES
[facility] - Intensive [facility])  Critical Care Medicine  Progress Note    Patient Name: Ca Diaz  MRN: [id number]  Admission Date: 9/16/2022  Hospital Length of Stay: 8 days  Code Status: Full Code  Attending Provider: Krystal Marin MD  Primary Care Provider: Desiree Frye MD   Principal Problem: S/P CABG x 3    Subjective:     HPI:  62W pmh CAD, DM2, anxiety, h/o CVA, depression, DM II, RLS, seizures, fibromyalgia, HLD, HTN, PRAVEENA, and obesity admitted 9/16 with NSTEMI.  [facility] cath revealed multivessel disease.  She had been on Brilinta.  Pt reamined in the hospital on a heparin drip during the Brilinta washout period.  She went for 3v CABG today.  Per anesthesia, operative course was uncomplicated.  Specifically, no hemodynamic or bleeding problems.  No transfusions and no CellSaver given.  She is now in the ICU intubated on vent, sedated on Precedex, norepinephrine @ 0.04 mcg/kg/min.       Hospital/ICU Course:  9/23: 3v CABG.  Post-CABG care in ICU.    9/24:  Extubated last night.  Remains on low dose norepi and insulin drips.  Chest tubes with no air leak.    9/25:  Remains extubated. Off pressors.  Transitioned to subcu insulin yesterday.  Will increase insulin regimen today.  Chest with no air leaks.  Chest tube out per Dr. Marin.  Delirium present.  Mild hyperkalemia.  Stop K+ tablets.  Recheck later today.    Interval History:  Complains of generalized aches and chest soreness.    Denies HA, SOB, abd pain, nausea, vomiting, diarrhea, fevers, sweats, chills.    Objective:     Vital Signs (Most Recent):  Temp: 99 °F (37.2 °C) (09/25/22 0705)  Pulse: 83 (09/25/22 0750)  Resp: 10 (09/25/22 0750)  BP: 135/63 (09/25/22 0705)  SpO2: 98 % (09/25/22 0750)   Vital Signs (24h Range):  Temp:  [97.5 °F (36.4 °C)-101.3 °F (38.5 °C)] 99 °F (37.2 °C)  Pulse:  [74-97] 83  Resp:  [10-40] 10  SpO2:  [88 %-100 %] 98 %  BP: ()/(42-68) 135/63     Weight: 97.2 kg (214 lb 4.6 oz)  Body mass index is 39.19  kg/m².      Intake/Output Summary (Last 24 hours) at 9/25/2022 0809  Last data filed at 9/25/2022 0700  Gross per 24 hour   Intake 548.84 ml   Output 2505 ml   Net -1956.16 ml       Physical Exam  Vitals reviewed.   Constitutional:       Appearance: She is obese.   HENT:      Head: Normocephalic and atraumatic.      Nose: Nose normal.   Eyes:      General: No scleral icterus.     Extraocular Movements: Extraocular movements intact.      Pupils: Pupils are equal, round, and reactive to light.   Cardiovascular:      Rate and Rhythm: Normal rate and regular rhythm.      Comments: Chest tubes no longer present.  Pulled this morning.  Epicardial wires.  Transparent sternal dressing in place.  Pulmonary:      Effort: Pulmonary effort is normal. No respiratory distress.      Comments: Symmetric chest rise.      Abdominal:      General: Abdomen is flat. There is no distension.      Palpations: Abdomen is soft.   Musculoskeletal:         General: No deformity or signs of injury.      Right lower leg: No edema.      Left lower leg: No edema.   Skin:     General: Skin is warm and dry.   Neurological:      General: No focal deficit present.      Mental Status: She is alert and oriented to person, place, and time.       Vents:  Vent Mode: Spont (09/23/22 1946)  Ventilator Initiated: Yes (09/23/22 1339)  Set Rate: 20 BPM (09/23/22 1704)  Vt Set: 400 mL (09/23/22 1704)  Pressure Support: 7 cmH20 (09/23/22 1946)  PEEP/CPAP: 5 cmH20 (09/23/22 1946)  Oxygen Concentration (%): 36 (09/25/22 0803)  Peak Airway Pressure: 13 cmH2O (09/23/22 1946)  Plateau Pressure: 0 cmH20 (09/23/22 1946)  Total Ve: 6.34 mL (09/23/22 1946)  Negative Inspiratory Force (cm H2O): -22 (09/23/22 2110)  F/VT Ratio<105 (RSBI): (!) 52.63 (09/23/22 1946)    Lines/Drains/Airways       Central Venous Catheter Line  Duration             Percutaneous Central Line Insertion/Assessment - Triple Lumen  09/23/22 0739 right internal jugular 2 days              Drain   Duration                  Urethral Catheter 09/23/22 0730 Silicone;Temperature probe;Straight-tip 16 Fr. 2 days         Y Chest Tube 1 and 2 09/23/22 1150 1 Right Mediastinal 24 Fr. 2 Left Mediastinal 24 Fr. 1 day         Y Chest Tube 3 and 4 09/23/22 1150 3 Left Pleural 24 Fr. 4 Right Pleural 24 Fr. 1 day              Peripheral Intravenous Line  Duration                  Peripheral IV - Single Lumen 09/17/22 0150 20 G Posterior;Right Hand 8 days         Peripheral IV - Single Lumen 09/23/22 1317 16 G Right Antecubital 1 day                    Significant Labs:    CBC/Anemia Profile:  Recent Labs   Lab 09/23/22  1322 09/23/22  1326 09/23/22  2029 09/24/22  0505 09/25/22  0512   WBC 15.19*  --   --  12.31 12.45   HGB 9.9*  --   --  8.4* 7.6*   HCT 29.7*   < > 26* 25.6* 23.7*     --   --  226 168   MCV 83  --   --  84 87   RDW 14.4  --   --  15.1* 15.8*    < > = values in this interval not displayed.        Chemistries:  Recent Labs   Lab 09/24/22  0505 09/24/22  1611 09/25/22  0512    138 138   K 4.0 4.9 5.2*    104 105   CO2 23 23 25   BUN 8 12 14   CREATININE 0.6 0.7 0.7   CALCIUM 8.0* 8.3* 8.3*   MG 2.7* 2.4 2.1       A1C: No results for input(s): HGBA1C in the last 48 hours.  Blood Culture: No results for input(s): LABBLOO in the last 48 hours.  Coagulation:   Recent Labs   Lab 09/24/22  0505   INR 1.0   APTT 34.2*     Lactic Acid: No results for input(s): LACTATE in the last 48 hours.  Pathology Results  (Last 10 years)      None          POCT Glucose:   Recent Labs   Lab 09/24/22  2104 09/25/22  0135 09/25/22  0755   POCTGLUCOSE 208* 256* 231*     Respiratory Culture: No results for input(s): GSRESP, RESPIRATORYC in the last 48 hours.  Troponin: No results for input(s): TROPONINI in the last 48 hours.  Urine Culture: No results for input(s): LABURIN in the last 48 hours.  Urine Studies: No results for input(s): COLORU, APPEARANCEUA, PHUR, SPECGRAV, PROTEINUA, GLUCUA, KETONESU, BILIRUBINUA,  OCCULTUA, NITRITE, UROBILINOGEN, LEUKOCYTESUR, RBCUA, WBCUA, BACTERIA, SQUAMEPITHEL, HYALINECASTS in the last 48 hours.    Invalid input(s): ALEC    Significant Imaging:   I have reviewed all pertinent imaging results/findings within the past 24 hours.        ABG  Recent Labs   Lab 09/24/22  1652   PH 7.403   PO2 59*   PCO2 39.3   HCO3 24.5   BE 0       Assessment/Plan:     Neuro  Seizure  Continue depakote    Psychiatric  Anxiety and depression  Fluoxetine    Cardiac/Vascular  * S/P CABG x 3  3v CABG on 9/23  Extubated evening of 9/23.      Sedation: Off  Pressors:Off  Chest tubes with no air leaks.  Subsequently pulled this morning 9/25 by Dr. Marin,  Epicardial wires.  Not currently paced.    Continue ASA, atorva.  Plavix  Metoprolol as BP allows  Lasix BID    Hyperlipidemia  Atorva    NSTEMI (non-ST elevated myocardial infarction)  Treated with heparin  Now s/p CABG 9/23/22    Endocrine  Obesity  Encourage weight loss when pt doing better following CABG    Type 2 diabetes mellitus, with long-term current use of insulin  Converted from insulin drip to subcu 9/24  Increase regimen today.       Critical Care Daily Checklist:    A: Awake: RASS Goal/Actual Goal: RASS Goal: 0-->alert and calm  Actual: Shaffer Agitation Sedation Scale (RASS): Restless   B: Spontaneous Breathing Trial Performed? Spon. Breathing Trial Initiated?: Initiated (09/23/22 2019)   C: SAT & SBT Coordinated?  No longer intubated                D: Delirium: CAM-ICU Overall CAM-ICU: Negative   E: Early Mobility Performed? Yes   F: Feeding Goal:    Status:     Current Diet Order   Procedures    Diet diabetic [facility]; 1500 Calorie; Cardiac (Low Na/Chol); Isolation Tray - Regular [place]     Order Specific Question:   Indicate patient location for additional diet options:     Answer:   [facility]     Order Specific Question:   Total calories:     Answer:   1500 Calorie     Order Specific Question:   Additional Diet Options:      Answer:   Cardiac (Low Na/Chol)     Order Specific Question:   Tray type:     Answer:   Isolation Tray - Regular [place]      AS: Analgesia/Sedation Off precedex   T: Thromboembolic Prophylaxis SCDs   H: HOB > 300 Yes   U: Stress Ulcer Prophylaxis (if needed) PPI   G: Glucose Control Insulin drip   B: Bowel Function Stool Occurrence: 0   I: Indwelling Catheter (Lines & Bello) Necessity Consider dc central line  Bello  Epicardial wires   D: De-escalation of Antimicrobials/Pharmacotherapies No abx at this time    Plan for the day/ETD Metoprolol as able  Lasix BID  ASA, Plavix  Out of bed as able        Code Status:  Family/Goals of Care: Full Code       Discussed this morning w/ Dr. Marin.    Non-Critical Care Time: 45 minutes       Gaurang Sue MD  Critical Care Medicine  [facility] ([facility])

## 2022-09-25 NOTE — PROGRESS NOTES
Pt half way out of bed. Swinging arms and legs. Grabbing medical equipment.  Called Attending MD for ok to place BSWR. Orders placed.

## 2022-09-25 NOTE — EICU
Bilateral soft wrist restrains ordered for 24 hours as patient at risk of dislodging life saving equipment.

## 2022-09-25 NOTE — PLAN OF CARE
Confused, restless, hollering, throughout night. Became nonredirectable, BSWR applied. VSS. Potential downgrade.   CHG bath performed. Linen and gown changed. Pt in chair position.

## 2022-09-25 NOTE — PROGRESS NOTES
Subjective:      Patient ID: Ca Diaz is a 62 y.o. female.    Chief Complaint: Fatigue (Pt reports generalized weakness, fell today and yesterday. Incontinent episodes before the fall. Hx: CVA 2017)    HPI: Ca Diaz is a 62 y.o. female that presented urgently and was found to have NSTEMI.  Patient has significant cardiac history with the multiple stents to her LAD diagonal circumflex and right coronary artery in the past the patient was on Brilinta and she was admitted after the cath for Brilinta washout before we took her for urgent coronary artery bypass grafting x3  A cardiac cath was done and the patient was found to have 3 vessel disease.  InStent restenoses of the LAD and a separate lesion at the mid LAD ostial stenosis of the diagonal 80% and 80% stenosis of the right PDA the obtuse marginal which was a large vessel was widely 50% stenosis  The patients echocardiogram showed  55. The carotid ultrasound showed 55% ejection fraction trivial mitral regurgitation aortic valve area 1.8 cm choir. Cardiac risk factors include HTN, HLD, Diabetes mellitus, Obesity, PAD, Cerebrovascular disease, PRAVEENA, and FHx of early heart disease. The patient's NYHA Functional Classification score is NYHA II: slight limitation of physical activity, comfortable at rest.     09/24/2022 the patient had successful CABG x3 with the endoscopic vein harvesting on 09/23/2022 she was extubated on the day of surgery and she was pain controlled overnight hemodynamically stable minimal ionotropic support blood sugar control has been fairly well chest tube output is serosanguineous 300 cc overnight no air leak she is sitting up in a chair.    Review of patient's allergies indicates:   Allergen Reactions    Epinephrine Anaphylaxis    Lidocaine Anaphylaxis     Dental visit pt stopped breathing after given lidocaine    Ace inhibitors     Sulfadiazine Other (See Comments)       Past Medical History:   Diagnosis Date     Anxiety and depression 3/13/2014    Essential (primary) hypertension 2013    Fibromyalgia 2012    Hyperlipidemia     Obstructive sleep apnea syndrome 2014    RLS (restless legs syndrome) 10/18/2018    Overview:  Rheum Dr Tam    Seizures     Type 2 diabetes mellitus 2012    ICD-10 Transition Last Assessment & Plan:  Continue with sliding scale insulin.  Control improved on low-dose Lantus presently       History reviewed. No pertinent family history.    Social History     Socioeconomic History    Marital status:    Tobacco Use    Smoking status: Former     Types: Cigarettes     Quit date:      Years since quittin.7    Smokeless tobacco: Never   Substance and Sexual Activity    Alcohol use: Not Currently    Drug use: Never       Past Surgical History:   Procedure Laterality Date    ABDOMINAL SURGERY      ARTERIOGRAPHY OF SUBCLAVIAN ARTERY  2022    Procedure: ARTERIOGRAM, SUBCLAVIAN;  Surgeon: Vale Pa MD;  Location: [facility] CATH LAB;  Service: Cardiology;;     SECTION      LEFT HEART CATHETERIZATION Left 2022    Procedure: CATHETERIZATION, HEART, LEFT;  Surgeon: Vale Pa MD;  Location: [facility] CATH LAB;  Service: Cardiology;  Laterality: Left;       Review of Systems   Constitutional:  Positive for activity change and appetite change.   HENT:  Positive for congestion. Negative for dental problem, nosebleeds and sore throat.    Eyes:  Negative for discharge and visual disturbance.   Respiratory:  Negative for cough, chest tightness and stridor.    Cardiovascular:  Negative for leg swelling.   Gastrointestinal:  Negative for abdominal distention and abdominal pain.   Genitourinary:  Negative for difficulty urinating and dysuria.   Musculoskeletal:  Negative for arthralgias, back pain and joint swelling.   Allergic/Immunologic: Negative for environmental allergies.   Neurological:  Negative for dizziness, syncope and headaches.   Hematological:  Does not  "bruise/bleed easily.   Psychiatric/Behavioral:  Positive for agitation and confusion. Negative for behavioral problems.         Objective:   /67   Pulse 91   Temp 98.5 °F (36.9 °C) (Temporal)   Resp (!) 30   Ht 5' 2" (1.575 m)   Wt 97.2 kg (214 lb 4.6 oz)   SpO2 (!) 90%   Breastfeeding No   BMI 39.19 kg/m²     X-Ray Chest AP Portable  Narrative: EXAMINATION:  XR CHEST AP PORTABLE    CLINICAL HISTORY:  Non-ST elevation (NSTEMI) myocardial infarctionPost-op;    COMPARISON:  09/24/2022    FINDINGS:  A right internal jugular venous line remains in place.  There are surgical changes associated with a prior sternotomy.  There is silhouetting of the left and right borders of the heart.  There is a mild amount of haziness scattered throughout the inferior halves of both lungs.  There is no pneumothorax.  There is opacification of the left costophrenic angle.  The right costophrenic angle is sharp.  Impression: 1. There is silhouetting of the left and right borders of the heart.  There is a mild amount of haziness scattered throughout the inferior halves of both lungs.  An infectious process cannot be excluded.  2. There is opacification of the left costophrenic angle.  This is characteristic of a tiny pleural effusion.  3. A right internal jugular venous line remains in place.  4. There are surgical changes associated with a prior sternotomy.  .    Electronically signed by: Elmer Clarke MD  Date:    09/25/2022  Time:    07:01         Physical Exam  Vitals and nursing note reviewed.   Constitutional:       Appearance: She is obese.   HENT:      Head: Normocephalic and atraumatic.      Mouth/Throat:      Mouth: Mucous membranes are moist.   Eyes:      Extraocular Movements: Extraocular movements intact.      Pupils: Pupils are equal, round, and reactive to light.   Cardiovascular:      Rate and Rhythm: Normal rate and regular rhythm.      Pulses: Normal pulses.      Heart sounds: Normal heart sounds.      " Comments: Patient midline sternotomy looks clean with the Prevena leg drains are minimal  Looks clean and dry.  Pulmonary:      Effort: Pulmonary effort is normal.      Breath sounds: Wheezing and rales present.   Abdominal:      Palpations: Abdomen is soft.   Musculoskeletal:      Cervical back: Normal range of motion and neck supple.   Skin:     General: Skin is warm.      Capillary Refill: Capillary refill takes less than 2 seconds.   Neurological:      General: No focal deficit present.      Mental Status: She is alert and oriented to person, place, and time.   Psychiatric:         Mood and Affect: Mood normal.     Chest x-ray shows postsurgical changes with bilateral atelectasis no pleural effusion  Assessment:     1. NSTEMI (non-ST elevated myocardial infarction)    2. Dizziness    3. Hyperglycemia    4. Chest pain    5. Pre-operative cardiovascular examination    6. Post-operative state    7. Chest pain, unspecified type    8. Hyperlipidemia, unspecified hyperlipidemia type    9. Essential (primary) hypertension    10. S/P CABG x 3        Plan   Postop day 2 status post CABG x3 lima to LAD vein graft to diagonal and vein graft to posterior descending artery patient extubated on day 1 hemodynamically stable  Acute confusional state hold the  narcotics and the CNS depressant  Cardiovascular aspirin and Plavix  Hypertension beta-blocker  Respiratory aggressive pulmonary toilet incentive spirometry out of bed  DuoNeb as needed for  Wheezing  CPAP at night for sleep apnea  Hyperglycemia on insulin sliding scale  and scheduled   start diabetic diet  Electrolytes replacement protocol  Anemia multifactorial acute blood loss continue  To monitor DVT and GI prophylaxis with Pepcid and bilateral SCDs  DC the chest tubes and pacing wires  Plan for transfer to the telemetry later in the day      Spent 25 minutes with the bedside taking around the patient's running with the ICU team and removing the drains.          Krystal  Tania SIFUENTES,   [facility]  [place]

## 2022-09-25 NOTE — PT/OT/SLP EVAL
Occupational Therapy   Evaluation    Name: Ca Diaz  MRN: [id number]  Admitting Diagnosis:  S/P CABG x 3  Recent Surgery: Procedure(s) (LRB):  CORONARY ARTERY BYPASS GRAFT (CABG) (N/A)  ECHOCARDIOGRAM,TRANSESOPHAGEAL (N/A)  SURGICAL PROCUREMENT, VEIN, ENDOSCOPIC (Left)  BLOCK, NERVE, INTERCOSTAL, 2 OR MORE (N/A) 1 Day Post-Op    Recommendations:     Discharge Recommendations:  (TBD DEPENDING ON PROGRESS)  Discharge Equipment Recommendations:   (TBD DETERMINE)  Barriers to discharge:       Assessment:     Ca Diaz is a 62 y.o. female with a medical diagnosis of S/P CABG x 3.  She presents with DEBILITY AND GENERALIZED WEAKNESS. Performance deficits affecting function: weakness, impaired self care skills, impaired endurance, impaired functional mobility, gait instability, impaired balance, decreased upper extremity function, decreased lower extremity function.      Rehab Prognosis: Good; patient would benefit from acute skilled OT services to address these deficits and reach maximum level of function.       Plan:     Patient to be seen 2 x/week to address the above listed problems via self-care/home management, therapeutic activities, therapeutic exercises  Plan of Care Expires:    Plan of Care Reviewed with:      Subjective     Chief Complaint: DEBILITY AND GENERALIZED WEAKNESS    Patient/Family Comments/goals:     Occupational Profile:  Living Environment: POOR HISTORIAN  Previous level of function:   Roles and Routines: OCCUPATIONAL THERAPY  Equipment Used at Home:   (UNKNOWN)  Assistance upon Discharge:     Pain/Comfort:  Pain Rating 1: 0/10    Patients cultural, spiritual, Mandaeism conflicts given the current situation:      Objective:     Communicated with: NURSE DOMIC AND Epic CHART REVIEW prior to session.  Patient found up in chair with   upon OT entry to room.    General Precautions: Standard, fall   Orthopedic Precautions:N/A   Braces: N/A  Respiratory Status: Room  air    Occupational Performance:    Activities of Daily Living:  Upper Body Dressing: maximal assistance .  Lower Body Dressing: total assistance .  Toileting: total assistance .    Cognitive/Visual Perceptual:  Cognitive/Psychosocial Skills:     -       Follows Commands/attention:LETHARGIC  -       Communication: LETHARGIC  -       Memory: UNKNOWN  -       Safety awareness/insight to disability: impaired     Physical Exam:  Upper Extremity Range of Motion:     -       Right Upper Extremity: Deficits: AAROM WFL OF STERNAL PRECAUTION  -       Left Upper Extremity: AAROM WFL OF STERNAL PRECAUTION    AMPA 6 Click ADL:  AMPA Total Score: 7    Treatment & Education:  Patient educated on role of OT in acute setting and benefits of participation. Educated on techniques to use to increase independence and decrease fall risk with functional transfers. Educated on importance of OOB activity and calling for A to transfer back to bed. Encouraged completion of B UE AROM therex throughout the day to tolerance to increase functional strength and activity tolerance. Patient stated understanding and in agreement with POC.      Patient left up in chair with all lines intact, call button in reach, and NURSE DOMIC notified    GOALS:   Multidisciplinary Problems       Occupational Therapy Goals          Problem: Occupational Therapy    Goal Priority Disciplines Outcome Interventions   Occupational Therapy Goal     OT, PT/OT     Description: O.T. GOALS TO BE MET BY 10-8-22   MIN A WITH UE DRESSING   PT WITH REQ MIN  BSC TRANSFERS  MIN A WITH TOILET ING  PT WILL TOLERATE 1 SET X 10 REPS B UE ROM EXERCISE WWTHIN A PAIN-FREE RANGE AND PLANE.                             History:     Past Medical History:   Diagnosis Date    Anxiety and depression 3/13/2014    Essential (primary) hypertension 1/22/2013    Fibromyalgia 9/17/2012    Hyperlipidemia     Obstructive sleep apnea syndrome 8/20/2014    RLS (restless legs syndrome) 10/18/2018     Overview:  Rheum Dr Tam    Seizures     Type 2 diabetes mellitus 2012    ICD-10 Transition Last Assessment & Plan:  Continue with sliding scale insulin.  Control improved on low-dose Lantus presently         Past Surgical History:   Procedure Laterality Date    ABDOMINAL SURGERY      ARTERIOGRAPHY OF SUBCLAVIAN ARTERY  2022    Procedure: ARTERIOGRAM, SUBCLAVIAN;  Surgeon: Vale Pa MD;  Location: [facility] CATH LAB;  Service: Cardiology;;     SECTION      LEFT HEART CATHETERIZATION Left 2022    Procedure: CATHETERIZATION, HEART, LEFT;  Surgeon: Vale Pa MD;  Location: [facility] CATH LAB;  Service: Cardiology;  Laterality: Left;       Time Tracking:     OT Date of Treatment: 22  OT Start Time: 1031  OT Stop Time: 1045  OT Total Time (min): 14 min    Billable Minutes:Evaluation 14 MINUTES    2022

## 2022-09-26 LAB
ANION GAP SERPL CALC-SCNC: 10 MMOL/L (ref 8–16)
ANION GAP SERPL CALC-SCNC: 10 MMOL/L (ref 8–16)
BASOPHILS # BLD AUTO: 0.02 K/UL (ref 0–0.2)
BASOPHILS NFR BLD: 0.2 % (ref 0–1.9)
BLD PROD TYP BPU: NORMAL
BLOOD UNIT EXPIRATION DATE: NORMAL
BLOOD UNIT TYPE CODE: 5100
BLOOD UNIT TYPE: NORMAL
BUN SERPL-MCNC: 12 MG/DL (ref 6–20)
BUN SERPL-MCNC: 12 MG/DL (ref 6–20)
CALCIUM SERPL-MCNC: 8 MG/DL (ref 8.7–10.5)
CALCIUM SERPL-MCNC: 8.5 MG/DL (ref 8.7–10.5)
CHLORIDE SERPL-SCNC: 102 MMOL/L (ref 95–110)
CHLORIDE SERPL-SCNC: 102 MMOL/L (ref 95–110)
CO2 SERPL-SCNC: 27 MMOL/L (ref 23–29)
CO2 SERPL-SCNC: 28 MMOL/L (ref 23–29)
CODING SYSTEM: NORMAL
CREAT SERPL-MCNC: 0.5 MG/DL (ref 0.5–1.4)
CREAT SERPL-MCNC: 0.6 MG/DL (ref 0.5–1.4)
DIFFERENTIAL METHOD: ABNORMAL
DISPENSE STATUS: NORMAL
EOSINOPHIL # BLD AUTO: 0 K/UL (ref 0–0.5)
EOSINOPHIL NFR BLD: 0.1 % (ref 0–8)
ERYTHROCYTE [DISTWIDTH] IN BLOOD BY AUTOMATED COUNT: 15.3 % (ref 11.5–14.5)
EST. GFR  (NO RACE VARIABLE): >60 ML/MIN/1.73 M^2
EST. GFR  (NO RACE VARIABLE): >60 ML/MIN/1.73 M^2
GLUCOSE SERPL-MCNC: 167 MG/DL (ref 70–110)
GLUCOSE SERPL-MCNC: 94 MG/DL (ref 70–110)
HCT VFR BLD AUTO: 23.3 % (ref 37–48.5)
HGB BLD-MCNC: 7.6 G/DL (ref 12–16)
IMM GRANULOCYTES # BLD AUTO: 0.14 K/UL (ref 0–0.04)
IMM GRANULOCYTES NFR BLD AUTO: 1.1 % (ref 0–0.5)
LYMPHOCYTES # BLD AUTO: 1.6 K/UL (ref 1–4.8)
LYMPHOCYTES NFR BLD: 12.3 % (ref 18–48)
MAGNESIUM SERPL-MCNC: 2 MG/DL (ref 1.6–2.6)
MAGNESIUM SERPL-MCNC: 2.2 MG/DL (ref 1.6–2.6)
MCH RBC QN AUTO: 27.5 PG (ref 27–31)
MCHC RBC AUTO-ENTMCNC: 32.6 G/DL (ref 32–36)
MCV RBC AUTO: 84 FL (ref 82–98)
MONOCYTES # BLD AUTO: 1.3 K/UL (ref 0.3–1)
MONOCYTES NFR BLD: 9.9 % (ref 4–15)
NEUTROPHILS # BLD AUTO: 9.9 K/UL (ref 1.8–7.7)
NEUTROPHILS NFR BLD: 76.4 % (ref 38–73)
NRBC BLD-RTO: 1 /100 WBC
NUM UNITS TRANS PACKED RBC: NORMAL
PLATELET # BLD AUTO: 172 K/UL (ref 150–450)
PMV BLD AUTO: 11.5 FL (ref 9.2–12.9)
POCT GLUCOSE: 105 MG/DL (ref 70–110)
POCT GLUCOSE: 120 MG/DL (ref 70–110)
POCT GLUCOSE: 128 MG/DL (ref 70–110)
POCT GLUCOSE: 173 MG/DL (ref 70–110)
POCT GLUCOSE: 184 MG/DL (ref 70–110)
POCT GLUCOSE: 99 MG/DL (ref 70–110)
POTASSIUM SERPL-SCNC: 3.8 MMOL/L (ref 3.5–5.1)
POTASSIUM SERPL-SCNC: 4.1 MMOL/L (ref 3.5–5.1)
RBC # BLD AUTO: 2.76 M/UL (ref 4–5.4)
SODIUM SERPL-SCNC: 139 MMOL/L (ref 136–145)
SODIUM SERPL-SCNC: 140 MMOL/L (ref 136–145)
WBC # BLD AUTO: 12.97 K/UL (ref 3.9–12.7)

## 2022-09-26 PROCEDURE — 25000003 PHARM REV CODE 250: Performed by: THORACIC SURGERY (CARDIOTHORACIC VASCULAR SURGERY)

## 2022-09-26 PROCEDURE — 83735 ASSAY OF MAGNESIUM: CPT | Mod: 91 | Performed by: THORACIC SURGERY (CARDIOTHORACIC VASCULAR SURGERY)

## 2022-09-26 PROCEDURE — 80048 BASIC METABOLIC PNL TOTAL CA: CPT | Mod: 91 | Performed by: THORACIC SURGERY (CARDIOTHORACIC VASCULAR SURGERY)

## 2022-09-26 PROCEDURE — 21400001 HC TELEMETRY ROOM

## 2022-09-26 PROCEDURE — 99232 PR SUBSEQUENT HOSPITAL CARE,LEVL II: ICD-10-PCS | Mod: ,,, | Performed by: INTERNAL MEDICINE

## 2022-09-26 PROCEDURE — 27000221 HC OXYGEN, UP TO 24 HOURS

## 2022-09-26 PROCEDURE — 36415 COLL VENOUS BLD VENIPUNCTURE: CPT | Performed by: THORACIC SURGERY (CARDIOTHORACIC VASCULAR SURGERY)

## 2022-09-26 PROCEDURE — 25000003 PHARM REV CODE 250: Performed by: PHYSICIAN ASSISTANT

## 2022-09-26 PROCEDURE — 99900035 HC TECH TIME PER 15 MIN (STAT)

## 2022-09-26 PROCEDURE — 97530 THERAPEUTIC ACTIVITIES: CPT

## 2022-09-26 PROCEDURE — 27200667 HC PACEMAKER, TEMPORARY MONITORING, PER SHIFT

## 2022-09-26 PROCEDURE — 99232 SBSQ HOSP IP/OBS MODERATE 35: CPT | Mod: ,,, | Performed by: INTERNAL MEDICINE

## 2022-09-26 PROCEDURE — 85025 COMPLETE CBC W/AUTO DIFF WBC: CPT | Performed by: THORACIC SURGERY (CARDIOTHORACIC VASCULAR SURGERY)

## 2022-09-26 PROCEDURE — 63600175 PHARM REV CODE 636 W HCPCS: Performed by: THORACIC SURGERY (CARDIOTHORACIC VASCULAR SURGERY)

## 2022-09-26 PROCEDURE — 25000003 PHARM REV CODE 250: Performed by: INTERNAL MEDICINE

## 2022-09-26 RX ORDER — MODAFINIL 100 MG/1
100 TABLET ORAL DAILY
Status: DISCONTINUED | OUTPATIENT
Start: 2022-09-27 | End: 2022-09-26

## 2022-09-26 RX ORDER — ENOXAPARIN SODIUM 100 MG/ML
40 INJECTION SUBCUTANEOUS EVERY 24 HOURS
Status: DISCONTINUED | OUTPATIENT
Start: 2022-09-26 | End: 2022-09-30 | Stop reason: HOSPADM

## 2022-09-26 RX ORDER — METOPROLOL TARTRATE 25 MG/1
25 TABLET, FILM COATED ORAL 3 TIMES DAILY
Status: DISCONTINUED | OUTPATIENT
Start: 2022-09-26 | End: 2022-09-27

## 2022-09-26 RX ORDER — SODIUM CHLORIDE 9 MG/ML
INJECTION, SOLUTION INTRAVENOUS
Status: DISCONTINUED | OUTPATIENT
Start: 2022-09-26 | End: 2022-09-30 | Stop reason: HOSPADM

## 2022-09-26 RX ADMIN — FUROSEMIDE 40 MG: 10 INJECTION, SOLUTION INTRAMUSCULAR; INTRAVENOUS at 08:09

## 2022-09-26 RX ADMIN — POLYETHYLENE GLYCOL 3350 17 G: 17 POWDER, FOR SOLUTION ORAL at 08:09

## 2022-09-26 RX ADMIN — MUPIROCIN: 20 OINTMENT TOPICAL at 08:09

## 2022-09-26 RX ADMIN — OXYCODONE HYDROCHLORIDE AND ACETAMINOPHEN 500 MG: 500 TABLET ORAL at 08:09

## 2022-09-26 RX ADMIN — INSULIN ASPART 10 UNITS: 100 INJECTION, SOLUTION INTRAVENOUS; SUBCUTANEOUS at 12:09

## 2022-09-26 RX ADMIN — INSULIN DETEMIR 15 UNITS: 100 INJECTION, SOLUTION SUBCUTANEOUS at 08:09

## 2022-09-26 RX ADMIN — METOPROLOL TARTRATE 25 MG: 25 TABLET, FILM COATED ORAL at 08:09

## 2022-09-26 RX ADMIN — POTASSIUM CHLORIDE 20 MEQ: 1500 TABLET, EXTENDED RELEASE ORAL at 08:09

## 2022-09-26 RX ADMIN — CHLORHEXIDINE GLUCONATE 0.12% ORAL RINSE 10 ML: 1.2 LIQUID ORAL at 08:09

## 2022-09-26 RX ADMIN — ASPIRIN 81 MG: 81 TABLET, COATED ORAL at 08:09

## 2022-09-26 RX ADMIN — PANTOPRAZOLE SODIUM 40 MG: 40 TABLET, DELAYED RELEASE ORAL at 08:09

## 2022-09-26 RX ADMIN — INSULIN ASPART 2 UNITS: 100 INJECTION, SOLUTION INTRAVENOUS; SUBCUTANEOUS at 08:09

## 2022-09-26 RX ADMIN — INSULIN ASPART 1 UNITS: 100 INJECTION, SOLUTION INTRAVENOUS; SUBCUTANEOUS at 04:09

## 2022-09-26 RX ADMIN — ATORVASTATIN CALCIUM 80 MG: 40 TABLET, FILM COATED ORAL at 08:09

## 2022-09-26 RX ADMIN — INSULIN ASPART 10 UNITS: 100 INJECTION, SOLUTION INTRAVENOUS; SUBCUTANEOUS at 08:09

## 2022-09-26 RX ADMIN — FLUOXETINE 40 MG: 20 CAPSULE ORAL at 08:09

## 2022-09-26 RX ADMIN — MAGNESIUM HYDROXIDE 2400 MG: 400 SUSPENSION ORAL at 08:09

## 2022-09-26 RX ADMIN — ENOXAPARIN SODIUM 40 MG: 100 INJECTION SUBCUTANEOUS at 04:09

## 2022-09-26 RX ADMIN — FOLIC ACID 1 MG: 1 TABLET ORAL at 08:09

## 2022-09-26 RX ADMIN — HALOPERIDOL LACTATE 5 MG: 5 INJECTION, SOLUTION INTRAMUSCULAR at 10:09

## 2022-09-26 RX ADMIN — MAGNESIUM SULFATE HEPTAHYDRATE 4 G: 2 INJECTION, SOLUTION INTRAVENOUS at 06:09

## 2022-09-26 RX ADMIN — METOPROLOL TARTRATE 25 MG: 25 TABLET, FILM COATED ORAL at 04:09

## 2022-09-26 RX ADMIN — CLOPIDOGREL 75 MG: 75 TABLET, FILM COATED ORAL at 08:09

## 2022-09-26 RX ADMIN — SODIUM CHLORIDE: 0.9 INJECTION, SOLUTION INTRAVENOUS at 06:09

## 2022-09-26 RX ADMIN — CYANOCOBALAMIN TAB 1000 MCG 1000 MCG: 1000 TAB at 08:09

## 2022-09-26 RX ADMIN — INSULIN ASPART 10 UNITS: 100 INJECTION, SOLUTION INTRAVENOUS; SUBCUTANEOUS at 04:09

## 2022-09-26 NOTE — SUBJECTIVE & OBJECTIVE
Review of Systems   Constitutional: Positive for fever. Negative for diaphoresis, malaise/fatigue, weight gain and weight loss.   HENT:  Negative for congestion and nosebleeds.    Cardiovascular:  Negative for chest pain, claudication, cyanosis, dyspnea on exertion, irregular heartbeat, leg swelling, near-syncope, orthopnea, palpitations, paroxysmal nocturnal dyspnea and syncope.   Respiratory:  Negative for cough, hemoptysis, shortness of breath, sleep disturbances due to breathing, snoring, sputum production and wheezing.    Hematologic/Lymphatic: Negative for bleeding problem. Does not bruise/bleed easily.   Skin:  Negative for rash.   Musculoskeletal:  Negative for arthritis, back pain, falls, joint pain, muscle cramps and muscle weakness.   Gastrointestinal:  Negative for abdominal pain, constipation, diarrhea, heartburn, hematemesis, hematochezia, melena, nausea and vomiting.   Genitourinary:  Negative for dysuria, hematuria and nocturia.   Neurological:  Positive for weakness. Negative for excessive daytime sleepiness, dizziness, headaches, light-headedness, loss of balance, numbness and vertigo.   Psychiatric/Behavioral:  Positive for altered mental status.    Objective:     Vital Signs (Most Recent):  Temp: (!) 100.4 °F (38 °C) (09/25/22 1907)  Pulse: 89 (09/25/22 1907)  Resp: (!) 24 (09/25/22 1907)  BP: (!) 152/67 (09/25/22 1805)  SpO2: (!) 91 % (09/25/22 1907)   Vital Signs (24h Range):  Temp:  [97.5 °F (36.4 °C)-101.1 °F (38.4 °C)] 100.4 °F (38 °C)  Pulse:  [75-97] 89  Resp:  [10-40] 24  SpO2:  [90 %-99 %] 91 %  BP: (110-152)/(53-68) 152/67     Weight: 97.2 kg (214 lb 4.6 oz)  Body mass index is 39.19 kg/m².     SpO2: (!) 91 %  O2 Device (Oxygen Therapy): nasal cannula      Intake/Output Summary (Last 24 hours) at 9/25/2022 1620  Last data filed at 9/25/2022 2000  Gross per 24 hour   Intake 485.14 ml   Output 2560 ml   Net -2074.86 ml       Lines/Drains/Airways       Central Venous Catheter Line   Duration             Percutaneous Central Line Insertion/Assessment - Triple Lumen  09/23/22 0739 right internal jugular 2 days              Drain  Duration                  Urethral Catheter 09/23/22 0730 Silicone;Temperature probe;Straight-tip 16 Fr. 2 days              Peripheral Intravenous Line  Duration                  Peripheral IV - Single Lumen 09/23/22 1317 16 G Right Antecubital 2 days                    Physical Exam  Vitals and nursing note reviewed.   Constitutional:       Appearance: She is well-developed. She is ill-appearing.   HENT:      Head: Normocephalic.      Mouth/Throat:      Mouth: Mucous membranes are moist.   Neck:      Vascular: No carotid bruit or JVD.   Cardiovascular:      Rate and Rhythm: Normal rate and regular rhythm.      Pulses: Normal pulses.      Heart sounds: Normal heart sounds. No murmur heard.    No friction rub.   Pulmonary:      Effort: Pulmonary effort is normal. No respiratory distress.      Breath sounds: Normal breath sounds. No wheezing or rales.   Abdominal:      General: Bowel sounds are normal. There is no distension.      Palpations: Abdomen is soft.      Tenderness: There is no abdominal tenderness. There is no guarding.   Musculoskeletal:         General: No swelling or tenderness.      Cervical back: Neck supple. No tenderness.      Right lower leg: No edema.      Left lower leg: No edema.   Skin:     General: Skin is warm and dry.      Capillary Refill: Capillary refill takes less than 2 seconds.      Findings: No rash.   Neurological:      General: No focal deficit present.       Significant Labs: BMP:   Recent Labs   Lab 09/24/22  1611 09/25/22  0512 09/25/22  1354 09/25/22  1755   * 225* 89 92    138 139 139   K 4.9 5.2* 4.0 3.9    105 103 103   CO2 23 25 27 29   BUN 12 14 12 13   CREATININE 0.7 0.7 0.6 0.5   CALCIUM 8.3* 8.3* 8.3* 8.3*   MG 2.4 2.1  --  1.8   , CMP   Recent Labs   Lab 09/25/22  0512 09/25/22  1354 09/25/22  1755   NA  138 139 139   K 5.2* 4.0 3.9    103 103   CO2 25 27 29   * 89 92   BUN 14 12 13   CREATININE 0.7 0.6 0.5   CALCIUM 8.3* 8.3* 8.3*   ANIONGAP 8 9 7*   , INR   Recent Labs   Lab 09/24/22  0505   INR 1.0   , and Lipid Panel No results for input(s): CHOL, HDL, LDLCALC, TRIG, CHOLHDL in the last 48 hours.    Significant Imaging: Echocardiogram: 2D echo with color flow doppler: No results found for this or any previous visit.

## 2022-09-26 NOTE — PROGRESS NOTES
Called to room by primary nurse after patient pulled off wound vac. Patient with Prevena vac dressing to sternal incision. She pulled the tubing and track pad off. Nurse replaced, but still with leak, so called to evaluated. Vac dressing remains intact. Sensatrac pad placement reinforced and seal achieved. Scattered areas of abrasions and ecchymosis noted to skin from prior falls, heels intact, no further wound care needs.

## 2022-09-26 NOTE — PLAN OF CARE
Nutrition Plan of Care:      Recommendations     Recommendation/Intervention:   1. Encourage po intake   2. Recommend to consider oral supplements   3. Collaboration with medical providers     Goals: Patient to consume and tolerate >50% EEN prior to RD follow up.     Nutrition Goal Status: progressing towards goal     Communication of RD Recs: other (comment) (poc)        Assessment and Plan     Nutrition Problem  Inadequate po intake     Related to (etiology):   Decreased po intake s/p procedure   Condition associated with diagnosis     Signs and Symptoms (as evidenced by):   Less than 50% po intake      Interventions/Recommendations (treatment strategy):  1. Encourage po intake   2. Recommend to consider oral supplements   3. Collaboration with medical providers     Nutrition Diagnosis Status:   William Lemus, MS, RDN, LDN

## 2022-09-26 NOTE — PROGRESS NOTES
Subjective:      Patient ID: Ca Diaz is a 62 y.o. female.    Chief Complaint: Fatigue (Pt reports generalized weakness, fell today and yesterday. Incontinent episodes before the fall. Hx: CVA 2017)    HPI: Ca Diaz is a 62 y.o. female that presented urgently and was found to have NSTEMI.  Patient has significant cardiac history with the multiple stents to her LAD diagonal circumflex and right coronary artery in the past the patient was on Brilinta and she was admitted after the cath for Brilinta washout before we took her for urgent coronary artery bypass grafting x3  A cardiac cath was done and the patient was found to have 3 vessel disease.  InStent restenoses of the LAD and a separate lesion at the mid LAD ostial stenosis of the diagonal 80% and 80% stenosis of the right PDA the obtuse marginal which was a large vessel was widely 50% stenosis  The patients echocardiogram showed  55. The carotid ultrasound showed 55% ejection fraction trivial mitral regurgitation aortic valve area 1.8 cm choir. Cardiac risk factors include HTN, HLD, Diabetes mellitus, Obesity, PAD, Cerebrovascular disease, PRAVEENA, and FHx of early heart disease. The patient's NYHA Functional Classification score is NYHA II: slight limitation of physical activity, comfortable at rest.     09/24/2022 the patient had successful CABG x3 with the endoscopic vein harvesting on 09/23/2022 she was extubated on the day of surgery and she was pain controlled overnight hemodynamically stable minimal ionotropic support blood sugar control has been fairly well chest tube output is serosanguineous 300 cc overnight no air leak she is sitting up in a chair.    Review of patient's allergies indicates:   Allergen Reactions    Epinephrine Anaphylaxis    Lidocaine Anaphylaxis     Dental visit pt stopped breathing after given lidocaine    Ace inhibitors     Sulfadiazine Other (See Comments)       Past Medical History:   Diagnosis Date     Anxiety and depression 3/13/2014    Essential (primary) hypertension 2013    Fibromyalgia 2012    Hyperlipidemia     Obstructive sleep apnea syndrome 2014    RLS (restless legs syndrome) 10/18/2018    Overview:  Rheum Dr Tam    Seizures     Type 2 diabetes mellitus 2012    ICD-10 Transition Last Assessment & Plan:  Continue with sliding scale insulin.  Control improved on low-dose Lantus presently       History reviewed. No pertinent family history.    Social History     Socioeconomic History    Marital status:    Tobacco Use    Smoking status: Former     Types: Cigarettes     Quit date:      Years since quittin.7    Smokeless tobacco: Never   Substance and Sexual Activity    Alcohol use: Not Currently    Drug use: Never       Past Surgical History:   Procedure Laterality Date    ABDOMINAL SURGERY      ARTERIOGRAPHY OF SUBCLAVIAN ARTERY  2022    Procedure: ARTERIOGRAM, SUBCLAVIAN;  Surgeon: Vale Pa MD;  Location: [facility] CATH LAB;  Service: Cardiology;;     SECTION      LEFT HEART CATHETERIZATION Left 2022    Procedure: CATHETERIZATION, HEART, LEFT;  Surgeon: Vale Pa MD;  Location: [facility] CATH LAB;  Service: Cardiology;  Laterality: Left;       Review of Systems   Constitutional:  Positive for activity change and appetite change.   HENT:  Negative for dental problem, nosebleeds and sore throat.    Eyes:  Negative for discharge and visual disturbance.   Respiratory:  Negative for cough, chest tightness and stridor.    Cardiovascular:  Negative for leg swelling.   Gastrointestinal:  Negative for abdominal distention and abdominal pain.   Genitourinary:  Negative for difficulty urinating and dysuria.   Musculoskeletal:  Negative for arthralgias, back pain and joint swelling.   Allergic/Immunologic: Negative for environmental allergies.   Neurological:  Negative for dizziness, syncope and headaches.   Hematological:  Does not bruise/bleed easily.  "  Psychiatric/Behavioral:  Positive for agitation and confusion. Negative for behavioral problems.         Objective:   BP (!) 140/65   Pulse 87   Temp 98.5 °F (36.9 °C) (Oral)   Resp 18   Ht 5' 2" (1.575 m)   Wt 95.8 kg (211 lb 3.2 oz)   SpO2 95%   Breastfeeding No   BMI 38.63 kg/m²     X-Ray Chest AP Portable  Narrative: EXAMINATION:  XR CHEST AP PORTABLE    CLINICAL HISTORY:  Non-ST elevation (NSTEMI) myocardial infarctionPost-op;    COMPARISON:  09/24/2022    FINDINGS:  A right internal jugular venous line remains in place.  There are surgical changes associated with a prior sternotomy.  There is silhouetting of the left and right borders of the heart.  There is a mild amount of haziness scattered throughout the inferior halves of both lungs.  There is no pneumothorax.  There is opacification of the left costophrenic angle.  The right costophrenic angle is sharp.  Impression: 1. There is silhouetting of the left and right borders of the heart.  There is a mild amount of haziness scattered throughout the inferior halves of both lungs.  An infectious process cannot be excluded.  2. There is opacification of the left costophrenic angle.  This is characteristic of a tiny pleural effusion.  3. A right internal jugular venous line remains in place.  4. There are surgical changes associated with a prior sternotomy.  .    Electronically signed by: Elmer Clarke MD  Date:    09/25/2022  Time:    07:01         Physical Exam  Vitals and nursing note reviewed.   Constitutional:       Appearance: She is obese.   HENT:      Head: Normocephalic and atraumatic.      Mouth/Throat:      Mouth: Mucous membranes are moist.   Eyes:      Extraocular Movements: Extraocular movements intact.      Pupils: Pupils are equal, round, and reactive to light.   Cardiovascular:      Rate and Rhythm: Normal rate and regular rhythm.      Pulses: Normal pulses.      Heart sounds: Normal heart sounds.      Comments: Patient midline " sternotomy looks clean with the Prevena leg drains are minimal  Looks clean and dry.  Pulmonary:      Effort: Pulmonary effort is normal.   Abdominal:      Palpations: Abdomen is soft.   Musculoskeletal:      Cervical back: Normal range of motion and neck supple.   Skin:     General: Skin is warm.      Capillary Refill: Capillary refill takes less than 2 seconds.   Neurological:      General: No focal deficit present.      Mental Status: She is alert and oriented to person, place, and time.   Psychiatric:         Mood and Affect: Mood normal.     Chest x-ray shows postsurgical changes with bilateral atelectasis no pleural effusion  Assessment:     1. S/P CABG x 3    2. Dizziness    3. NSTEMI (non-ST elevated myocardial infarction)    4. Hyperglycemia    5. Chest pain    6. Pre-operative cardiovascular examination    7. Post-operative state    8. Chest pain, unspecified type    9. Hyperlipidemia, unspecified hyperlipidemia type    10. Essential (primary) hypertension        Plan   Postop day 3 status post CABG x3 lima to LAD vein graft to diagonal and vein graft to posterior descending artery patient extubated on day 1 hemodynamically stable  Acute confusional state hold the  narcotics and the CNS depressant  Cardiovascular aspirin and Plavix  Hypertension beta-blocker  Respiratory aggressive pulmonary toilet incentive spirometry out of bed  DuoNeb as needed for  Wheezing  CPAP at night for sleep apnea  Hyperglycemia on insulin sliding scale  and scheduled   start diabetic diet  Electrolytes replacement protocol  Anemia multifactorial acute blood loss continue to follow up   DVT and GI prophylaxis with Pepcid and bilateral SCDs  Transfer to the telemetry with a sitter        Spent 25 minutes with the bedside taking around the patient's running with the ICU team and removing the drains.          Krystal Marin MD,   [facility]  [place]

## 2022-09-26 NOTE — PLAN OF CARE
Persistent confusion and restlessness overnight. PRN Haldol given x1, see MAR. VSS. Afebrile. Adequate urine output to sena. BM x1. BSWR intact per order. POC reviewed with patient. Safety and fall precautions maintained.

## 2022-09-26 NOTE — ASSESSMENT & PLAN NOTE
-Continue current post-op care and mgmt as per CTS  -ASA, Plavix, statin, BB  -Patient remains confused  -IS usage and PT/OT when able

## 2022-09-26 NOTE — SUBJECTIVE & OBJECTIVE
Review of Systems   Unable to perform ROS: Mental status change (confused)   Objective:     Vital Signs (Most Recent):  Temp: 98.5 °F (36.9 °C) (09/26/22 0400)  Pulse: 87 (09/26/22 0600)  Resp: 18 (09/26/22 0600)  BP: (!) 140/65 (09/26/22 0647)  SpO2: 95 % (09/26/22 0600)   Vital Signs (24h Range):  Temp:  [98.5 °F (36.9 °C)-100.9 °F (38.3 °C)] 98.5 °F (36.9 °C)  Pulse:  [78-92] 87  Resp:  [16-44] 18  SpO2:  [86 %-98 %] 95 %  BP: (104-188)/(53-81) 140/65     Weight: 95.8 kg (211 lb 3.2 oz)  Body mass index is 38.63 kg/m².     SpO2: 95 %  O2 Device (Oxygen Therapy): nasal cannula      Intake/Output Summary (Last 24 hours) at 9/26/2022 1059  Last data filed at 9/26/2022 0800  Gross per 24 hour   Intake 300.45 ml   Output 1485 ml   Net -1184.55 ml       Lines/Drains/Airways       Central Venous Catheter Line  Duration             Percutaneous Central Line Insertion/Assessment - Triple Lumen  09/23/22 0739 right internal jugular 3 days              Drain  Duration                  Urethral Catheter 09/23/22 0730 Silicone;Temperature probe;Straight-tip 16 Fr. 3 days              Peripheral Intravenous Line  Duration                  Peripheral IV - Single Lumen 09/23/22 1317 16 G Right Antecubital 2 days                    Physical Exam  Vitals and nursing note reviewed.   Constitutional:       General: She is not in acute distress.     Appearance: She is well-developed. She is ill-appearing. She is not diaphoretic.   HENT:      Head: Normocephalic and atraumatic.   Eyes:      General:         Right eye: No discharge.         Left eye: No discharge.      Pupils: Pupils are equal, round, and reactive to light.   Neck:      Thyroid: No thyromegaly.      Vascular: No JVD.      Trachea: No tracheal deviation.   Cardiovascular:      Rate and Rhythm: Normal rate and regular rhythm.      Heart sounds: Normal heart sounds, S1 normal and S2 normal. No murmur heard.     Comments: Sternotomy site C/D/I; no bleeding erythema or  drainage, dressing in place  Pulmonary:      Effort: Pulmonary effort is normal. No respiratory distress.      Breath sounds: Normal breath sounds. No wheezing or rales.      Comments: Diminished BS at bases  Abdominal:      General: There is no distension.      Tenderness: There is no rebound.   Musculoskeletal:      Cervical back: Neck supple.      Right lower leg: No edema.      Left lower leg: No edema.   Skin:     General: Skin is warm and dry.      Findings: No erythema.      Comments: SVG site C/D/I; no bleeding erythema or drainage   Neurological:      Mental Status: She is alert.      Comments: Confused/disoriented       Significant Labs: CMP   Recent Labs   Lab 09/25/22  1354 09/25/22  1755 09/26/22  0453    139 140   K 4.0 3.9 4.1    103 102   CO2 27 29 28   GLU 89 92 167*   BUN 12 13 12   CREATININE 0.6 0.5 0.5   CALCIUM 8.3* 8.3* 8.5*   ANIONGAP 9 7* 10   , CBC   Recent Labs   Lab 09/25/22  0512 09/26/22  0453   WBC 12.45 12.97*   HGB 7.6* 7.6*   HCT 23.7* 23.3*    172   , Troponin No results for input(s): TROPONINI in the last 48 hours., and All pertinent lab results from the last 24 hours have been reviewed.    Significant Imaging: Echocardiogram: Transthoracic echo (TTE) complete (Cupid Only):   Results for orders placed or performed during the hospital encounter of 09/16/22   Echo   Result Value Ref Range    BSA 2 m2    TDI SEPTAL 0.06 m/s    LV LATERAL E/E' RATIO 16.00 m/s    LV SEPTAL E/E' RATIO 16.00 m/s    LA WIDTH 3.50 cm    IVC diameter 1.78 cm    Left Ventricular Outflow Tract Mean Velocity 0.90 cm/s    Left Ventricular Outflow Tract Mean Gradient 3.28 mmHg    TDI LATERAL 0.06 m/s    LVIDd 3.49 (A) 3.5 - 6.0 cm    IVS 1.28 (A) 0.6 - 1.1 cm    Posterior Wall 1.18 (A) 0.6 - 1.1 cm    Ao root annulus 2.57 cm    LVIDs 2.43 2.1 - 4.0 cm    FS 30 28 - 44 %    LA volume 53.78 cm3    STJ 2.92 cm    Ascending aorta 2.90 cm    LV mass 140.49 g    LA size 3.58 cm    TAPSE 1.90 cm     Left Ventricle Relative Wall Thickness 0.68 cm    AV mean gradient 8 mmHg    AV valve area 1.96 cm2    AV Velocity Ratio 0.63     AV index (prosthetic) 0.65     E/A ratio 0.98     Mean e' 0.06 m/s    E wave deceleration time 236.57 msec    IVRT 79.92 msec    LVOT diameter 1.96 cm    LVOT area 3.0 cm2    LVOT peak nilay 1.07 m/s    LVOT peak VTI 27.30 cm    Ao peak nilay 1.71 m/s    Ao VTI 42.1 cm    RVOT peak nilay 0.70 m/s    RVOT peak VTI 15.6 cm    LVOT stroke volume 82.33 cm3    AV peak gradient 12 mmHg    PV mean gradient 1.22 mmHg    E/E' ratio 16.00 m/s    MV Peak E Nilay 0.96 m/s    MV Peak A Nilay 0.98 m/s    LV Systolic Volume 20.74 mL    LV Systolic Volume Index 10.8 mL/m2    LV Diastolic Volume 50.49 mL    LV Diastolic Volume Index 26.30 mL/m2    LA Volume Index 28.0 mL/m2    LV Mass Index 73 g/m2    RA Major Axis 3.84 cm    Left Atrium Minor Axis 5.02 cm    Left Atrium Major Axis 5.08 cm    RA Width 2.80 cm    Right Atrial Pressure (from IVC) 8 mmHg    EF 60 %    Narrative    · The left ventricle is normal in size with concentric remodeling and   normal systolic function.  · The estimated ejection fraction is 60%.  · Indeterminate left ventricular diastolic function.  · Normal right ventricular size with normal right ventricular systolic   function.  · There is mild aortic valve stenosis.  · Aortic valve area is 1.96 cm2; peak velocity is 1.71 m/s; mean gradient   is 8 mmHg.  · Intermediate central venous pressure (8 mmHg).      , EKG: Reviewed, and X-Ray: CXR: X-Ray Chest 1 View (CXR): No results found for this visit on 09/16/22. and X-Ray Chest PA and Lateral (CXR): No results found for this visit on 09/16/22.

## 2022-09-26 NOTE — PT/OT/SLP PROGRESS
Occupational Therapy   Treatment    Name: Ca Diaz  MRN: [id number]  Admitting Diagnosis:  S/P CABG x 3  3 Days Post-Op    Recommendations:     Discharge Recommendations: nursing facility, skilled  Discharge Equipment Recommendations:   (TBD at next level of care)  Barriers to discharge:   (unknown level of A at d/c)    Assessment:     Ca Diaz is a 62 y.o. female with a medical diagnosis of S/P CABG x 3.  She presents with the following performance deficits affecting function are weakness, impaired endurance, impaired self care skills, impaired functional mobility, impaired balance, impaired cognition, decreased safety awareness, impaired cardiopulmonary response to activity (sternal precautions).     Rehab Prognosis:  Fair and questionable ; patient would benefit from acute skilled OT services to address these deficits and reach maximum level of function.       Plan:     Patient to be seen 2 x/week to address the above listed problems via self-care/home management, therapeutic activities, therapeutic exercises  Plan of Care Expires: 10/08/22  Plan of Care Reviewed with: patient    Subjective     Pain/Comfort:  Pain Rating 1:  (no nonverbal indicators of pain)    Objective:     Communicated with: NurseDevon, prior to session.  Patient found supine with blood pressure cuff, central line, sena catheter, oxygen, pulse ox (continuous), restraints, telemetry, wound vac upon OT entry to room.    General Precautions: Standard, fall, sternal, respiratory   Orthopedic Precautions:N/A   Braces: N/A  Respiratory Status: Nasal cannula, flow 5 L/min    Bed Mobility:    Patient completed Supine to Sit with maximal assistance and 2 persons  Patient completed Sit to Supine with maximal assistance and 2 persons     Functional Mobility/Transfers:  Patient completed Sit <> Stand Transfer with moderate assistance and of 2 persons  with  hand-held assist   Completed sit>stand x2 trials with poor  comprehension of cueing for safety and improved active engagement  Functional Mobility: unable to facilitate side stepping with max tactile and verbal cueing    Activities of Daily Living:  Lower Body Dressing: total assistance unable to comprehend task to engage. Donned socks while seated EOB dependently    AMPAC 6 Click ADL: 7    Treatment & Education:  Patient tolerated intervention fair. Noted to have significant cognitive deficits including delayed processing. Patient with repetitive commands and increased time able to state, name, , and that she was in a hospital. Patient lethargic throughout. Sat EOB x10 mins with impulsive movements including frequent trunk rocking anterior/posterior. Patient was provided with education re: sternal precautions and their functional implications with poor comprehension requiring continuous A of therapist to ensure compliance. Will require continued education throughout hospitalization. Will continue to progress towards functional goals and OOB to chair as appropriate.    Patient left supine with all lines intact, call button in reach, restraints reapplied at end of session, and nurse notified    GOALS:   Multidisciplinary Problems       Occupational Therapy Goals          Problem: Occupational Therapy    Goal Priority Disciplines Outcome Interventions   Occupational Therapy Goal     OT, PT/OT Ongoing, Progressing    Description: O.T. GOALS TO BE MET BY 10-8-22   MIN A WITH UE DRESSING   PT WITH REQ MIN  BSC TRANSFERS  MIN A WITH TOILET ING  PT WILL TOLERATE 1 SET X 10 REPS B UE ROM EXERCISE WWTHIN A PAIN-FREE RANGE AND PLANE.                             Time Tracking:     OT Date of Treatment: 22  OT Start Time: 805  OT Stop Time: 830  OT Total Time (min): 25 min    Billable Minutes:Therapeutic Activity 2022

## 2022-09-26 NOTE — PROGRESS NOTES
"[facility] - Intensive Care ([facility])  Cardiology  Progress Note    Patient Name: Ca Diaz  MRN: [id number]  Admission Date: 9/16/2022  Hospital Length of Stay: 9 days  Code Status: Full Code   Attending Physician: Krystal Marin MD   Primary Care Physician: Desiree Frye MD  Expected Discharge Date:   Principal Problem:S/P CABG x 3    Subjective:     Hospital Course:      Ca Diaz is a 62 y.o. female patient with a PMHx of anxiety, CAD, h/o CVA, depression, DM II, RLS, seizures, fibromyalgia, HLD, HTN, PRAVEENA, and obesity who presented to the Emergency Department for evaluation of generalized weakness which onset gradually two days ago. Pt states she fell last night (9/15) and the night before last (9/14). Pt's  states that she "can't even walk through an open doorway." Symptoms are constant and moderate in severity. No mitigating or exacerbating factors reported. Associated sxs include N/D and dizziness. Pt reports the nausea began one day ago. Patient denies any HA, vomiting, light-headedness, visual disturbance, CP, SOB/MALAVE, diaphoresis, neck or jaw pain, upper extremity pain, numbness/tingling, dysuria, and all other sxs at this time. Work-up in the ED revealed NSTEMI with troponin of 0.502, EKG unrevealing, CXR unremarkable, CT of the head negative for acute process. 325 mg ASA given and UFH initiated. Hospital Medicine was contacted for admission and patient placed in Observation.   9/18/22-Patient seen and examined today. No chest pain. Plan CABG soon due to complex 3 vessel CAD.        9/19/22-Patient seen and examined today. Reported some indigestion symptoms earlier this AM, has since resolved. No alden chest pain or tightness. No other CV complaints. Labs stable. Awaiting CABG after Brilinta washout period.    9/20/22- Patient seen and examined today. No complaints overnight. No c/o cp or SOB.  Labs reviewed, on Hep gtt. CABG planned for Friday after Brilinta washout " period    9/21/22-Patient seen and examined today. Stable overnight. No issues. Labs reviewed. Awaiting CABG.    9/24/22- POD1. The patient had successful CABG x3 on 9/23/22. SVG-diagonal, SVG-PDA, LIMA-LAD.  Doing well.  Sitting up in the chair, feeling tired.  No acute events overnight.    9/25/22-POD2. Became disoriented overdnight. Hgb 8.4->7.6. No arrhythmias overnight. Low grade fever     9/26/22-Patient seen and examined today, s/p CABG x 3 POD # 3. Remains confused, did not respond to questions. Hemodynamically stable. Labs reviewed. H/H 7.6/23.        Review of Systems   Unable to perform ROS: Mental status change (confused)   Objective:     Vital Signs (Most Recent):  Temp: 98.5 °F (36.9 °C) (09/26/22 0400)  Pulse: 87 (09/26/22 0600)  Resp: 18 (09/26/22 0600)  BP: (!) 140/65 (09/26/22 0647)  SpO2: 95 % (09/26/22 0600)   Vital Signs (24h Range):  Temp:  [98.5 °F (36.9 °C)-100.9 °F (38.3 °C)] 98.5 °F (36.9 °C)  Pulse:  [78-92] 87  Resp:  [16-44] 18  SpO2:  [86 %-98 %] 95 %  BP: (104-188)/(53-81) 140/65     Weight: 95.8 kg (211 lb 3.2 oz)  Body mass index is 38.63 kg/m².     SpO2: 95 %  O2 Device (Oxygen Therapy): nasal cannula      Intake/Output Summary (Last 24 hours) at 9/26/2022 1059  Last data filed at 9/26/2022 0800  Gross per 24 hour   Intake 300.45 ml   Output 1485 ml   Net -1184.55 ml       Lines/Drains/Airways       Central Venous Catheter Line  Duration             Percutaneous Central Line Insertion/Assessment - Triple Lumen  09/23/22 0739 right internal jugular 3 days              Drain  Duration                  Urethral Catheter 09/23/22 0730 Silicone;Temperature probe;Straight-tip 16 Fr. 3 days              Peripheral Intravenous Line  Duration                  Peripheral IV - Single Lumen 09/23/22 1317 16 G Right Antecubital 2 days                    Physical Exam  Vitals and nursing note reviewed.   Constitutional:       General: She is not in acute distress.     Appearance: She is  well-developed. She is ill-appearing. She is not diaphoretic.   HENT:      Head: Normocephalic and atraumatic.   Eyes:      General:         Right eye: No discharge.         Left eye: No discharge.      Pupils: Pupils are equal, round, and reactive to light.   Neck:      Thyroid: No thyromegaly.      Vascular: No JVD.      Trachea: No tracheal deviation.   Cardiovascular:      Rate and Rhythm: Normal rate and regular rhythm.      Heart sounds: Normal heart sounds, S1 normal and S2 normal. No murmur heard.     Comments: Sternotomy site C/D/I; no bleeding erythema or drainage, dressing in place  Pulmonary:      Effort: Pulmonary effort is normal. No respiratory distress.      Breath sounds: Normal breath sounds. No wheezing or rales.      Comments: Diminished BS at bases  Abdominal:      General: There is no distension.      Tenderness: There is no rebound.   Musculoskeletal:      Cervical back: Neck supple.      Right lower leg: No edema.      Left lower leg: No edema.   Skin:     General: Skin is warm and dry.      Findings: No erythema.      Comments: SVG site C/D/I; no bleeding erythema or drainage   Neurological:      Mental Status: She is alert.      Comments: Confused/disoriented       Significant Labs: CMP   Recent Labs   Lab 09/25/22  1354 09/25/22  1755 09/26/22  0453    139 140   K 4.0 3.9 4.1    103 102   CO2 27 29 28   GLU 89 92 167*   BUN 12 13 12   CREATININE 0.6 0.5 0.5   CALCIUM 8.3* 8.3* 8.5*   ANIONGAP 9 7* 10   , CBC   Recent Labs   Lab 09/25/22  0512 09/26/22  0453   WBC 12.45 12.97*   HGB 7.6* 7.6*   HCT 23.7* 23.3*    172   , Troponin No results for input(s): TROPONINI in the last 48 hours., and All pertinent lab results from the last 24 hours have been reviewed.    Significant Imaging: Echocardiogram: Transthoracic echo (TTE) complete ([facility] Only):   Results for orders placed or performed during the hospital encounter of 09/16/22   Echo   Result Value Ref Range    BSA 2 m2     TDI SEPTAL 0.06 m/s    LV LATERAL E/E' RATIO 16.00 m/s    LV SEPTAL E/E' RATIO 16.00 m/s    LA WIDTH 3.50 cm    IVC diameter 1.78 cm    Left Ventricular Outflow Tract Mean Velocity 0.90 cm/s    Left Ventricular Outflow Tract Mean Gradient 3.28 mmHg    TDI LATERAL 0.06 m/s    LVIDd 3.49 (A) 3.5 - 6.0 cm    IVS 1.28 (A) 0.6 - 1.1 cm    Posterior Wall 1.18 (A) 0.6 - 1.1 cm    Ao root annulus 2.57 cm    LVIDs 2.43 2.1 - 4.0 cm    FS 30 28 - 44 %    LA volume 53.78 cm3    STJ 2.92 cm    Ascending aorta 2.90 cm    LV mass 140.49 g    LA size 3.58 cm    TAPSE 1.90 cm    Left Ventricle Relative Wall Thickness 0.68 cm    AV mean gradient 8 mmHg    AV valve area 1.96 cm2    AV Velocity Ratio 0.63     AV index (prosthetic) 0.65     E/A ratio 0.98     Mean e' 0.06 m/s    E wave deceleration time 236.57 msec    IVRT 79.92 msec    LVOT diameter 1.96 cm    LVOT area 3.0 cm2    LVOT peak nilay 1.07 m/s    LVOT peak VTI 27.30 cm    Ao peak nilay 1.71 m/s    Ao VTI 42.1 cm    RVOT peak nilay 0.70 m/s    RVOT peak VTI 15.6 cm    LVOT stroke volume 82.33 cm3    AV peak gradient 12 mmHg    PV mean gradient 1.22 mmHg    E/E' ratio 16.00 m/s    MV Peak E Nilay 0.96 m/s    MV Peak A Nilay 0.98 m/s    LV Systolic Volume 20.74 mL    LV Systolic Volume Index 10.8 mL/m2    LV Diastolic Volume 50.49 mL    LV Diastolic Volume Index 26.30 mL/m2    LA Volume Index 28.0 mL/m2    LV Mass Index 73 g/m2    RA Major Axis 3.84 cm    Left Atrium Minor Axis 5.02 cm    Left Atrium Major Axis 5.08 cm    RA Width 2.80 cm    Right Atrial Pressure (from IVC) 8 mmHg    EF 60 %    Narrative    · The left ventricle is normal in size with concentric remodeling and   normal systolic function.  · The estimated ejection fraction is 60%.  · Indeterminate left ventricular diastolic function.  · Normal right ventricular size with normal right ventricular systolic   function.  · There is mild aortic valve stenosis.  · Aortic valve area is 1.96 cm2; peak velocity is 1.71 m/s; mean  gradient   is 8 mmHg.  · Intermediate central venous pressure (8 mmHg).      , EKG: Reviewed, and X-Ray: CXR: X-Ray Chest 1 View (CXR): No results found for this visit on 09/16/22. and X-Ray Chest PA and Lateral (CXR): No results found for this visit on 09/16/22.    Assessment and Plan:   Patient recovering s/p CABG x 3. Continue current mgmt and post-op care as per CTS.    * S/P CABG x 3  -Continue current post-op care and mgmt as per CTS  -ASA, Plavix, statin, BB  -Patient remains confused  -IS usage and PT/OT when able    Atherosclerosis of native coronary artery of native heart without angina pectoris  Multiple prior stents last 2020. Recurrent symptoms and pos troponin.  Plan  Cath today . Patient agrees    9/18/22-Patient stable post cath, 3 vessel CAD and plan CABG     9/19/22  -s/p cath that showed multivessel CAD  -Stable this AM, awaiting CABG after Brilinta washout  -Continue OMT-ASA, statin, BB, amlodipine, heparin gtt    9/20/22  -CABG planned Friday after Brilinta washout  -Cont OMT- ASA, statin, BB, amlodipine, hep gtt    9/21/22  -Stable, no acute events  -Continue ASA, statin, BB, amlodipine, heparin gtt  -Awaiting CABG    9/24/22  POD1 s/p CABG x 3  Continue aspirin, metoprolol, statin, Plavix  IV diuresis  Being managed by CT surgery    9/25/22  Became delirious overnight, somnolent on exam  Continrue current medications  No significant arrhthymias   Continue aspirin, metoprolol, statin, Plavix    NSTEMI (non-ST elevated myocardial infarction)  Plan cath today    9/18/22- no further symptoms    9/19/22-See plan above    Hyperlipidemia  -Continue statin    Essential (primary) hypertension  -Continue metoprolol     Type 2 diabetes mellitus, with long-term current use of insulin  Per hospital medicine        VTE Risk Mitigation (From admission, onward)         Ordered     enoxaparin injection 40 mg  Daily         09/26/22 0745     IP VTE HIGH RISK PATIENT  Once         09/26/22 0745     Place  sequential compression device  Until discontinued         09/26/22 0745     Place BENITEZ hose  Until discontinued         09/23/22 1307     Place sequential compression device  Until discontinued         09/23/22 1307                Ivana Garcia PA-C  Cardiology  '[facility] - Intensive Care ([facility])

## 2022-09-26 NOTE — PT/OT/SLP PROGRESS
Physical Therapy Treatment    Patient Name:  Ca Diaz   MRN:  [id number]    Recommendations:     Discharge Recommendations:  nursing facility, skilled   Discharge Equipment Recommendations:  (TBD AT NEXT LEVEL OF CARE)   Barriers to discharge: None    Assessment:     Ca Diaz is a 62 y.o. female admitted with a medical diagnosis of S/P CABG x 3.  She presents with the following impairments/functional limitations:  weakness, impaired endurance, impaired cognition, impaired functional mobility, gait instability, impaired balance, decreased safety awareness, decreased lower extremity function, decreased upper extremity function, impaired cardiopulmonary response to activity, decreased coordination.    Rehab Prognosis: Fair; patient would benefit from acute skilled PT services to address these deficits and reach maximum level of function.    Recent Surgery: Procedure(s) (LRB):  CORONARY ARTERY BYPASS GRAFT (CABG) (N/A)  ECHOCARDIOGRAM,TRANSESOPHAGEAL (N/A)  SURGICAL PROCUREMENT, VEIN, ENDOSCOPIC (Left)  BLOCK, NERVE, INTERCOSTAL, 2 OR MORE (N/A) 3 Days Post-Op    Plan:     During this hospitalization, patient to be seen 3 x/week to address the identified rehab impairments via gait training, therapeutic activities, therapeutic exercises and progress toward the following goals:    Plan of Care Expires:  10/08/22    Subjective     Chief Complaint:   Patient/Family Comments/goals:   Pain/Comfort:  Pain Rating 1: 0/10 (NO NON-VERBAL INDICATORS OF PAIN, NO PAIN VERBALIZED)      Objective:     Communicated with NURSE BREWSTER prior to session.  Patient found supine with blood pressure cuff, restraints, telemetry, oxygen, pulse ox (continuous), peripheral IV, wound vac, sena catheter, central line upon PT entry to room.     General Precautions: Standard, fall, sternal, respiratory   Orthopedic Precautions:N/A   Braces: N/A  Respiratory Status: Nasal cannula, flow 5 L/min     Functional Mobility:  Bed  "Mobility:     Rolling Left:  maximal assistance  Rolling Right: maximal assistance  Scooting: maximal assistance and of 2 persons  Bridging: minimum assistance  Supine to Sit: maximal assistance and of 2 persons  Sit to Supine: maximal assistance and of 2 persons  Transfers:     Sit to Stand:  moderate assistance and of 2 persons with no AD-2 TRIALS, UNSTEADY DUE TO SMALL LOB FWD DUE TO LETHARGY  Balance: POOR+ SITTING BALANCE, POOR STANDING BALANCE.  PT SAT EOB FOR APPROX. 8 MINUTES WITH MOD/NORMA, PT IN/OUT OF LETHARGY, CUES FOR UPRIGHT POSTURE AND ATTENTION TO TASK, INTERMITTENTLY IMPULSIVE SITTING EOB TRYING TO STAND    AM-PAC 6 CLICK MOBILITY  Turning over in bed (including adjusting bedclothes, sheets and blankets)?: 2  Sitting down on and standing up from a chair with arms (e.g., wheelchair, bedside commode, etc.): 2  Moving from lying on back to sitting on the side of the bed?: 2  Moving to and from a bed to a chair (including a wheelchair)?: 2  Need to walk in hospital room?: 1  Climbing 3-5 steps with a railing?: 1  Basic Mobility Total Score: 10     Therapeutic Activities and Exercises:  REVIEW ROLE OF P.T. AND POC IN ACUTE CARE HOSPITAL SETTING, REVIEW STERNAL PRECAUTIONS, PT REQUIRED CONSTANT VERBAL CUES TO AVOID BREAKING STERNAL PRECAUTIONS,  ENCOURAGED TO INCREASE TIME OOB IN CHAIR TO TOLERANCE WHEN APPROPRIATE, EDUCATED IN AND ENCOURAGED TO PERFORM BLE THEREX WHILE SUPINE THROUGHOUT THE DAY TO TOLERANCE: HIP FLEX/EXT, QUAD SET,  HEEL SLIDES, AP'S.   PT EDUCATED ON RISK FOR FALLS DUE TO GENERALIZED WEAKNESS, EDUCATED ON "CALL DON'T FALL", ENCOURAGED TO CALL FOR ASSISTANCE WITH ALL NEEDS.    Patient left HOB elevated with all lines intact, call button in reach, bed alarm on, restraints reapplied at end of session, and NURSE notified..    GOALS:   Multidisciplinary Problems       Physical Therapy Goals          Problem: Physical Therapy    Goal Priority Disciplines Outcome Goal Variances Interventions "   Physical Therapy Goal     PT, PT/OT Ongoing, Progressing     Description: Pt will perform bed mobility independently in order to participate in EOB activity.  Pt will perform transfers independently in order to participate in OOB activity.   Pt will ambulate 100ft mod I with LRAD in order to participate in daily tasks.                         Time Tracking:     PT Received On: 09/26/22  PT Start Time: 0730     PT Stop Time: 0753  PT Total Time (min): 23 min     Billable Minutes: Therapeutic Activity 23    Treatment Type: Treatment  PT/PTA: PT     PTA Visit Number: 0     09/26/2022

## 2022-09-26 NOTE — NURSING
Notified Dr Marin regarding continued restlessness, persistently putting legs overnight side rails; unable to redirect or reorient. PRN Haldol to be ordered per MD. With restlessness, pt disconnected Purvena at sponge site; unable to get seal with reenforcement--MD aware.

## 2022-09-26 NOTE — ASSESSMENT & PLAN NOTE
Multiple prior stents last 2020. Recurrent symptoms and pos troponin.  Plan  Cath today . Patient agrees    9/18/22-Patient stable post cath, 3 vessel CAD and plan CABG     9/19/22  -s/p cath that showed multivessel CAD  -Stable this AM, awaiting CABG after Brilinta washout  -Continue OMT-ASA, statin, BB, amlodipine, heparin gtt    9/20/22  -CABG planned Friday after Brilinta washout  -Cont OMT- ASA, statin, BB, amlodipine, hep gtt    9/21/22  -Stable, no acute events  -Continue ASA, statin, BB, amlodipine, heparin gtt  -Awaiting CABG    9/24/22  POD1 s/p CABG x 3  Continue aspirin, metoprolol, statin, Plavix  IV diuresis  Being managed by CT surgery    9/25/22  Became delirious overnight, somnolent on exam  Continrue current medications  No significant arrhthymias   Continue aspirin, metoprolol, statin, Plavix

## 2022-09-26 NOTE — PLAN OF CARE
Pt remains restless and confused all shift, consistently oriented to person and place, intermittently answering appropriately to situation, totally disoriented to time. BSWRs in place d/t pulling at lines/drains, constant reorientation and redirection provided as pt persistently putting legs over siderails and trying to climb OOB. Chest tubes and pacer wires removed this AM by MD. SR on monitor, O2 sats stable on 5L NC. Bello with approx 1150ml UOP this shift. Blood glucose monitored/managed per SSI and scheduled insulin; pt only received breakfast dose of insulin d/t refusing to eat any meals despite both RN and spouse attempting to feed pt. Turned/repositioned q2h, heels floated on pillows and pt frequent crosses/repositions legs independently.

## 2022-09-26 NOTE — PLAN OF CARE
FAIR PARTICIPATION, LIMITED BY CONFUSION/LETHARGY, MAXA X 2 FOR BED MOBILITY, MODA X 2 FOR SIT<>STAND TF

## 2022-09-26 NOTE — EICU
Rounding (Video Assessment):  No    Intervention Initiated From:  Bedside    Pallavi Communicated with Bedside Nurse regarding:  Other    Nurse Notified:  Yes    Doctor Notified:  Yes    Comments: elert from bedside nruse for restraint renewal. Informed Dr. Parsons.

## 2022-09-26 NOTE — PROGRESS NOTES
[facility] ([facility])  Adult Nutrition  Progress Note    SUMMARY       Recommendations    Recommendation/Intervention:   1. Encourage po intake   2. Recommend to consider oral supplements   3. Collaboration with medical providers    Goals: Patient to consume and tolerate >50% EEN prior to RD follow up.    Nutrition Goal Status: progressing towards goal    Communication of RD Recs: other (comment) (poc)      Assessment and Plan    Nutrition Problem  Inadequate po intake    Related to (etiology):   Decreased po intake s/p procedure   Condition associated with diagnosis    Signs and Symptoms (as evidenced by):   Less than 50% po intake     Interventions/Recommendations (treatment strategy):  1. Encourage po intake   2. Recommend to consider oral supplements   3. Collaboration with medical providers    Nutrition Diagnosis Status:   New    Malnutrition Assessment                                       Reason for Assessment    Reason For Assessment: length of stay    Diagnosis: diabetes diagnosis/complications, cardiac disease  Patient Active Problem List   Diagnosis    Type 2 diabetes mellitus, with long-term current use of insulin    ASCVD (arteriosclerotic cardiovascular disease)    MDD (major depressive disorder)    Anxiety and depression    Essential (primary) hypertension    GERD (gastroesophageal reflux disease)    History of migraine headaches    Hyperlipidemia    Hypersomnia with sleep apnea    Obstructive sleep apnea syndrome    Fibromyalgia    RLS (restless legs syndrome)    NSTEMI (non-ST elevated myocardial infarction)    Atherosclerosis of native coronary artery of native heart without angina pectoris    Obesity    S/P CABG x 3    Seizure    Chest pain     Past Medical History:   Diagnosis Date    Anxiety and depression 3/13/2014    Essential (primary) hypertension 1/22/2013    Fibromyalgia 9/17/2012    Hyperlipidemia     Obstructive sleep apnea syndrome 8/20/2014    RLS (restless legs syndrome)  "10/18/2018    Overview:  Rheum Dr Tam    Seizures     Type 2 diabetes mellitus 9/17/2012    ICD-10 Transition Last Assessment & Plan:  Continue with sliding scale insulin.  Control improved on low-dose Lantus presently       Relevant Medical History: s/p CABG x3    Interdisciplinary Rounds: did not attend (RD remote)    General Information Comments:   9/26: Patient with a cardiac diet.  PO intake decreased at this time with average of 25% po intake. Patient with AMS/confusion.  Oral supplements recommended daily.  Labs reviewed.  NKFA.  LBM:9/26.  RD will continue to ecnourage po intake.  (RD remote)    Nutrition Discharge Planning: Patient will continue to consume recommended cardiac diet prior and posti discharge.    Nutrition Risk Screen    Nutrition Risk Screen: no indicators present    Nutrition/Diet History    Spiritual, Cultural Beliefs, Holiness Practices, Values that Affect Care: no  Food Allergies: NKFA  Factors Affecting Nutritional Intake: decreased appetite    Anthropometrics    Temp: 98.5 °F (36.9 °C)  Height Method: Stated  Height: 5' 2" (157.5 cm)  Height (inches): 62 in  Weight Method: Bed Scale  Weight: 95.8 kg (211 lb 3.2 oz)  Weight (lb): 211.2 lb  Ideal Body Weight (IBW), Female: 110 lb  % Ideal Body Weight, Female (lb): 192 %  BMI (Calculated): 38.6  BMI Grade: 35 - 39.9 - obesity - grade II       Lab/Procedures/Meds    Pertinent Labs Reviewed: reviewed  Pertinent Medications Reviewed: reviewed  BMP  Lab Results   Component Value Date     09/26/2022    K 4.1 09/26/2022     09/26/2022    CO2 28 09/26/2022    BUN 12 09/26/2022    CREATININE 0.5 09/26/2022    CALCIUM 8.5 (L) 09/26/2022    ANIONGAP 10 09/26/2022    ESTGFRAFRICA >60 03/18/2022    EGFRNONAA >60 03/18/2022     Lab Results   Component Value Date    HGBA1C 10.9 (H) 09/17/2022     Lab Results   Component Value Date    CALCIUM 8.5 (L) 09/26/2022    PHOS 3.0 09/19/2022     Scheduled Meds:   ascorbic acid (vitamin C)  500 " mg Oral BID    aspirin  81 mg Oral Daily    atorvastatin  80 mg Oral Daily    chlorhexidine  10 mL Mouth/Throat BID    clopidogreL  75 mg Oral Daily    cyanocobalamin  1,000 mcg Oral Daily    enoxaparin  40 mg Subcutaneous Daily    FLUoxetine  40 mg Oral Daily    folic acid  1 mg Oral Daily    furosemide (LASIX) injection  40 mg Intravenous BID    insulin aspart U-100  10 Units Subcutaneous TIDWM    insulin detemir U-100  15 Units Subcutaneous BID    magnesium hydroxide 400 mg/5 ml  30 mL Oral Daily    metoprolol tartrate  25 mg Oral TID    mupirocin   Nasal BID    pantoprazole  40 mg Oral Daily    polyethylene glycol  17 g Oral Daily    potassium chloride  20 mEq Oral BID     Continuous Infusions:   dexmedetomidine (PRECEDEX) infusion Stopped (09/23/22 1550)     PRN Meds:.sodium chloride, sodium chloride 0.9%, acetaminophen, albumin human 5%, albuterol-ipratropium, aluminum-magnesium hydroxide-simethicone, bisacodyL, calcium gluconate IVPB, dextrose 10%, dextrose 10%, glucagon (human recombinant), glucose, glucose, haloperidol lactate, insulin aspart U-100, lactated ringers, magnesium sulfate IVPB, melatonin, morphine, naloxone, nitroGLYCERIN, ondansetron, ondansetron, oxyCODONE, potassium chloride in water, potassium chloride in water, potassium chloride in water, promethazine, sodium chloride 0.9%    Physical Findings/Assessment         Estimated/Assessed Needs    Weight Used For Calorie Calculations: 95.8 kg (211 lb 3.2 oz)  Energy Calorie Requirements (kcal): 20kcals/kg (1916kcals/day)  Energy Need Method: Kcal/kg  Protein Requirements: 0.8-1.0g/kg  Weight Used For Protein Calculations: 95.8 kg (211 lb 3.2 oz)     Estimated Fluid Requirement Method: RDA Method  RDA Method (mL): 20  CHO Requirement: 240      Nutrition Prescription Ordered    Current Diet Order: cardiac    Evaluation of Received Nutrient/Fluid Intake    % Kcal Needs: <25%  % Protein Needs: <25%  Energy Calories Required: not meeting  needs  Protein Required: not meeting needs  Fluid Required: not meeting needs  Tolerance: tolerating  % Intake of Estimated Energy Needs: 0 - 25 %  % Meal Intake: 0 - 25 %  Intake/Output - Last 3 Shifts         09/24 0700 09/25 0659 09/25 0700 09/26 0659 09/26 0700 09/27 0659    P.O.  360     I.V. (mL/kg) 594.6 (6.1) 130.5 (1.4)     IV Piggyback  99.9     Total Intake(mL/kg) 594.6 (6.1) 590.5 (6.2)     Urine (mL/kg/hr) 1890 (0.8) 2355 (1) 780 (1.2)    Drains       Other  0     Stool 0 0 0    Chest Tube 620 75     Total Output 2510 2430 780    Net -1915.4 -1839.6 -780           Stool Occurrence 0 x 1 x 1 x            Nutrition Risk    Level of Risk/Frequency of Follow-up: moderate     Monitor and Evaluation    Food and Nutrient Intake: energy intake, food and beverage intake  Food and Nutrient Adminstration: diet order  Knowledge/Beliefs/Attitudes: food and nutrition knowledge/skill, beliefs and attitudes  Physical Activity and Function: nutrition-related ADLs and IADLs, factors affecting access to physical activity  Anthropometric Measurements: height/length, weight, weight change, body mass index  Biochemical Data, Medical Tests and Procedures: electrolyte and renal panel, lipid profile, gastrointestinal profile, glucose/endocrine profile, inflammatory profile     Nutrition Follow-Up    RD Follow-up?: Yes  Geneva Lemus, MS, RDN, LDN

## 2022-09-26 NOTE — PROGRESS NOTES
"[facility] ([facility])  Cardiology  Progress Note    Patient Name: Ca Diaz  MRN: [id number]  Admission Date: 9/16/2022  Hospital Length of Stay: 8 days  Code Status: Full Code   Attending Physician: Krystal Marin MD   Primary Care Physician: Desiree Frye MD  Expected Discharge Date:   Principal Problem:S/P CABG x 3    Subjective:     Hospital Course:      Ca Diaz is a 62 y.o. female patient with a PMHx of anxiety, CAD, h/o CVA, depression, DM II, RLS, seizures, fibromyalgia, HLD, HTN, PRAVEENA, and obesity who presented to the Emergency Department for evaluation of generalized weakness which onset gradually two days ago. Pt states she fell last night (9/15) and the night before last (9/14). Pt's  states that she "can't even walk through an open doorway." Symptoms are constant and moderate in severity. No mitigating or exacerbating factors reported. Associated sxs include N/D and dizziness. Pt reports the nausea began one day ago. Patient denies any HA, vomiting, light-headedness, visual disturbance, CP, SOB/MALAVE, diaphoresis, neck or jaw pain, upper extremity pain, numbness/tingling, dysuria, and all other sxs at this time. Work-up in the ED revealed NSTEMI with troponin of 0.502, EKG unrevealing, CXR unremarkable, CT of the head negative for acute process. 325 mg ASA given and UFH initiated. Hospital Medicine was contacted for admission and patient placed in Observation.   9/18/22-Patient seen and examined today. No chest pain. Plan CABG soon due to complex 3 vessel CAD.        9/19/22-Patient seen and examined today. Reported some indigestion symptoms earlier this AM, has since resolved. No alden chest pain or tightness. No other CV complaints. Labs stable. Awaiting CABG after Brilinta washout period.    9/20/22- Patient seen and examined today. No complaints overnight. No c/o cp or SOB.  Labs reviewed, on Hep gtt. CABG planned for Friday after Brilinta washout " period    9/21/22-Patient seen and examined today. Stable overnight. No issues. Labs reviewed. Awaiting CABG.    9/24/22- POD1. The patient had successful CABG x3 on 9/23/22. SVG-diagonal, SVG-PDA, LIMA-LAD.  Doing well.  Sitting up in the chair, feeling tired.  No acute events overnight.    9/25/22-POD2. Became disoriented overdnight. Hgb 8.4->7.6. No arrhythmias overnight. Low grade fever           Review of Systems   Constitutional: Positive for fever. Negative for diaphoresis, malaise/fatigue, weight gain and weight loss.   HENT:  Negative for congestion and nosebleeds.    Cardiovascular:  Negative for chest pain, claudication, cyanosis, dyspnea on exertion, irregular heartbeat, leg swelling, near-syncope, orthopnea, palpitations, paroxysmal nocturnal dyspnea and syncope.   Respiratory:  Negative for cough, hemoptysis, shortness of breath, sleep disturbances due to breathing, snoring, sputum production and wheezing.    Hematologic/Lymphatic: Negative for bleeding problem. Does not bruise/bleed easily.   Skin:  Negative for rash.   Musculoskeletal:  Negative for arthritis, back pain, falls, joint pain, muscle cramps and muscle weakness.   Gastrointestinal:  Negative for abdominal pain, constipation, diarrhea, heartburn, hematemesis, hematochezia, melena, nausea and vomiting.   Genitourinary:  Negative for dysuria, hematuria and nocturia.   Neurological:  Positive for weakness. Negative for excessive daytime sleepiness, dizziness, headaches, light-headedness, loss of balance, numbness and vertigo.   Psychiatric/Behavioral:  Positive for altered mental status.    Objective:     Vital Signs (Most Recent):  Temp: (!) 100.4 °F (38 °C) (09/25/22 1907)  Pulse: 89 (09/25/22 1907)  Resp: (!) 24 (09/25/22 1907)  BP: (!) 152/67 (09/25/22 1805)  SpO2: (!) 91 % (09/25/22 1907)   Vital Signs (24h Range):  Temp:  [97.5 °F (36.4 °C)-101.1 °F (38.4 °C)] 100.4 °F (38 °C)  Pulse:  [75-97] 89  Resp:  [10-40] 24  SpO2:  [90 %-99 %]  91 %  BP: (110-152)/(53-68) 152/67     Weight: 97.2 kg (214 lb 4.6 oz)  Body mass index is 39.19 kg/m².     SpO2: (!) 91 %  O2 Device (Oxygen Therapy): nasal cannula      Intake/Output Summary (Last 24 hours) at 9/25/2022 2248  Last data filed at 9/25/2022 2000  Gross per 24 hour   Intake 485.14 ml   Output 2560 ml   Net -2074.86 ml       Lines/Drains/Airways       Central Venous Catheter Line  Duration             Percutaneous Central Line Insertion/Assessment - Triple Lumen  09/23/22 0739 right internal jugular 2 days              Drain  Duration                  Urethral Catheter 09/23/22 0730 Silicone;Temperature probe;Straight-tip 16 Fr. 2 days              Peripheral Intravenous Line  Duration                  Peripheral IV - Single Lumen 09/23/22 1317 16 G Right Antecubital 2 days                    Physical Exam  Vitals and nursing note reviewed.   Constitutional:       Appearance: She is well-developed. She is ill-appearing.   HENT:      Head: Normocephalic.      Mouth/Throat:      Mouth: Mucous membranes are moist.   Neck:      Vascular: No carotid bruit or JVD.   Cardiovascular:      Rate and Rhythm: Normal rate and regular rhythm.      Pulses: Normal pulses.      Heart sounds: Normal heart sounds. No murmur heard.    No friction rub.   Pulmonary:      Effort: Pulmonary effort is normal. No respiratory distress.      Breath sounds: Normal breath sounds. No wheezing or rales.   Abdominal:      General: Bowel sounds are normal. There is no distension.      Palpations: Abdomen is soft.      Tenderness: There is no abdominal tenderness. There is no guarding.   Musculoskeletal:         General: No swelling or tenderness.      Cervical back: Neck supple. No tenderness.      Right lower leg: No edema.      Left lower leg: No edema.   Skin:     General: Skin is warm and dry.      Capillary Refill: Capillary refill takes less than 2 seconds.      Findings: No rash.   Neurological:      General: No focal deficit  present.       Significant Labs: BMP:   Recent Labs   Lab 09/24/22  1611 09/25/22  0512 09/25/22  1354 09/25/22  1755   * 225* 89 92    138 139 139   K 4.9 5.2* 4.0 3.9    105 103 103   CO2 23 25 27 29   BUN 12 14 12 13   CREATININE 0.7 0.7 0.6 0.5   CALCIUM 8.3* 8.3* 8.3* 8.3*   MG 2.4 2.1  --  1.8   , CMP   Recent Labs   Lab 09/25/22  0512 09/25/22  1354 09/25/22  1755    139 139   K 5.2* 4.0 3.9    103 103   CO2 25 27 29   * 89 92   BUN 14 12 13   CREATININE 0.7 0.6 0.5   CALCIUM 8.3* 8.3* 8.3*   ANIONGAP 8 9 7*   , INR   Recent Labs   Lab 09/24/22  0505   INR 1.0   , and Lipid Panel No results for input(s): CHOL, HDL, LDLCALC, TRIG, CHOLHDL in the last 48 hours.    Significant Imaging: Echocardiogram: 2D echo with color flow doppler: No results found for this or any previous visit.    Assessment and Plan:       Atherosclerosis of native coronary artery of native heart without angina pectoris  Multiple prior stents last 2020. Recurrent symptoms and pos troponin.  Plan  Cath today . Patient agrees    9/18/22-Patient stable post cath, 3 vessel CAD and plan CABG     9/19/22  -s/p cath that showed multivessel CAD  -Stable this AM, awaiting CABG after Brilinta washout  -Continue OMT-ASA, statin, BB, amlodipine, heparin gtt    9/20/22  -CABG planned Friday after Brilinta washout  -Cont OMT- ASA, statin, BB, amlodipine, hep gtt    9/21/22  -Stable, no acute events  -Continue ASA, statin, BB, amlodipine, heparin gtt  -Awaiting CABG    9/24/22  POD1 s/p CABG x 3  Continue aspirin, metoprolol, statin, Plavix  IV diuresis  Being managed by CT surgery    9/25/22  Became delirious overnight, somnolent on exam  Continrue current medications  No significant arrhthymias   Continue aspirin, metoprolol, statin, Plavix    NSTEMI (non-ST elevated myocardial infarction)  Plan cath today    9/18/22- no further symptoms    9/19/22-See plan above    Hyperlipidemia  -Continue statin    Essential  (primary) hypertension  -Continue metoprolol     Type 2 diabetes mellitus, with long-term current use of insulin  Per hospital medicine        VTE Risk Mitigation (From admission, onward)         Ordered     Place BENITEZ hose  Until discontinued         09/23/22 1307     Place sequential compression device  Until discontinued         09/23/22 1307                Gisela Velázquez MD  Cardiology  [facility] ([facility])

## 2022-09-27 LAB
ANION GAP SERPL CALC-SCNC: 14 MMOL/L (ref 8–16)
BASOPHILS # BLD AUTO: 0.02 K/UL (ref 0–0.2)
BASOPHILS NFR BLD: 0.2 % (ref 0–1.9)
BUN SERPL-MCNC: 11 MG/DL (ref 6–20)
CALCIUM SERPL-MCNC: 8.2 MG/DL (ref 8.7–10.5)
CHLORIDE SERPL-SCNC: 102 MMOL/L (ref 95–110)
CO2 SERPL-SCNC: 24 MMOL/L (ref 23–29)
CREAT SERPL-MCNC: 0.5 MG/DL (ref 0.5–1.4)
DIFFERENTIAL METHOD: ABNORMAL
EOSINOPHIL # BLD AUTO: 0 K/UL (ref 0–0.5)
EOSINOPHIL NFR BLD: 0.2 % (ref 0–8)
ERYTHROCYTE [DISTWIDTH] IN BLOOD BY AUTOMATED COUNT: 15.8 % (ref 11.5–14.5)
EST. GFR  (NO RACE VARIABLE): >60 ML/MIN/1.73 M^2
GLUCOSE SERPL-MCNC: 103 MG/DL (ref 70–110)
HCT VFR BLD AUTO: 25.7 % (ref 37–48.5)
HGB BLD-MCNC: 8.3 G/DL (ref 12–16)
IMM GRANULOCYTES # BLD AUTO: 0.1 K/UL (ref 0–0.04)
IMM GRANULOCYTES NFR BLD AUTO: 0.9 % (ref 0–0.5)
LYMPHOCYTES # BLD AUTO: 2.1 K/UL (ref 1–4.8)
LYMPHOCYTES NFR BLD: 19.1 % (ref 18–48)
MAGNESIUM SERPL-MCNC: 1.8 MG/DL (ref 1.6–2.6)
MCH RBC QN AUTO: 27.3 PG (ref 27–31)
MCHC RBC AUTO-ENTMCNC: 32.3 G/DL (ref 32–36)
MCV RBC AUTO: 85 FL (ref 82–98)
MONOCYTES # BLD AUTO: 1.1 K/UL (ref 0.3–1)
MONOCYTES NFR BLD: 10 % (ref 4–15)
NEUTROPHILS # BLD AUTO: 7.5 K/UL (ref 1.8–7.7)
NEUTROPHILS NFR BLD: 69.6 % (ref 38–73)
NRBC BLD-RTO: 2 /100 WBC
PLATELET # BLD AUTO: 263 K/UL (ref 150–450)
PMV BLD AUTO: 11.5 FL (ref 9.2–12.9)
POCT GLUCOSE: 114 MG/DL (ref 70–110)
POCT GLUCOSE: 121 MG/DL (ref 70–110)
POCT GLUCOSE: 153 MG/DL (ref 70–110)
POTASSIUM SERPL-SCNC: 3.8 MMOL/L (ref 3.5–5.1)
RBC # BLD AUTO: 3.04 M/UL (ref 4–5.4)
SODIUM SERPL-SCNC: 140 MMOL/L (ref 136–145)
WBC # BLD AUTO: 10.72 K/UL (ref 3.9–12.7)

## 2022-09-27 PROCEDURE — 99900035 HC TECH TIME PER 15 MIN (STAT)

## 2022-09-27 PROCEDURE — 83735 ASSAY OF MAGNESIUM: CPT | Performed by: THORACIC SURGERY (CARDIOTHORACIC VASCULAR SURGERY)

## 2022-09-27 PROCEDURE — 99222 1ST HOSP IP/OBS MODERATE 55: CPT | Mod: GT,,, | Performed by: PSYCHIATRY & NEUROLOGY

## 2022-09-27 PROCEDURE — 85025 COMPLETE CBC W/AUTO DIFF WBC: CPT | Performed by: THORACIC SURGERY (CARDIOTHORACIC VASCULAR SURGERY)

## 2022-09-27 PROCEDURE — 25000003 PHARM REV CODE 250: Performed by: THORACIC SURGERY (CARDIOTHORACIC VASCULAR SURGERY)

## 2022-09-27 PROCEDURE — 99232 SBSQ HOSP IP/OBS MODERATE 35: CPT | Mod: ,,, | Performed by: INTERNAL MEDICINE

## 2022-09-27 PROCEDURE — 25000003 PHARM REV CODE 250: Performed by: INTERNAL MEDICINE

## 2022-09-27 PROCEDURE — 25000003 PHARM REV CODE 250: Performed by: NURSE PRACTITIONER

## 2022-09-27 PROCEDURE — 25000003 PHARM REV CODE 250: Performed by: PHYSICIAN ASSISTANT

## 2022-09-27 PROCEDURE — 63600175 PHARM REV CODE 636 W HCPCS: Performed by: THORACIC SURGERY (CARDIOTHORACIC VASCULAR SURGERY)

## 2022-09-27 PROCEDURE — 21400001 HC TELEMETRY ROOM

## 2022-09-27 PROCEDURE — 99232 PR SUBSEQUENT HOSPITAL CARE,LEVL II: ICD-10-PCS | Mod: ,,, | Performed by: INTERNAL MEDICINE

## 2022-09-27 PROCEDURE — 63600175 PHARM REV CODE 636 W HCPCS: Performed by: NURSE PRACTITIONER

## 2022-09-27 PROCEDURE — 36415 COLL VENOUS BLD VENIPUNCTURE: CPT | Performed by: THORACIC SURGERY (CARDIOTHORACIC VASCULAR SURGERY)

## 2022-09-27 PROCEDURE — 80048 BASIC METABOLIC PNL TOTAL CA: CPT | Performed by: THORACIC SURGERY (CARDIOTHORACIC VASCULAR SURGERY)

## 2022-09-27 PROCEDURE — 99222 PR INITIAL HOSPITAL CARE,LEVL II: ICD-10-PCS | Mod: GT,,, | Performed by: PSYCHIATRY & NEUROLOGY

## 2022-09-27 PROCEDURE — 94761 N-INVAS EAR/PLS OXIMETRY MLT: CPT

## 2022-09-27 PROCEDURE — 94799 UNLISTED PULMONARY SVC/PX: CPT

## 2022-09-27 PROCEDURE — 27000221 HC OXYGEN, UP TO 24 HOURS

## 2022-09-27 RX ORDER — LANOLIN ALCOHOL/MO/W.PET/CERES
400 CREAM (GRAM) TOPICAL 2 TIMES DAILY
Status: COMPLETED | OUTPATIENT
Start: 2022-09-27 | End: 2022-09-28

## 2022-09-27 RX ORDER — LOSARTAN POTASSIUM 25 MG/1
25 TABLET ORAL DAILY
Status: DISCONTINUED | OUTPATIENT
Start: 2022-09-27 | End: 2022-09-29

## 2022-09-27 RX ORDER — METOPROLOL TARTRATE 50 MG/1
50 TABLET ORAL 2 TIMES DAILY
Status: DISCONTINUED | OUTPATIENT
Start: 2022-09-27 | End: 2022-09-30 | Stop reason: HOSPADM

## 2022-09-27 RX ORDER — FUROSEMIDE 40 MG/1
40 TABLET ORAL DAILY
Status: DISCONTINUED | OUTPATIENT
Start: 2022-09-27 | End: 2022-09-29

## 2022-09-27 RX ORDER — HYDRALAZINE HYDROCHLORIDE 20 MG/ML
10 INJECTION INTRAMUSCULAR; INTRAVENOUS EVERY 6 HOURS PRN
Status: DISCONTINUED | OUTPATIENT
Start: 2022-09-27 | End: 2022-09-30 | Stop reason: HOSPADM

## 2022-09-27 RX ADMIN — INSULIN ASPART 1 UNITS: 100 INJECTION, SOLUTION INTRAVENOUS; SUBCUTANEOUS at 09:09

## 2022-09-27 RX ADMIN — PANTOPRAZOLE SODIUM 40 MG: 40 TABLET, DELAYED RELEASE ORAL at 08:09

## 2022-09-27 RX ADMIN — INSULIN ASPART 10 UNITS: 100 INJECTION, SOLUTION INTRAVENOUS; SUBCUTANEOUS at 11:09

## 2022-09-27 RX ADMIN — INSULIN DETEMIR 15 UNITS: 100 INJECTION, SOLUTION SUBCUTANEOUS at 08:09

## 2022-09-27 RX ADMIN — CLOPIDOGREL 75 MG: 75 TABLET, FILM COATED ORAL at 08:09

## 2022-09-27 RX ADMIN — MUPIROCIN: 20 OINTMENT TOPICAL at 08:09

## 2022-09-27 RX ADMIN — FLUOXETINE 40 MG: 20 CAPSULE ORAL at 08:09

## 2022-09-27 RX ADMIN — CHLORHEXIDINE GLUCONATE 0.12% ORAL RINSE 10 ML: 1.2 LIQUID ORAL at 08:09

## 2022-09-27 RX ADMIN — CYANOCOBALAMIN TAB 1000 MCG 1000 MCG: 1000 TAB at 08:09

## 2022-09-27 RX ADMIN — Medication 400 MG: at 09:09

## 2022-09-27 RX ADMIN — ASPIRIN 81 MG: 81 TABLET, COATED ORAL at 08:09

## 2022-09-27 RX ADMIN — OXYCODONE HYDROCHLORIDE AND ACETAMINOPHEN 500 MG: 500 TABLET ORAL at 09:09

## 2022-09-27 RX ADMIN — HYDRALAZINE HYDROCHLORIDE 10 MG: 20 INJECTION, SOLUTION INTRAMUSCULAR; INTRAVENOUS at 04:09

## 2022-09-27 RX ADMIN — FUROSEMIDE 40 MG: 40 TABLET ORAL at 08:09

## 2022-09-27 RX ADMIN — ENOXAPARIN SODIUM 40 MG: 100 INJECTION SUBCUTANEOUS at 04:09

## 2022-09-27 RX ADMIN — LOSARTAN POTASSIUM 25 MG: 25 TABLET, FILM COATED ORAL at 04:09

## 2022-09-27 RX ADMIN — MUPIROCIN: 20 OINTMENT TOPICAL at 09:09

## 2022-09-27 RX ADMIN — INSULIN ASPART 10 UNITS: 100 INJECTION, SOLUTION INTRAVENOUS; SUBCUTANEOUS at 04:09

## 2022-09-27 RX ADMIN — POLYETHYLENE GLYCOL 3350 17 G: 17 POWDER, FOR SOLUTION ORAL at 08:09

## 2022-09-27 RX ADMIN — CHLORHEXIDINE GLUCONATE 0.12% ORAL RINSE 10 ML: 1.2 LIQUID ORAL at 09:09

## 2022-09-27 RX ADMIN — INSULIN DETEMIR 15 UNITS: 100 INJECTION, SOLUTION SUBCUTANEOUS at 09:09

## 2022-09-27 RX ADMIN — FOLIC ACID 1 MG: 1 TABLET ORAL at 08:09

## 2022-09-27 RX ADMIN — POTASSIUM CHLORIDE 20 MEQ: 1500 TABLET, EXTENDED RELEASE ORAL at 09:09

## 2022-09-27 RX ADMIN — Medication 400 MG: at 08:09

## 2022-09-27 RX ADMIN — INSULIN ASPART 10 UNITS: 100 INJECTION, SOLUTION INTRAVENOUS; SUBCUTANEOUS at 06:09

## 2022-09-27 RX ADMIN — POTASSIUM CHLORIDE 20 MEQ: 1500 TABLET, EXTENDED RELEASE ORAL at 08:09

## 2022-09-27 RX ADMIN — Medication 6 MG: at 09:09

## 2022-09-27 RX ADMIN — ATORVASTATIN CALCIUM 80 MG: 40 TABLET, FILM COATED ORAL at 08:09

## 2022-09-27 RX ADMIN — METOPROLOL TARTRATE 50 MG: 50 TABLET, FILM COATED ORAL at 09:09

## 2022-09-27 RX ADMIN — METOPROLOL TARTRATE 50 MG: 50 TABLET, FILM COATED ORAL at 08:09

## 2022-09-27 RX ADMIN — OXYCODONE HYDROCHLORIDE AND ACETAMINOPHEN 500 MG: 500 TABLET ORAL at 08:09

## 2022-09-27 NOTE — PLAN OF CARE
Plan of care reviewed with pt. A&O x1, disoriented to place, time, and situation. Medications given with no complications. On 4L NC. Pt ambulates with assistance, turns in bed independently. CABG on 9/23, POD 4. Wound vac in place and intact. Incisions intact. BG monitored. BLSWR in place and charting q2h completed. Order expires 9/27 at 2347. Sitter at bedside. NS on monitor. Bed alarm on. Hourly rounding completed.

## 2022-09-27 NOTE — CONSULTS
[facility] Tele[facility] ([facility])  Neurology  Consult Note    Patient Name: Ca Diaz  MRN: [id number]  Admission Date: 2022  Hospital Length of Stay: 10 days  Code Status: Full Code   Attending Provider: Krystal Marin MD   Consulting Provider: James Lynn MD  Primary Care Physician: Desiree Frye MD  Principal Problem:S/P CABG x 3    Inpatient consult to Neurology  Consult performed by: James Lynn MD  Consult ordered by: Krystal Marin MD      Subjective:     Chief Complaint:  AMS    HPI: 62 y.o. female with Hx of CAD, hyperlipidemia, PRAVEENA, RLS presented to ED initially for generalized weakness. In her work up she was found to have a NSTEMI.  Pt has Hx of multiple cardiac stents. She ultimately underwent CABG x 3. Pt is POD#4. It was noted yesterday during nighttime that she became confused and restless. She was given haloperidol. Today she is alert and able to provide Hx. No reported seizures.    Past Medical History:   Diagnosis Date    Anxiety and depression 3/13/2014    Essential (primary) hypertension 2013    Fibromyalgia 2012    Hyperlipidemia     Obstructive sleep apnea syndrome 2014    RLS (restless legs syndrome) 10/18/2018    Overview:  Rheum Dr Tam    Seizures     Type 2 diabetes mellitus 2012    ICD-10 Transition Last Assessment & Plan:  Continue with sliding scale insulin.  Control improved on low-dose Lantus presently       Past Surgical History:   Procedure Laterality Date    ABDOMINAL SURGERY      ARTERIOGRAPHY OF SUBCLAVIAN ARTERY  2022    Procedure: ARTERIOGRAM, SUBCLAVIAN;  Surgeon: Vale Pa MD;  Location: [facility] CATH LAB;  Service: Cardiology;;     SECTION      LEFT HEART CATHETERIZATION Left 2022    Procedure: CATHETERIZATION, HEART, LEFT;  Surgeon: Vale Pa MD;  Location: [facility] CATH LAB;  Service: Cardiology;  Laterality: Left;       Review of patient's allergies indicates:   Allergen Reactions    Epinephrine  Anaphylaxis    Lidocaine Anaphylaxis     Dental visit pt stopped breathing after given lidocaine    Ace inhibitors     Sulfadiazine Other (See Comments)       Current Neurological Medications:     No current facility-administered medications on file prior to encounter.     Current Outpatient Medications on File Prior to Encounter   Medication Sig    amLODIPine (NORVASC) 10 MG tablet TAKE 1 TABLET BY MOUTH ONCE DAILY    amLODIPine (NORVASC) 10 MG tablet Take 1 tablet by mouth once daily.    apple cider vinegar 300 mg Tab Take 450 mg by mouth once daily.    aspirin (ECOTRIN) 81 MG EC tablet Take 81 mg by mouth.    aspirin (ECOTRIN) 81 MG EC tablet Take 1 tablet by mouth once daily.    atorvastatin (LIPITOR) 80 MG tablet TAKE 1 TABLET BY MOUTH ONCE DAILY    atorvastatin (LIPITOR) 80 MG tablet Take 1 tablet by mouth once daily.    CALCIUM-MAGNESIUM-ZINC ORAL Take 2 tablets by mouth once daily.    divalproex ER (DEPAKOTE) 500 MG Tb24 Take 500 mg by mouth 3 (three) times daily.    ELDERBERRY FRUIT ORAL Take 50 mg by mouth once daily.    flash glucose sensor ([employer]YLE QUYNH 14 DAY SENSOR) Kit 1 Cartridge by subcutaneous (via wearable injector) route every 14 (fourteen) days.    FLUoxetine 20 MG capsule Take 20 mg by mouth once daily.    FLUoxetine 40 MG capsule Take 40 mg by mouth once daily.    HUMALOG MIX 75-25 KWIKPEN 100 unit/mL (75-25) InPn INJECT 75 UNITS SUBCUTANEOUSLY TWICE DAILY BEFORE MEAL(S)    insulin lispro protamin-lispro 100 unit/mL (75-25) InPn Inject 80 Units into the skin.    metoprolol succinate (TOPROL-XL) 50 MG 24 hr tablet Take 50 mg by mouth once daily.    multivitamin capsule Take 1 capsule by mouth once daily.    pregabalin (LYRICA) 100 MG capsule Take 100 mg by mouth 3 (three) times daily.    ticagrelor (BRILINTA) 90 mg tablet Take 1 tablet by mouth 2 (two) times daily.    turmeric root extract 500 mg Cap Take 500 mg by mouth 2 (two) times a day.    albuterol (PROVENTIL/VENTOLIN HFA) 90  mcg/actuation inhaler Inhale 2 puffs into the lungs every 6 (six) hours as needed for Wheezing.    carBAMazepine (TEGRETOL) 200 mg tablet Take 1 tablet (200 mg total) by mouth once daily. Take one tab daily x 7 days;  If symptoms persist may increase to one tab BID thereafter    divalproex (DEPAKOTE) 500 MG TbEC Take 500 mg by mouth 3 (three) times daily.      Family History    None       Tobacco Use    Smoking status: Former     Types: Cigarettes     Quit date:      Years since quittin.7    Smokeless tobacco: Never   Substance and Sexual Activity    Alcohol use: Not Currently    Drug use: Never    Sexual activity: Not on file     Review of Systems   Constitutional:  Positive for fatigue.   HENT:  Negative for ear pain.    Eyes:  Negative for photophobia.   Respiratory:  Negative for shortness of breath.    Cardiovascular:  Negative for palpitations.   Gastrointestinal:  Negative for abdominal pain.   Genitourinary:  Negative for flank pain.   Musculoskeletal:  Negative for back pain.   Neurological:  Negative for tremors.     Objective:     Vital Signs (Most Recent):  Temp: 97.7 °F (36.5 °C) (22 1359)  Pulse: 78 (22 1401)  Resp: 18 (22 1359)  BP: (!) 116/57 (22 1359)  SpO2: (!) 94 % (22 1401)   Vital Signs (24h Range):  Temp:  [97.6 °F (36.4 °C)-98.9 °F (37.2 °C)] 97.7 °F (36.5 °C)  Pulse:  [75-85] 78  Resp:  [18-36] 18  SpO2:  [89 %-99 %] 94 %  BP: (101-215)/(55-91) 116/57     Weight: 93 kg (205 lb 0.4 oz)  Body mass index is 37.5 kg/m².    Physical Exam  Constitutional:       General: She is not in acute distress.  Pulmonary:      Effort: No respiratory distress.       NEUROLOGICAL EXAMINATION:     MENTAL STATUS   Level of consciousness: alert       Oriented to person, place, year     CRANIAL NERVES     CN III, IV, VI   Conjugate gaze: present    CN VII   Right facial weakness: none  Left facial weakness: none    CN XII   Tongue deviation: none    MOTOR EXAM        AROM of  UE's and LE's against gravity. No drift.  Occasional myoclonus     Significant Labs: CBC:   Recent Labs   Lab 09/26/22  0453 09/27/22  0514   WBC 12.97* 10.72   HGB 7.6* 8.3*   HCT 23.3* 25.7*    263     CMP:   Recent Labs   Lab 09/26/22  0453 09/26/22  1625 09/27/22  0514   * 94 103    139 140   K 4.1 3.8 3.8    102 102   CO2 28 27 24   BUN 12 12 11   CREATININE 0.5 0.6 0.5   CALCIUM 8.5* 8.0* 8.2*   MG 2.0 2.2 1.8   ANIONGAP 10 10 14   LIPIDS/LFTS:  Recent Labs   Lab 09/17/22  0410   Cholesterol 96 L   Triglycerides 145   HDL 24 L   LDL Cholesterol 43.0 L   Non-HDL Cholesterol 72         Significant Imaging: I have reviewed all pertinent imaging results/findings within the past 24 hours.    Head CT  Impression:     Negative for acute intracranial abnormality.     All CT scans at this facility are performed  using dose modulation techniques as appropriate to performed exam including the following:  automated exposure control; adjustment of mA and/or kV according to the patients size (this includes techniques or standardized protocols for targeted exams where dose is matched to indication/reason for exam: i.e. extremities or head);  iterative reconstruction technique.        Electronically signed by: Yo Larios MD  Date:                                            09/17/2022  Time:                                           08:33                  Assessment and Plan:     63 y/o female consulted for AMS/possible seizure    Encephalopathy: per notes pt seemed delirious. Currently calm and able to provide Hx and to follow commands. No apparent major motor deficits on exam.  For now will monitor.  Avoid benzodiazepines as possible as may worsen delirium.  OK to use quetiapine 25 mg nightly if needed. Monitor QTc interval.    Active Diagnoses:    Diagnosis Date Noted POA    PRINCIPAL PROBLEM:  S/P CABG x 3 [Z95.1] 09/23/2022 Not Applicable    Seizure [R56.9] 09/23/2022 Yes    Chest pain [R07.9]  09/23/2022 Yes    NSTEMI (non-ST elevated myocardial infarction) [I21.4] 09/17/2022 Yes    Obesity [E66.9] 09/17/2022 Yes     Chronic    Atherosclerosis of native coronary artery of native heart without angina pectoris [I25.10] 10/18/2018 Yes     Chronic    Anxiety and depression [F41.9, F32.A] 03/13/2014 Yes    Hyperlipidemia [E78.5] 01/22/2013 Yes     Chronic    Essential (primary) hypertension [I10] 01/22/2013 Yes     Chronic    Type 2 diabetes mellitus, with long-term current use of insulin [E11.9, Z79.4] 09/17/2012 Not Applicable     Chronic      Problems Resolved During this Admission:       VTE Risk Mitigation (From admission, onward)           Ordered     enoxaparin injection 40 mg  Daily         09/26/22 0745     IP VTE HIGH RISK PATIENT  Once         09/26/22 0745     Place sequential compression device  Until discontinued         09/26/22 0745     Place BENITEZ hose  Until discontinued         09/23/22 1307     Place sequential compression device  Until discontinued         09/23/22 1307                    Thank you for your consult. I will follow-up with patient. Please contact us if you have any additional questions.    James Lynn MD  Neurology  [facility] - Telemetry ([facility])

## 2022-09-27 NOTE — PLAN OF CARE
Ongoing (interventions implemented as appropriate)  Pt oriented to place and self.  VSS  Pt able to make needs known.  Pt remained afebrile throughout this shift.   Pt was in bed   Pt remained free of falls this shift.   Pt denies pain this shift.  Plan of care reviewed. Patient verbalizes understanding.   Pt moving/turing independent. Frequent weight shifting encouraged.  Patient sinus rhythm on monitor.   Bed low, side rails up x 2, wheels locked, call light in reach.   Hourly rounding completed.   Will continue to monitor.

## 2022-09-27 NOTE — NURSING
Pt was transferred from ICU in restraints and on 4L NC. Tele monitor on. VS taken. Bed alarm on and sitter in place. Pain noted, will consult if pain meds are okay to give at this time. Bed low and wheels locked, side rails up x3, call light in reach.

## 2022-09-27 NOTE — ASSESSMENT & PLAN NOTE
Multiple prior stents last 2020. Recurrent symptoms and pos troponin.  Plan  Cath today . Patient agrees    9/18/22-Patient stable post cath, 3 vessel CAD and plan CABG     9/19/22  -s/p cath that showed multivessel CAD  -Stable this AM, awaiting CABG after Brilinta washout  -Continue OMT-ASA, statin, BB, amlodipine, heparin gtt    9/20/22  -CABG planned Friday after Brilinta washout  -Cont OMT- ASA, statin, BB, amlodipine, hep gtt    9/21/22  -Stable, no acute events  -Continue ASA, statin, BB, amlodipine, heparin gtt  -Awaiting CABG    9/24/22  POD1 s/p CABG x 3  Continue aspirin, metoprolol, statin, Plavix  IV diuresis  Being managed by CT surgery    9/25/22  Became delirious overnight, somnolent on exam  Continrue current medications  No significant arrhthymias   Continue aspirin, metoprolol, statin, Plavix    9/27/22  -Less confused  -Continue ASA, BB, statin, Plavix  -IS usage and ambulation   Private car

## 2022-09-27 NOTE — PROGRESS NOTES
Home Oxygen Evaluation - [facility] - Cardiopulmonary Department      Date Performed: 2022      1) Patient's Home O2 Sat on room air, while at rest: Room Air SpO2 At Rest: (!) 87 %        If O2 sats on room air at rest are 88% or below, patient qualifies.  Document O2 liter flow needed in Step 2.  If O2 sats are 89% or above, complete Step 3.        2)  If patient is not ambulated and O2 sats are <88%, what is the O2 liter flow required to meet ordered saturation?  2L NC    If O2 sats on room air while exercising remain 89% or above patient does not qualify, no further testing needed Document N/A in step 3. If O2 sats on room air while exercising are 88% or below, continue to Step 4.    3) Patient's O2 Sat on room air while exercisin) Patient's O2 Sat while exercising on O2: SpO2 During Ambulation on O2: 94 % at Ambulation O2 LPM: 2 LPM         (Must show improvement from #4 for patients to qualify)

## 2022-09-27 NOTE — SUBJECTIVE & OBJECTIVE
Review of Systems   Constitutional: Positive for malaise/fatigue.   HENT: Negative.     Eyes: Negative.    Cardiovascular:  Positive for chest pain (incisional). Negative for paroxysmal nocturnal dyspnea.   Respiratory: Negative.     Endocrine: Negative.    Hematologic/Lymphatic: Negative.    Skin: Negative.    Musculoskeletal:  Positive for arthritis and joint pain.   Gastrointestinal: Negative.    Genitourinary: Negative.    Neurological: Negative.    Psychiatric/Behavioral:          Confused at times     Objective:     Vital Signs (Most Recent):  Temp: 97.7 °F (36.5 °C) (09/27/22 1008)  Pulse: 75 (09/27/22 1008)  Resp: 18 (09/27/22 1008)  BP: (!) 101/55 (09/27/22 1008)  SpO2: 96 % (09/27/22 1008)   Vital Signs (24h Range):  Temp:  [97.6 °F (36.4 °C)-98.9 °F (37.2 °C)] 97.7 °F (36.5 °C)  Pulse:  [75-85] 75  Resp:  [18-36] 18  SpO2:  [89 %-99 %] 96 %  BP: (101-215)/(55-91) 101/55     Weight: 93 kg (205 lb 0.4 oz)  Body mass index is 37.5 kg/m².     SpO2: 96 %  O2 Device (Oxygen Therapy): nasal cannula      Intake/Output Summary (Last 24 hours) at 9/27/2022 1216  Last data filed at 9/27/2022 0830  Gross per 24 hour   Intake 240 ml   Output 360 ml   Net -120 ml       Lines/Drains/Airways       Peripheral Intravenous Line  Duration                  Peripheral IV - Single Lumen 09/26/22 1827 20 G Left Antecubital <1 day                    Physical Exam  Vitals and nursing note reviewed.   Constitutional:       General: She is not in acute distress.     Appearance: She is well-developed. She is not diaphoretic.      Comments: On supplemental O2   HENT:      Head: Normocephalic and atraumatic.   Eyes:      General:         Right eye: No discharge.         Left eye: No discharge.      Pupils: Pupils are equal, round, and reactive to light.   Neck:      Thyroid: No thyromegaly.      Vascular: No JVD.      Trachea: No tracheal deviation.   Cardiovascular:      Rate and Rhythm: Normal rate and regular rhythm.      Heart  sounds: Normal heart sounds, S1 normal and S2 normal. No murmur heard.     Comments: Sternotomy site C/D/I, no bleeding erythema or drainage  Pulmonary:      Effort: Pulmonary effort is normal. No respiratory distress.      Breath sounds: Normal breath sounds. No wheezing.      Comments: Diminished BS at bases  Abdominal:      General: There is no distension.      Palpations: Abdomen is soft.      Tenderness: There is no rebound.   Musculoskeletal:      Cervical back: Neck supple.      Right lower leg: No edema.      Left lower leg: No edema.   Skin:     General: Skin is warm and dry.      Findings: No erythema.      Comments: SVG site C/D/I; no bleeding erythema or drainage   Neurological:      General: No focal deficit present.      Mental Status: She is alert and oriented to person, place, and time.   Psychiatric:         Mood and Affect: Mood normal.         Behavior: Behavior normal.         Thought Content: Thought content normal.       Significant Labs: CMP   Recent Labs   Lab 09/26/22  0453 09/26/22  1625 09/27/22  0514    139 140   K 4.1 3.8 3.8    102 102   CO2 28 27 24   * 94 103   BUN 12 12 11   CREATININE 0.5 0.6 0.5   CALCIUM 8.5* 8.0* 8.2*   ANIONGAP 10 10 14   , CBC   Recent Labs   Lab 09/26/22  0453 09/27/22  0514   WBC 12.97* 10.72   HGB 7.6* 8.3*   HCT 23.3* 25.7*    263   , Troponin No results for input(s): TROPONINI in the last 48 hours., and All pertinent lab results from the last 24 hours have been reviewed.    Significant Imaging: Echocardiogram: Transthoracic echo (TTE) complete ([facility] Only):   Results for orders placed or performed during the hospital encounter of 09/16/22   Echo   Result Value Ref Range    BSA 2 m2    TDI SEPTAL 0.06 m/s    LV LATERAL E/E' RATIO 16.00 m/s    LV SEPTAL E/E' RATIO 16.00 m/s    LA WIDTH 3.50 cm    IVC diameter 1.78 cm    Left Ventricular Outflow Tract Mean Velocity 0.90 cm/s    Left Ventricular Outflow Tract Mean Gradient 3.28 mmHg     TDI LATERAL 0.06 m/s    LVIDd 3.49 (A) 3.5 - 6.0 cm    IVS 1.28 (A) 0.6 - 1.1 cm    Posterior Wall 1.18 (A) 0.6 - 1.1 cm    Ao root annulus 2.57 cm    LVIDs 2.43 2.1 - 4.0 cm    FS 30 28 - 44 %    LA volume 53.78 cm3    STJ 2.92 cm    Ascending aorta 2.90 cm    LV mass 140.49 g    LA size 3.58 cm    TAPSE 1.90 cm    Left Ventricle Relative Wall Thickness 0.68 cm    AV mean gradient 8 mmHg    AV valve area 1.96 cm2    AV Velocity Ratio 0.63     AV index (prosthetic) 0.65     E/A ratio 0.98     Mean e' 0.06 m/s    E wave deceleration time 236.57 msec    IVRT 79.92 msec    LVOT diameter 1.96 cm    LVOT area 3.0 cm2    LVOT peak nilay 1.07 m/s    LVOT peak VTI 27.30 cm    Ao peak nilay 1.71 m/s    Ao VTI 42.1 cm    RVOT peak nilay 0.70 m/s    RVOT peak VTI 15.6 cm    LVOT stroke volume 82.33 cm3    AV peak gradient 12 mmHg    PV mean gradient 1.22 mmHg    E/E' ratio 16.00 m/s    MV Peak E Nilay 0.96 m/s    MV Peak A Nilay 0.98 m/s    LV Systolic Volume 20.74 mL    LV Systolic Volume Index 10.8 mL/m2    LV Diastolic Volume 50.49 mL    LV Diastolic Volume Index 26.30 mL/m2    LA Volume Index 28.0 mL/m2    LV Mass Index 73 g/m2    RA Major Axis 3.84 cm    Left Atrium Minor Axis 5.02 cm    Left Atrium Major Axis 5.08 cm    RA Width 2.80 cm    Right Atrial Pressure (from IVC) 8 mmHg    EF 60 %    Narrative    · The left ventricle is normal in size with concentric remodeling and   normal systolic function.  · The estimated ejection fraction is 60%.  · Indeterminate left ventricular diastolic function.  · Normal right ventricular size with normal right ventricular systolic   function.  · There is mild aortic valve stenosis.  · Aortic valve area is 1.96 cm2; peak velocity is 1.71 m/s; mean gradient   is 8 mmHg.  · Intermediate central venous pressure (8 mmHg).      , EKG: Reviewed, and X-Ray: CXR: X-Ray Chest 1 View (CXR): No results found for this visit on 09/16/22. and X-Ray Chest PA and Lateral (CXR): No results found for this  visit on 09/16/22.

## 2022-09-27 NOTE — PROGRESS NOTES
"[facility] ([facility])  Cardiology  Progress Note    Patient Name: Ca Diaz  MRN: [id number]  Admission Date: 9/16/2022  Hospital Length of Stay: 10 days  Code Status: Full Code   Attending Physician: Krystal Marin MD   Primary Care Physician: Desiree Frye MD  Expected Discharge Date:   Principal Problem:S/P CABG x 3    Subjective:     Hospital Course:      Ca Diaz is a 62 y.o. female patient with a PMHx of anxiety, CAD, h/o CVA, depression, DM II, RLS, seizures, fibromyalgia, HLD, HTN, PRAVEENA, and obesity who presented to the Emergency Department for evaluation of generalized weakness which onset gradually two days ago. Pt states she fell last night (9/15) and the night before last (9/14). Pt's  states that she "can't even walk through an open doorway." Symptoms are constant and moderate in severity. No mitigating or exacerbating factors reported. Associated sxs include N/D and dizziness. Pt reports the nausea began one day ago. Patient denies any HA, vomiting, light-headedness, visual disturbance, CP, SOB/MALAVE, diaphoresis, neck or jaw pain, upper extremity pain, numbness/tingling, dysuria, and all other sxs at this time. Work-up in the ED revealed NSTEMI with troponin of 0.502, EKG unrevealing, CXR unremarkable, CT of the head negative for acute process. 325 mg ASA given and UFH initiated. Hospital Medicine was contacted for admission and patient placed in Observation.   9/18/22-Patient seen and examined today. No chest pain. Plan CABG soon due to complex 3 vessel CAD.        9/19/22-Patient seen and examined today. Reported some indigestion symptoms earlier this AM, has since resolved. No alden chest pain or tightness. No other CV complaints. Labs stable. Awaiting CABG after Brilinta washout period.    9/20/22- Patient seen and examined today. No complaints overnight. No c/o cp or SOB.  Labs reviewed, on Hep gtt. CABG planned for Friday after Brilinta washout " period    9/21/22-Patient seen and examined today. Stable overnight. No issues. Labs reviewed. Awaiting CABG.    9/24/22- POD1. The patient had successful CABG x3 on 9/23/22. SVG-diagonal, SVG-PDA, LIMA-LAD.  Doing well.  Sitting up in the chair, feeling tired.  No acute events overnight.    9/25/22-POD2. Became disoriented overdnight. Hgb 8.4->7.6. No arrhythmias overnight. Low grade fever     9/26/22-Patient seen and examined today, s/p CABG x 3 POD # 3. Remains confused, did not respond to questions. Hemodynamically stable. Labs reviewed. H/H 7.6/23.    9/27/22-Patient seen and examined today, s/p CABG x 3 POD # 4. Mental status improved, more awake/alert. No CV complaints during exam. Pain controlled. Labs stable.         Review of Systems   Constitutional: Positive for malaise/fatigue.   HENT: Negative.     Eyes: Negative.    Cardiovascular:  Positive for chest pain (incisional). Negative for paroxysmal nocturnal dyspnea.   Respiratory: Negative.     Endocrine: Negative.    Hematologic/Lymphatic: Negative.    Skin: Negative.    Musculoskeletal:  Positive for arthritis and joint pain.   Gastrointestinal: Negative.    Genitourinary: Negative.    Neurological: Negative.    Psychiatric/Behavioral:          Confused at times     Objective:     Vital Signs (Most Recent):  Temp: 97.7 °F (36.5 °C) (09/27/22 1008)  Pulse: 75 (09/27/22 1008)  Resp: 18 (09/27/22 1008)  BP: (!) 101/55 (09/27/22 1008)  SpO2: 96 % (09/27/22 1008)   Vital Signs (24h Range):  Temp:  [97.6 °F (36.4 °C)-98.9 °F (37.2 °C)] 97.7 °F (36.5 °C)  Pulse:  [75-85] 75  Resp:  [18-36] 18  SpO2:  [89 %-99 %] 96 %  BP: (101-215)/(55-91) 101/55     Weight: 93 kg (205 lb 0.4 oz)  Body mass index is 37.5 kg/m².     SpO2: 96 %  O2 Device (Oxygen Therapy): nasal cannula      Intake/Output Summary (Last 24 hours) at 9/27/2022 1216  Last data filed at 9/27/2022 0830  Gross per 24 hour   Intake 240 ml   Output 360 ml   Net -120 ml       Lines/Drains/Airways        Peripheral Intravenous Line  Duration                  Peripheral IV - Single Lumen 09/26/22 1827 20 G Left Antecubital <1 day                    Physical Exam  Vitals and nursing note reviewed.   Constitutional:       General: She is not in acute distress.     Appearance: She is well-developed. She is not diaphoretic.      Comments: On supplemental O2   HENT:      Head: Normocephalic and atraumatic.   Eyes:      General:         Right eye: No discharge.         Left eye: No discharge.      Pupils: Pupils are equal, round, and reactive to light.   Neck:      Thyroid: No thyromegaly.      Vascular: No JVD.      Trachea: No tracheal deviation.   Cardiovascular:      Rate and Rhythm: Normal rate and regular rhythm.      Heart sounds: Normal heart sounds, S1 normal and S2 normal. No murmur heard.     Comments: Sternotomy site C/D/I, no bleeding erythema or drainage  Pulmonary:      Effort: Pulmonary effort is normal. No respiratory distress.      Breath sounds: Normal breath sounds. No wheezing.      Comments: Diminished BS at bases  Abdominal:      General: There is no distension.      Palpations: Abdomen is soft.      Tenderness: There is no rebound.   Musculoskeletal:      Cervical back: Neck supple.      Right lower leg: No edema.      Left lower leg: No edema.   Skin:     General: Skin is warm and dry.      Findings: No erythema.      Comments: SVG site C/D/I; no bleeding erythema or drainage   Neurological:      General: No focal deficit present.      Mental Status: She is alert and oriented to person, place, and time.   Psychiatric:         Mood and Affect: Mood normal.         Behavior: Behavior normal.         Thought Content: Thought content normal.       Significant Labs: CMP   Recent Labs   Lab 09/26/22  0453 09/26/22  1625 09/27/22  0514    139 140   K 4.1 3.8 3.8    102 102   CO2 28 27 24   * 94 103   BUN 12 12 11   CREATININE 0.5 0.6 0.5   CALCIUM 8.5* 8.0* 8.2*   ANIONGAP 10 10 14   ,  CBC   Recent Labs   Lab 09/26/22  0453 09/27/22  0514   WBC 12.97* 10.72   HGB 7.6* 8.3*   HCT 23.3* 25.7*    263   , Troponin No results for input(s): TROPONINI in the last 48 hours., and All pertinent lab results from the last 24 hours have been reviewed.    Significant Imaging: Echocardiogram: Transthoracic echo (TTE) complete ([facility] Only):   Results for orders placed or performed during the hospital encounter of 09/16/22   Echo   Result Value Ref Range    BSA 2 m2    TDI SEPTAL 0.06 m/s    LV LATERAL E/E' RATIO 16.00 m/s    LV SEPTAL E/E' RATIO 16.00 m/s    LA WIDTH 3.50 cm    IVC diameter 1.78 cm    Left Ventricular Outflow Tract Mean Velocity 0.90 cm/s    Left Ventricular Outflow Tract Mean Gradient 3.28 mmHg    TDI LATERAL 0.06 m/s    LVIDd 3.49 (A) 3.5 - 6.0 cm    IVS 1.28 (A) 0.6 - 1.1 cm    Posterior Wall 1.18 (A) 0.6 - 1.1 cm    Ao root annulus 2.57 cm    LVIDs 2.43 2.1 - 4.0 cm    FS 30 28 - 44 %    LA volume 53.78 cm3    STJ 2.92 cm    Ascending aorta 2.90 cm    LV mass 140.49 g    LA size 3.58 cm    TAPSE 1.90 cm    Left Ventricle Relative Wall Thickness 0.68 cm    AV mean gradient 8 mmHg    AV valve area 1.96 cm2    AV Velocity Ratio 0.63     AV index (prosthetic) 0.65     E/A ratio 0.98     Mean e' 0.06 m/s    E wave deceleration time 236.57 msec    IVRT 79.92 msec    LVOT diameter 1.96 cm    LVOT area 3.0 cm2    LVOT peak nilay 1.07 m/s    LVOT peak VTI 27.30 cm    Ao peak nilay 1.71 m/s    Ao VTI 42.1 cm    RVOT peak nilay 0.70 m/s    RVOT peak VTI 15.6 cm    LVOT stroke volume 82.33 cm3    AV peak gradient 12 mmHg    PV mean gradient 1.22 mmHg    E/E' ratio 16.00 m/s    MV Peak E Nilay 0.96 m/s    MV Peak A Nilay 0.98 m/s    LV Systolic Volume 20.74 mL    LV Systolic Volume Index 10.8 mL/m2    LV Diastolic Volume 50.49 mL    LV Diastolic Volume Index 26.30 mL/m2    LA Volume Index 28.0 mL/m2    LV Mass Index 73 g/m2    RA Major Axis 3.84 cm    Left Atrium Minor Axis 5.02 cm    Left Atrium Major Axis  5.08 cm    RA Width 2.80 cm    Right Atrial Pressure (from IVC) 8 mmHg    EF 60 %    Narrative    · The left ventricle is normal in size with concentric remodeling and   normal systolic function.  · The estimated ejection fraction is 60%.  · Indeterminate left ventricular diastolic function.  · Normal right ventricular size with normal right ventricular systolic   function.  · There is mild aortic valve stenosis.  · Aortic valve area is 1.96 cm2; peak velocity is 1.71 m/s; mean gradient   is 8 mmHg.  · Intermediate central venous pressure (8 mmHg).      , EKG: Reviewed, and X-Ray: CXR: X-Ray Chest 1 View (CXR): No results found for this visit on 09/16/22. and X-Ray Chest PA and Lateral (CXR): No results found for this visit on 09/16/22.    Assessment and Plan:   Patient who presents with multivessel CAD. Recovering s/p CABG x 3. Continue current post-op care and mgmt as per CTS.    * S/P CABG x 3  -Continue current post-op care and mgmt as per CTS  -ASA, Plavix, statin, BB  -Patient remains confused  -IS usage and PT/OT when able    Atherosclerosis of native coronary artery of native heart without angina pectoris  Multiple prior stents last 2020. Recurrent symptoms and pos troponin.  Plan  Cath today . Patient agrees    9/18/22-Patient stable post cath, 3 vessel CAD and plan CABG     9/19/22  -s/p cath that showed multivessel CAD  -Stable this AM, awaiting CABG after Brilinta washout  -Continue OMT-ASA, statin, BB, amlodipine, heparin gtt    9/20/22  -CABG planned Friday after Brilinta washout  -Cont OMT- ASA, statin, BB, amlodipine, hep gtt    9/21/22  -Stable, no acute events  -Continue ASA, statin, BB, amlodipine, heparin gtt  -Awaiting CABG    9/24/22  POD1 s/p CABG x 3  Continue aspirin, metoprolol, statin, Plavix  IV diuresis  Being managed by CT surgery    9/25/22  Became delirious overnight, somnolent on exam  Continrue current medications  No significant arrhthymias   Continue aspirin, metoprolol, statin,  Plavix    9/27/22  -Less confused  -Continue ASA, BB, statin, Plavix  -IS usage and ambulation    NSTEMI (non-ST elevated myocardial infarction)  Plan cath today    9/18/22- no further symptoms    9/19/22-See plan above    Hyperlipidemia  -Continue statin    Essential (primary) hypertension  -Continue metoprolol     Type 2 diabetes mellitus, with long-term current use of insulin  Per hospital medicine        VTE Risk Mitigation (From admission, onward)         Ordered     enoxaparin injection 40 mg  Daily         09/26/22 0745     IP VTE HIGH RISK PATIENT  Once         09/26/22 0745     Place sequential compression device  Until discontinued         09/26/22 0745     Place BENITEZ hose  Until discontinued         09/23/22 1307     Place sequential compression device  Until discontinued         09/23/22 1307                Ivana Garcia PA-C  Cardiology  [facility] ([facility])

## 2022-09-28 LAB
ANION GAP SERPL CALC-SCNC: 11 MMOL/L (ref 8–16)
BUN SERPL-MCNC: 16 MG/DL (ref 6–20)
CALCIUM SERPL-MCNC: 8.3 MG/DL (ref 8.7–10.5)
CHLORIDE SERPL-SCNC: 105 MMOL/L (ref 95–110)
CO2 SERPL-SCNC: 25 MMOL/L (ref 23–29)
CREAT SERPL-MCNC: 0.6 MG/DL (ref 0.5–1.4)
EST. GFR  (NO RACE VARIABLE): >60 ML/MIN/1.73 M^2
GLUCOSE SERPL-MCNC: 104 MG/DL (ref 70–110)
POCT GLUCOSE: 103 MG/DL (ref 70–110)
POCT GLUCOSE: 213 MG/DL (ref 70–110)
POCT GLUCOSE: 214 MG/DL (ref 70–110)
POCT GLUCOSE: 233 MG/DL (ref 70–110)
POTASSIUM SERPL-SCNC: 4.2 MMOL/L (ref 3.5–5.1)
SODIUM SERPL-SCNC: 141 MMOL/L (ref 136–145)

## 2022-09-28 PROCEDURE — 94799 UNLISTED PULMONARY SVC/PX: CPT

## 2022-09-28 PROCEDURE — 97535 SELF CARE MNGMENT TRAINING: CPT

## 2022-09-28 PROCEDURE — 25000003 PHARM REV CODE 250: Performed by: PHYSICIAN ASSISTANT

## 2022-09-28 PROCEDURE — 25000003 PHARM REV CODE 250: Performed by: THORACIC SURGERY (CARDIOTHORACIC VASCULAR SURGERY)

## 2022-09-28 PROCEDURE — 99232 SBSQ HOSP IP/OBS MODERATE 35: CPT | Mod: ,,, | Performed by: INTERNAL MEDICINE

## 2022-09-28 PROCEDURE — 63600175 PHARM REV CODE 636 W HCPCS: Performed by: THORACIC SURGERY (CARDIOTHORACIC VASCULAR SURGERY)

## 2022-09-28 PROCEDURE — 97110 THERAPEUTIC EXERCISES: CPT

## 2022-09-28 PROCEDURE — 97530 THERAPEUTIC ACTIVITIES: CPT

## 2022-09-28 PROCEDURE — 94761 N-INVAS EAR/PLS OXIMETRY MLT: CPT

## 2022-09-28 PROCEDURE — 21400001 HC TELEMETRY ROOM

## 2022-09-28 PROCEDURE — 25000003 PHARM REV CODE 250: Performed by: NURSE PRACTITIONER

## 2022-09-28 PROCEDURE — 99232 PR SUBSEQUENT HOSPITAL CARE,LEVL II: ICD-10-PCS | Mod: ,,, | Performed by: INTERNAL MEDICINE

## 2022-09-28 PROCEDURE — 25000003 PHARM REV CODE 250: Performed by: INTERNAL MEDICINE

## 2022-09-28 PROCEDURE — 80048 BASIC METABOLIC PNL TOTAL CA: CPT | Performed by: THORACIC SURGERY (CARDIOTHORACIC VASCULAR SURGERY)

## 2022-09-28 PROCEDURE — 36415 COLL VENOUS BLD VENIPUNCTURE: CPT | Performed by: THORACIC SURGERY (CARDIOTHORACIC VASCULAR SURGERY)

## 2022-09-28 PROCEDURE — 97116 GAIT TRAINING THERAPY: CPT

## 2022-09-28 PROCEDURE — 99900035 HC TECH TIME PER 15 MIN (STAT)

## 2022-09-28 PROCEDURE — 27000221 HC OXYGEN, UP TO 24 HOURS

## 2022-09-28 RX ADMIN — CHLORHEXIDINE GLUCONATE 0.12% ORAL RINSE 10 ML: 1.2 LIQUID ORAL at 08:09

## 2022-09-28 RX ADMIN — INSULIN ASPART 10 UNITS: 100 INJECTION, SOLUTION INTRAVENOUS; SUBCUTANEOUS at 04:09

## 2022-09-28 RX ADMIN — LOSARTAN POTASSIUM 25 MG: 25 TABLET, FILM COATED ORAL at 08:09

## 2022-09-28 RX ADMIN — OXYCODONE HYDROCHLORIDE AND ACETAMINOPHEN 500 MG: 500 TABLET ORAL at 08:09

## 2022-09-28 RX ADMIN — OXYCODONE HYDROCHLORIDE 5 MG: 5 TABLET ORAL at 03:09

## 2022-09-28 RX ADMIN — INSULIN DETEMIR 15 UNITS: 100 INJECTION, SOLUTION SUBCUTANEOUS at 08:09

## 2022-09-28 RX ADMIN — Medication 400 MG: at 08:09

## 2022-09-28 RX ADMIN — METOPROLOL TARTRATE 50 MG: 50 TABLET, FILM COATED ORAL at 08:09

## 2022-09-28 RX ADMIN — POTASSIUM CHLORIDE 20 MEQ: 1500 TABLET, EXTENDED RELEASE ORAL at 08:09

## 2022-09-28 RX ADMIN — CLOPIDOGREL 75 MG: 75 TABLET, FILM COATED ORAL at 08:09

## 2022-09-28 RX ADMIN — INSULIN DETEMIR 15 UNITS: 100 INJECTION, SOLUTION SUBCUTANEOUS at 09:09

## 2022-09-28 RX ADMIN — CYANOCOBALAMIN TAB 1000 MCG 1000 MCG: 1000 TAB at 08:09

## 2022-09-28 RX ADMIN — INSULIN ASPART 10 UNITS: 100 INJECTION, SOLUTION INTRAVENOUS; SUBCUTANEOUS at 12:09

## 2022-09-28 RX ADMIN — ATORVASTATIN CALCIUM 80 MG: 40 TABLET, FILM COATED ORAL at 08:09

## 2022-09-28 RX ADMIN — FOLIC ACID 1 MG: 1 TABLET ORAL at 08:09

## 2022-09-28 RX ADMIN — FUROSEMIDE 40 MG: 40 TABLET ORAL at 08:09

## 2022-09-28 RX ADMIN — ASPIRIN 81 MG: 81 TABLET, COATED ORAL at 08:09

## 2022-09-28 RX ADMIN — INSULIN ASPART 10 UNITS: 100 INJECTION, SOLUTION INTRAVENOUS; SUBCUTANEOUS at 08:09

## 2022-09-28 RX ADMIN — MUPIROCIN: 20 OINTMENT TOPICAL at 08:09

## 2022-09-28 RX ADMIN — FLUOXETINE 40 MG: 20 CAPSULE ORAL at 08:09

## 2022-09-28 RX ADMIN — POLYETHYLENE GLYCOL 3350 17 G: 17 POWDER, FOR SOLUTION ORAL at 08:09

## 2022-09-28 RX ADMIN — INSULIN ASPART 2 UNITS: 100 INJECTION, SOLUTION INTRAVENOUS; SUBCUTANEOUS at 09:09

## 2022-09-28 RX ADMIN — ENOXAPARIN SODIUM 40 MG: 100 INJECTION SUBCUTANEOUS at 04:09

## 2022-09-28 RX ADMIN — ACETAMINOPHEN 650 MG: 325 TABLET ORAL at 08:09

## 2022-09-28 RX ADMIN — PANTOPRAZOLE SODIUM 40 MG: 40 TABLET, DELAYED RELEASE ORAL at 08:09

## 2022-09-28 NOTE — PROGRESS NOTES
"[facility] ([facility])  Cardiology  Progress Note    Patient Name: Ca Diaz  MRN: [id number]  Admission Date: 9/16/2022  Hospital Length of Stay: 11 days  Code Status: Full Code   Attending Physician: Krystal Marin MD   Primary Care Physician: Desiree Frye MD  Expected Discharge Date:   Principal Problem:S/P CABG x 3    Subjective:     Hospital Course:      Ca Diaz is a 62 y.o. female patient with a PMHx of anxiety, CAD, h/o CVA, depression, DM II, RLS, seizures, fibromyalgia, HLD, HTN, PRAVEENA, and obesity who presented to the Emergency Department for evaluation of generalized weakness which onset gradually two days ago. Pt states she fell last night (9/15) and the night before last (9/14). Pt's  states that she "can't even walk through an open doorway." Symptoms are constant and moderate in severity. No mitigating or exacerbating factors reported. Associated sxs include N/D and dizziness. Pt reports the nausea began one day ago. Patient denies any HA, vomiting, light-headedness, visual disturbance, CP, SOB/MALAVE, diaphoresis, neck or jaw pain, upper extremity pain, numbness/tingling, dysuria, and all other sxs at this time. Work-up in the ED revealed NSTEMI with troponin of 0.502, EKG unrevealing, CXR unremarkable, CT of the head negative for acute process. 325 mg ASA given and UFH initiated. Hospital Medicine was contacted for admission and patient placed in Observation.   9/18/22-Patient seen and examined today. No chest pain. Plan CABG soon due to complex 3 vessel CAD.        9/19/22-Patient seen and examined today. Reported some indigestion symptoms earlier this AM, has since resolved. No alden chest pain or tightness. No other CV complaints. Labs stable. Awaiting CABG after Brilinta washout period.    9/20/22- Patient seen and examined today. No complaints overnight. No c/o cp or SOB.  Labs reviewed, on Hep gtt. CABG planned for Friday after Brilinta washout " period    9/21/22-Patient seen and examined today. Stable overnight. No issues. Labs reviewed. Awaiting CABG.    9/24/22- POD1. The patient had successful CABG x3 on 9/23/22. SVG-diagonal, SVG-PDA, LIMA-LAD.  Doing well.  Sitting up in the chair, feeling tired.  No acute events overnight.    9/25/22-POD2. Became disoriented overdnight. Hgb 8.4->7.6. No arrhythmias overnight. Low grade fever     9/26/22-Patient seen and examined today, s/p CABG x 3 POD # 3. Remains confused, did not respond to questions. Hemodynamically stable. Labs reviewed. H/H 7.6/23.    9/27/22-Patient seen and examined today, s/p CABG x 3 POD # 4. Mental status improved, more awake/alert. No CV complaints during exam. Pain controlled. Labs stable.     09/28/2022 s/p CABG POD#5. VSS, non chest pain and confusion improved, the lab reviewed.         ROS  Objective:     Vital Signs (Most Recent):  Temp: 98.6 °F (37 °C) (09/28/22 1541)  Pulse: 81 (09/28/22 1541)  Resp: 18 (09/28/22 1541)  BP: 137/60 (09/28/22 1541)  SpO2: 98 % (09/28/22 1541) Vital Signs (24h Range):  Temp:  [96.2 °F (35.7 °C)-98.6 °F (37 °C)] 98.6 °F (37 °C)  Pulse:  [69-81] 81  Resp:  [16-20] 18  SpO2:  [96 %-99 %] 98 %  BP: (113-147)/(55-64) 137/60     Weight: 92.7 kg (204 lb 5.9 oz)  Body mass index is 37.38 kg/m².     SpO2: 98 %  O2 Device (Oxygen Therapy): nasal cannula      Intake/Output Summary (Last 24 hours) at 9/28/2022 1701  Last data filed at 9/28/2022 1213  Gross per 24 hour   Intake 718 ml   Output 0 ml   Net 718 ml       Lines/Drains/Airways       Peripheral Intravenous Line  Duration                  Peripheral IV - Single Lumen 09/26/22 1827 20 G Left Antecubital 1 day                    Physical Exam  Vitals and nursing note reviewed.   Constitutional:       General: She is not in acute distress.     Appearance: She is well-developed. She is not diaphoretic.      Comments: On supplemental O2   HENT:      Head: Normocephalic and atraumatic.   Eyes:      General:          Right eye: No discharge.         Left eye: No discharge.      Pupils: Pupils are equal, round, and reactive to light.   Neck:      Thyroid: No thyromegaly.      Vascular: No JVD.      Trachea: No tracheal deviation.   Cardiovascular:      Rate and Rhythm: Normal rate and regular rhythm.      Heart sounds: Normal heart sounds, S1 normal and S2 normal. No murmur heard.     Comments: Sternotomy site C/D/I, no bleeding erythema or drainage  Pulmonary:      Effort: Pulmonary effort is normal. No respiratory distress.      Breath sounds: Normal breath sounds. No wheezing.      Comments: Diminished BS at bases  Abdominal:      General: There is no distension.      Palpations: Abdomen is soft.      Tenderness: There is no rebound.   Musculoskeletal:      Cervical back: Neck supple.      Right lower leg: No edema.      Left lower leg: No edema.   Skin:     General: Skin is warm and dry.      Findings: No erythema.      Comments: SVG site C/D/I; no bleeding erythema or drainage   Neurological:      General: No focal deficit present.      Mental Status: She is alert and oriented to person, place, and time.   Psychiatric:         Mood and Affect: Mood normal.         Behavior: Behavior normal.         Thought Content: Thought content normal.       Significant Labs: ABG: No results for input(s): PH, PCO2, HCO3, POCSATURATED, BE in the last 48 hours., Blood Culture: No results for input(s): LABBLOO in the last 48 hours., BMP:   Recent Labs   Lab 09/27/22  0514 09/28/22  0441    104    141   K 3.8 4.2    105   CO2 24 25   BUN 11 16   CREATININE 0.5 0.6   CALCIUM 8.2* 8.3*   MG 1.8  --    , CMP   Recent Labs   Lab 09/27/22  0514 09/28/22  0441    141   K 3.8 4.2    105   CO2 24 25    104   BUN 11 16   CREATININE 0.5 0.6   CALCIUM 8.2* 8.3*   ANIONGAP 14 11   , CBC   Recent Labs   Lab 09/27/22 0514   WBC 10.72   HGB 8.3*   HCT 25.7*      , INR No results for input(s): INR, PROTIME  in the last 48 hours., Lipid Panel No results for input(s): CHOL, HDL, LDLCALC, TRIG, CHOLHDL in the last 48 hours., and Troponin No results for input(s): TROPONINI in the last 48 hours.    Significant Imaging: EKG:      Assessment and Plan:       * S/P CABG x 3  -Continue current post-op care and mgmt as per CTS  -ASA, Plavix, statin, BB  -Patient remains confused  -IS usage and PT/OT when able    09/28  Post CABG stable  Continue ASA Plavix statin metoprolol losartan and lasix  Contiune supportive care    Atherosclerosis of native coronary artery of native heart without angina pectoris  Multiple prior stents last 2020. Recurrent symptoms and pos troponin.  Plan  Cath today . Patient agrees    9/18/22-Patient stable post cath, 3 vessel CAD and plan CABG     9/19/22  -s/p cath that showed multivessel CAD  -Stable this AM, awaiting CABG after Brilinta washout  -Continue OMT-ASA, statin, BB, amlodipine, heparin gtt    9/20/22  -CABG planned Friday after Brilinta washout  -Cont OMT- ASA, statin, BB, amlodipine, hep gtt    9/21/22  -Stable, no acute events  -Continue ASA, statin, BB, amlodipine, heparin gtt  -Awaiting CABG    9/24/22  POD1 s/p CABG x 3  Continue aspirin, metoprolol, statin, Plavix  IV diuresis  Being managed by CT surgery    9/25/22  Became delirious overnight, somnolent on exam  Continrue current medications  No significant arrhthymias   Continue aspirin, metoprolol, statin, Plavix    9/27/22  -Less confused  -Continue ASA, BB, statin, Plavix  -IS usage and ambulation    NSTEMI (non-ST elevated myocardial infarction)  Plan cath today    9/18/22- no further symptoms    9/19/22-See plan above    Hyperlipidemia  -Continue statin    Essential (primary) hypertension  -Continue metoprolol     Type 2 diabetes mellitus, with long-term current use of insulin  Per hospital medicine        VTE Risk Mitigation (From admission, onward)         Ordered     enoxaparin injection 40 mg  Daily         09/26/22 8089      IP VTE HIGH RISK PATIENT  Once         09/26/22 0745     Place sequential compression device  Until discontinued         09/26/22 0745     Place BENITEZ hose  Until discontinued         09/23/22 1307     Place sequential compression device  Until discontinued         09/23/22 1307                Rich Goldstein MD  Cardiology  [facility] [facility] ([facility])

## 2022-09-28 NOTE — SUBJECTIVE & OBJECTIVE
ROS  Objective:     Vital Signs (Most Recent):  Temp: 98.6 °F (37 °C) (09/28/22 1541)  Pulse: 81 (09/28/22 1541)  Resp: 18 (09/28/22 1541)  BP: 137/60 (09/28/22 1541)  SpO2: 98 % (09/28/22 1541) Vital Signs (24h Range):  Temp:  [96.2 °F (35.7 °C)-98.6 °F (37 °C)] 98.6 °F (37 °C)  Pulse:  [69-81] 81  Resp:  [16-20] 18  SpO2:  [96 %-99 %] 98 %  BP: (113-147)/(55-64) 137/60     Weight: 92.7 kg (204 lb 5.9 oz)  Body mass index is 37.38 kg/m².     SpO2: 98 %  O2 Device (Oxygen Therapy): nasal cannula      Intake/Output Summary (Last 24 hours) at 9/28/2022 1701  Last data filed at 9/28/2022 1213  Gross per 24 hour   Intake 718 ml   Output 0 ml   Net 718 ml       Lines/Drains/Airways       Peripheral Intravenous Line  Duration                  Peripheral IV - Single Lumen 09/26/22 1827 20 G Left Antecubital 1 day                    Physical Exam  Vitals and nursing note reviewed.   Constitutional:       General: She is not in acute distress.     Appearance: She is well-developed. She is not diaphoretic.      Comments: On supplemental O2   HENT:      Head: Normocephalic and atraumatic.   Eyes:      General:         Right eye: No discharge.         Left eye: No discharge.      Pupils: Pupils are equal, round, and reactive to light.   Neck:      Thyroid: No thyromegaly.      Vascular: No JVD.      Trachea: No tracheal deviation.   Cardiovascular:      Rate and Rhythm: Normal rate and regular rhythm.      Heart sounds: Normal heart sounds, S1 normal and S2 normal. No murmur heard.     Comments: Sternotomy site C/D/I, no bleeding erythema or drainage  Pulmonary:      Effort: Pulmonary effort is normal. No respiratory distress.      Breath sounds: Normal breath sounds. No wheezing.      Comments: Diminished BS at bases  Abdominal:      General: There is no distension.      Palpations: Abdomen is soft.      Tenderness: There is no rebound.   Musculoskeletal:      Cervical back: Neck supple.      Right lower leg: No edema.       Left lower leg: No edema.   Skin:     General: Skin is warm and dry.      Findings: No erythema.      Comments: SVG site C/D/I; no bleeding erythema or drainage   Neurological:      General: No focal deficit present.      Mental Status: She is alert and oriented to person, place, and time.   Psychiatric:         Mood and Affect: Mood normal.         Behavior: Behavior normal.         Thought Content: Thought content normal.       Significant Labs: ABG: No results for input(s): PH, PCO2, HCO3, POCSATURATED, BE in the last 48 hours., Blood Culture: No results for input(s): LABBLOO in the last 48 hours., BMP:   Recent Labs   Lab 09/27/22  0514 09/28/22  0441    104    141   K 3.8 4.2    105   CO2 24 25   BUN 11 16   CREATININE 0.5 0.6   CALCIUM 8.2* 8.3*   MG 1.8  --    , CMP   Recent Labs   Lab 09/27/22  0514 09/28/22  0441    141   K 3.8 4.2    105   CO2 24 25    104   BUN 11 16   CREATININE 0.5 0.6   CALCIUM 8.2* 8.3*   ANIONGAP 14 11   , CBC   Recent Labs   Lab 09/27/22  0514   WBC 10.72   HGB 8.3*   HCT 25.7*      , INR No results for input(s): INR, PROTIME in the last 48 hours., Lipid Panel No results for input(s): CHOL, HDL, LDLCALC, TRIG, CHOLHDL in the last 48 hours., and Troponin No results for input(s): TROPONINI in the last 48 hours.    Significant Imaging: EKG:

## 2022-09-28 NOTE — ASSESSMENT & PLAN NOTE
-Continue current post-op care and mgmt as per CTS  -ASA, Plavix, statin, BB  -Patient remains confused  -IS usage and PT/OT when able    09/28  Post CABG stable  Continue ASA Plavix statin metoprolol losartan and lasix  Contiune supportive care

## 2022-09-28 NOTE — CONSULTS
Consulted on patient for insulin regimen change. I spoke with patient and she informed me that she has been a diabetic for around 30 years and knows how to administer her insulin.  also at bedside. Patient states her sugar has always stayed around 250-300, because she doesn't eat a diabetic diet. Patient verbalized understanding of a diabetic diet but she states she try's to follow it sometimes. Patient states she has all the supplies she needs at home to monitor blood sugar and does check her blood glucose once a day at least 2-3 times a week. We discussed importance and benefits of lowering blood sugar and monitoring food choices. States she takes her insulin every day as ordered. Informed patient to call myself for any additional information needed.

## 2022-09-28 NOTE — PT/OT/SLP PROGRESS
Occupational Therapy   Treatment    Name: Ca Diaz  MRN: [id number]  Admitting Diagnosis:  S/P CABG x 3  5 Days Post-Op    Recommendations:     Discharge Recommendations: nursing facility, skilled  Discharge Equipment Recommendations:  other (see comments) (TBD AT NEXT LEVEL OF CARE)  Barriers to discharge:  None    Assessment:     Ca Diaz is a 62 y.o. female with a medical diagnosis of S/P CABG x 3.  She presents with generalized weakness and debility. Performance deficits affecting function are weakness, impaired endurance, impaired self care skills, impaired functional mobility, gait instability, impaired balance, impaired cognition, decreased upper extremity function, decreased lower extremity function, impaired cardiopulmonary response to activity.     Rehab Prognosis:  Fair; patient would benefit from acute skilled OT services to address these deficits and reach maximum level of function.       Plan:     Patient to be seen 2 x/week to address the above listed problems via self-care/home management, therapeutic activities, therapeutic exercises  Plan of Care Expires: 10/08/22  Plan of Care Reviewed with: patient, spouse    Subjective     Pain/Comfort:  Pain Rating 1: 0/10    Objective:     Communicated with: Nurse Ryder and epic chart review prior to session.  Patient found up in chair with telemetry, oxygen, peripheral IV, wound vac upon OT entry to room.    General Precautions: Standard, fall, sternal, respiratory   Orthopedic Precautions:N/A   Braces: N/A  Respiratory Status: Nasal cannula, flow 2 L/min     Occupational Performance:     Functional Mobility/Transfers:  Patient completed Sit <> Stand Transfer with minimum assistance  with  hand-held assist   Patient completed Toilet Transfer Step Transfer technique with minimum assistance with  hand-held assist  Functional Mobility: Pt completed functional mobility in banuelos with HHA and Min A. Pt demonstrated minimal L sway and  deviation; pt denied feeling fatigued, dizzy or weak. No word slurring or gaze noted. Nurse Kendra notified of pt performance. Pt given multiple verbal and tactile cues for sternal precautions; pt able to recite 2/5.     Activities of Daily Living:  Upper Body Dressing: total assistance to don gown as robe seated EOB to include draping across back and threading L UE.   Lower Body Dressing: total assistance ro don socks while seated in chair      AMPAC 6 Click ADL: 13    Treatment & Education:  Pt tolerated tx fair. Pt educated on benefits of actively participating in occupational therapy and POC. Pt educated on fall prevention and use of call button for assistance. Pt encouraged to complete B UE therex throughout day to improve endurance and muscle strength. Pt verbalized understanding of all education.      Patient left up in chair with all lines intact, call button in reach, and sitter and  present    GOALS:   Multidisciplinary Problems       Occupational Therapy Goals          Problem: Occupational Therapy    Goal Priority Disciplines Outcome Interventions   Occupational Therapy Goal     OT, PT/OT Ongoing, Progressing    Description: O.T. GOALS TO BE MET BY 10-8-22   MIN A WITH UE DRESSING   PT WITH REQ MIN  BSC TRANSFERS  MIN A WITH TOILET ING  PT WILL TOLERATE 1 SET X 10 REPS B UE ROM EXERCISE WWTHIN A PAIN-FREE RANGE AND PLANE.                             Time Tracking:     OT Date of Treatment: 09/28/22  OT Start Time: 1018  OT Stop Time: 1041  OT Total Time (min): 23 min    Billable Minutes:Self Care/Home Management 15 minutes  Therapeutic Activity 8 minutes    OT/TABATHA: OT          9/28/2022

## 2022-09-28 NOTE — PLAN OF CARE
CM and MD met with patient/spouse at bedside to discuss d/c planning. Options of HHC vs SNF were discussed. Explained current hospital recommendation remains SNF d/t concerns regarding fall risk as patient has been requiring Max assist x2 for supine to sit/transfers. Patient states she ambulated in the hallway today. Spouse requested time to think about options as he would prefer to take patient home.    CM left SNF and HHC agency lists at bedside for patient/family. Requested spouse contact CM by 3pm with decision regarding SNF vs home with HHC and increased family support. Spouse verbalized understanding and all questions were answered.

## 2022-09-28 NOTE — PLAN OF CARE
CM spoke with patient/spouse regarding d/c planning. Patient has decided she wants to pursue SNF placement. [facility]errals [facility] to [facility] per request. CM to f/u with patient/family in am.    Spouse states his sister from [place] will come stay with he and patient for 6 weeks to assist at home upon d/c from SNF.

## 2022-09-28 NOTE — PT/OT/SLP PROGRESS
"Physical Therapy  Treatment    Ca Diaz   MRN: [id number]   Admitting Diagnosis: S/P CABG x 3    PT Received On: 09/24/22  PT Start Time: 1232     PT Stop Time: 1300    PT Total Time (min): 28 min       Billable Minutes:  Gait Training 18 and Therapeutic Exercise 10    Treatment Type: Treatment  PT/PTA: PT     PTA Visit Number: 0       General Precautions: Standard, fall, sternal  Orthopedic Precautions: N/A   Braces: N/A (CABG pillow)  Respiratory Status: NC 2L    Spiritual, Cultural Beliefs, Zoroastrianism Practices, Values that Affect Care: no    Subjective:  Communicated with nurse Krissy prior to session.  Pt presents supine in bed w/ spouse at bedside. Agreeable to PT services. Sitter at bedside as well.    Pain/Comfort  Pain Rating 1: 0/10    Objective:   Patient found with: peripheral IV, telemetry    Functional Mobility, Therapeutic Activities and Exercises:    Re-educated pt on sternal precautions, pt able to recall with cuing. Facilitated bed mobility with mod A and increased cuing not to pull/push with UE. Facilitated transfer training with increased education on scooting in any direction. Sit<>stand with CGA. Gait training with min A, demonstrates slow pace, wide DRE, decreased stride length, discontinuous steps and unsteadiness on feet. During mobility (with supplemental O2), pt required increased cuing for directions and obstacle avoidance. When prompted to mobilize around chair in banuelos pt walked directly up to chair, when reminded to go around the chair the pt stated "I am" but remained standing in front of chair. Skilled intervention also included AP, LAQ x 20 reps. Increased cuing needed to remain on task.      AM-PAC 6 CLICK MOBILITY  How much help from another person does this patient currently need?   1 = Unable, Total/Dependent Assistance  2 = A lot, Maximum/Moderate Assistance  3 = A little, Minimum/Contact Guard/Supervision  4 = None, Modified [place]/Independent    Turning " over in bed (including adjusting bedclothes, sheets and blankets)?: 2  Sitting down on and standing up from a chair with arms (e.g., wheelchair, bedside commode, etc.): 3  Moving from lying on back to sitting on the side of the bed?: 2  Moving to and from a bed to a chair (including a wheelchair)?: 3  Need to walk in hospital room?: 2  Climbing 3-5 steps with a railing?: 1  Basic Mobility Total Score: 13    AM-PAC Raw Score CMS G-Code Modifier Level of Impairment Assistance   6 % Total / Unable   7 - 9 CM 80 - 100% Maximal Assist   10 - 14 CL 60 - 80% Moderate Assist   15 - 19 CK 40 - 60% Moderate Assist   20 - 22 CJ 20 - 40% Minimal Assist   23 CI 1-20% SBA / CGA   24 CH 0% Independent/ Mod I     Patient left supine with all lines intact, call button in reach, bed alarm on, nursing notified, and sitter and family present.    Assessment:  Ca Diaz is a 62 y.o. female with a medical diagnosis of S/P CABG x 3 and presents with the deficits listed below which negatively impact functional status. Recommend continued PT services to address deficits and progress toward PLOF.    Rehab identified problem list/impairments: Rehab identified problem list/impairments: weakness, decreased safety awareness, impaired cognition, gait instability, impaired functional mobility, impaired endurance    Rehab potential is good.    Activity tolerance: Good    Discharge recommendations: Discharge Facility/Level of Care Needs: nursing facility, skilled     Barriers to discharge:      Equipment recommendations: Equipment Needed After Discharge:  (TBD)     GOALS:   Multidisciplinary Problems       Physical Therapy Goals          Problem: Physical Therapy    Goal Priority Disciplines Outcome Goal Variances Interventions   Physical Therapy Goal     PT, PT/OT Ongoing, Progressing     Description: Pt will perform bed mobility independently in order to participate in EOB activity.  Pt will perform transfers independently in  order to participate in OOB activity.   Pt will ambulate 100ft mod I with LRAD in order to participate in daily tasks.                         PLAN:    Patient to be seen 3 x/week  to address the above listed problems via gait training, therapeutic activities, therapeutic exercises, neuromuscular re-education  Plan of Care expires: 10/08/22  Plan of Care reviewed with: patient, spouse         09/28/2022

## 2022-09-28 NOTE — PLAN OF CARE
Pt AAO. VSS. Pt is on 2 lpm of o2. Pt remained free of falls this shift. Sitter at the bedside. No complaints of pain or discomfort. Medications administered as ordered. Pt is normal sinus on monitor. Hourly rounding completed. Pt instructed to call for assistance. POC reviewed. Pt verbalized understanding. Pt got up to use bedside commode, had to remind pt to hug sternal pillow to practice sternal precautions.

## 2022-09-28 NOTE — PLAN OF CARE
TX COMPLETED: facilitated bed mobility min A, transfers CGA, ambulated 30ft x 2, twice with seated rest breaks and intermittent standing rest breaks with min A HHA. Pt more alert and able to participate however slightly confused/requires increased processing time for commands/prompts. Recommend SNF

## 2022-09-28 NOTE — PROGRESS NOTES
Subjective:      Patient ID: Ca Diaz is a 62 y.o. female.    Chief Complaint: Fatigue (Pt reports generalized weakness, fell today and yesterday. Incontinent episodes before the fall. Hx: CVA 2017)    HPI: Ca Diaz is a 62 y.o. female that presented urgently and was found to have NSTEMI.  Patient has significant cardiac history with the multiple stents to her LAD diagonal circumflex and right coronary artery in the past the patient was on Brilinta and she was admitted after the cath for Brilinta washout before we took her for urgent coronary artery bypass grafting x3  A cardiac cath was done and the patient was found to have 3 vessel disease.  InStent restenoses of the LAD and a separate lesion at the mid LAD ostial stenosis of the diagonal 80% and 80% stenosis of the right PDA the obtuse marginal which was a large vessel was widely 50% stenosis  The patients echocardiogram showed  55. The carotid ultrasound showed 55% ejection fraction trivial mitral regurgitation aortic valve area 1.8 cm choir. Cardiac risk factors include HTN, HLD, Diabetes mellitus, Obesity, PAD, Cerebrovascular disease, PRAVEENA, and FHx of early heart disease. The patient's NYHA Functional Classification score is NYHA II: slight limitation of physical activity, comfortable at rest.     09/24/2022 the patient had successful CABG x3 with the endoscopic vein harvesting on 09/23/2022 she was extubated on the day of surgery and she was pain controlled overnight hemodynamically stable minimal ionotropic support blood sugar control has been fairly well chest tube output is serosanguineous 300 cc overnight no air leak she is sitting up in a chair.  09/27/2022 the patient had been transferred to telemetry and she is with a sitter hemodynamically stable overnight few bouts of agitation but otherwise stable.    Review of patient's allergies indicates:   Allergen Reactions    Epinephrine Anaphylaxis    Lidocaine Anaphylaxis      Dental visit pt stopped breathing after given lidocaine    Ace inhibitors     Sulfadiazine Other (See Comments)       Past Medical History:   Diagnosis Date    Anxiety and depression 3/13/2014    Essential (primary) hypertension 2013    Fibromyalgia 2012    Hyperlipidemia     Obstructive sleep apnea syndrome 2014    RLS (restless legs syndrome) 10/18/2018    Overview:  Rheum Dr Tam    Seizures     Type 2 diabetes mellitus 2012    ICD-10 Transition Last Assessment & Plan:  Continue with sliding scale insulin.  Control improved on low-dose Lantus presently       History reviewed. No pertinent family history.    Social History     Socioeconomic History    Marital status:    Tobacco Use    Smoking status: Former     Types: Cigarettes     Quit date:      Years since quittin.7    Smokeless tobacco: Never   Substance and Sexual Activity    Alcohol use: Not Currently    Drug use: Never       Past Surgical History:   Procedure Laterality Date    ABDOMINAL SURGERY      ARTERIOGRAPHY OF SUBCLAVIAN ARTERY  2022    Procedure: ARTERIOGRAM, SUBCLAVIAN;  Surgeon: Vale Pa MD;  Location: [facility] ([place] CATH LAB;  Service: Cardiology;;     SECTION      LEFT HEART CATHETERIZATION Left 2022    Procedure: CATHETERIZATION, HEART, LEFT;  Surgeon: Vale Pa MD;  Location: [facility] ([place] CATH LAB;  Service: Cardiology;  Laterality: Left;       Review of Systems   Constitutional:  Positive for activity change.   HENT:  Negative for dental problem, nosebleeds and sore throat.    Eyes:  Negative for discharge and visual disturbance.   Respiratory:  Negative for cough, chest tightness and stridor.    Cardiovascular:  Negative for leg swelling.   Gastrointestinal:  Negative for abdominal distention and abdominal pain.   Genitourinary:  Negative for difficulty urinating and dysuria.   Musculoskeletal:  Negative for arthralgias, back pain and joint swelling.   Allergic/Immunologic: Negative for  "environmental allergies.   Neurological:  Negative for dizziness, syncope and headaches.   Hematological:  Does not bruise/bleed easily.   Psychiatric/Behavioral:  Positive for agitation and confusion. Negative for behavioral problems.         Objective:   /60 (BP Location: Right arm, Patient Position: Lying)   Pulse 72   Temp 97.5 °F (36.4 °C) (Oral)   Resp 18   Ht 5' 2" (1.575 m)   Wt 92.7 kg (204 lb 5.9 oz)   SpO2 98%   Breastfeeding No   BMI 37.38 kg/m²     X-Ray Chest AP Portable  Narrative: EXAMINATION:  XR CHEST AP PORTABLE    CLINICAL HISTORY:  Non-ST elevation (NSTEMI) myocardial infarctionPost-op;    COMPARISON:  09/24/2022    FINDINGS:  A right internal jugular venous line remains in place.  There are surgical changes associated with a prior sternotomy.  There is silhouetting of the left and right borders of the heart.  There is a mild amount of haziness scattered throughout the inferior halves of both lungs.  There is no pneumothorax.  There is opacification of the left costophrenic angle.  The right costophrenic angle is sharp.  Impression: 1. There is silhouetting of the left and right borders of the heart.  There is a mild amount of haziness scattered throughout the inferior halves of both lungs.  An infectious process cannot be excluded.  2. There is opacification of the left costophrenic angle.  This is characteristic of a tiny pleural effusion.  3. A right internal jugular venous line remains in place.  4. There are surgical changes associated with a prior sternotomy.  .    Electronically signed by: Elmer Clarke MD  Date:    09/25/2022  Time:    07:01         Physical Exam  Vitals and nursing note reviewed.   Constitutional:       Appearance: She is obese.   HENT:      Head: Normocephalic and atraumatic.      Mouth/Throat:      Mouth: Mucous membranes are moist.   Eyes:      Extraocular Movements: Extraocular movements intact.      Pupils: Pupils are equal, round, and reactive to " light.   Cardiovascular:      Rate and Rhythm: Normal rate and regular rhythm.      Pulses: Normal pulses.      Heart sounds: Normal heart sounds.      Comments: Patient midline sternotomy looks clean with the Prevena leg drains are minimal  Looks clean and dry.  Pulmonary:      Effort: Pulmonary effort is normal.   Abdominal:      Palpations: Abdomen is soft.   Musculoskeletal:      Cervical back: Normal range of motion and neck supple.   Skin:     General: Skin is warm.      Capillary Refill: Capillary refill takes less than 2 seconds.   Neurological:      General: No focal deficit present.      Mental Status: She is alert and oriented to person, place, and time.   Psychiatric:         Mood and Affect: Mood normal.     Chest x-ray shows postsurgical changes with bilateral atelectasis no pleural effusion  Assessment:     1. S/P CABG x 3    2. Dizziness    3. NSTEMI (non-ST elevated myocardial infarction)    4. Hyperglycemia    5. Chest pain    6. Pre-operative cardiovascular examination    7. Post-operative state    8. Chest pain, unspecified type    9. Hyperlipidemia, unspecified hyperlipidemia type    10. Essential (primary) hypertension    11. Seizure        Plan   Postop day 4 status post CABG x3 lima to LAD vein graft to diagonal and vein graft to posterior descending artery patient extubated on day 1 hemodynamically stable  Acute confusional state hold the  narcotics and the CNS depressant  Cardiovascular aspirin and Plavix  Hypertension beta-blocker  Respiratory aggressive pulmonary toilet incentive spirometry out of bed  DuoNeb as needed for  Wheezing  CPAP at night for sleep apnea  Hyperglycemia on insulin sliding scale  and scheduled   start diabetic diet  Electrolytes replacement protocol  Anemia multifactorial acute blood loss continue to follow up   DVT and GI prophylaxis with Pepcid and bilateral SCDs  Transfer to the telemetry with a sitter        Spent 25 minutes with the bedside examining the  patient physical exam and formulated the treatment plan..          Krystal Marin MD,   [facility]  [place]

## 2022-09-28 NOTE — PROGRESS NOTES
Subjective:      Patient ID: Ca Diaz is a 62 y.o. female.    Chief Complaint: Fatigue (Pt reports generalized weakness, fell today and yesterday. Incontinent episodes before the fall. Hx: CVA 2017)    HPI: Ca Diaz is a 62 y.o. female that presented urgently and was found to have NSTEMI.  Patient has significant cardiac history with the multiple stents to her LAD diagonal circumflex and right coronary artery in the past the patient was on Brilinta and she was admitted after the cath for Brilinta washout before we took her for urgent coronary artery bypass grafting x3  A cardiac cath was done and the patient was found to have 3 vessel disease.  InStent restenoses of the LAD and a separate lesion at the mid LAD ostial stenosis of the diagonal 80% and 80% stenosis of the right PDA the obtuse marginal which was a large vessel was widely 50% stenosis  The patients echocardiogram showed  55. The carotid ultrasound showed 55% ejection fraction trivial mitral regurgitation aortic valve area 1.8 cm choir. Cardiac risk factors include HTN, HLD, Diabetes mellitus, Obesity, PAD, Cerebrovascular disease, PRAVEENA, and FHx of early heart disease. The patient's NYHA Functional Classification score is NYHA II: slight limitation of physical activity, comfortable at rest.     09/24/2022 the patient had successful CABG x3 with the endoscopic vein harvesting on 09/23/2022 she was extubated on the day of surgery and she was pain controlled overnight hemodynamically stable minimal ionotropic support blood sugar control has been fairly well chest tube output is serosanguineous 300 cc overnight no air leak she is sitting up in a chair.  09/27/2022 the patient had been transferred to telemetry and she is with a sitter hemodynamically stable overnight few bouts of agitation but otherwise stable.  09/28/2022 the patient is more awake sitting up in a chair and had a stable night.  She walked in the hallway with the  physical therapy    Review of patient's allergies indicates:   Allergen Reactions    Epinephrine Anaphylaxis    Lidocaine Anaphylaxis     Dental visit pt stopped breathing after given lidocaine    Ace inhibitors     Sulfadiazine Other (See Comments)       Past Medical History:   Diagnosis Date    Anxiety and depression 3/13/2014    Essential (primary) hypertension 2013    Fibromyalgia 2012    Hyperlipidemia     Obstructive sleep apnea syndrome 2014    RLS (restless legs syndrome) 10/18/2018    Overview:  Rheum Dr Tam    Seizures     Type 2 diabetes mellitus 2012    ICD-10 Transition Last Assessment & Plan:  Continue with sliding scale insulin.  Control improved on low-dose Lantus presently       History reviewed. No pertinent family history.    Social History     Socioeconomic History    Marital status:    Tobacco Use    Smoking status: Former     Types: Cigarettes     Quit date:      Years since quittin.7    Smokeless tobacco: Never   Substance and Sexual Activity    Alcohol use: Not Currently    Drug use: Never       Past Surgical History:   Procedure Laterality Date    ABDOMINAL SURGERY      ARTERIOGRAPHY OF SUBCLAVIAN ARTERY  2022    Procedure: ARTERIOGRAM, SUBCLAVIAN;  Surgeon: Vale Pa MD;  Location: [facility] CATH LAB;  Service: Cardiology;;     SECTION      LEFT HEART CATHETERIZATION Left 2022    Procedure: CATHETERIZATION, HEART, LEFT;  Surgeon: Vale Pa MD;  Location: [facility] CATH LAB;  Service: Cardiology;  Laterality: Left;       Review of Systems   Constitutional:  Positive for activity change.   HENT:  Negative for dental problem, nosebleeds and sore throat.    Eyes:  Negative for discharge and visual disturbance.   Respiratory:  Negative for cough, chest tightness and stridor.    Cardiovascular:  Negative for leg swelling.   Gastrointestinal:  Negative for abdominal distention and abdominal pain.   Genitourinary:  Negative for difficulty  "urinating and dysuria.   Musculoskeletal:  Negative for arthralgias, back pain and joint swelling.   Allergic/Immunologic: Negative for environmental allergies.   Neurological:  Negative for dizziness, syncope and headaches.   Hematological:  Does not bruise/bleed easily.   Psychiatric/Behavioral:  Positive for agitation and confusion. Negative for behavioral problems.         Objective:   /60 (BP Location: Right arm, Patient Position: Lying)   Pulse 72   Temp 97.5 °F (36.4 °C) (Oral)   Resp 18   Ht 5' 2" (1.575 m)   Wt 92.7 kg (204 lb 5.9 oz)   SpO2 98%   Breastfeeding No   BMI 37.38 kg/m²     X-Ray Chest AP Portable  Narrative: EXAMINATION:  XR CHEST AP PORTABLE    CLINICAL HISTORY:  Non-ST elevation (NSTEMI) myocardial infarctionPost-op;    COMPARISON:  09/24/2022    FINDINGS:  A right internal jugular venous line remains in place.  There are surgical changes associated with a prior sternotomy.  There is silhouetting of the left and right borders of the heart.  There is a mild amount of haziness scattered throughout the inferior halves of both lungs.  There is no pneumothorax.  There is opacification of the left costophrenic angle.  The right costophrenic angle is sharp.  Impression: 1. There is silhouetting of the left and right borders of the heart.  There is a mild amount of haziness scattered throughout the inferior halves of both lungs.  An infectious process cannot be excluded.  2. There is opacification of the left costophrenic angle.  This is characteristic of a tiny pleural effusion.  3. A right internal jugular venous line remains in place.  4. There are surgical changes associated with a prior sternotomy.  .    Electronically signed by: Elmer Clarke MD  Date:    09/25/2022  Time:    07:01         Physical Exam  Vitals and nursing note reviewed.   Constitutional:       Appearance: She is obese.   HENT:      Head: Normocephalic and atraumatic.      Mouth/Throat:      Mouth: Mucous " membranes are moist.   Eyes:      Extraocular Movements: Extraocular movements intact.      Pupils: Pupils are equal, round, and reactive to light.   Cardiovascular:      Rate and Rhythm: Normal rate and regular rhythm.      Pulses: Normal pulses.      Heart sounds: Normal heart sounds.      Comments: Patient midline sternotomy looks clean with the Prevena leg drains are minimal  Looks clean and dry.  Pulmonary:      Effort: Pulmonary effort is normal.   Abdominal:      Palpations: Abdomen is soft.   Musculoskeletal:      Cervical back: Normal range of motion and neck supple.   Skin:     General: Skin is warm.      Capillary Refill: Capillary refill takes less than 2 seconds.   Neurological:      General: No focal deficit present.      Mental Status: She is alert and oriented to person, place, and time.   Psychiatric:         Mood and Affect: Mood normal.     Chest x-ray shows postsurgical changes with bilateral atelectasis no pleural effusion  Assessment:     1. S/P CABG x 3    2. Dizziness    3. NSTEMI (non-ST elevated myocardial infarction)    4. Hyperglycemia    5. Chest pain    6. Pre-operative cardiovascular examination    7. Post-operative state    8. Chest pain, unspecified type    9. Hyperlipidemia, unspecified hyperlipidemia type    10. Essential (primary) hypertension    11. Seizure        Plan   Postop day 5 status post CABG x3 lima to LAD vein graft to diagonal and vein graft to posterior descending artery patient extubated on day 1 hemodynamically stable  Acute confusional state hold the  narcotics and the CNS depressant  Cardiovascular aspirin and Plavix  Hypertension beta-blocker  Respiratory aggressive pulmonary toilet incentive spirometry out of bed  DuoNeb as needed for  Wheezing  CPAP at night for sleep apnea  Hyperglycemia on insulin sliding scale  and scheduled   start diabetic diet  Electrolytes replacement protocol  Anemia multifactorial acute blood loss continue to follow up   DVT and GI  prophylaxis with Pepcid and bilateral SCDs  Discontinue the sitter  DC planning with the skilled nursing facility versus home with home health.  Seizure disorder patient will follow-up with neurology after discharge as an outpatient.        Spent 22 minutes with the bedside examining the patient physical exam and formulated the treatment plan..          Krystal Marin MD,   [facility]  [place]

## 2022-09-29 LAB
ANION GAP SERPL CALC-SCNC: 10 MMOL/L (ref 8–16)
BUN SERPL-MCNC: 14 MG/DL (ref 6–20)
CALCIUM SERPL-MCNC: 8.9 MG/DL (ref 8.7–10.5)
CHLORIDE SERPL-SCNC: 105 MMOL/L (ref 95–110)
CO2 SERPL-SCNC: 27 MMOL/L (ref 23–29)
CREAT SERPL-MCNC: 0.6 MG/DL (ref 0.5–1.4)
EST. GFR  (NO RACE VARIABLE): >60 ML/MIN/1.73 M^2
GLUCOSE SERPL-MCNC: 163 MG/DL (ref 70–110)
POCT GLUCOSE: 151 MG/DL (ref 70–110)
POCT GLUCOSE: 160 MG/DL (ref 70–110)
POCT GLUCOSE: 174 MG/DL (ref 70–110)
POCT GLUCOSE: 279 MG/DL (ref 70–110)
POTASSIUM SERPL-SCNC: 5.5 MMOL/L (ref 3.5–5.1)
SODIUM SERPL-SCNC: 142 MMOL/L (ref 136–145)

## 2022-09-29 PROCEDURE — 99900035 HC TECH TIME PER 15 MIN (STAT)

## 2022-09-29 PROCEDURE — 94799 UNLISTED PULMONARY SVC/PX: CPT

## 2022-09-29 PROCEDURE — 99232 SBSQ HOSP IP/OBS MODERATE 35: CPT | Mod: ,,, | Performed by: INTERNAL MEDICINE

## 2022-09-29 PROCEDURE — 63600175 PHARM REV CODE 636 W HCPCS: Performed by: THORACIC SURGERY (CARDIOTHORACIC VASCULAR SURGERY)

## 2022-09-29 PROCEDURE — 80048 BASIC METABOLIC PNL TOTAL CA: CPT | Performed by: THORACIC SURGERY (CARDIOTHORACIC VASCULAR SURGERY)

## 2022-09-29 PROCEDURE — 97110 THERAPEUTIC EXERCISES: CPT | Mod: CQ

## 2022-09-29 PROCEDURE — 25000003 PHARM REV CODE 250: Performed by: PHYSICIAN ASSISTANT

## 2022-09-29 PROCEDURE — 25000003 PHARM REV CODE 250: Performed by: INTERNAL MEDICINE

## 2022-09-29 PROCEDURE — 21400001 HC TELEMETRY ROOM

## 2022-09-29 PROCEDURE — 97530 THERAPEUTIC ACTIVITIES: CPT | Mod: CQ

## 2022-09-29 PROCEDURE — 97530 THERAPEUTIC ACTIVITIES: CPT

## 2022-09-29 PROCEDURE — 99232 PR SUBSEQUENT HOSPITAL CARE,LEVL II: ICD-10-PCS | Mod: ,,, | Performed by: INTERNAL MEDICINE

## 2022-09-29 PROCEDURE — 94761 N-INVAS EAR/PLS OXIMETRY MLT: CPT

## 2022-09-29 PROCEDURE — 25000003 PHARM REV CODE 250: Performed by: NURSE PRACTITIONER

## 2022-09-29 PROCEDURE — 27000221 HC OXYGEN, UP TO 24 HOURS

## 2022-09-29 PROCEDURE — 97110 THERAPEUTIC EXERCISES: CPT

## 2022-09-29 PROCEDURE — 36415 COLL VENOUS BLD VENIPUNCTURE: CPT | Performed by: THORACIC SURGERY (CARDIOTHORACIC VASCULAR SURGERY)

## 2022-09-29 PROCEDURE — 25000003 PHARM REV CODE 250: Performed by: THORACIC SURGERY (CARDIOTHORACIC VASCULAR SURGERY)

## 2022-09-29 RX ORDER — LOSARTAN POTASSIUM 25 MG/1
25 TABLET ORAL 2 TIMES DAILY
Status: DISCONTINUED | OUTPATIENT
Start: 2022-09-29 | End: 2022-09-30 | Stop reason: HOSPADM

## 2022-09-29 RX ADMIN — POTASSIUM CHLORIDE 20 MEQ: 1500 TABLET, EXTENDED RELEASE ORAL at 08:09

## 2022-09-29 RX ADMIN — INSULIN ASPART 1 UNITS: 100 INJECTION, SOLUTION INTRAVENOUS; SUBCUTANEOUS at 08:09

## 2022-09-29 RX ADMIN — ATORVASTATIN CALCIUM 80 MG: 40 TABLET, FILM COATED ORAL at 08:09

## 2022-09-29 RX ADMIN — LOSARTAN POTASSIUM 25 MG: 25 TABLET, FILM COATED ORAL at 08:09

## 2022-09-29 RX ADMIN — FLUOXETINE 40 MG: 20 CAPSULE ORAL at 08:09

## 2022-09-29 RX ADMIN — INSULIN ASPART 10 UNITS: 100 INJECTION, SOLUTION INTRAVENOUS; SUBCUTANEOUS at 03:09

## 2022-09-29 RX ADMIN — INSULIN DETEMIR 15 UNITS: 100 INJECTION, SOLUTION SUBCUTANEOUS at 08:09

## 2022-09-29 RX ADMIN — CYANOCOBALAMIN TAB 1000 MCG 1000 MCG: 1000 TAB at 08:09

## 2022-09-29 RX ADMIN — FUROSEMIDE 40 MG: 40 TABLET ORAL at 08:09

## 2022-09-29 RX ADMIN — INSULIN ASPART 2 UNITS: 100 INJECTION, SOLUTION INTRAVENOUS; SUBCUTANEOUS at 06:09

## 2022-09-29 RX ADMIN — OXYCODONE HYDROCHLORIDE AND ACETAMINOPHEN 500 MG: 500 TABLET ORAL at 08:09

## 2022-09-29 RX ADMIN — Medication 6 MG: at 08:09

## 2022-09-29 RX ADMIN — ENOXAPARIN SODIUM 40 MG: 100 INJECTION SUBCUTANEOUS at 04:09

## 2022-09-29 RX ADMIN — INSULIN ASPART 10 UNITS: 100 INJECTION, SOLUTION INTRAVENOUS; SUBCUTANEOUS at 08:09

## 2022-09-29 RX ADMIN — POLYETHYLENE GLYCOL 3350 17 G: 17 POWDER, FOR SOLUTION ORAL at 08:09

## 2022-09-29 RX ADMIN — CLOPIDOGREL 75 MG: 75 TABLET, FILM COATED ORAL at 08:09

## 2022-09-29 RX ADMIN — INSULIN ASPART 10 UNITS: 100 INJECTION, SOLUTION INTRAVENOUS; SUBCUTANEOUS at 11:09

## 2022-09-29 RX ADMIN — ASPIRIN 81 MG: 81 TABLET, COATED ORAL at 08:09

## 2022-09-29 RX ADMIN — PANTOPRAZOLE SODIUM 40 MG: 40 TABLET, DELAYED RELEASE ORAL at 08:09

## 2022-09-29 RX ADMIN — MUPIROCIN: 20 OINTMENT TOPICAL at 08:09

## 2022-09-29 RX ADMIN — OXYCODONE HYDROCHLORIDE 5 MG: 5 TABLET ORAL at 11:09

## 2022-09-29 RX ADMIN — OXYCODONE HYDROCHLORIDE 5 MG: 5 TABLET ORAL at 08:09

## 2022-09-29 RX ADMIN — INSULIN ASPART 6 UNITS: 100 INJECTION, SOLUTION INTRAVENOUS; SUBCUTANEOUS at 11:09

## 2022-09-29 RX ADMIN — FOLIC ACID 1 MG: 1 TABLET ORAL at 08:09

## 2022-09-29 RX ADMIN — METOPROLOL TARTRATE 50 MG: 50 TABLET, FILM COATED ORAL at 08:09

## 2022-09-29 NOTE — PLAN OF CARE
CM met with patient, spouse and family at bedside to discuss d/c planning. Informed [facility] does not have available bed, [facility] clinically accepting.    Patient/spouse agreeable to d/c to [facility]. CM contacted Rosita at [facility] and requested facility submit for SNF auth today.

## 2022-09-29 NOTE — PLAN OF CARE
Plan of care reviewed with pt, verbalized understanding. A&O x3, disoriented to time. IV intact, dry, and clean. Medications given with no complications. On 2L NC. Pt ambulates with assistance x1, turns in bed independently. Pain monitored and treated per order. BG monitored and treated per order. Wound vac in place. Bed alarm on. NS on monitor. Instructed to call for assistance. Hourly rounding completed.

## 2022-09-29 NOTE — PLAN OF CARE
Continue OT POC.  Pt able to recall 1/5 sternal precautions, reviewed with pt. CGA for sit<>stand x2 trials with no AD and Min A for ambulation 10ft with no AD.

## 2022-09-29 NOTE — PROGRESS NOTES
Subjective:      Patient ID: Ca Diaz is a 62 y.o. female.    Chief Complaint: Fatigue (Pt reports generalized weakness, fell today and yesterday. Incontinent episodes before the fall. Hx: CVA 2017)    HPI: Ca Diaz is a 62 y.o. female that presented urgently and was found to have NSTEMI.  Patient has significant cardiac history with the multiple stents to her LAD diagonal circumflex and right coronary artery in the past the patient was on Brilinta and she was admitted after the cath for Brilinta washout before we took her for urgent coronary artery bypass grafting x3  A cardiac cath was done and the patient was found to have 3 vessel disease.  InStent restenoses of the LAD and a separate lesion at the mid LAD ostial stenosis of the diagonal 80% and 80% stenosis of the right PDA the obtuse marginal which was a large vessel was widely 50% stenosis  The patients echocardiogram showed  55. The carotid ultrasound showed 55% ejection fraction trivial mitral regurgitation aortic valve area 1.8 cm choir. Cardiac risk factors include HTN, HLD, Diabetes mellitus, Obesity, PAD, Cerebrovascular disease, PRAVEENA, and FHx of early heart disease. The patient's NYHA Functional Classification score is NYHA II: slight limitation of physical activity, comfortable at rest.     09/24/2022 the patient had successful CABG x3 with the endoscopic vein harvesting on 09/23/2022 she was extubated on the day of surgery and she was pain controlled overnight hemodynamically stable minimal ionotropic support blood sugar control has been fairly well chest tube output is serosanguineous 300 cc overnight no air leak she is sitting up in a chair.  09/27/2022 the patient had been transferred to telemetry and she is with a sitter hemodynamically stable overnight few bouts of agitation but otherwise stable.  09/28/2022 the patient is more awake sitting up in a chair and had a stable night.  She walked in the hallway with the  physical therapy    Review of patient's allergies indicates:   Allergen Reactions    Epinephrine Anaphylaxis    Lidocaine Anaphylaxis     Dental visit pt stopped breathing after given lidocaine    Ace inhibitors     Sulfadiazine Other (See Comments)       Past Medical History:   Diagnosis Date    Anxiety and depression 3/13/2014    Essential (primary) hypertension 2013    Fibromyalgia 2012    Hyperlipidemia     Obstructive sleep apnea syndrome 2014    RLS (restless legs syndrome) 10/18/2018    Overview:  Rheum Dr Tam    Seizures     Type 2 diabetes mellitus 2012    ICD-10 Transition Last Assessment & Plan:  Continue with sliding scale insulin.  Control improved on low-dose Lantus presently       History reviewed. No pertinent family history.    Social History     Socioeconomic History    Marital status:    Tobacco Use    Smoking status: Former     Types: Cigarettes     Quit date:      Years since quittin.7    Smokeless tobacco: Never   Substance and Sexual Activity    Alcohol use: Not Currently    Drug use: Never       Past Surgical History:   Procedure Laterality Date    ABDOMINAL SURGERY      ARTERIOGRAPHY OF SUBCLAVIAN ARTERY  2022    Procedure: ARTERIOGRAM, SUBCLAVIAN;  Surgeon: Vale Pa MD;  Location: [facility] CATH LAB;  Service: Cardiology;;     SECTION      CORONARY ARTERY BYPASS GRAFT (CABG) N/A 2022    Procedure: CORONARY ARTERY BYPASS GRAFT (CABG);  Surgeon: Krystal Marin MD;  Location: [facility] OR;  Service: Cardiothoracic;  Laterality: N/A;  3-VESSEL WITH EPI-AORTIC ULTRASOUND    ENDOSCOPIC HARVEST OF VEIN Left 2022    Procedure: SURGICAL PROCUREMENT, VEIN, ENDOSCOPIC;  Surgeon: Krystal Marin MD;  Location: [facility] OR;  Service: Cardiothoracic;  Laterality: Left;    INJECTION OF ANESTHETIC AGENT AROUND MULTIPLE INTERCOSTAL NERVES N/A 2022    Procedure: BLOCK, NERVE, INTERCOSTAL, 2 OR MORE;  Surgeon: Krystal Marin MD;  Location:  "[facility] OR;  Service: Cardiothoracic;  Laterality: N/A;  PARASTERNAL NERVE BLOCK    LEFT HEART CATHETERIZATION Left 9/17/2022    Procedure: CATHETERIZATION, HEART, LEFT;  Surgeon: Vale Pa MD;  Location: [facility] CATH LAB;  Service: Cardiology;  Laterality: Left;       Review of Systems   HENT:  Negative for dental problem, nosebleeds and sore throat.    Eyes:  Negative for discharge and visual disturbance.   Respiratory:  Negative for cough, chest tightness and stridor.    Cardiovascular:  Negative for leg swelling.   Gastrointestinal:  Negative for abdominal distention and abdominal pain.   Genitourinary:  Negative for difficulty urinating and dysuria.   Musculoskeletal:  Negative for arthralgias, back pain and joint swelling.   Allergic/Immunologic: Negative for environmental allergies.   Neurological:  Negative for dizziness, syncope and headaches.   Hematological:  Does not bruise/bleed easily.   Psychiatric/Behavioral:  Negative for behavioral problems.         Objective:   /61   Pulse 81   Temp 97.5 °F (36.4 °C) (Oral)   Resp 20   Ht 5' 2" (1.575 m)   Wt 92.7 kg (204 lb 5.9 oz)   SpO2 99%   Breastfeeding No   BMI 37.38 kg/m²     X-Ray Chest AP Portable  Narrative: EXAMINATION:  XR CHEST AP PORTABLE    CLINICAL HISTORY:  Non-ST elevation (NSTEMI) myocardial infarctionPost-op;    COMPARISON:  09/24/2022    FINDINGS:  A right internal jugular venous line remains in place.  There are surgical changes associated with a prior sternotomy.  There is silhouetting of the left and right borders of the heart.  There is a mild amount of haziness scattered throughout the inferior halves of both lungs.  There is no pneumothorax.  There is opacification of the left costophrenic angle.  The right costophrenic angle is sharp.  Impression: 1. There is silhouetting of the left and right borders of the heart.  There is a mild amount of haziness scattered throughout the inferior halves of both lungs.  An infectious " process cannot be excluded.  2. There is opacification of the left costophrenic angle.  This is characteristic of a tiny pleural effusion.  3. A right internal jugular venous line remains in place.  4. There are surgical changes associated with a prior sternotomy.  .    Electronically signed by: Elmer Clarke MD  Date:    09/25/2022  Time:    07:01         Physical Exam  Vitals and nursing note reviewed.   Constitutional:       Appearance: She is obese.   HENT:      Head: Normocephalic and atraumatic.      Mouth/Throat:      Mouth: Mucous membranes are moist.   Eyes:      Extraocular Movements: Extraocular movements intact.      Pupils: Pupils are equal, round, and reactive to light.   Cardiovascular:      Rate and Rhythm: Normal rate and regular rhythm.      Pulses: Normal pulses.      Heart sounds: Normal heart sounds.      Comments: Patient midline sternotomy looks clean with the Prevena leg drains are minimal  Looks clean and dry.  Pulmonary:      Effort: Pulmonary effort is normal.   Abdominal:      Palpations: Abdomen is soft.   Musculoskeletal:      Cervical back: Normal range of motion and neck supple.   Skin:     General: Skin is warm.      Capillary Refill: Capillary refill takes less than 2 seconds.   Neurological:      General: No focal deficit present.      Mental Status: She is alert and oriented to person, place, and time.   Psychiatric:         Mood and Affect: Mood normal.     Chest x-ray shows postsurgical changes with bilateral atelectasis no pleural effusion  Assessment:     1. S/P CABG x 3    2. Dizziness    3. NSTEMI (non-ST elevated myocardial infarction)    4. Hyperglycemia    5. Chest pain    6. Pre-operative cardiovascular examination    7. Post-operative state    8. Chest pain, unspecified type    9. Hyperlipidemia, unspecified hyperlipidemia type    10. Essential (primary) hypertension    11. Seizure    12. Anxiety and depression        Plan   Postop day 6status post CABG x3 lima to LAD  vein graft to diagonal and vein graft to posterior descending artery patient extubated on day 1 hemodynamically stable  Acute confusional state improved  Cardiovascular aspirin and Plavix  Hypertension beta-blocker  Respiratory aggressive pulmonary toilet incentive spirometry out of bed  CPAP at night for sleep apnea  Hyperglycemia on insulin sliding scale  and scheduled   start diabetic diet  Anemia multifactorial stable   DVT and GI prophylaxis with Pepcid and bilateral SCDs  Discontinue the sitter  Seizure disorder patient will follow-up with neurology after discharge as an outpatient.  Patient waiting for skilled nursing unit facility availability and placement        Spent 20minutes with the bedside examining the patient physical exam and formulated the treatment plan..          Krystal Marin MD,   [facility]  [place]

## 2022-09-29 NOTE — PLAN OF CARE
Ongoing (interventions implemented as appropriate)  Pt AAO x4.  VSS  Pt able to make needs known.  Pt remained afebrile throughout this shift.   Pt up to chair, gait unsteady   Pt remained free of falls this shift.   Pt denies pain this shift.  Plan of care reviewed. Patient verbalizes understanding.   Pt moving/turing independent. Frequent weight shifting encouraged.  Patient sinus rhythm on monitor.   Bed low, side rails up x 2, wheels locked, call light in reach.   Hourly rounding completed.   Will continue to monitor.

## 2022-09-29 NOTE — PT/OT/SLP PROGRESS
Occupational Therapy   Treatment    Name: Ca Diaz  MRN: [id number]  Admitting Diagnosis:  S/P CABG x 3  6 Days Post-Op    Recommendations:     Discharge Recommendations: nursing facility, skilled  Discharge Equipment Recommendations:  other (see comments) (TBD)  Barriers to discharge:  None    Assessment:     Ca Diaz is a 62 y.o. female with a medical diagnosis of S/P CABG x 3.  She presents with the following performance deficits affecting function are weakness, impaired endurance, impaired self care skills, impaired functional mobility, gait instability, impaired balance, impaired cognition, decreased coordination, decreased upper extremity function, decreased safety awareness, pain, other (comment) (sternal).     Rehab Prognosis:  Good; patient would benefit from acute skilled OT services to address these deficits and reach maximum level of function.       Plan:     Patient to be seen 2 x/week to address the above listed problems via self-care/home management, therapeutic activities, therapeutic exercises  Plan of Care Expires: 10/08/22  Plan of Care Reviewed with: patient    Subjective     Pain/Comfort:  Pain Rating 1: 3/10  Location - Orientation 1: generalized  Location 1: chest  Pain Addressed 1: Other (see comments) (activity pacing)    Objective:     Communicated with: nurse Payne and epic chart review prior to session.  Patient found up in chair with telemetry, peripheral IV, oxygen upon OT entry to room.    General Precautions: Standard, fall, sternal   Orthopedic Precautions:N/A   Braces: N/A (CABG pillow)  Respiratory Status: Nasal cannula, flow 2 L/min    Functional Mobility/Transfers:  Patient completed Sit <> Stand Transfer with contact guard assistance  with  no assistive device  x2 trials  Functional Mobility: Patient completed x10ft functional mobility with Min A and no AD to increase dynamic standing balance and activity tolerance needed for ADL completion.     [facility] 6  "Click ADL: 15    Treatment & Education:  Pt able to recall 1/5 sternal precautions, reviewed all precautions with pt. Pt educated on importance of compliance with precautions; however, pt noted to not adhere to them and required verbal cueing to not lean onto UE while sitting in chair. Pt needing to sit back down after first sit>stand, stating "I'm not feeling good. I need to sit down." Pt feeling fatigued. Educated on pursed lip breathing technique. Pt educated on and performed x10 reps BUE AROM shoulder flexion to 90 degrees, elbow flexion/ext, and digit flexion/ext while sitting in chair.   Educated on techniques to use to increase independence and decrease fall risk with functional transfers. Educated on importance of OOB activity and calling for A to transfer back to bed. Encouraged completion of BUE AROM therex throughout the day to tolerance to increase functional strength and activity tolerance. Patient stated understanding and in agreement with POC.      Patient left up in chair with all lines intact, call button in reach, and nurse notified    GOALS:   Multidisciplinary Problems       Occupational Therapy Goals          Problem: Occupational Therapy    Goal Priority Disciplines Outcome Interventions   Occupational Therapy Goal     OT, PT/OT Ongoing, Progressing    Description: O.T. GOALS TO BE MET BY 10-8-22   MIN A WITH UE DRESSING   PT WITH REQ MIN  BSC TRANSFERS  MIN A WITH TOILET ING  PT WILL TOLERATE 1 SET X 10 REPS B UE ROM EXERCISE WWTHIN A PAIN-FREE RANGE AND PLANE.                             Time Tracking:     OT Date of Treatment: 09/29/22  OT Start Time: 0940  OT Stop Time: 1005  OT Total Time (min): 25 min    Billable Minutes:Therapeutic Activity 15  Therapeutic Exercise 10    OT/TABATHA: MATT Aggarwal OT     9/29/2022  "

## 2022-09-30 VITALS
RESPIRATION RATE: 20 BRPM | HEIGHT: 62 IN | WEIGHT: 212.94 LBS | OXYGEN SATURATION: 95 % | BODY MASS INDEX: 39.19 KG/M2 | TEMPERATURE: 98 F | HEART RATE: 86 BPM | SYSTOLIC BLOOD PRESSURE: 102 MMHG | DIASTOLIC BLOOD PRESSURE: 50 MMHG

## 2022-09-30 LAB
ANION GAP SERPL CALC-SCNC: 13 MMOL/L (ref 8–16)
BUN SERPL-MCNC: 10 MG/DL (ref 6–20)
CALCIUM SERPL-MCNC: 8.7 MG/DL (ref 8.7–10.5)
CHLORIDE SERPL-SCNC: 101 MMOL/L (ref 95–110)
CO2 SERPL-SCNC: 26 MMOL/L (ref 23–29)
CREAT SERPL-MCNC: 0.6 MG/DL (ref 0.5–1.4)
EST. GFR  (NO RACE VARIABLE): >60 ML/MIN/1.73 M^2
GLUCOSE SERPL-MCNC: 140 MG/DL (ref 70–110)
POCT GLUCOSE: 143 MG/DL (ref 70–110)
POCT GLUCOSE: 234 MG/DL (ref 70–110)
POTASSIUM SERPL-SCNC: 4.5 MMOL/L (ref 3.5–5.1)
SODIUM SERPL-SCNC: 140 MMOL/L (ref 136–145)

## 2022-09-30 PROCEDURE — 97530 THERAPEUTIC ACTIVITIES: CPT | Mod: CQ

## 2022-09-30 PROCEDURE — 99231 SBSQ HOSP IP/OBS SF/LOW 25: CPT | Mod: ,,, | Performed by: INTERNAL MEDICINE

## 2022-09-30 PROCEDURE — 99900035 HC TECH TIME PER 15 MIN (STAT)

## 2022-09-30 PROCEDURE — 25000003 PHARM REV CODE 250: Performed by: INTERNAL MEDICINE

## 2022-09-30 PROCEDURE — 97116 GAIT TRAINING THERAPY: CPT | Mod: CQ

## 2022-09-30 PROCEDURE — 80048 BASIC METABOLIC PNL TOTAL CA: CPT | Performed by: THORACIC SURGERY (CARDIOTHORACIC VASCULAR SURGERY)

## 2022-09-30 PROCEDURE — 97530 THERAPEUTIC ACTIVITIES: CPT

## 2022-09-30 PROCEDURE — 25000003 PHARM REV CODE 250: Performed by: THORACIC SURGERY (CARDIOTHORACIC VASCULAR SURGERY)

## 2022-09-30 PROCEDURE — 97110 THERAPEUTIC EXERCISES: CPT

## 2022-09-30 PROCEDURE — 99231 PR SUBSEQUENT HOSPITAL CARE,LEVL I: ICD-10-PCS | Mod: ,,, | Performed by: INTERNAL MEDICINE

## 2022-09-30 PROCEDURE — 36415 COLL VENOUS BLD VENIPUNCTURE: CPT | Performed by: THORACIC SURGERY (CARDIOTHORACIC VASCULAR SURGERY)

## 2022-09-30 PROCEDURE — 94761 N-INVAS EAR/PLS OXIMETRY MLT: CPT

## 2022-09-30 PROCEDURE — 27000221 HC OXYGEN, UP TO 24 HOURS

## 2022-09-30 PROCEDURE — 25000003 PHARM REV CODE 250: Performed by: PHYSICIAN ASSISTANT

## 2022-09-30 RX ORDER — INSULIN ASPART 100 [IU]/ML
1-10 INJECTION, SOLUTION INTRAVENOUS; SUBCUTANEOUS
Refills: 0 | Status: CANCELLED | OUTPATIENT
Start: 2022-09-30 | End: 2023-09-30

## 2022-09-30 RX ORDER — PREGABALIN 100 MG/1
100 CAPSULE ORAL 2 TIMES DAILY
Qty: 60 CAPSULE | Refills: 0 | Status: SHIPPED | OUTPATIENT
Start: 2022-09-30 | End: 2022-11-13 | Stop reason: SDUPTHER

## 2022-09-30 RX ORDER — LOSARTAN POTASSIUM 25 MG/1
25 TABLET ORAL 2 TIMES DAILY
Qty: 180 TABLET | Refills: 3 | Status: SHIPPED | OUTPATIENT
Start: 2022-09-30 | End: 2023-09-30

## 2022-09-30 RX ADMIN — ASPIRIN 81 MG: 81 TABLET, COATED ORAL at 09:09

## 2022-09-30 RX ADMIN — INSULIN ASPART 10 UNITS: 100 INJECTION, SOLUTION INTRAVENOUS; SUBCUTANEOUS at 11:09

## 2022-09-30 RX ADMIN — OXYCODONE HYDROCHLORIDE AND ACETAMINOPHEN 500 MG: 500 TABLET ORAL at 09:09

## 2022-09-30 RX ADMIN — FOLIC ACID 1 MG: 1 TABLET ORAL at 09:09

## 2022-09-30 RX ADMIN — OXYCODONE HYDROCHLORIDE 5 MG: 5 TABLET ORAL at 03:09

## 2022-09-30 RX ADMIN — CYANOCOBALAMIN TAB 1000 MCG 1000 MCG: 1000 TAB at 09:09

## 2022-09-30 RX ADMIN — LOSARTAN POTASSIUM 25 MG: 25 TABLET, FILM COATED ORAL at 09:09

## 2022-09-30 RX ADMIN — OXYCODONE HYDROCHLORIDE 5 MG: 5 TABLET ORAL at 11:09

## 2022-09-30 RX ADMIN — FLUOXETINE 40 MG: 20 CAPSULE ORAL at 09:09

## 2022-09-30 RX ADMIN — METOPROLOL TARTRATE 50 MG: 50 TABLET, FILM COATED ORAL at 09:09

## 2022-09-30 RX ADMIN — INSULIN ASPART 10 UNITS: 100 INJECTION, SOLUTION INTRAVENOUS; SUBCUTANEOUS at 09:09

## 2022-09-30 RX ADMIN — PANTOPRAZOLE SODIUM 40 MG: 40 TABLET, DELAYED RELEASE ORAL at 09:09

## 2022-09-30 RX ADMIN — CLOPIDOGREL 75 MG: 75 TABLET, FILM COATED ORAL at 09:09

## 2022-09-30 RX ADMIN — ATORVASTATIN CALCIUM 80 MG: 40 TABLET, FILM COATED ORAL at 09:09

## 2022-09-30 RX ADMIN — OXYCODONE HYDROCHLORIDE 5 MG: 5 TABLET ORAL at 07:09

## 2022-09-30 NOTE — PT/OT/SLP PROGRESS
"Physical Therapy  Treatment    Ca Diaz   MRN: [id number]   Admitting Diagnosis: S/P CABG x 3    PT Received On: 09/30/22  PT Start Time: 1050     PT Stop Time: 1115    PT Total Time (min): 25 min       Billable Minutes:  Gait Training 10 and Therapeutic Activity 15    Treatment Type: Treatment  PT/PTA: PTA     PTA Visit Number: 2       General Precautions: Standard, fall, sternal  Orthopedic Precautions: N/A   Braces: N/A  Respiratory Status: Nasal cannula, flow 2 L/min    Spiritual, Cultural Beliefs, Rastafarian Practices, Values that Affect Care: no    Subjective:  Communicated with patient's nurse, Gia, and completed Epic chart review prior to session.  Patient initially refused PT session due to having guests in the room and suggesting therapist "come back later."  Patient eventually agreed with encouragement and education.     Patient accurately verbalized 1/5 sternal precautions.    Pain/Comfort  Pain Rating 1: 5/10  Location 1: sternal (INCISION)  Pain Addressed 1: Other (see comments) (ACTIVITY PACING)    Objective:   Patient found with: telemetry, peripheral IV, oxygen    Functional Mobility:  Patient found sitting up in chair.  Reviewed sternal precautions and re enforced importance of compliance.     Forward scoot towards edge of chair: SBA    STS from chair No AD: Min A (VC for rocking to increase momentum and assist with successful standing)    40ft x2 trials No AD Min A  (increased time required to complete with multiple standing rest breaks; slow gait speed)    Stand pivot T/F to chair No AD: Min A     Educated patient on importance of increased tolerance to upright position and direct impact on CV endurance and strength. Patient encouraged to sit up in chair for a minimum of 2 consecutive hours per day. Encouraged patient to perform AROM TE to BLE throughout the day within all available planes of motion. Also encouraged patient to request assistance from nursing staff to ambulate a minim " of 3x more today. Re enforced importance of utilizing call light to meet needs in room and not attempt to get up without staff assistance. Patient verbalized understanding and agreed to comply.      AM-PAC 6 CLICK MOBILITY  How much help from another person does this patient currently need?   1 = Unable, Total/Dependent Assistance  2 = A lot, Maximum/Moderate Assistance  3 = A little, Minimum/Contact Guard/Supervision  4 = None, Modified Becker/Independent    Turning over in bed (including adjusting bedclothes, sheets and blankets)?: 3  Sitting down on and standing up from a chair with arms (e.g., wheelchair, bedside commode, etc.): 3  Moving to and from a bed to a chair (including a wheelchair)?: 3  Need to walk in hospital room?: 3  Climbing 3-5 steps with a railing?: 1    AM-PAC Raw Score CMS G-Code Modifier Level of Impairment Assistance   6 % Total / Unable   7 - 9 CM 80 - 100% Maximal Assist   10 - 14 CL 60 - 80% Moderate Assist   15 - 19 CK 40 - 60% Moderate Assist   20 - 22 CJ 20 - 40% Minimal Assist   23 CI 1-20% SBA / CGA   24 CH 0% Independent/ Mod I     Patient left up in chair with call button in reach and visitors present. Nurse notified of patient's request for pain medication.     Assessment:  Ca Diaz is a 62 y.o. female with a medical diagnosis of S/P CABG x 3 and presents with overall decline in functional mobility. Patient would continue to benefit from skilled PT to address functional limitations listed below in order to return to PLOF/decrease caregiver burden.     Rehab identified problem list/impairments: Rehab identified problem list/impairments: weakness, impaired endurance, impaired self care skills, gait instability, impaired balance, decreased coordination, decreased ROM, impaired coordination, impaired cardiopulmonary response to activity    Rehab potential is good.    Activity tolerance: Fair    Discharge recommendations: Discharge Facility/Level of Care  Needs: nursing facility, skilled     Barriers to discharge:      Equipment recommendations: Equipment Needed After Discharge: other (see comments) (TBD BY NEXT LEVEL OF CARE)     GOALS:   Multidisciplinary Problems       Physical Therapy Goals       Not on file              Multidisciplinary Problems (Resolved)          Problem: Physical Therapy    Goal Priority Disciplines Outcome Goal Variances Interventions   Physical Therapy Goal   (Resolved)     PT, PT/OT Met     Description: Pt will perform bed mobility independently in order to participate in EOB activity.  Pt will perform transfers independently in order to participate in OOB activity.   Pt will ambulate 100ft mod I with LRAD in order to participate in daily tasks.                         PLAN:    Patient to be seen 3 x/week  to address the above listed problems via gait training, therapeutic activities, therapeutic exercises  Plan of Care expires: 10/08/22  Plan of Care reviewed with: patient         09/30/2022

## 2022-09-30 NOTE — PLAN OF CARE
Continue OT POC.  SBA for forward scoot in chair. Min A for sit>stand and ambulation 80 ft with no AD. Pt recalled 1/5 sternal precautions.      O.T. GOALS TO BE MET BY 10-8-22  MIN A WITH UE DRESSING  PT WITH REQ MIN  BSC TRANSFERS  MIN A WITH TOILET ING  PT WILL TOLERATE 1 SET X 10 REPS B UE ROM EXERCISE WWTHIN A PAIN-FREE RANGE AND PLANE.    Pt discharged prior to entering note, goals detailed above.

## 2022-09-30 NOTE — PROGRESS NOTES
"[facility] - Telemetry ([facility])  Cardiology  Progress Note    Patient Name: Ca Diaz  MRN: [id number]  Admission Date: 9/16/2022  Hospital Length of Stay: 13 days  Code Status: Full Code   Attending Physician: No att. providers found   Primary Care Physician: Desiree Frye MD  Expected Discharge Date: 9/30/2022  Principal Problem:S/P CABG x 3    Subjective:     Hospital Course:      Ca Diaz is a 62 y.o. female patient with a PMHx of anxiety, CAD, h/o CVA, depression, DM II, RLS, seizures, fibromyalgia, HLD, HTN, PRAVEENA, and obesity who presented to the Emergency Department for evaluation of generalized weakness which onset gradually two days ago. Pt states she fell last night (9/15) and the night before last (9/14). Pt's  states that she "can't even walk through an open doorway." Symptoms are constant and moderate in severity. No mitigating or exacerbating factors reported. Associated sxs include N/D and dizziness. Pt reports the nausea began one day ago. Patient denies any HA, vomiting, light-headedness, visual disturbance, CP, SOB/MALAVE, diaphoresis, neck or jaw pain, upper extremity pain, numbness/tingling, dysuria, and all other sxs at this time. Work-up in the ED revealed NSTEMI with troponin of 0.502, EKG unrevealing, CXR unremarkable, CT of the head negative for acute process. 325 mg ASA given and UFH initiated. Hospital Medicine was contacted for admission and patient placed in Observation.   9/18/22-Patient seen and examined today. No chest pain. Plan CABG soon due to complex 3 vessel CAD.        9/19/22-Patient seen and examined today. Reported some indigestion symptoms earlier this AM, has since resolved. No alden chest pain or tightness. No other CV complaints. Labs stable. Awaiting CABG after Brilinta washout period.    9/20/22- Patient seen and examined today. No complaints overnight. No c/o cp or SOB.  Labs reviewed, on Hep gtt. CABG planned for Friday after Brilinta " washout period    9/21/22-Patient seen and examined today. Stable overnight. No issues. Labs reviewed. Awaiting CABG.    9/24/22- POD1. The patient had successful CABG x3 on 9/23/22. SVG-diagonal, SVG-PDA, LIMA-LAD.  Doing well.  Sitting up in the chair, feeling tired.  No acute events overnight.    9/25/22-POD2. Became disoriented overdnight. Hgb 8.4->7.6. No arrhythmias overnight. Low grade fever     9/26/22-Patient seen and examined today, s/p CABG x 3 POD # 3. Remains confused, did not respond to questions. Hemodynamically stable. Labs reviewed. H/H 7.6/23.    9/27/22-Patient seen and examined today, s/p CABG x 3 POD # 4. Mental status improved, more awake/alert. No CV complaints during exam. Pain controlled. Labs stable.     09/28/2022 s/p CABG POD#5. VSS, non chest pain and confusion improved, the lab reviewed.     09/29/2022 s/p CABG POD#6. Stable. Ambulating well the lab reviewed.     09/30 no chest pain ambulating, lab reviewed s/p CABg POD #7          ROS  Objective:     Vital Signs (Most Recent):  Temp: 97.5 °F (36.4 °C) (09/30/22 1124)  Pulse: 86 (09/30/22 1128)  Resp: 20 (09/30/22 1125)  BP: (!) 102/50 (09/30/22 1124)  SpO2: 95 % (09/30/22 1124)   Vital Signs (24h Range):  Temp:  [97.5 °F (36.4 °C)-98.1 °F (36.7 °C)] 97.5 °F (36.4 °C)  Pulse:  [73-88] 86  Resp:  [16-20] 20  SpO2:  [95 %-99 %] 95 %  BP: (102-137)/(50-68) 102/50     Weight: 96.6 kg (212 lb 15.4 oz)  Body mass index is 38.95 kg/m².     SpO2: 95 %  O2 Device (Oxygen Therapy): nasal cannula      Intake/Output Summary (Last 24 hours) at 9/30/2022 1701  Last data filed at 9/29/2022 2358  Gross per 24 hour   Intake 240 ml   Output --   Net 240 ml       Lines/Drains/Airways       None                   Physical Exam  Vitals and nursing note reviewed.   Constitutional:       General: She is not in acute distress.     Appearance: She is well-developed. She is not diaphoretic.      Comments: On supplemental O2   HENT:      Head: Normocephalic and  atraumatic.   Eyes:      General:         Right eye: No discharge.         Left eye: No discharge.      Pupils: Pupils are equal, round, and reactive to light.   Neck:      Thyroid: No thyromegaly.      Vascular: No JVD.      Trachea: No tracheal deviation.   Cardiovascular:      Rate and Rhythm: Normal rate and regular rhythm.      Heart sounds: Normal heart sounds, S1 normal and S2 normal. No murmur heard.     Comments: Sternotomy site C/D/I, no bleeding erythema or drainage  Pulmonary:      Effort: Pulmonary effort is normal. No respiratory distress.      Breath sounds: Normal breath sounds. No wheezing.      Comments: Diminished BS at bases  Abdominal:      General: There is no distension.      Palpations: Abdomen is soft.      Tenderness: There is no rebound.   Musculoskeletal:      Cervical back: Neck supple.      Right lower leg: No edema.      Left lower leg: No edema.   Skin:     General: Skin is warm and dry.      Findings: No erythema.      Comments: SVG site C/D/I; no bleeding erythema or drainage   Neurological:      General: No focal deficit present.      Mental Status: She is alert and oriented to person, place, and time.   Psychiatric:         Mood and Affect: Mood normal.         Behavior: Behavior normal.         Thought Content: Thought content normal.       Significant Labs: ABG: No results for input(s): PH, PCO2, HCO3, POCSATURATED, BE in the last 48 hours., Blood Culture: No results for input(s): LABBLOO in the last 48 hours., BMP:   Recent Labs   Lab 09/29/22  0509 09/30/22  0520   * 140*    140   K 5.5* 4.5    101   CO2 27 26   BUN 14 10   CREATININE 0.6 0.6   CALCIUM 8.9 8.7   , CMP   Recent Labs   Lab 09/29/22  0509 09/30/22  0520    140   K 5.5* 4.5    101   CO2 27 26   * 140*   BUN 14 10   CREATININE 0.6 0.6   CALCIUM 8.9 8.7   ANIONGAP 10 13   , CBC No results for input(s): WBC, HGB, HCT, PLT in the last 48 hours., INR No results for input(s): INR,  PROTIME in the last 48 hours., Lipid Panel No results for input(s): CHOL, HDL, LDLCALC, TRIG, CHOLHDL in the last 48 hours., and Troponin No results for input(s): TROPONINI in the last 48 hours.    Significant Imaging: EKG:      Assessment and Plan:       * S/P CABG x 3  -Continue current post-op care and mgmt as per CTS  -ASA, Plavix, statin, BB  -Patient remains confused  -IS usage and PT/OT when able    09/28  Post CABG stable  Continue ASA Plavix statin metoprolol losartan and lasix  Contiune supportive care    09/29  Continue current Rx  Facility bed waiting     09/30  Contiune current Rx  F/u as OP    Atherosclerosis of native coronary artery of native heart without angina pectoris  Multiple prior stents last 2020. Recurrent symptoms and pos troponin.  Plan  Cath today . Patient agrees    9/18/22-Patient stable post cath, 3 vessel CAD and plan CABG     9/19/22  -s/p cath that showed multivessel CAD  -Stable this AM, awaiting CABG after Brilinta washout  -Continue OMT-ASA, statin, BB, amlodipine, heparin gtt    9/20/22  -CABG planned Friday after Brilinta washout  -Cont OMT- ASA, statin, BB, amlodipine, hep gtt    9/21/22  -Stable, no acute events  -Continue ASA, statin, BB, amlodipine, heparin gtt  -Awaiting CABG    9/24/22  POD1 s/p CABG x 3  Continue aspirin, metoprolol, statin, Plavix  IV diuresis  Being managed by CT surgery    9/25/22  Became delirious overnight, somnolent on exam  Continrue current medications  No significant arrhthymias   Continue aspirin, metoprolol, statin, Plavix    9/27/22  -Less confused  -Continue ASA, BB, statin, Plavix  -IS usage and ambulation    NSTEMI (non-ST elevated myocardial infarction)  Plan cath today    9/18/22- no further symptoms    9/19/22-See plan above    Hyperlipidemia  -Continue statin    Essential (primary) hypertension  -Continue metoprolol     Type 2 diabetes mellitus, with long-term current use of insulin  Per hospital medicine        VTE Risk Mitigation  (From admission, onward)         Ordered     enoxaparin injection 40 mg  Daily         09/26/22 0745     IP VTE HIGH RISK PATIENT  Once         09/26/22 0745     Place sequential compression device  Until discontinued         09/26/22 0745     Place BENITEZ hose  Until discontinued         09/23/22 1307     Place sequential compression device  Until discontinued         09/23/22 1307                Rich Goldstein MD  Cardiology  [facility] - Telemetry ([facility])

## 2022-09-30 NOTE — DISCHARGE SUMMARY
[facility] - Telemetry (Hospital)  Discharge Note  Short Stay    Procedure(s) (LRB):  CORONARY ARTERY BYPASS GRAFT (CABG) (N/A)  ECHOCARDIOGRAM,TRANSESOPHAGEAL (N/A)  SURGICAL PROCUREMENT, VEIN, ENDOSCOPIC (Left)  BLOCK, NERVE, INTERCOSTAL, 2 OR MORE (N/A)      HPI: Ca Diaz is a 62 y.o. female that presented urgently and was found to have NSTEMI.  Patient has significant cardiac history with the multiple stents to her LAD diagonal circumflex and right coronary artery in the past the patient was on Brilinta and she was admitted after the cath for Brilinta washout before we took her for urgent coronary artery bypass grafting x3  A cardiac cath was done and the patient was found to have 3 vessel disease.  InStent restenoses of the LAD and a separate lesion at the mid LAD ostial stenosis of the diagonal 80% and 80% stenosis of the right PDA the obtuse marginal which was a large vessel was widely 50% stenosis  The patients echocardiogram showed  55. The carotid ultrasound showed 55% ejection fraction trivial mitral regurgitation aortic valve area 1.8 cm choir. Cardiac risk factors include HTN, HLD, Diabetes mellitus, Obesity, PAD, Cerebrovascular disease, PRAVEENA, and FHx of early heart disease. The patient's NYHA Functional Classification score is NYHA II: slight limitation of physical activity, comfortable at rest.      09/24/2022 the patient had successful CABG x3 with the endoscopic vein harvesting on 09/23/2022 she was extubated on the day of surgery and she was pain controlled overnight hemodynamically stable minimal ionotropic support blood sugar control has been fairly well chest tube output is serosanguineous 300 cc overnight no air leak she is sitting up in a chair.  09/27/2022 the patient had been transferred to telemetry and she is with a sitter hemodynamically stable overnight few bouts of agitation but otherwise stable.  09/28/2022 the patient is more awake sitting up in a chair and had a stable  "night.  She walked in the hallway with the physical therapy  9/30/2022 the patient is sitting up in the chair and eating this morning  Hemodynamilcal stable and pain well controlled.Incisions are clean and dry.     OUTCOME: Patient tolerated treatment/procedure well without complication and is now ready for discharge.    DISPOSITION: Intermediate Care Facility    FINAL DIAGNOSIS:  S/P CABG x 3    FOLLOWUP: In clinic    DISCHARGE INSTRUCTIONS:          Attached Information    Dietary Fat, Facts About (English)      Facts About Dietary Fat      Olive oil is a good source of unsaturated fat.     Eating less saturated and trans fat is one of the best things you can do for your heart. Start by finding out which fats are better to use. Then always try to use as little "bad" fat as you can.   Why eat less fat?   Cutting down on the fat you eat can lower your blood cholesterol levels. This may help prevent clogged arteries from buildup of plaque.   A low-fat diet can help you lose excess weight. Doing so can lower your blood pressure and reduce your chances of getting diabetes.   A low-fat diet reduces your risk for stroke and for some cancers.  Unsaturated fat is most healthy   When you must add fat, use unsaturated fat.   Unsaturated fats come from plants. They include olive, canola, peanut, corn, avocado, safflower, and sunflower oils.   Liquid (squeezable) margarine is also mostly unsaturated fat.   In moderate amounts, unsaturated fat can even be good for your heart.  Saturated fat is less healthy   Avoid eating saturated fat because it raises your blood cholesterol levels.   Most saturated fat comes from animals. Foods such as butter, lard, cheese, cream, whole milk, and fatty cuts of meat are high in saturated fat.   Some oils, such as palm and coconut oils, are also saturated fats.  Trans fat is least healthy   Also avoid trans fat whenever possible. Even if it's not listed on the food label, look for it in the " ingredients in the form of hydrogenated or partially hydrogenated oils.   This is found in snack foods, shortening, french fries, and stick margarines.  Add flavor without fat   Sprinkle herbs on fish, chicken, and meat, and in soups.   Try herbs, lemon juice, or flavored vinegar on vegetables.   Add chopped onions, garlic, and peppers to flavor beans and rice.   Date Last Reviewed: 5/11/2015 © 2000-2017 [employer]. [address] [place] [address]. All rights reserved. This information is not intended as a substitute for professional medical care. Always follow your healthcare professional's instructions.     Attached Information    Eating Heart-Healthy Foods (English)      Eating Heart-Healthy Foods   Eating has a big impact on your heart health. In fact, eating healthier can improve several of your heart risks at once. For instance, it helps you manage weight, cholesterol, and blood pressure. Here are ideas to help you make heart-healthy changes without giving up all the foods and flavors you love.   Getting started   Talk with your health care provider about eating plans, such as the DASH or Mediterranean diet. You may also be referred to a dietitian.   Change a few things at a time. Give yourself time to get used to a few eating changes before adding more.   Work to create a tasty, healthy eating plan that you can stick to for the rest of your life.     Goals for healthy eating   Below are some tips to improve your eating habits:   Limit saturated fats and trans fats. Saturated fats raise your levels of cholesterol, so keep these fats to a minimum. They are found in foods such as fatty meats, whole milk, cheese, and palm and coconut oils. Avoid trans fats because they lower good cholesterol as well as raise bad cholesterol. Trans fats are most often found in processed foods.   Reduce sodium (salt) intake. Eating too much salt may increase your blood pressure. Limit your sodium intake  to 2,300 milligrams (mg) per day, or less if your health care provider recommends it. Dining out less often and eating fewer processed foods are two great ways to decrease the amount of salt you consume.   Managing calories. A calorie is a unit of energy. Your body burns calories for fuel, but if you eat more calories than your body burns, the extras are stored as fat. Your health care provider can help you create a diet plan to manage your calories. This will likely include eating healthier foods as well as exercising regularly. To help you track your progress, keep a diary to record what you eat and how often you exercise.  Choose the right foods   Aim to make these foods staples of your diet. If you have diabetes, you may have different recommendations than what is listed here:   Fruits and vegetable provide plenty of nutrients without a lot of calories. At meals, fill half your plate with these foods. Split the other half of your plate between whole grains and lean protein.   Whole grains are high in fiber and rich in vitamins and nutrients. Good choices include whole-wheat bread, pasta, and brown rice.   Lean proteins give you nutrition with less fat. Good choices include fish, skinless chicken, and beans.   Low-fat or nonfat dairy provides nutrients without a lot of fat. Try low-fat or nonfat milk, cheese, or yogurt.   Healthy fats can be good for you in small amounts. These are unsaturated fats, such as olive oil, nuts, and fish. Try to have at least 2 servings per week of fatty fish such as salmon, sardines, mackerel, rainbow trout, and albacore tuna. These contain omega-3 fatty acids, which are good for your heart. Flaxseed is another source of a heart-healthy fat.  More on heart healthy eating      Read food labels   Healthy eating starts at the grocery store. Be sure to pay attention to food labels on packaged foods. Look for products that are high in fiber and protein, and low in saturated fat,  cholesterol, and sodium. Avoid products that contain trans fat. And pay close attention to serving size. For instance, if you plan to eat two servings, double all the numbers on the label.   Prepare food right   A key part of healthy cooking is cutting down on added fat and salt. Look on the internet for lower-fat, lower-sodium recipes. Also, try these tips:   Remove fat from meat and skin from poultry before cooking.   Skim fat from the surface of soups and sauces.   Broil, boil, bake, steam, grill, and microwave food without added fats.   Choose ingredients that spice up your food without adding calories, fat, or sodium. Try these items: horseradish, hot sauce, lemon, mustard, nonfat salad dressings, and vinegar. For salt-free herbs and spices, try basil, cilantro, cinnamon, pepper, and rosemary.  Date Last Reviewed: 6/25/2015   © 4743-3610 [employer]. [address] [place] [address]. All rights reserved. This information is not intended as a substitute for professional medical care. Always follow your healthcare professional's instructions.   Attached Information    After Bypass Surgery, Controlling Your Risk Factors (English)      Controlling Your Risk Factors After Bypass Surgery   Managing coronary artery disease   After surgery, the blood flow to your heart is better. But new blockages can still form. You need to take steps to prevent this. By taking care of your risk factors for coronary artery disease, commonly called heart disease, you can help keep new blockages from forming. This will lower your chances of needing another bypass surgery.   Controlling risk factors   To manage heart disease, you must control as many risk factors as you can. Work with your healthcare provider to identify your risk factors and to get them under control.   Your healthcare provider will work with you to change lifestyle factors as needed to help stop the progression of atherosclerotic cardiovascular  disease (ASCVD). It may be the cause of your chest pain (angina). Factors you may need to work on include:   Diet      Your healthcare provider will give you information on dietary changes that you may need to make, based on your situation. He or she may recommend that you see a registered dietitian for help with these changes. Changes may include:   Cutting fat and cholesterol intake   Reducing sodium (salt) intake, especially if you have high blood pressure   Choosing fresh or frozen vegetables instead of canned vegetables, which can be very high in salt   Increasing your intake of fresh vegetables and fruits   Eating lean proteins, such as fish, poultry, and legumes (beans and peas) and eating less red meat and processed or canned meats   Using low-fat dairy products   Using vegetable and nut oils in limited amounts   Limiting sweets and processed foods such as chips, cookies, ready-made microwavable meals and baked goods  Physical activity     Your healthcare provider may recommend that you increase your physical activity if you have not been as active as possible. Based on your situation, your provider may recommend that you do moderate to vigorous intensity physical activity for at least 40 minutes each day for at least 3 to 4 days per week. But don't start until your healthcare provider tells you it's okay to do so. He or she may want you to go through cardiac rehab and get a stress test first. A few examples of moderate to vigorous intensity physical activity are:   Walking at a brisk pace, about 3 to 4 miles per hour   Jogging or running   Swimming or water aerobics   Hiking   Dancing   Martial arts   Tennis   Riding a bike or stationary bike    Weight management   If you are overweight or obese, your healthcare provider will work with you to lose weight and lower your body mass index (BMI) to a normal or near-normal level. Making diet changes and exercising more can help.   Date Last Reviewed: 10/1/2016    © 1492-1520 [employer]. [address] [place] [address], [place] [address]. All rights reserved. This information is not intended as a substitute for professional medical care. Always follow your healthcare professional's instructions.       Attached Information  After Bypass Surgery, Heart Medicines (English)      Heart Medicines After Bypass Surgery      From now on, youll be taking medicine to keep your heart disease (coronary artery disease) under control. You may also take medicines for related health problems. Be sure to take all your medicines as directed or they wont work properly.   Heart medicines   Your doctor may prescribe some of these medicines after your bypass surgery:   Antiplatelet medicines such as aspirin help prevent blood clots. They also reduce your risk for a heart attack.   Beta-blockers reduce your heart rate and the force of the heartbeat. They also lower blood pressure.   ACE inhibitors lower blood pressure and decrease strain on the heart.   Lipid-lowering medicines reduce the amount of LDL (bad) cholesterol and other fats in the blood. Some medicines also improve levels of HDL (good) cholesterol.  Other medicines   Depending on your risk factors, you may also take medicines for related conditions:   If you have high blood pressure, you may take medicines such as diuretics and vasodilators. These lower blood pressure, which helps control heart disease.   If you have diabetes, pills or insulin injections can keep blood sugar under control. This reduces the risk for diabetes complications, including heart disease.  Tips for taking medicines   Set up a routine. For example, take your medicine with the same meal each day, or before you go to bed.   Use a pill box or dispenser. Choose one that has the day of the week and morning and evening marked. This device may help you keep your medicines organized and remind you when to take them.   Keep a list of all your medicines and  their dosages with you at all times. Show this list to any doctor or dentist who treats you. Also show it to your pharmacist before buying any prescription or over-the-counter medicine.  Date Last Reviewed: 10/1/2016   © 7717-8797 [employer]. [address] [place] [address]. All rights reserved. This information is not intended as a substitute for professional medical care. Always follow your healthcare professional's instructions.     Attached Information  After Bypass Surgery, Managing Pain (English)      Managing Pain After Bypass Surgery   Taking your medicines      A pill organizer can help you keep track of the medicines you take each day.   After your bypass surgery, you may be given prescriptions for new medicines when you leave the hospital. Take each one as directed. Keeping a chart or putting your pills in a 7-day pill box can help you remember.   Relieving pain   If you have pain around your incisions or in your chest, your doctor may prescribe a pain reliever. Be sure to take it exactly as directed. A gentle back rub may help relax sore muscles in your back and shoulders. If the surgeon used a mammary artery, you may feel a pulling or tightness in the front of your chest on the side the artery was used. You can use a heating pad to ease the tightness.   As your breastbone heals   Don't be surprised to feel sharp pains in your chest as the breastbone heals. Even changes in the weather can make the incision hurt. These pains feel different from angina and are most likely not signs of a heart attack. If you have questions about what you're feeling, or if medications are not enough to manage your pain, call your health care provider.   Date Last Reviewed: 10/1/2016   © 6497-2448 [employer]. [address] [place] [address]. All rights reserved. This information is not intended as a substitute for professional medical care. Always follow your healthcare  professional's instructions.     Attached Information  After Bypass Surgery, Starting a Cardiac Rehab Program (English)      Starting a Cardiac Rehab Program After Bypass Surgery      A cardiac rehab program can take place in a hospital, a clinic, or a doctor's office. You'll work with a team of specialists. Your team may include doctors, nurses, exercise specialists, dietitians, and counselors. Cardiac rehab can help you get back into your normal routine after surgery. It also gives you tools to improve your overall health for the rest of your life.   Program parts   A cardiac rehab program includes:   Exercise. You'll learn how to exercise safely. Your program will include exercises to increase fitness, endurance, and strength.   Nutrition education. You'll work with a dietitian to learn the best ways to eat for heart health. You'll also learn ways to use this knowledge when you shop, cook, and eat out.   Help managing risk factors. You'll learn about controlling related conditions. These include high blood pressure, high cholesterol, and diabetes.   Counseling. You'll get help dealing with the emotional aspects of CAD and treatment. This may include help with depression and anxiety. It may also include practical advice and support for quitting smoking, losing weight, being physically active, and continuing your sex life.   Family education. Your family can learn with you. That way, they can help you to continue using your new skills and knowledge after you finish the program.   Date Last Reviewed: 10/1/2016   © 9654-5965 [employer]. [address] [place] [address], [place] [address]. All rights reserved. This information is not intended as a substitute for professional medical care. Always follow your healthcare professional's instructions.     Attached Information  After Bypass Surgery, When to Call the Doctor (English)      When to Call the Doctor After Bypass Surgery      Call your doctor if you have any  of these symptoms:   Angina or chest pain symptoms like those you felt before surgery (call 911)   Fever of 100.4?F (38?C) or higher, or as directed by your healthcare provider   Unexplained chills or sweating   Shortness of breath or trouble breathing   Sharp pain in the chest on taking a deep breath   Belly (abdominal) pain, nausea, or vomiting that doesn't go away   Fast or irregular heartbeat   Dizziness or fainting   Increasing pain that doesn't get better after taking pain medicine   Swelling, redness, oozing, or cloudy discharge at the incision sites   Unexplained bruising or bleeding   Continued sensation of motion or clicking sounds in your breastbone   Sudden weight gain. This means 5 pounds or more in 1 week.   Increased swelling of the legs, especially on the side where the vein was not removed  Date Last Reviewed: 10/1/2016   © 5405-7886 [employer]. [address] [place] [address]. All rights reserved. This information is not intended as a substitute for professional medical care. Always follow your healthcare professional's instructions.     Attached Information  After Bypass Surgery, Your Emotional Health (English)      Your Emotional Health After Bypass Surgery      Having heart disease and heart surgery may trigger a variety of feelings. You may be relieved to have had treatment, but you may also feel depressed or angry at times. You may also feel that your loved ones dont let you do enough. These feelings are normal, but they can make you short-tempered with those around you. Letting them know how you feel will help avoid misunderstandings.   Talking about your feelings and needs   Your loved ones can support you better if they know how you feel. When you talk with your spouse or other loved ones:   Choose a time when you can relax and talk without being disturbed.   Use terms such as I feel or I need you to This tells your loved one how you feel without blaming him  or her.   Be sure to give your loved one time to say what he or she feels and needs. This is a hard time for those close to you, too.   You may want to talk first to a friend or family member who doesnt live with you. This can help you understand your own feelings.  Notes to family and friends   Accept that your loved one may be depressed or angry at times. Try not to blame yourself for these feelings.   Recognize that you may feel angry or depressed, too. Try to talk openly about your feelings. You may want to talk with a friend or other family member first.  Date Last Reviewed: 10/1/2016   © 7327-5754 [employer]. [address] [place] [address], [place] [address]. All rights reserved. This information is not intended as a substitute for professional medical care. Always follow your healthcare professional's instructions.     Attached Information  Coronary Artery Bypass Graft Surgery, Discharge Instructions for (English)      After Coronary Artery Bypass Surgery   Your healthcare provider performed coronary artery bypass graft surgery (also called CABG, pronounced cabbage). This surgery created new pathways around blocked parts of your hearts blood vessels, allowing blood to reach your heart muscle. Your healthcare provider used a healthy blood vessel from another part of your body (a graft) to restore blood flow.   Activity   Discuss with your healthcare doctor what you can and cant do as you recover. You will have good and bad days. This is normal. But tell your healthcare provider if you feel depressed, have trouble sleeping, or have a persistent decrease in appetite. Although these problems are common after surgery, they can slow your recovery. Its important to seek help.   Let others drive you wherever you need to go for the first 3 to 6 weeks after your surgery.   Ask someone to stand nearby while you shower or do other activities, just in case you need help.   Avoid using very hot water while  showering. It can affect your circulation and make you dizzy.   Weigh yourself every day, at the same time of day, and in the same kind of clothes. Quick weight gain can be a sign of a problem that needs your healthcare providers attention.   You may start doing light work around the house and yard after 2 to 3 weeks at home. Dont lift anything heavier than 5 pounds. Your healthcare provider may give you a more specific weight restriction. Until approved by your healthcare provider, avoid mowing the lawn, vacuuming, driving, and doing other activities that could strain your breastbone.   Ask your healthcare provider when you can expect to return to work.  Pain relief   You will recover faster after surgery if your pain is kept under control:   Dont be surprised if you feel sharp pains as your breastbone heals or if you have soreness in your incision during changes in weather.   Tell your healthcare provider if you have questions about what youre feeling, if your medicines dont reduce your pain, or if you suddenly feel worse.  Incision care   Healing takes several weeks. The bandage or dressing on your chest will likely be removed before you go home. If it is still in place, ask your healthcare provider how you should care for it after you return home. Do the following to care for your incision:   If there are any steri-strips still on your incision, you can remove them after a week if they haven't already fallen off.   Clean your incision every day with soap and water.   Gently pat the area of the incision to dry it.   Dont use any powders, lotions, or oils on your incision until it is well healed.  Lifestyle changes   Ask your healthcare provider when you can start a walking program:   If you havent already started a walking program in the hospital, begin with short walks (about 5 minutes) at home. Go a little longer each day.   Choose a safe place with a level surface, such as a local park or mall.   Wear  supportive shoes to prevent injury to your knees and ankles.   Walk with someone. Its more fun and helps you stay with it.  Take your medicines exactly as directed. Dont skip doses.   Maintain a healthy weight. Get help to lose any extra pounds.   Avoid fatty and fried foods. Stick to lean meats, such as chicken or fish.   Cut back on salt:   Limit canned, dried, packaged, and fast foods.   Dont add salt to your food at the table.   Season foods with herbs instead of salt when you cook.  Break the smoking habit. Enroll in a stop-smoking program to improve your chances of success.  When to call your healthcare provider   Call your healthcare provider immediately if you have any of the following:   Chest pain or a return of the heart symptoms you had before your surgery   Fever of 100.4°F (38°C) or higher, or as directed by your healthcare provider   Signs of infection (redness, swelling, drainage, or warmth) at the incision site   Shortness of breath   Fainting   Weight gain of more than 3 pounds in 1 day, more than 5 pounds in 1 week, or whatever weight gain you were told to report by your healthcare provider   New or increased swelling in your hands, feet, or ankles   Unrelieved pain at the incision site(s)   Changes in the location, type, or severity of pain   Fast or irregular pulse   Persistent abdominal pain   Nausea   Trouble urinating   Any unusual bleeding   Date Last Reviewed: 10/1/2016   © 4395-4874 [employer]. [address] [place] [address]. All rights reserved. This information is not intended as a substitute for professional medical care. Always follow your healthcare professional's instructions.       Attached Information  Driving and Social Activities After Bypass Surgery (English)      After Bypass Surgery: Driving and Social Activities   As you feel stronger, you can go out and do more. At first, keep activities to about an hour. And remember that it's OK to leave early  or ask visitors to go home so you can rest.      Here are some social activities you could choose:   Do crafts, such as painting or knitting   Play cards and other table games   Take a walk with friends   Go to a shopping mall   Attend [employer] activities   Go to a movie, play, or sports event  A word about driving   For your own safety and the safety of others, don't drive until your doctor says you can. There are several reasons for this:   Your reaction time is slower until you regain your strength. The medicines you take may also slow your reaction time.   If you are taking medicine to keep your blood pressure low, you could become dizzy or pass out behind the wheel.   If you were to be in a crash, hitting the steering wheel could damage your breastbone.   Date Last Reviewed: 10/1/2016   © 2991-5125 [employer]. [address] [place] [address]. All rights reserved. This information is not intended as a substitute for professional medical care. Always follow your healthcare professional's instructions.     Attached Information  Exercise After Bypass Surgery: Start Slowly (English)      Exercise After Bypass Surgery: Start Slowly      Increase your exercise slowly   You may feel weak and tire easily at first. You may also be a bit stiff. Go slowly and build up your time and speed little by little.   After the first week, exercise 4 times a day for 7 to 8 minutes each time. Each week, add 1 to 2 minutes to each session.   As you add minutes to each session, cut down on the number of times you exercise in a day. Just be sure you exercise for a total of 25 to 30 minutes. Slowly build up to 25 to 30 minutes once a day.   Exercise 5 days a week. You can take 2 days off-but not 2 days in a row.   Exercise either before or at least 1 hour after a meal.   Learn to take your pulse during exercise. If it becomes too fast, stop and rest. If your pulse rate stays high even at rest, call for help.    Rest after you exercise. And drink plenty of water, unless your healthcare provider has told you to limit how much fluid you drink.   Date Last Reviewed: 10/1/2016   © 7502-0219 [employer]. [address] [place] [address], [place] [address]. All rights reserved. This information is not intended as a substitute for professional medical care. Always follow your healthcare professional's instructions.         Discharge Procedure Orders   Diet Cardiac     No dressing needed     Activity as tolerated         Clinical Reference Documents Added to Patient Instructions         Document    DASH DIET (ENGLISH)    DIABETIC MEAL PLANNING  (ENGLISH)    GUIDE TO EATING WHEN YOU HAVE DIABETES (ENGLISH)    HEART HEALTHY DIET (ENGLISH)            TIME SPENT ON DISCHARGE: 45 minutes

## 2022-09-30 NOTE — PLAN OF CARE
Patient remained free from falls throughout shift, call bell within reach. POD #7 from her CABG. Wound vac removed yesterday. Oxy given twice for pain. Patient able to ambulate with minimal assistance. On 2L NC. Vitals stable, will continue to monitor.

## 2022-09-30 NOTE — PROGRESS NOTES
[facility] - Intensive Care ([facility])  Adult Nutrition  Progress Note    SUMMARY       Recommendations    Recommendation/Intervention:   1. Encourage po intake   2. Recommend to consider oral supplements   3. Collaboration with medical providers    Goals: Patient to consume and tolerate >50% EEN prior to RD follow up.    Nutrition Goal Status: Resolved    Communication of RD Recs: other (comment) (poc)      Assessment and Plan    Nutrition Problem  Inadequate po intake    Related to (etiology):   Decreased po intake s/p procedure   Condition associated with diagnosis    Signs and Symptoms (as evidenced by):   Less than 50% po intake     Interventions/Recommendations (treatment strategy):  1. Encourage po intake   2. Recommend to consider oral supplements   3. Collaboration with medical providers    Nutrition Diagnosis Status:   Resolved      Reason for Assessment    Reason For Assessment: length of stay    Diagnosis: diabetes diagnosis/complications, cardiac disease  Patient Active Problem List   Diagnosis    Type 2 diabetes mellitus, with long-term current use of insulin    ASCVD (arteriosclerotic cardiovascular disease)    MDD (major depressive disorder)    Anxiety and depression    Essential (primary) hypertension    GERD (gastroesophageal reflux disease)    History of migraine headaches    Hyperlipidemia    Hypersomnia with sleep apnea    Obstructive sleep apnea syndrome    Fibromyalgia    RLS (restless legs syndrome)    NSTEMI (non-ST elevated myocardial infarction)    Atherosclerosis of native coronary artery of native heart without angina pectoris    Obesity    S/P CABG x 3    Seizure    Chest pain     Past Medical History:   Diagnosis Date    Anxiety and depression 3/13/2014    Essential (primary) hypertension 1/22/2013    Fibromyalgia 9/17/2012    Hyperlipidemia     Obstructive sleep apnea syndrome 8/20/2014    RLS (restless legs syndrome) 10/18/2018    Overview:  Rheum Dr Tam    Seizures     Type 2  "diabetes mellitus 9/17/2012    ICD-10 Transition Last Assessment & Plan:  Continue with sliding scale insulin.  Control improved on low-dose Lantus presently       Relevant Medical History: s/p CABG x3    Interdisciplinary Rounds: did not attend (RD remote)    General Information Comments:   9/30: Visited pt at bedside. Pt reports appetite has returned to normal and eating good, 100% PO intake of meals, not experiencing any N/V/D. Pt confirmed intake of boost glucose control oral supplement, will continue to consume. LBM 9/29. Labs, meds, and weight reviewed. GLU (H).   9/26: Patient with a cardiac diet.  PO intake decreased at this time with average of 25% po intake. Patient with AMS/confusion.  Oral supplements recommended daily.  Labs reviewed.  NKFA.  LBM:9/26.  RD will continue to ecnourage po intake.  (RD remote)    Nutrition Discharge Planning: Patient will continue to consume recommended cardiac/diabetic diet     Nutrition Risk Screen    Nutrition Risk Screen: no indicators present    Nutrition/Diet History    Spiritual, Cultural Beliefs, Oriental orthodox Practices, Values that Affect Care: no  Food Allergies: NKFA  Factors Affecting Nutritional Intake: decreased appetite    Anthropometrics    Temp: 98.1 °F (36.7 °C)  Height Method: Stated  Height: 5' 2" (157.5 cm)  Height (inches): 62 in  Weight Method: Bed Scale  Weight: 96.6 kg (212 lb 15.4 oz)  Weight (lb): 212.97 lb  Ideal Body Weight (IBW), Female: 110 lb  % Ideal Body Weight, Female (lb): 192 %  BMI (Calculated): 38.9  BMI Grade: 35 - 39.9 - obesity - grade II       Lab/Procedures/Meds    Pertinent Labs Reviewed: reviewed  Pertinent Medications Reviewed: reviewed  BMP  Lab Results   Component Value Date     09/30/2022    K 4.5 09/30/2022     09/30/2022    CO2 26 09/30/2022    BUN 10 09/30/2022    CREATININE 0.6 09/30/2022    CALCIUM 8.7 09/30/2022    ANIONGAP 13 09/30/2022    ESTGFRAFRICA >60 03/18/2022    EGFRNONAA >60 03/18/2022     Lab Results "   Component Value Date    HGBA1C 10.9 (H) 09/17/2022     Recent Labs   Lab 09/30/22  0446   POCTGLUCOSE 143*      Lab Results   Component Value Date    CALCIUM 8.7 09/30/2022    PHOS 3.0 09/19/2022     Scheduled Meds:   ascorbic acid (vitamin C)  500 mg Oral BID    aspirin  81 mg Oral Daily    atorvastatin  80 mg Oral Daily    clopidogreL  75 mg Oral Daily    cyanocobalamin  1,000 mcg Oral Daily    enoxaparin  40 mg Subcutaneous Daily    FLUoxetine  40 mg Oral Daily    folic acid  1 mg Oral Daily    insulin aspart U-100  10 Units Subcutaneous TIDWM    insulin detemir U-100  18 Units Subcutaneous BID    losartan  25 mg Oral BID    metoprolol tartrate  50 mg Oral BID    pantoprazole  40 mg Oral Daily    polyethylene glycol  17 g Oral Daily     Continuous Infusions:   dexmedetomidine (PRECEDEX) infusion Stopped (09/23/22 1550)     PRN Meds:.sodium chloride, sodium chloride 0.9%, acetaminophen, albumin human 5%, albuterol-ipratropium, aluminum-magnesium hydroxide-simethicone, bisacodyL, calcium gluconate IVPB, dextrose 10%, dextrose 10%, glucagon (human recombinant), glucose, glucose, haloperidol lactate, hydrALAZINE, influenza, insulin aspart U-100, lactated ringers, magnesium sulfate IVPB, melatonin, morphine, naloxone, nitroGLYCERIN, ondansetron, ondansetron, oxyCODONE, potassium chloride in water, potassium chloride in water, potassium chloride in water, promethazine, sodium chloride 0.9%    Physical Findings/Assessment  Wt Readings from Last 15 Encounters:   09/30/22 96.6 kg (212 lb 15.4 oz)   03/18/22 90.9 kg (200 lb 4.6 oz)   02/06/20 92.3 kg (203 lb 6.4 oz)   12/02/18 98 kg (215 lb 15.1 oz)   ]    Estimated/Assessed Needs    Weight Used For Calorie Calculations: 95.8 kg (211 lb 3.2 oz)  Energy Calorie Requirements (kcal): 20kcals/kg (1916kcals/day)  Energy Need Method: Kcal/kg  Protein Requirements: 0.8-1.0g/kg  Weight Used For Protein Calculations: 95.8 kg (211 lb 3.2 oz)     Estimated Fluid Requirement Method:  RDA Method  RDA Method (mL): 20  CHO Requirement: 240      Nutrition Prescription Ordered    Current Diet Order:diabetic    Evaluation of Received Nutrient/Fluid Intake    % Kcal Needs: <25%  % Protein Needs: <25%  Energy Calories Required: not meeting needs  Protein Required: not meeting needs  Fluid Required: not meeting needs  Tolerance: tolerating  % Intake of Estimated Energy Needs: 100%  % Meal Intake: 100%  Intake/Output - Last 3 Shifts         09/28 0700 09/29 0659 09/29 0700 09/30 0659 09/30 0700  10/01 0659    P.O. 718 240     Other       Total Intake(mL/kg) 718 (7.7) 240 (2.5)     Urine (mL/kg/hr) 0 (0)      Other 0      Stool       Total Output 0      Net +718 +240            Urine Occurrence 3 x 2 x     Stool Occurrence 1 x 1 x             Nutrition Risk    Level of Risk/Frequency of Follow-up: low    Monitor and Evaluation    Food and Nutrient Intake: energy intake, food and beverage intake  Food and Nutrient Adminstration: diet order  Knowledge/Beliefs/Attitudes: food and nutrition knowledge/skill, beliefs and attitudes  Physical Activity and Function: nutrition-related ADLs and IADLs, factors affecting access to physical activity  Anthropometric Measurements: height/length, weight, weight change, body mass index  Biochemical Data, Medical Tests and Procedures: electrolyte and renal panel, lipid profile, gastrointestinal profile, glucose/endocrine profile, inflammatory profile     Nutrition Follow-Up    RD Follow-up?: Thais Lopez Dietitian

## 2022-09-30 NOTE — PLAN OF CARE
[facility] - Telemetry (Hospital)  Discharge Final Note    Primary Care Provider: Desiree Frye MD    Expected Discharge Date: 9/30/2022    Final Discharge Note (most recent)       Final Note - 09/30/22 1052          Final Note    Assessment Type Final Discharge Note     Anticipated Discharge Disposition Skilled Nursing Facility     Hospital Resources/Appts/Education Provided Post-Acute resouces added to AVS        Post-Acute Status    Post-Acute Authorization Placement     Post-Acute Placement Status Set-up Complete/Auth obtained                     Important Message from Medicare              Follow-up providers       Krystal Marin MD   Specialty: Cardiothoracic Surgery    [address] [place] [address] Dr Parminder ZURITA [id number]   Phone: [phone]       Next Steps: Follow up on 10/13/2022              After-discharge care                Destination       [facility]   Service: Skilled Nursing    [address] [facility] [address]   Phone: [phone]                             Dc dispo: [facility]  Family notified: spouse notified at bedside by CM.  Transportation: facility to provide w/c van transport with O2.    All orders uploaded to [facility] and nurse provided with number for report.

## 2022-09-30 NOTE — PT/OT/SLP PROGRESS
"Physical Therapy  Treatment    Ca Diaz   MRN: [id number]   Admitting Diagnosis: S/P CABG x 3    PT Received On: 09/29/22  PT Start Time: 1725     PT Stop Time: 1749    PT Total Time (min): 24 min       Billable Minutes:  Therapeutic Activity 9 and Therapeutic Exercise 15    Treatment Type: Treatment  PT/PTA: PTA     PTA Visit Number: 1       General Precautions: Standard, fall, sternal  Orthopedic Precautions: N/A   Braces: N/A  Respiratory Status: Nasal cannula, flow NA L/min    Spiritual, Cultural Beliefs, Baptism Practices, Values that Affect Care: no    Subjective:  Communicated with epic and nurse DOYLE  prior to session.  "I don't feel good. I don't want to"  (counseled pt on expectations post CABG  and goal of recovery, she agreed to do more)     Pain/Comfort  Pain Rating 1: 4/10  Location 1: leg (vein harvest site)    Objective:   Patient found with: telemetry, peripheral IV, oxygen    Functional Mobility:  Bed Mobility:     MIN A    Transfers:    MIN A    Gait:     Refused but did  pt took steps to transfer to the recliner.            Therapeutic Activities and Exercises:  Pt participated with bilateral arom supine LE  exercises for ROM and strengthening to improved functional mobility and for cardiovascular benefit.   20 reps of ap. 10 reps of QS, HEEL SLIDES. HIP AB/ADDCUTION. Breaks given PRN. Post exercises she stated that she could get in the chair despite having declined to so initially. MIN A for all mobility. She is capable of more.      AM-PAC 6 CLICK MOBILITY  How much help from another person does this patient currently need?   1 = Unable, Total/Dependent Assistance  2 = A lot, Maximum/Moderate Assistance  3 = A little, Minimum/Contact Guard/Supervision  4 = None, Modified Sully/Independent    Turning over in bed (including adjusting bedclothes, sheets and blankets)?: 3  Sitting down on and standing up from a chair with arms (e.g., wheelchair, bedside commode, etc.): " 3  Moving from lying on back to sitting on the side of the bed?: 3  Moving to and from a bed to a chair (including a wheelchair)?: 3  Need to walk in hospital room?: 2  Climbing 3-5 steps with a railing?: 1  Basic Mobility Total Score: 15    AM-PAC Raw Score CMS G-Code Modifier Level of Impairment Assistance   6 % Total / Unable   7 - 9 CM 80 - 100% Maximal Assist   10 - 14 CL 60 - 80% Moderate Assist   15 - 19 CK 40 - 60% Moderate Assist   20 - 22 CJ 20 - 40% Minimal Assist   23 CI 1-20% SBA / CGA   24 CH 0% Independent/ Mod I     Patient left up in chair with all lines intact, call button in reach, and Spouse present. Nurse Jose D was notified. Legs of recliner elevated.     Assessment:  Ca Diaz is a 62 y.o. female with a medical diagnosis of S/P CABG x 3 and presents with limited participation today but did eventually try. .    Rehab identified problem list/impairments: Rehab identified problem list/impairments: weakness, impaired balance, decreased safety awareness, impaired skin, impaired endurance, pain, edema, impaired cardiopulmonary response to activity, impaired self care skills, decreased ROM, impaired joint extensibility, impaired functional mobility, decreased upper extremity function, decreased lower extremity function, gait instability    Rehab potential is fair.    Activity tolerance: Fair    Discharge recommendations: Discharge Facility/Level of Care Needs: nursing facility, skilled     Barriers to discharge:      Equipment recommendations: Equipment Needed After Discharge:  (TBD at the next level of care.)     GOALS:   Multidisciplinary Problems       Physical Therapy Goals          Problem: Physical Therapy    Goal Priority Disciplines Outcome Goal Variances Interventions   Physical Therapy Goal     PT, PT/OT Ongoing, Progressing     Description: Pt will perform bed mobility independently in order to participate in EOB activity.  Pt will perform transfers independently in  order to participate in OOB activity.   Pt will ambulate 100ft mod I with LRAD in order to participate in daily tasks.                         PLAN:    Patient to be seen 3 x/week  to address the above listed problems via gait training, therapeutic activities, therapeutic exercises  Plan of Care expires: 10/08/22  Plan of Care reviewed with: patient, spouse         09/29/2022

## 2022-09-30 NOTE — PROGRESS NOTES
IV removed without difficulty, pressure dressing applied to site - cardiac monitor removed and returned to monitor tech - patient waiting on wheelchair van from [facility].

## 2022-09-30 NOTE — SUBJECTIVE & OBJECTIVE
ROS  Objective:     Vital Signs (Most Recent):  Temp: 97.5 °F (36.4 °C) (09/30/22 1124)  Pulse: 86 (09/30/22 1128)  Resp: 20 (09/30/22 1125)  BP: (!) 102/50 (09/30/22 1124)  SpO2: 95 % (09/30/22 1124)   Vital Signs (24h Range):  Temp:  [97.5 °F (36.4 °C)-98.1 °F (36.7 °C)] 97.5 °F (36.4 °C)  Pulse:  [73-88] 86  Resp:  [16-20] 20  SpO2:  [95 %-99 %] 95 %  BP: (102-137)/(50-68) 102/50     Weight: 96.6 kg (212 lb 15.4 oz)  Body mass index is 38.95 kg/m².     SpO2: 95 %  O2 Device (Oxygen Therapy): nasal cannula      Intake/Output Summary (Last 24 hours) at 9/30/2022 1701  Last data filed at 9/29/2022 2358  Gross per 24 hour   Intake 240 ml   Output --   Net 240 ml       Lines/Drains/Airways       None                   Physical Exam  Vitals and nursing note reviewed.   Constitutional:       General: She is not in acute distress.     Appearance: She is well-developed. She is not diaphoretic.      Comments: On supplemental O2   HENT:      Head: Normocephalic and atraumatic.   Eyes:      General:         Right eye: No discharge.         Left eye: No discharge.      Pupils: Pupils are equal, round, and reactive to light.   Neck:      Thyroid: No thyromegaly.      Vascular: No JVD.      Trachea: No tracheal deviation.   Cardiovascular:      Rate and Rhythm: Normal rate and regular rhythm.      Heart sounds: Normal heart sounds, S1 normal and S2 normal. No murmur heard.     Comments: Sternotomy site C/D/I, no bleeding erythema or drainage  Pulmonary:      Effort: Pulmonary effort is normal. No respiratory distress.      Breath sounds: Normal breath sounds. No wheezing.      Comments: Diminished BS at bases  Abdominal:      General: There is no distension.      Palpations: Abdomen is soft.      Tenderness: There is no rebound.   Musculoskeletal:      Cervical back: Neck supple.      Right lower leg: No edema.      Left lower leg: No edema.   Skin:     General: Skin is warm and dry.      Findings: No erythema.       Comments: [facility] site C/D/I; no bleeding erythema or drainage   Neurological:      General: No focal deficit present.      Mental Status: She is alert and oriented to person, place, and time.   Psychiatric:         Mood and Affect: Mood normal.         Behavior: Behavior normal.         Thought Content: Thought content normal.       Significant Labs: ABG: No results for input(s): PH, PCO2, HCO3, POCSATURATED, BE in the last 48 hours., Blood Culture: No results for input(s): LABBLOO in the last 48 hours., BMP:   Recent Labs   Lab 09/29/22  0509 09/30/22  0520   * 140*    140   K 5.5* 4.5    101   CO2 27 26   BUN 14 10   CREATININE 0.6 0.6   CALCIUM 8.9 8.7   , CMP   Recent Labs   Lab 09/29/22  0509 09/30/22  0520    140   K 5.5* 4.5    101   CO2 27 26   * 140*   BUN 14 10   CREATININE 0.6 0.6   CALCIUM 8.9 8.7   ANIONGAP 10 13   , CBC No results for input(s): WBC, HGB, HCT, PLT in the last 48 hours., INR No results for input(s): INR, PROTIME in the last 48 hours., Lipid Panel No results for input(s): CHOL, HDL, LDLCALC, TRIG, CHOLHDL in the last 48 hours., and Troponin No results for input(s): TROPONINI in the last 48 hours.    Significant Imaging: EKG:

## 2022-09-30 NOTE — ASSESSMENT & PLAN NOTE
-Continue current post-op care and mgmt as per CTS  -ASA, Plavix, statin, BB  -Patient remains confused  -IS usage and PT/OT when able    09/28  Post CABG stable  Continue ASA Plavix statin metoprolol losartan and lasix  Contiune supportive care    09/29  Continue current Rx  Facility bed waiting

## 2022-09-30 NOTE — PT/OT/SLP PROGRESS
Occupational Therapy   Treatment    Name: Ca Diaz  MRN: [id number]  Admitting Diagnosis:  S/P CABG x 3  7 Days Post-Op    Recommendations:     Discharge Recommendations: nursing facility, skilled  Discharge Equipment Recommendations:  other (see comments) (TBD)  Barriers to discharge:  None    Assessment:     Ca Diaz is a 62 y.o. female with a medical diagnosis of S/P CABG x 3.  She presents with the following performance deficits affecting function are weakness, impaired endurance, impaired self care skills, impaired functional mobility, gait instability, impaired balance, decreased coordination, decreased safety awareness, pain, decreased ROM, impaired coordination, impaired cardiopulmonary response to activity.     Rehab Prognosis:  Good; patient would benefit from acute skilled OT services to address these deficits and reach maximum level of function.       Plan:     Patient to be seen 2 x/week to address the above listed problems via self-care/home management, therapeutic exercises, therapeutic activities  Plan of Care Expires: 10/08/22  Plan of Care Reviewed with: patient, family    Subjective     Pain/Comfort:  Pain Rating 1: 5/10  Location - Orientation 1: generalized  Location 1: sternal (incision site)  Pain Addressed 1: Other (see comments) (activity pacing)    Objective:     Communicated with: nurse and epic chart review prior to session.  Patient found up in chair with telemetry, peripheral IV, oxygen upon OT entry to room.    General Precautions: Standard, fall, sternal   Orthopedic Precautions:N/A   Braces: N/A  Respiratory Status: Nasal cannula, flow 2 L/min      Functional Mobility/Transfers:  Forward scooting in chair with SBA.  Patient completed Sit <> Stand Transfer with minimum assistance  with  no assistive device   Functional Mobility: Patient completed x40ft x2 reps functional mobility with Min A and no AD to increase dynamic standing balance and activity tolerance  needed for ADL completion. Pt requiring increased time and multiple rest breaks during ambulation.  Stand pivot t/f back to chair with Min A, no AD.    [facility] ADL: 15    Treatment & Education:  Pt initially refusing OT services due to company present, but agreeable with max encouragement. Pt recalled 1/5 sternal precautions (no raising BUE past 90 degrees), reviewed precautions with pt and educated on importance of compliance. Educated on ambulating x3 more times today and on BUE AROM therex. Encouraged to hold CABG pillow for all t/fs to prevent using BUE. Pt demonstrated and performed x5 reps shoulder flexion to 90 degrees, shoulder abduction to 90 degrees, elbow flexion/ext, and digit flexion/ext while sitting up in chair. Pt requesting pain meds, nurse notified.   Educated on techniques to use to increase independence and decrease fall risk with functional transfers. Educated on importance of OOB activity and calling for A to transfer back to bed. Encouraged completion of BUE AROM therex within sternal precautions throughout the day to tolerance to increase functional strength and activity tolerance. Patient stated understanding and in agreement with POC.     Patient left up in chair with all lines intact, call button in reach, nurse notified, and family present    GOALS:   Multidisciplinary Problems       Occupational Therapy Goals       Not on file              Multidisciplinary Problems (Resolved)          Problem: Occupational Therapy    Goal Priority Disciplines Outcome Interventions   Occupational Therapy Goal   (Resolved)     OT, PT/OT Met    Description: O.T. GOALS TO BE MET BY 10-8-22   MIN A WITH UE DRESSING   PT WITH REQ MIN  BSC TRANSFERS  MIN A WITH TOILET ING  PT WILL TOLERATE 1 SET X 10 REPS B UE ROM EXERCISE WWTHIN A PAIN-FREE RANGE AND PLANE.                             Time Tracking:     OT Date of Treatment: 09/30/22  OT Start Time: 1045  OT Stop Time: 1110  OT Total Time (min): 25  min    Billable Minutes:Therapeutic Activity 15  Therapeutic Exercise 10    OT/TABATHA: OT      Mariola Aggarwal OT     9/30/2022

## 2022-09-30 NOTE — PROGRESS NOTES
"[facility] - Telemetry ([facility])  Cardiology  Progress Note    Patient Name: Ca Diaz  MRN: [id number]  Admission Date: 9/16/2022  Hospital Length of Stay: 12 days  Code Status: Full Code   Attending Physician: Krystal Marin MD   Primary Care Physician: Desiree Frye MD  Expected Discharge Date:   Principal Problem:S/P CABG x 3    Subjective:     Hospital Course:      Ca Diaz is a 62 y.o. female patient with a PMHx of anxiety, CAD, h/o CVA, depression, DM II, RLS, seizures, fibromyalgia, HLD, HTN, PRAVEENA, and obesity who presented to the Emergency Department for evaluation of generalized weakness which onset gradually two days ago. Pt states she fell last night (9/15) and the night before last (9/14). Pt's  states that she "can't even walk through an open doorway." Symptoms are constant and moderate in severity. No mitigating or exacerbating factors reported. Associated sxs include N/D and dizziness. Pt reports the nausea began one day ago. Patient denies any HA, vomiting, light-headedness, visual disturbance, CP, SOB/MALAVE, diaphoresis, neck or jaw pain, upper extremity pain, numbness/tingling, dysuria, and all other sxs at this time. Work-up in the ED revealed NSTEMI with troponin of 0.502, EKG unrevealing, CXR unremarkable, CT of the head negative for acute process. 325 mg ASA given and UFH initiated. Hospital Medicine was contacted for admission and patient placed in Observation.   9/18/22-Patient seen and examined today. No chest pain. Plan CABG soon due to complex 3 vessel CAD.        9/19/22-Patient seen and examined today. Reported some indigestion symptoms earlier this AM, has since resolved. No alden chest pain or tightness. No other CV complaints. Labs stable. Awaiting CABG after Brilinta washout period.    9/20/22- Patient seen and examined today. No complaints overnight. No c/o cp or SOB.  Labs reviewed, on Hep gtt. CABG planned for Friday after Brilinta washout " period    9/21/22-Patient seen and examined today. Stable overnight. No issues. Labs reviewed. Awaiting CABG.    9/24/22- POD1. The patient had successful CABG x3 on 9/23/22. SVG-diagonal, SVG-PDA, LIMA-LAD.  Doing well.  Sitting up in the chair, feeling tired.  No acute events overnight.    9/25/22-POD2. Became disoriented overdnight. Hgb 8.4->7.6. No arrhythmias overnight. Low grade fever     9/26/22-Patient seen and examined today, s/p CABG x 3 POD # 3. Remains confused, did not respond to questions. Hemodynamically stable. Labs reviewed. H/H 7.6/23.    9/27/22-Patient seen and examined today, s/p CABG x 3 POD # 4. Mental status improved, more awake/alert. No CV complaints during exam. Pain controlled. Labs stable.     09/28/2022 s/p CABG POD#5. VSS, non chest pain and confusion improved, the lab reviewed.     09/29/2022 s/p CABG POD#6. Stable. Ambulating well the lab reviewed.         ROS  Objective:     Vital Signs (Most Recent):  Temp: 98 °F (36.7 °C) (09/29/22 1858)  Pulse: 74 (09/29/22 2129)  Resp: 18 (09/29/22 2030)  BP: 136/65 (09/29/22 1858)  SpO2: 99 % (09/29/22 2030) Vital Signs (24h Range):  Temp:  [97.1 °F (36.2 °C)-98 °F (36.7 °C)] 98 °F (36.7 °C)  Pulse:  [73-88] 74  Resp:  [17-20] 18  SpO2:  [97 %-99 %] 99 %  BP: (130-170)/(61-72) 136/65     Weight: 92.7 kg (204 lb 5.9 oz)  Body mass index is 37.38 kg/m².     SpO2: 99 %  O2 Device (Oxygen Therapy): nasal cannula    No intake or output data in the 24 hours ending 09/29/22 2201    Lines/Drains/Airways       Peripheral Intravenous Line  Duration                  Peripheral IV - Single Lumen 09/26/22 1827 20 G Left Antecubital 3 days                    Physical Exam  Vitals and nursing note reviewed.   Constitutional:       General: She is not in acute distress.     Appearance: She is well-developed. She is not diaphoretic.      Comments: On supplemental O2   HENT:      Head: Normocephalic and atraumatic.   Eyes:      General:         Right eye: No  discharge.         Left eye: No discharge.      Pupils: Pupils are equal, round, and reactive to light.   Neck:      Thyroid: No thyromegaly.      Vascular: No JVD.      Trachea: No tracheal deviation.   Cardiovascular:      Rate and Rhythm: Normal rate and regular rhythm.      Heart sounds: Normal heart sounds, S1 normal and S2 normal. No murmur heard.     Comments: Sternotomy site C/D/I, no bleeding erythema or drainage  Pulmonary:      Effort: Pulmonary effort is normal. No respiratory distress.      Breath sounds: Normal breath sounds. No wheezing.      Comments: Diminished BS at bases  Abdominal:      General: There is no distension.      Palpations: Abdomen is soft.      Tenderness: There is no rebound.   Musculoskeletal:      Cervical back: Neck supple.      Right lower leg: No edema.      Left lower leg: No edema.   Skin:     General: Skin is warm and dry.      Findings: No erythema.      Comments: SVG site C/D/I; no bleeding erythema or drainage   Neurological:      General: No focal deficit present.      Mental Status: She is alert and oriented to person, place, and time.   Psychiatric:         Mood and Affect: Mood normal.         Behavior: Behavior normal.         Thought Content: Thought content normal.       Significant Labs: ABG: No results for input(s): PH, PCO2, HCO3, POCSATURATED, BE in the last 48 hours., Blood Culture: No results for input(s): LABBLOO in the last 48 hours., BMP:   Recent Labs   Lab 09/28/22  0441 09/29/22  0509    163*    142   K 4.2 5.5*    105   CO2 25 27   BUN 16 14   CREATININE 0.6 0.6   CALCIUM 8.3* 8.9   , CMP   Recent Labs   Lab 09/28/22  0441 09/29/22  0509    142   K 4.2 5.5*    105   CO2 25 27    163*   BUN 16 14   CREATININE 0.6 0.6   CALCIUM 8.3* 8.9   ANIONGAP 11 10   , CBC No results for input(s): WBC, HGB, HCT, PLT in the last 48 hours., INR No results for input(s): INR, PROTIME in the last 48 hours., Lipid Panel No results for  input(s): CHOL, HDL, LDLCALC, TRIG, CHOLHDL in the last 48 hours., and Troponin No results for input(s): TROPONINI in the last 48 hours.    Significant Imaging: EKG:      Assessment and Plan:       * S/P CABG x 3  -Continue current post-op care and mgmt as per CTS  -ASA, Plavix, statin, BB  -Patient remains confused  -IS usage and PT/OT when able    09/28  Post CABG stable  Continue ASA Plavix statin metoprolol losartan and lasix  Contiune supportive care    09/29  Continue current Rx  Facility bed waiting     Atherosclerosis of native coronary artery of native heart without angina pectoris  Multiple prior stents last 2020. Recurrent symptoms and pos troponin.  Plan  Cath today . Patient agrees    9/18/22-Patient stable post cath, 3 vessel CAD and plan CABG     9/19/22  -s/p cath that showed multivessel CAD  -Stable this AM, awaiting CABG after Brilinta washout  -Continue OMT-ASA, statin, BB, amlodipine, heparin gtt    9/20/22  -CABG planned Friday after Brilinta washout  -Cont OMT- ASA, statin, BB, amlodipine, hep gtt    9/21/22  -Stable, no acute events  -Continue ASA, statin, BB, amlodipine, heparin gtt  -Awaiting CABG    9/24/22  POD1 s/p CABG x 3  Continue aspirin, metoprolol, statin, Plavix  IV diuresis  Being managed by CT surgery    9/25/22  Became delirious overnight, somnolent on exam  Continrue current medications  No significant arrhthymias   Continue aspirin, metoprolol, statin, Plavix    9/27/22  -Less confused  -Continue ASA, BB, statin, Plavix  -IS usage and ambulation    NSTEMI (non-ST elevated myocardial infarction)  Plan cath today    9/18/22- no further symptoms    9/19/22-See plan above    Hyperlipidemia  -Continue statin    Essential (primary) hypertension  -Continue metoprolol     Type 2 diabetes mellitus, with long-term current use of insulin  Per hospital medicine        VTE Risk Mitigation (From admission, onward)         Ordered     enoxaparin injection 40 mg  Daily         09/26/22 4665      IP VTE HIGH RISK PATIENT  Once         09/26/22 0745     Place sequential compression device  Until discontinued         09/26/22 0745     Place BENITEZ hose  Until discontinued         09/23/22 1307     Place sequential compression device  Until discontinued         09/23/22 1307                Rich Goldstein MD  Cardiology  [facility] - Telemetry ([facility])

## 2022-09-30 NOTE — SUBJECTIVE & OBJECTIVE
ROS  Objective:     Vital Signs (Most Recent):  Temp: 98 °F (36.7 °C) (09/29/22 1858)  Pulse: 74 (09/29/22 2129)  Resp: 18 (09/29/22 2030)  BP: 136/65 (09/29/22 1858)  SpO2: 99 % (09/29/22 2030) Vital Signs (24h Range):  Temp:  [97.1 °F (36.2 °C)-98 °F (36.7 °C)] 98 °F (36.7 °C)  Pulse:  [73-88] 74  Resp:  [17-20] 18  SpO2:  [97 %-99 %] 99 %  BP: (130-170)/(61-72) 136/65     Weight: 92.7 kg (204 lb 5.9 oz)  Body mass index is 37.38 kg/m².     SpO2: 99 %  O2 Device (Oxygen Therapy): nasal cannula    No intake or output data in the 24 hours ending 09/29/22 2201    Lines/Drains/Airways       Peripheral Intravenous Line  Duration                  Peripheral IV - Single Lumen 09/26/22 1827 20 G Left Antecubital 3 days                    Physical Exam  Vitals and nursing note reviewed.   Constitutional:       General: She is not in acute distress.     Appearance: She is well-developed. She is not diaphoretic.      Comments: On supplemental O2   HENT:      Head: Normocephalic and atraumatic.   Eyes:      General:         Right eye: No discharge.         Left eye: No discharge.      Pupils: Pupils are equal, round, and reactive to light.   Neck:      Thyroid: No thyromegaly.      Vascular: No JVD.      Trachea: No tracheal deviation.   Cardiovascular:      Rate and Rhythm: Normal rate and regular rhythm.      Heart sounds: Normal heart sounds, S1 normal and S2 normal. No murmur heard.     Comments: Sternotomy site C/D/I, no bleeding erythema or drainage  Pulmonary:      Effort: Pulmonary effort is normal. No respiratory distress.      Breath sounds: Normal breath sounds. No wheezing.      Comments: Diminished BS at bases  Abdominal:      General: There is no distension.      Palpations: Abdomen is soft.      Tenderness: There is no rebound.   Musculoskeletal:      Cervical back: Neck supple.      Right lower leg: No edema.      Left lower leg: No edema.   Skin:     General: Skin is warm and dry.      Findings: No  erythema.      Comments: [facility] site C/D/I; no bleeding erythema or drainage   Neurological:      General: No focal deficit present.      Mental Status: She is alert and oriented to person, place, and time.   Psychiatric:         Mood and Affect: Mood normal.         Behavior: Behavior normal.         Thought Content: Thought content normal.       Significant Labs: ABG: No results for input(s): PH, PCO2, HCO3, POCSATURATED, BE in the last 48 hours., Blood Culture: No results for input(s): LABBLOO in the last 48 hours., BMP:   Recent Labs   Lab 09/28/22  0441 09/29/22  0509    163*    142   K 4.2 5.5*    105   CO2 25 27   BUN 16 14   CREATININE 0.6 0.6   CALCIUM 8.3* 8.9   , CMP   Recent Labs   Lab 09/28/22  0441 09/29/22  0509    142   K 4.2 5.5*    105   CO2 25 27    163*   BUN 16 14   CREATININE 0.6 0.6   CALCIUM 8.3* 8.9   ANIONGAP 11 10   , CBC No results for input(s): WBC, HGB, HCT, PLT in the last 48 hours., INR No results for input(s): INR, PROTIME in the last 48 hours., Lipid Panel No results for input(s): CHOL, HDL, LDLCALC, TRIG, CHOLHDL in the last 48 hours., and Troponin No results for input(s): TROPONINI in the last 48 hours.    Significant Imaging: EKG:

## 2022-09-30 NOTE — ASSESSMENT & PLAN NOTE
-Continue current post-op care and mgmt as per CTS  -ASA, Plavix, statin, BB  -Patient remains confused  -IS usage and PT/OT when able    09/28  Post CABG stable  Continue ASA Plavix statin metoprolol losartan and lasix  Contiune supportive care    09/29  Continue current Rx  Facility bed waiting     09/30  Contiune current Rx  F/u as OP

## 2022-09-30 NOTE — PLAN OF CARE
Pt initially refused therapy due to no feeling well but then participated with in bed exercises and decided she would get OOB to the chair.

## 2022-09-30 NOTE — PROGRESS NOTES
Subjective:      Patient ID: Ca Diaz is a 62 y.o. female.    Chief Complaint: Fatigue (Pt reports generalized weakness, fell today and yesterday. Incontinent episodes before the fall. Hx: CVA 2017)    HPI: Ca Diaz is a 62 y.o. female that presented urgently and was found to have NSTEMI.  Patient has significant cardiac history with the multiple stents to her LAD diagonal circumflex and right coronary artery in the past the patient was on Brilinta and she was admitted after the cath for Brilinta washout before we took her for urgent coronary artery bypass grafting x3  A cardiac cath was done and the patient was found to have 3 vessel disease.  InStent restenoses of the LAD and a separate lesion at the mid LAD ostial stenosis of the diagonal 80% and 80% stenosis of the right PDA the obtuse marginal which was a large vessel was widely 50% stenosis  The patients echocardiogram showed  55. The carotid ultrasound showed 55% ejection fraction trivial mitral regurgitation aortic valve area 1.8 cm choir. Cardiac risk factors include HTN, HLD, Diabetes mellitus, Obesity, PAD, Cerebrovascular disease, PRAVEENA, and FHx of early heart disease. The patient's NYHA Functional Classification score is NYHA II: slight limitation of physical activity, comfortable at rest.     09/24/2022 the patient had successful CABG x3 with the endoscopic vein harvesting on 09/23/2022 she was extubated on the day of surgery and she was pain controlled overnight hemodynamically stable minimal ionotropic support blood sugar control has been fairly well chest tube output is serosanguineous 300 cc overnight no air leak she is sitting up in a chair.  09/27/2022 the patient had been transferred to telemetry and she is with a sitter hemodynamically stable overnight few bouts of agitation but otherwise stable.  09/28/2022 the patient is more awake sitting up in a chair and had a stable night.  She walked in the hallway with the  physical therapy  2022 the patient is sitting up in the chair and eating this morning  Hemodynamilcal stable and pain well controlled.Incisions are clean and dry.    Review of patient's allergies indicates:   Allergen Reactions    Epinephrine Anaphylaxis    Lidocaine Anaphylaxis     Dental visit pt stopped breathing after given lidocaine    Ace inhibitors     Sulfadiazine Other (See Comments)       Past Medical History:   Diagnosis Date    Anxiety and depression 3/13/2014    Essential (primary) hypertension 2013    Fibromyalgia 2012    Hyperlipidemia     Obstructive sleep apnea syndrome 2014    RLS (restless legs syndrome) 10/18/2018    Overview:  Rheum Dr Tam    Seizures     Type 2 diabetes mellitus 2012    ICD-10 Transition Last Assessment & Plan:  Continue with sliding scale insulin.  Control improved on low-dose Lantus presently       History reviewed. No pertinent family history.    Social History     Socioeconomic History    Marital status:    Tobacco Use    Smoking status: Former     Types: Cigarettes     Quit date:      Years since quittin.7    Smokeless tobacco: Never   Substance and Sexual Activity    Alcohol use: Not Currently    Drug use: Never       Past Surgical History:   Procedure Laterality Date    ABDOMINAL SURGERY      ARTERIOGRAPHY OF SUBCLAVIAN ARTERY  2022    Procedure: ARTERIOGRAM, SUBCLAVIAN;  Surgeon: Vale Pa MD;  Location: [facility] CATH LAB;  Service: Cardiology;;     SECTION      CORONARY ARTERY BYPASS GRAFT (CABG) N/A 2022    Procedure: CORONARY ARTERY BYPASS GRAFT (CABG);  Surgeon: Krystal Marin MD;  Location: [facility] OR;  Service: Cardiothoracic;  Laterality: N/A;  3-VESSEL WITH EPI-AORTIC ULTRASOUND    ENDOSCOPIC HARVEST OF VEIN Left 2022    Procedure: SURGICAL PROCUREMENT, VEIN, ENDOSCOPIC;  Surgeon: Krystal Marin MD;  Location: [facility] OR;  Service: Cardiothoracic;  Laterality: Left;    INJECTION OF ANESTHETIC  "AGENT AROUND MULTIPLE INTERCOSTAL NERVES N/A 9/23/2022    Procedure: BLOCK, NERVE, INTERCOSTAL, 2 OR MORE;  Surgeon: Krystal Marin MD;  Location: [facility] OR;  Service: Cardiothoracic;  Laterality: N/A;  PARASTERNAL NERVE BLOCK    LEFT HEART CATHETERIZATION Left 9/17/2022    Procedure: CATHETERIZATION, HEART, LEFT;  Surgeon: Vale Pa MD;  Location: [facility] CATH LAB;  Service: Cardiology;  Laterality: Left;       Review of Systems   HENT:  Negative for dental problem, nosebleeds and sore throat.    Eyes:  Negative for discharge and visual disturbance.   Respiratory:  Negative for cough, chest tightness and stridor.    Cardiovascular:  Negative for leg swelling.   Gastrointestinal:  Negative for abdominal distention and abdominal pain.   Genitourinary:  Negative for difficulty urinating and dysuria.   Musculoskeletal:  Negative for arthralgias, back pain and joint swelling.   Allergic/Immunologic: Negative for environmental allergies.   Neurological:  Negative for dizziness, syncope and headaches.   Hematological:  Does not bruise/bleed easily.   Psychiatric/Behavioral:  Negative for behavioral problems.         Objective:   /63   Pulse 75   Temp 98.1 °F (36.7 °C)   Resp 16   Ht 5' 2" (1.575 m)   Wt 96.6 kg (212 lb 15.4 oz)   SpO2 99%   Breastfeeding No   BMI 38.95 kg/m²     X-Ray Chest AP Portable  Narrative: EXAMINATION:  XR CHEST AP PORTABLE    CLINICAL HISTORY:  Non-ST elevation (NSTEMI) myocardial infarctionPost-op;    COMPARISON:  09/24/2022    FINDINGS:  A right internal jugular venous line remains in place.  There are surgical changes associated with a prior sternotomy.  There is silhouetting of the left and right borders of the heart.  There is a mild amount of haziness scattered throughout the inferior halves of both lungs.  There is no pneumothorax.  There is opacification of the left costophrenic angle.  The right costophrenic angle is sharp.  Impression: 1. There is silhouetting of the " left and right borders of the heart.  There is a mild amount of haziness scattered throughout the inferior halves of both lungs.  An infectious process cannot be excluded.  2. There is opacification of the left costophrenic angle.  This is characteristic of a tiny pleural effusion.  3. A right internal jugular venous line remains in place.  4. There are surgical changes associated with a prior sternotomy.  .    Electronically signed by: Elmer Clarke MD  Date:    09/25/2022  Time:    07:01         Physical Exam  Vitals and nursing note reviewed.   Constitutional:       Appearance: She is obese.   HENT:      Head: Normocephalic and atraumatic.      Mouth/Throat:      Mouth: Mucous membranes are moist.   Eyes:      Extraocular Movements: Extraocular movements intact.      Pupils: Pupils are equal, round, and reactive to light.   Cardiovascular:      Rate and Rhythm: Normal rate and regular rhythm.      Pulses: Normal pulses.      Heart sounds: Normal heart sounds.      Comments: Patient midline sternotomy looks clean with the Prevena leg drains are minimal  Looks clean and dry.  Pulmonary:      Effort: Pulmonary effort is normal.   Abdominal:      Palpations: Abdomen is soft.   Musculoskeletal:      Cervical back: Normal range of motion and neck supple.   Skin:     General: Skin is warm.      Capillary Refill: Capillary refill takes less than 2 seconds.   Neurological:      General: No focal deficit present.      Mental Status: She is alert and oriented to person, place, and time.   Psychiatric:         Mood and Affect: Mood normal.     Chest x-ray shows postsurgical changes with bilateral atelectasis no pleural effusion  Assessment:     1. S/P CABG x 3    2. Dizziness    3. NSTEMI (non-ST elevated myocardial infarction)    4. Hyperglycemia    5. Chest pain    6. Pre-operative cardiovascular examination    7. Post-operative state    8. Chest pain, unspecified type    9. Hyperlipidemia, unspecified hyperlipidemia  type    10. Essential (primary) hypertension    11. Seizure    12. Anxiety and depression        Plan   Postop day 7 status post CABG x3 lima to LAD vein graft to diagonal and vein graft to posterior descending artery patient extubated on day 1 hemodynamically stable  Acute confusional state improved  Cardiovascular aspirin and Plavix  Hypertension beta-blocker  Respiratory aggressive pulmonary toilet incentive spirometry out of bed  CPAP at night for sleep apnea  Hyperglycemia on insulin sliding scale  and scheduled   start diabetic diet  Anemia multifactorial stable   DVT and GI prophylaxis with Pepcid and bilateral SCDs  Discontinue the sitter  Seizure disorder patient will follow-up with neurology after discharge as an outpatient.  Patient is going to SNF today.  Follow up in 2 weeks in my office.   Continue cardiac in rehabilitation.        Spent 20minutes with the bedside examining the patient physical exam and formulated the treatment plan..          Krystal Marin MD,   [facility]  [place]

## 2022-09-30 NOTE — PROGRESS NOTES
Report called to [facility] ([phone]). They are sending wheelchair van at about 1300 to  patient.

## 2022-10-04 NOTE — PHYSICIAN QUERY
PT Name: Ca Diaz  MR #: [id number]    DOCUMENTATION CLARIFICATION     CDS/: Barry Potts Jr RN CCDS              Contact information:[email or web address]  Query withdrawn pending further input rbr [id number]  This form is a permanent document in the medical record.     Query Date: October 4, 2022    By submitting this query, we are merely seeking further clarification of documentation. Please utilize your independent clinical judgment when addressing the question(s) below.    The Medical Record contains the following:   Indicators   Supporting Clinical Findings Location in Medical Record   x AMS, Confusion,  LOC, etc.  9/26/22-Patient seen and examined today, s/p CABG x 3 POD # 3. Remains confused, did not respond to questions   9/26 Cardiology Progress Note         x Acute/Chronic Illness Principal Problem:NSTEMI (non-ST elevated myocardial infarction)    Encephalopathy: per notes pt seemed delirious. Currently calm and able to provide Hx and to follow commands. No apparent major motor deficits on exam.  a. For now will monitor.  b. Avoid benzodiazepines as possible as may worsen delirium.  c. OK to use quetiapine 25 mg nightly if needed. Monitor QTc interval   9/17 H&P      9/27 Neurology Progress Note   x Radiology Findings Impression:     Negative for acute intracranial abnormality.   9/17 CT Head    x Electrolyte Imbalance Delirium present.  Mild hyperkalemia.  Stop K+ tablets.  Recheck later today.   9/25 Critical Care Medicine    x Medication oxyCODONE immediate release tablet 5 mg PRN Every 4 Hours 9/24 724 Administration   9/28 1506 Administration      x Treatment         Acute confusional state hold the  narcotics and the CNS depressant   9/28 Cardiothoracic Surgery Progress Note   x Other presented to the Emergency Department for evaluation of generalized weakness which onset gradually two days ago      09/28/2022 the patient is more awake sitting up in a chair and had a stable night.   She walked in the hallway with the physical therapy 9/17 H&P            9/30 Discharge Summary     The noted clinical guidelines are only system guidelines and do not replace the providers clinical judgment.    The National [place] of Neurologic Disorders and Stroke (NINDS) of the NIH describes encephalopathy as any diffuse disease of the brain that alters brain function or structure.    Provider, please clarify/confirm the          Encephalopathy       diagnosis     [   ] Metabolic Encephalopathy - Due to electrolyte imbalance, metabolic derangements, or infectious processes, includes Septic Encephalopathy, Uremic Encephalopathy   [   ] Toxic Encephalopathy - Due to drugs, chemicals, or other toxic substances   [   ] Encephalopathy, unspecified      [   ] Other Encephalopathy (please specify):    [   ] Encephalopathy ruled Out   [   ] Encephalopathy ruled Out,Delirium Due to Hyperkalemia    [   ] Encephalopathy ruled Out,Delirium Due to (Please Specify)   [   ] Other neurological condition- Includes Post-ictal altered mental status (please specify condition):    [   ]  Clinically Undetermined           Please document in your progress notes daily for the duration of treatment until resolved, and include in your discharge summary.    References:  PEDRO Fuchs RN, CCDS. (2018, June 9). Notes from the Instructor: Encephalopathy tips. Retrieved October 22, 2020, from [email or web address]    ICD-9-CM Coding Clinic First Quarter 2013, Effective with discharges: October 21, 2013 [facility] § Seizure with encephalopathy due to postictal state (2013).    ICD-10-CM/PCS  Integrated Codebook (Version V.20.8.10.0) [Computer software]. (2020). Retrieved October 21, 2020.    National [place] of Neurological Disorders and Stroke. (2019, March 27). Retrieved October 22, 2020, from  https://www.ninds.nih.gov/Disorders/All-Disorders/Iptvqiyqlbsmfp-Zpohujlwmmd-Wbfh    Form No. 96340

## 2022-10-05 NOTE — PHYSICIAN QUERY
PT Name: Ca Diaz  MR #: [id number]    DOCUMENTATION CLARIFICATION     CDS/: Barry Potts Jr RN CCDS              Contact information:[email or web address]    This form is a permanent document in the medical record.     Query Date: October 5, 2022    By submitting this query, we are merely seeking further clarification of documentation. Please utilize your independent clinical judgment when addressing the question(s) below.    The Medical Record contains the following:   Indicators   Supporting Clinical Findings Location in Medical Record   x AMS, Confusion,  LOC, etc.  Persistent confusion and restlessness overnight. PRN Haldol given x1    9/26/22-Patient seen and examined today, s/p CABG x 3 POD # 3. Remains confused, did not respond to questions   9/26 Nursing Plan of Care Note      9/26 Cardiology Progress Note         x Acute/Chronic Illness   61 y/o female consulted for AMS/possible seizure    Encephalopathy: per notes pt seemed delirious. Currently calm and able to provide Hx and to follow commands. No apparent major motor deficits on exam.  a. For now will monitor.  b. Avoid benzodiazepines as possible as may worsen delirium.  c. OK to use quetiapine 25 mg nightly if needed. Monitor QTc interval     9/27 Neurology Progress Note    9/27 Neurology Progress Note   x Radiology Findings Impression:     Negative for acute intracranial abnormality.   9/17 CT Head    x Electrolyte Imbalance Delirium present.  Mild hyperkalemia.  Stop K+ tablets.  Recheck later today.   9/25 Critical Care Medicine    x Medication oxyCODONE immediate release tablet 5 mg PRN Every 4 Hours 9/24 724 Administration   9/28 1506 Administration      x Treatment         Acute confusional state hold the  narcotics and the CNS depressant    Seizure disorder patient will follow-up with neurology after discharge as an outpatient   9/28 Cardiothoracic Surgery Progress Note    9/28 Cardiothoracic Surgery Progress Note   x  Other presented to the Emergency Department for evaluation of generalized weakness which onset gradually two days ago    09/28/2022 the patient is more awake sitting up in a chair and had a stable night.  She walked in the hallway with the physical therapy 9/17 H&P        9/30 Discharge Summary     The noted clinical guidelines are only system guidelines and do not replace the providers clinical judgment.    The National [place] of Neurologic Disorders and Stroke (NINDS) of the NIH describes encephalopathy as any diffuse disease of the brain that alters brain function or structure.    Provider, please clarify the Diagnosis associated with the Altered Mental Status     [  X ] Metabolic Encephalopathy - Due to electrolyte imbalance, metabolic derangements, or infectious processes, includes Septic Encephalopathy, Uremic Encephalopathy   [   ] Toxic Encephalopathy - Due to drugs, chemicals, or other toxic substances   [   ] Encephalopathy, unspecified      [   ] Other Encephalopathy (please specify):    [   ] Delirium Due to Hyperkalemia    [   ] Delirium Due to (Please Specify)   [   ] Other neurological condition- Includes Post-ictal altered mental status (please specify condition):   [   ] Clinically Undetermined           Please document in your progress notes daily for the duration of treatment until resolved, and include in your discharge summary.    References:  PEDRO Fuchs RN, CCDS. (2018, June 9). Notes from the Instructor: Encephalopathy tips. Retrieved October 22, 2020, from [email or web address]    ICD-9-CM Coding Clinic First Quarter 2013, Effective with discharges: October 21, 2013 [facility] § Seizure with encephalopathy due to postictal state (2013).    ICD-10-CM/PCS [employer] Integrated Codebook (Version V.20.8.10.0) [Computer software]. (2020). Retrieved October 21, 2020.    National [place] of Neurological Disorders and Stroke. (2019, March  27). Retrieved October 22, 2020, from https://www.ninds.nih.gov/Disorders/All-Disorders/Mwoupjldytlkqc-Rzbzvsycopy-Qkoi    Form No. 34481

## 2022-10-13 ENCOUNTER — OFFICE VISIT (OUTPATIENT)
Dept: CARDIOTHORACIC SURGERY | Facility: CLINIC | Age: 62
End: 2022-10-13
Payer: COMMERCIAL

## 2022-10-13 VITALS
OXYGEN SATURATION: 99 % | WEIGHT: 212 LBS | SYSTOLIC BLOOD PRESSURE: 122 MMHG | HEART RATE: 75 BPM | BODY MASS INDEX: 39.01 KG/M2 | HEIGHT: 62 IN | DIASTOLIC BLOOD PRESSURE: 82 MMHG

## 2022-10-13 DIAGNOSIS — Z95.1 S/P CABG (CORONARY ARTERY BYPASS GRAFT): Primary | ICD-10-CM

## 2022-10-13 PROCEDURE — 99024 POSTOP FOLLOW-UP VISIT: CPT | Mod: S$GLB,,, | Performed by: THORACIC SURGERY (CARDIOTHORACIC VASCULAR SURGERY)

## 2022-10-13 PROCEDURE — 3074F PR MOST RECENT SYSTOLIC BLOOD PRESSURE < 130 MM HG: ICD-10-PCS | Mod: CPTII,S$GLB,, | Performed by: THORACIC SURGERY (CARDIOTHORACIC VASCULAR SURGERY)

## 2022-10-13 PROCEDURE — 4010F PR ACE/ARB THEARPY RXD/TAKEN: ICD-10-PCS | Mod: CPTII,S$GLB,, | Performed by: THORACIC SURGERY (CARDIOTHORACIC VASCULAR SURGERY)

## 2022-10-13 PROCEDURE — 3079F PR MOST RECENT DIASTOLIC BLOOD PRESSURE 80-89 MM HG: ICD-10-PCS | Mod: CPTII,S$GLB,, | Performed by: THORACIC SURGERY (CARDIOTHORACIC VASCULAR SURGERY)

## 2022-10-13 PROCEDURE — 3046F HEMOGLOBIN A1C LEVEL >9.0%: CPT | Mod: CPTII,S$GLB,, | Performed by: THORACIC SURGERY (CARDIOTHORACIC VASCULAR SURGERY)

## 2022-10-13 PROCEDURE — 3074F SYST BP LT 130 MM HG: CPT | Mod: CPTII,S$GLB,, | Performed by: THORACIC SURGERY (CARDIOTHORACIC VASCULAR SURGERY)

## 2022-10-13 PROCEDURE — 99999 PR PBB SHADOW E&M-EST. PATIENT-LVL III: ICD-10-PCS | Mod: PBBFAC,,, | Performed by: THORACIC SURGERY (CARDIOTHORACIC VASCULAR SURGERY)

## 2022-10-13 PROCEDURE — 4010F ACE/ARB THERAPY RXD/TAKEN: CPT | Mod: CPTII,S$GLB,, | Performed by: THORACIC SURGERY (CARDIOTHORACIC VASCULAR SURGERY)

## 2022-10-13 PROCEDURE — 3079F DIAST BP 80-89 MM HG: CPT | Mod: CPTII,S$GLB,, | Performed by: THORACIC SURGERY (CARDIOTHORACIC VASCULAR SURGERY)

## 2022-10-13 PROCEDURE — 99999 PR PBB SHADOW E&M-EST. PATIENT-LVL III: CPT | Mod: PBBFAC,,, | Performed by: THORACIC SURGERY (CARDIOTHORACIC VASCULAR SURGERY)

## 2022-10-13 PROCEDURE — 1159F MED LIST DOCD IN RCRD: CPT | Mod: CPTII,S$GLB,, | Performed by: THORACIC SURGERY (CARDIOTHORACIC VASCULAR SURGERY)

## 2022-10-13 PROCEDURE — 99024 PR POST-OP FOLLOW-UP VISIT: ICD-10-PCS | Mod: S$GLB,,, | Performed by: THORACIC SURGERY (CARDIOTHORACIC VASCULAR SURGERY)

## 2022-10-13 PROCEDURE — 1111F DSCHRG MED/CURRENT MED MERGE: CPT | Mod: CPTII,S$GLB,, | Performed by: THORACIC SURGERY (CARDIOTHORACIC VASCULAR SURGERY)

## 2022-10-13 PROCEDURE — 1159F PR MEDICATION LIST DOCUMENTED IN MEDICAL RECORD: ICD-10-PCS | Mod: CPTII,S$GLB,, | Performed by: THORACIC SURGERY (CARDIOTHORACIC VASCULAR SURGERY)

## 2022-10-13 PROCEDURE — 1111F PR DISCHARGE MEDS RECONCILED W/ CURRENT OUTPATIENT MED LIST: ICD-10-PCS | Mod: CPTII,S$GLB,, | Performed by: THORACIC SURGERY (CARDIOTHORACIC VASCULAR SURGERY)

## 2022-10-13 PROCEDURE — 3046F PR MOST RECENT HEMOGLOBIN A1C LEVEL > 9.0%: ICD-10-PCS | Mod: CPTII,S$GLB,, | Performed by: THORACIC SURGERY (CARDIOTHORACIC VASCULAR SURGERY)

## 2022-10-13 NOTE — PROGRESS NOTES
"Subjective:      Patient ID: Ca Diaz is a 62 y.o. female.    Chief Complaint: No chief complaint on file.    HPI:  62 Year old female with a history of hypertension diabetes mellitus morbid obesity obstructive sleep apnea seizure disorder fibromyalgia underwent CABG x3 she is here for follow-up.  The patient went to the inpatient rehab because of her physical condition and she is successfully getting inpatient cardiac rehabilitation she is still on 1 L of oxygen nasal cannula and need assistance in activities of daily life otherwise she is successfully completing her inpatient cardiac rehabilitation no pain no discharge from the wound both chest and leg.  Blood sugars have been fairly controlled.    Review of patient's allergies indicates:   Allergen Reactions    Epinephrine Anaphylaxis    Lidocaine Anaphylaxis     Dental visit pt stopped breathing after given lidocaine    Ace inhibitors     Sulfadiazine Other (See Comments)       Review of Systems   Constitutional:  Negative for activity change and appetite change.   HENT:  Negative for dental problem, nosebleeds and sore throat.    Eyes:  Negative for discharge and visual disturbance.   Respiratory:  Negative for cough, chest tightness and stridor.    Cardiovascular:  Negative for leg swelling.   Gastrointestinal:  Negative for abdominal distention and abdominal pain.   Genitourinary:  Negative for difficulty urinating and dysuria.   Musculoskeletal:  Negative for arthralgias, back pain and joint swelling.   Allergic/Immunologic: Negative for environmental allergies.   Neurological:  Negative for dizziness, syncope and headaches.   Hematological:  Does not bruise/bleed easily.   Psychiatric/Behavioral:  Negative for behavioral problems.         Objective:   /82   Pulse 75   Ht 5' 2" (1.575 m)   Wt 96.2 kg (212 lb)   SpO2 99%   PF (!) 1 L/min   BMI 38.78 kg/m²     X-Ray Chest AP Portable  Narrative: EXAMINATION:  XR CHEST AP " PORTABLE    CLINICAL HISTORY:  Non-ST elevation (NSTEMI) myocardial infarctionPost-op;    COMPARISON:  09/24/2022    FINDINGS:  A right internal jugular venous line remains in place.  There are surgical changes associated with a prior sternotomy.  There is silhouetting of the left and right borders of the heart.  There is a mild amount of haziness scattered throughout the inferior halves of both lungs.  There is no pneumothorax.  There is opacification of the left costophrenic angle.  The right costophrenic angle is sharp.  Impression: 1. There is silhouetting of the left and right borders of the heart.  There is a mild amount of haziness scattered throughout the inferior halves of both lungs.  An infectious process cannot be excluded.  2. There is opacification of the left costophrenic angle.  This is characteristic of a tiny pleural effusion.  3. A right internal jugular venous line remains in place.  4. There are surgical changes associated with a prior sternotomy.  .    Electronically signed by: Elmer Clarke MD  Date:    09/25/2022  Time:    07:01         Physical Exam  Vitals and nursing note reviewed.   Constitutional:       Appearance: She is obese.   HENT:      Head: Normocephalic.      Mouth/Throat:      Mouth: Mucous membranes are moist.   Eyes:      Extraocular Movements: Extraocular movements intact.      Pupils: Pupils are equal, round, and reactive to light.   Cardiovascular:      Rate and Rhythm: Normal rate and regular rhythm.      Comments: Chest incision is clean and dry  Pulmonary:      Effort: Pulmonary effort is normal.      Breath sounds: Normal breath sounds.   Abdominal:      Palpations: Abdomen is soft.   Musculoskeletal:         General: Normal range of motion.      Cervical back: Normal range of motion and neck supple.      Comments: Left leg incision healed well   Skin:     General: Skin is warm.   Neurological:      General: No focal deficit present.      Mental Status: She is alert  and oriented to person, place, and time.   Psychiatric:         Mood and Affect: Mood normal.       Assessment:     1. S/P CABG (coronary artery bypass graft)    Morbid obesity  Type 1 diabetes mellitus  Hypertension  Hyperlipidemia  Seizure disorder  Sleep apnea  Fibromyalgia  Depression      Plan   Continue inpatient cardiopulmonary rehabilitation with sternal precautions  Aspirin statin beta-blocker  Pain control as needed  Aggressive pulmonary toilet  Wean the oxygen as tolerated and use BiPAP at night  Follow-up in 1 month with me  Follow-up with Cardiology          Krystal Marin MD  [facility]  [place]

## 2022-11-13 ENCOUNTER — NURSE TRIAGE (OUTPATIENT)
Dept: ADMINISTRATIVE | Facility: CLINIC | Age: 62
End: 2022-11-13
Payer: COMMERCIAL

## 2022-11-13 ENCOUNTER — HOSPITAL ENCOUNTER (EMERGENCY)
Facility: HOSPITAL | Age: 62
Discharge: HOME | End: 2022-11-14
Attending: EMERGENCY MEDICINE
Payer: COMMERCIAL

## 2022-11-13 DIAGNOSIS — R73.9 HYPERGLYCEMIA: Primary | ICD-10-CM

## 2022-11-13 LAB
ALBUMIN SERPL BCP-MCNC: 3.3 G/DL (ref 3.5–5.2)
ALP SERPL-CCNC: 78 U/L (ref 55–135)
ALT SERPL W/O P-5'-P-CCNC: 13 U/L (ref 10–44)
ANION GAP SERPL CALC-SCNC: 15 MMOL/L (ref 8–16)
AST SERPL-CCNC: 17 U/L (ref 10–40)
B-OH-BUTYR BLD STRIP-SCNC: 0.9 MMOL/L (ref 0–0.5)
BASOPHILS # BLD AUTO: 0.05 K/UL (ref 0–0.2)
BASOPHILS NFR BLD: 0.7 % (ref 0–1.9)
BILIRUB SERPL-MCNC: 0.4 MG/DL (ref 0.1–1)
BUN SERPL-MCNC: 9 MG/DL (ref 6–20)
CALCIUM SERPL-MCNC: 9.8 MG/DL (ref 8.7–10.5)
CHLORIDE SERPL-SCNC: 100 MMOL/L (ref 95–110)
CO2 SERPL-SCNC: 22 MMOL/L (ref 23–29)
CREAT SERPL-MCNC: 0.7 MG/DL (ref 0.5–1.4)
DIFFERENTIAL METHOD: ABNORMAL
EOSINOPHIL # BLD AUTO: 0.4 K/UL (ref 0–0.5)
EOSINOPHIL NFR BLD: 5.1 % (ref 0–8)
ERYTHROCYTE [DISTWIDTH] IN BLOOD BY AUTOMATED COUNT: 15 % (ref 11.5–14.5)
EST. GFR  (NO RACE VARIABLE): >60 ML/MIN/1.73 M^2
GLUCOSE SERPL-MCNC: 312 MG/DL (ref 70–110)
HCT VFR BLD AUTO: 38.2 % (ref 37–48.5)
HGB BLD-MCNC: 11.5 G/DL (ref 12–16)
IMM GRANULOCYTES # BLD AUTO: 0.01 K/UL (ref 0–0.04)
IMM GRANULOCYTES NFR BLD AUTO: 0.1 % (ref 0–0.5)
LYMPHOCYTES # BLD AUTO: 3.1 K/UL (ref 1–4.8)
LYMPHOCYTES NFR BLD: 42.3 % (ref 18–48)
MCH RBC QN AUTO: 22.7 PG (ref 27–31)
MCHC RBC AUTO-ENTMCNC: 30.1 G/DL (ref 32–36)
MCV RBC AUTO: 76 FL (ref 82–98)
MONOCYTES # BLD AUTO: 0.6 K/UL (ref 0.3–1)
MONOCYTES NFR BLD: 7.8 % (ref 4–15)
NEUTROPHILS # BLD AUTO: 3.3 K/UL (ref 1.8–7.7)
NEUTROPHILS NFR BLD: 44 % (ref 38–73)
NRBC BLD-RTO: 0 /100 WBC
PLATELET # BLD AUTO: 295 K/UL (ref 150–450)
PMV BLD AUTO: 11.3 FL (ref 9.2–12.9)
POCT GLUCOSE: 193 MG/DL (ref 70–110)
POCT GLUCOSE: 305 MG/DL (ref 70–110)
POTASSIUM SERPL-SCNC: 4.1 MMOL/L (ref 3.5–5.1)
PROT SERPL-MCNC: 7.5 G/DL (ref 6–8.4)
RBC # BLD AUTO: 5.06 M/UL (ref 4–5.4)
SODIUM SERPL-SCNC: 137 MMOL/L (ref 136–145)
WBC # BLD AUTO: 7.4 K/UL (ref 3.9–12.7)

## 2022-11-13 PROCEDURE — 80053 COMPREHEN METABOLIC PANEL: CPT | Performed by: EMERGENCY MEDICINE

## 2022-11-13 PROCEDURE — 82962 GLUCOSE BLOOD TEST: CPT

## 2022-11-13 PROCEDURE — 96361 HYDRATE IV INFUSION ADD-ON: CPT

## 2022-11-13 PROCEDURE — 85025 COMPLETE CBC W/AUTO DIFF WBC: CPT | Performed by: EMERGENCY MEDICINE

## 2022-11-13 PROCEDURE — 63600175 PHARM REV CODE 636 W HCPCS: Performed by: EMERGENCY MEDICINE

## 2022-11-13 PROCEDURE — 99284 EMERGENCY DEPT VISIT MOD MDM: CPT | Mod: 25

## 2022-11-13 PROCEDURE — 25000003 PHARM REV CODE 250: Performed by: EMERGENCY MEDICINE

## 2022-11-13 PROCEDURE — 82010 KETONE BODYS QUAN: CPT | Performed by: EMERGENCY MEDICINE

## 2022-11-13 PROCEDURE — 96374 THER/PROPH/DIAG INJ IV PUSH: CPT

## 2022-11-13 RX ORDER — INSULIN LISPRO 100 [IU]/ML
80 INJECTION, SUSPENSION SUBCUTANEOUS
COMMUNITY
Start: 2022-11-09 | End: 2022-11-14 | Stop reason: SDUPTHER

## 2022-11-13 RX ORDER — METOPROLOL SUCCINATE 50 MG/1
50 TABLET, EXTENDED RELEASE ORAL DAILY
COMMUNITY
Start: 2022-11-02

## 2022-11-13 RX ORDER — FLUOXETINE HYDROCHLORIDE 40 MG/1
40 CAPSULE ORAL DAILY
COMMUNITY
Start: 2022-11-11

## 2022-11-13 RX ORDER — PREGABALIN 100 MG/1
1 CAPSULE ORAL 3 TIMES DAILY
COMMUNITY
Start: 2022-11-11

## 2022-11-13 RX ADMIN — SODIUM CHLORIDE 1000 ML: 0.9 INJECTION, SOLUTION INTRAVENOUS at 09:11

## 2022-11-13 RX ADMIN — INSULIN HUMAN 10 UNITS: 100 INJECTION, SOLUTION PARENTERAL at 11:11

## 2022-11-14 VITALS
SYSTOLIC BLOOD PRESSURE: 139 MMHG | WEIGHT: 195.13 LBS | TEMPERATURE: 99 F | BODY MASS INDEX: 35.69 KG/M2 | HEART RATE: 79 BPM | RESPIRATION RATE: 17 BRPM | DIASTOLIC BLOOD PRESSURE: 63 MMHG | OXYGEN SATURATION: 100 %

## 2022-11-14 RX ORDER — INSULIN LISPRO 100 [IU]/ML
80 INJECTION, SUSPENSION SUBCUTANEOUS 2 TIMES DAILY
Qty: 3 ML | Refills: 3 | Status: SHIPPED | OUTPATIENT
Start: 2022-11-14

## 2022-11-14 NOTE — ED PROVIDER NOTES
Encounter Date: 11/13/2022       History     Chief Complaint   Patient presents with    Hyperglycemia     Pt states around 7pm she checked her blood glucose and it read over 300. Pt states she is NOT on insulin. Pt states she feels a little weak but other than that she is fine. Pt sugar checked in Triage and reading is 305.      Patient here for evaluation of hyperglycemia.  She reports that she is been off insulin for almost 2 weeks.  She actually had a bypass surgery where she had 5 grafts placed at the end of September.  She went to rehab afterwards she was discharged from rehab 2 weeks ago.  She is not had her insulin since then.  She did try to call her primary care doctor Dr. Desiree medina so but would need an appointment prior to getting a prescription filled.  She reports she feels a little bit weak but other than that she does feel fine.  No fever nausea vomiting chest pain or shortness of breath no abdominal pain at this time.    Review of patient's allergies indicates:   Allergen Reactions    Epinephrine Anaphylaxis    Lidocaine Anaphylaxis     Dental visit pt stopped breathing after given lidocaine    Ace inhibitors     Sulfadiazine Other (See Comments)     Past Medical History:   Diagnosis Date    Anxiety and depression 03/13/2014    CVA (cerebral vascular accident) 2017    Essential (primary) hypertension 01/22/2013    Fibromyalgia 09/17/2012    Hyperlipidemia     Obstructive sleep apnea syndrome 08/20/2014    RLS (restless legs syndrome) 10/18/2018    Overview:  Rheum Dr Tam    Seizures     Type 2 diabetes mellitus 09/17/2012    ICD-10 Transition Last Assessment & Plan:  Continue with sliding scale insulin.  Control improved on low-dose Lantus presently     Past Surgical History:   Procedure Laterality Date    ABDOMINAL SURGERY      ARTERIOGRAPHY OF SUBCLAVIAN ARTERY  9/17/2022    Procedure: ARTERIOGRAM, SUBCLAVIAN;  Surgeon: Vale Pa MD;  Location: [facility] CATH LAB;  Service: Cardiology;;      SECTION      CORONARY ARTERY BYPASS GRAFT (CABG) N/A 2022    Procedure: CORONARY ARTERY BYPASS GRAFT (CABG);  Surgeon: Krystal Marin MD;  Location: [facility] OR;  Service: Cardiothoracic;  Laterality: N/A;  3-VESSEL WITH EPI-AORTIC ULTRASOUND    ENDOSCOPIC HARVEST OF VEIN Left 2022    Procedure: SURGICAL PROCUREMENT, VEIN, ENDOSCOPIC;  Surgeon: Krystal Marin MD;  Location: [facility] OR;  Service: Cardiothoracic;  Laterality: Left;    INJECTION OF ANESTHETIC AGENT AROUND MULTIPLE INTERCOSTAL NERVES N/A 2022    Procedure: BLOCK, NERVE, INTERCOSTAL, 2 OR MORE;  Surgeon: Krystal Marin MD;  Location: [facility] OR;  Service: Cardiothoracic;  Laterality: N/A;  PARASTERNAL NERVE BLOCK    LEFT HEART CATHETERIZATION Left 2022    Procedure: CATHETERIZATION, HEART, LEFT;  Surgeon: Vale Pa MD;  Location: [facility] CATH LAB;  Service: Cardiology;  Laterality: Left;     No family history on file.  Social History     Tobacco Use    Smoking status: Former     Types: Cigarettes     Quit date:      Years since quittin.8    Smokeless tobacco: Never   Substance Use Topics    Alcohol use: Not Currently    Drug use: Never     Review of Systems   Constitutional:  Negative for fever.   HENT:  Negative for sore throat.    Respiratory:  Negative for shortness of breath.    Cardiovascular:  Negative for chest pain.   Gastrointestinal:  Negative for nausea.   Genitourinary:  Negative for dysuria.   Musculoskeletal:  Negative for back pain.   Skin:  Negative for rash.   Neurological:  Positive for weakness. Negative for dizziness and light-headedness.   Hematological:  Does not bruise/bleed easily.     Physical Exam     Initial Vitals [22]   BP Pulse Resp Temp SpO2   127/73 85 17 98.6 °F (37 °C) 99 %      MAP       --         Physical Exam    Nursing note and vitals reviewed.  Constitutional: She appears well-developed and well-nourished.   HENT:   Head: Normocephalic and atraumatic.   Eyes:  Conjunctivae and EOM are normal.   Neck: Neck supple.   Normal range of motion.  Cardiovascular:  Normal rate, regular rhythm, normal heart sounds and intact distal pulses.           Pulmonary/Chest: Breath sounds normal. No respiratory distress. She has no wheezes. She has no rhonchi. She has no rales.   Abdominal: Abdomen is soft. Bowel sounds are normal.   Musculoskeletal:         General: Normal range of motion.      Cervical back: Normal range of motion and neck supple.     Neurological: She is alert and oriented to person, place, and time. She has normal strength.   Skin: Skin is warm and dry.   Psychiatric: She has a normal mood and affect. Thought content normal.       ED Course   Procedures  Labs Reviewed   CBC W/ AUTO DIFFERENTIAL - Abnormal; Notable for the following components:       Result Value    Hemoglobin 11.5 (*)     MCV 76 (*)     MCH 22.7 (*)     MCHC 30.1 (*)     RDW 15.0 (*)     All other components within normal limits   COMPREHENSIVE METABOLIC PANEL - Abnormal; Notable for the following components:    CO2 22 (*)     Glucose 312 (*)     Albumin 3.3 (*)     All other components within normal limits   BETA - HYDROXYBUTYRATE, SERUM - Abnormal; Notable for the following components:    Beta-Hydroxybutyrate 0.9 (*)     All other components within normal limits   POCT GLUCOSE - Abnormal; Notable for the following components:    POCT Glucose 305 (*)     All other components within normal limits   POCT GLUCOSE - Abnormal; Notable for the following components:    POCT Glucose 193 (*)     All other components within normal limits   POCT GLUCOSE MONITORING CONTINUOUS        Results for orders placed or performed during the hospital encounter of 11/13/22   CBC W/ AUTO DIFFERENTIAL   Result Value Ref Range    WBC 7.40 3.90 - 12.70 K/uL    RBC 5.06 4.00 - 5.40 M/uL    Hemoglobin 11.5 (L) 12.0 - 16.0 g/dL    Hematocrit 38.2 37.0 - 48.5 %    MCV 76 (L) 82 - 98 fL    MCH 22.7 (L) 27.0 - 31.0 pg    MCHC 30.1 (L)  32.0 - 36.0 g/dL    RDW 15.0 (H) 11.5 - 14.5 %    Platelets 295 150 - 450 K/uL    MPV 11.3 9.2 - 12.9 fL    Immature Granulocytes 0.1 0.0 - 0.5 %    Gran # (ANC) 3.3 1.8 - 7.7 K/uL    Immature Grans (Abs) 0.01 0.00 - 0.04 K/uL    Lymph # 3.1 1.0 - 4.8 K/uL    Mono # 0.6 0.3 - 1.0 K/uL    Eos # 0.4 0.0 - 0.5 K/uL    Baso # 0.05 0.00 - 0.20 K/uL    nRBC 0 0 /100 WBC    Gran % 44.0 38.0 - 73.0 %    Lymph % 42.3 18.0 - 48.0 %    Mono % 7.8 4.0 - 15.0 %    Eosinophil % 5.1 0.0 - 8.0 %    Basophil % 0.7 0.0 - 1.9 %    Differential Method Automated    Comp. Metabolic Panel   Result Value Ref Range    Sodium 137 136 - 145 mmol/L    Potassium 4.1 3.5 - 5.1 mmol/L    Chloride 100 95 - 110 mmol/L    CO2 22 (L) 23 - 29 mmol/L    Glucose 312 (H) 70 - 110 mg/dL    BUN 9 6 - 20 mg/dL    Creatinine 0.7 0.5 - 1.4 mg/dL    Calcium 9.8 8.7 - 10.5 mg/dL    Total Protein 7.5 6.0 - 8.4 g/dL    Albumin 3.3 (L) 3.5 - 5.2 g/dL    Total Bilirubin 0.4 0.1 - 1.0 mg/dL    Alkaline Phosphatase 78 55 - 135 U/L    AST 17 10 - 40 U/L    ALT 13 10 - 44 U/L    Anion Gap 15 8 - 16 mmol/L    eGFR >60 >60 mL/min/1.73 m^2   Beta - Hydroxybutyrate, Serum   Result Value Ref Range    Beta-Hydroxybutyrate 0.9 (H) 0.0 - 0.5 mmol/L   POCT glucose   Result Value Ref Range    POCT Glucose 305 (H) 70 - 110 mg/dL   POCT glucose   Result Value Ref Range    POCT Glucose 193 (H) 70 - 110 mg/dL         Imaging Results    None          Medications   sodium chloride 0.9% bolus 1,000 mL (0 mLs Intravenous Stopped 11/13/22 2300)   insulin regular injection 10 Units 0.1 mL (10 Units Intravenous Given 11/13/22 2308)         1:06 AM  Prior to discharge patient got a L fluids and some insulin.  She was feeling a lot better.  I did refill her insulin for her.  She will follow up with her primary care doctor.                     Clinical Impression:   Final diagnoses:  [R73.9] Hyperglycemia (Primary)      ED Disposition Condition    Discharge Stable          ED Prescriptions        Medication Sig Dispense Start Date End Date Auth. Provider    insulin lispro protamin-lispro 100 unit/mL (75-25) InPn Inject 80 Units into the skin 2 (two) times a day. 3 mL 11/14/2022 -- Juliana Weaver Do, MD          Follow-up Information       Follow up With Specialties Details Why Contact Info    Desiree Frye MD Family Medicine In 1 day  [address]  [address]  [facility] [address]  [phone]               Juliana Weaver Do, MD  11/14/22 0106       Juliana Weaver Do, MD  11/14/22 0107

## 2022-11-14 NOTE — TELEPHONE ENCOUNTER
Spoke with patient states she has not taken insulin for 2 weeks (Humalog).  Patient is being followed by Dr. Aguirre who is not an [facility] provider.  Per patient she was told by Dr. Aguirre's office that they were not calling in insulin since she has not been seen in over a year.  Patient states her blood sugar is currently 318.  Patient reports her blood sugar was greater than 300 two times in a row.  Advised patient to go to ER for evaluation.  Patient verbalized understanding.   Reason for Disposition   [1] Blood glucose > 300 mg/dL (16.7 mmol/L) AND [2] two or more times in a row    Additional Information   Negative: Unconscious or difficult to awaken   Negative: Acting confused (e.g., disoriented, slurred speech)   Negative: Very weak (e.g., can't stand)   Negative: Sounds like a life-threatening emergency to the triager   Negative: [1] Vomiting AND [2] signs of dehydration (e.g., very dry mouth, lightheaded, dark urine)   Negative: [1] Blood glucose > 240 mg/dL (13.3 mmol/L) AND [2] rapid breathing   Negative: Blood glucose > 500 mg/dL (27.8 mmol/L)   Negative: [1] Blood glucose > 240 mg/dL (13.3 mmol/L) AND [2] urine ketones moderate-large (or more than 1+)   Negative: [1] Blood glucose > 240 mg/dL (13.3 mmol/L) AND [2] blood ketones > 1.4 mmol/L   Negative: [1] Blood glucose > 240 mg/dL (13.3 mmol/L) AND [2] vomiting AND [3] unable to check for ketones (in blood or urine)   Negative: [1] New-onset diabetes suspected (e.g., frequent urination, weak, weight loss) AND [2] vomiting or rapid breathing   Negative: Vomiting lasts > 4 hours   Negative: Patient sounds very sick or weak to the triager   Negative: Fever > 100.4 F (38.0 C)   Negative: Blood glucose > 400 mg/dL (22.2 mmol/L)    Protocols used: Diabetes - High Blood Sugar-A-

## 2022-11-17 ENCOUNTER — OFFICE VISIT (OUTPATIENT)
Dept: CARDIOTHORACIC SURGERY | Facility: CLINIC | Age: 62
End: 2022-11-17
Payer: COMMERCIAL

## 2022-11-17 VITALS
OXYGEN SATURATION: 97 % | DIASTOLIC BLOOD PRESSURE: 80 MMHG | HEART RATE: 87 BPM | BODY MASS INDEX: 36.61 KG/M2 | WEIGHT: 200.19 LBS | SYSTOLIC BLOOD PRESSURE: 125 MMHG

## 2022-11-17 DIAGNOSIS — Z95.1 S/P CABG (CORONARY ARTERY BYPASS GRAFT): Primary | ICD-10-CM

## 2022-11-17 PROCEDURE — 99024 PR POST-OP FOLLOW-UP VISIT: ICD-10-PCS | Mod: S$GLB,,, | Performed by: THORACIC SURGERY (CARDIOTHORACIC VASCULAR SURGERY)

## 2022-11-17 PROCEDURE — 3079F DIAST BP 80-89 MM HG: CPT | Mod: CPTII,S$GLB,, | Performed by: THORACIC SURGERY (CARDIOTHORACIC VASCULAR SURGERY)

## 2022-11-17 PROCEDURE — 3074F SYST BP LT 130 MM HG: CPT | Mod: CPTII,S$GLB,, | Performed by: THORACIC SURGERY (CARDIOTHORACIC VASCULAR SURGERY)

## 2022-11-17 PROCEDURE — 3046F HEMOGLOBIN A1C LEVEL >9.0%: CPT | Mod: CPTII,S$GLB,, | Performed by: THORACIC SURGERY (CARDIOTHORACIC VASCULAR SURGERY)

## 2022-11-17 PROCEDURE — 1159F MED LIST DOCD IN RCRD: CPT | Mod: CPTII,S$GLB,, | Performed by: THORACIC SURGERY (CARDIOTHORACIC VASCULAR SURGERY)

## 2022-11-17 PROCEDURE — 3046F PR MOST RECENT HEMOGLOBIN A1C LEVEL > 9.0%: ICD-10-PCS | Mod: CPTII,S$GLB,, | Performed by: THORACIC SURGERY (CARDIOTHORACIC VASCULAR SURGERY)

## 2022-11-17 PROCEDURE — 3074F PR MOST RECENT SYSTOLIC BLOOD PRESSURE < 130 MM HG: ICD-10-PCS | Mod: CPTII,S$GLB,, | Performed by: THORACIC SURGERY (CARDIOTHORACIC VASCULAR SURGERY)

## 2022-11-17 PROCEDURE — 99024 POSTOP FOLLOW-UP VISIT: CPT | Mod: S$GLB,,, | Performed by: THORACIC SURGERY (CARDIOTHORACIC VASCULAR SURGERY)

## 2022-11-17 PROCEDURE — 3008F BODY MASS INDEX DOCD: CPT | Mod: CPTII,S$GLB,, | Performed by: THORACIC SURGERY (CARDIOTHORACIC VASCULAR SURGERY)

## 2022-11-17 PROCEDURE — 4010F ACE/ARB THERAPY RXD/TAKEN: CPT | Mod: CPTII,S$GLB,, | Performed by: THORACIC SURGERY (CARDIOTHORACIC VASCULAR SURGERY)

## 2022-11-17 PROCEDURE — 4010F PR ACE/ARB THEARPY RXD/TAKEN: ICD-10-PCS | Mod: CPTII,S$GLB,, | Performed by: THORACIC SURGERY (CARDIOTHORACIC VASCULAR SURGERY)

## 2022-11-17 PROCEDURE — 3008F PR BODY MASS INDEX (BMI) DOCUMENTED: ICD-10-PCS | Mod: CPTII,S$GLB,, | Performed by: THORACIC SURGERY (CARDIOTHORACIC VASCULAR SURGERY)

## 2022-11-17 PROCEDURE — 1159F PR MEDICATION LIST DOCUMENTED IN MEDICAL RECORD: ICD-10-PCS | Mod: CPTII,S$GLB,, | Performed by: THORACIC SURGERY (CARDIOTHORACIC VASCULAR SURGERY)

## 2022-11-17 PROCEDURE — 99999 PR PBB SHADOW E&M-EST. PATIENT-LVL III: ICD-10-PCS | Mod: PBBFAC,,, | Performed by: THORACIC SURGERY (CARDIOTHORACIC VASCULAR SURGERY)

## 2022-11-17 PROCEDURE — 99999 PR PBB SHADOW E&M-EST. PATIENT-LVL III: CPT | Mod: PBBFAC,,, | Performed by: THORACIC SURGERY (CARDIOTHORACIC VASCULAR SURGERY)

## 2022-11-17 PROCEDURE — 3079F PR MOST RECENT DIASTOLIC BLOOD PRESSURE 80-89 MM HG: ICD-10-PCS | Mod: CPTII,S$GLB,, | Performed by: THORACIC SURGERY (CARDIOTHORACIC VASCULAR SURGERY)

## 2022-11-17 NOTE — PROGRESS NOTES
Subjective:      Patient ID: Ca Diaz is a 62 y.o. female.    Chief Complaint: No chief complaint on file.    HPI:  62-year-old female with a history of obesity multivessel coronary artery disease status post coronary artery bypass grafting x3 year his follow follow-up visit she has no acute major issues at this time no pain her incisions have healed well she is ambulating she is getting outpatient cardiopulmonary rehabilitation..    Review of patient's allergies indicates:   Allergen Reactions    Epinephrine Anaphylaxis    Lidocaine Anaphylaxis     Dental visit pt stopped breathing after given lidocaine    Ace inhibitors     Sulfadiazine Other (See Comments)       Review of Systems   Constitutional:  Negative for activity change and appetite change.   HENT:  Negative for dental problem, nosebleeds and sore throat.    Eyes:  Negative for discharge and visual disturbance.   Respiratory:  Negative for cough, chest tightness and stridor.    Cardiovascular:  Negative for leg swelling.   Gastrointestinal:  Negative for abdominal distention and abdominal pain.   Genitourinary:  Negative for difficulty urinating and dysuria.   Musculoskeletal:  Negative for arthralgias, back pain and joint swelling.   Allergic/Immunologic: Negative for environmental allergies.   Neurological:  Negative for dizziness, syncope and headaches.   Hematological:  Does not bruise/bleed easily.   Psychiatric/Behavioral:  Negative for behavioral problems.         Objective:   /80   Pulse 87   Wt 90.8 kg (200 lb 2.8 oz)   SpO2 97%   BMI 36.61 kg/m²     X-Ray Chest AP Portable  Narrative: EXAMINATION:  XR CHEST AP PORTABLE    CLINICAL HISTORY:  Non-ST elevation (NSTEMI) myocardial infarctionPost-op;    COMPARISON:  09/24/2022    FINDINGS:  A right internal jugular venous line remains in place.  There are surgical changes associated with a prior sternotomy.  There is silhouetting of the left and right borders of the heart.   There is a mild amount of haziness scattered throughout the inferior halves of both lungs.  There is no pneumothorax.  There is opacification of the left costophrenic angle.  The right costophrenic angle is sharp.  Impression: 1. There is silhouetting of the left and right borders of the heart.  There is a mild amount of haziness scattered throughout the inferior halves of both lungs.  An infectious process cannot be excluded.  2. There is opacification of the left costophrenic angle.  This is characteristic of a tiny pleural effusion.  3. A right internal jugular venous line remains in place.  4. There are surgical changes associated with a prior sternotomy.  .    Electronically signed by: Elmer Clarke MD  Date:    09/25/2022  Time:    07:01         Physical Exam  Vitals and nursing note reviewed.   Constitutional:       Appearance: She is obese.   HENT:      Head: Normocephalic.      Mouth/Throat:      Mouth: Mucous membranes are moist.   Eyes:      Extraocular Movements: Extraocular movements intact.      Pupils: Pupils are equal, round, and reactive to light.   Cardiovascular:      Rate and Rhythm: Normal rate and regular rhythm.      Pulses: Normal pulses.      Heart sounds: Normal heart sounds.      Comments: Midline sternal incision clean and dry well healed sternum is stable leg incision is also healed well  Pulmonary:      Effort: Pulmonary effort is normal.      Breath sounds: Normal breath sounds.   Abdominal:      Palpations: Abdomen is soft.   Musculoskeletal:         General: Normal range of motion.      Cervical back: Normal range of motion and neck supple.   Skin:     General: Skin is warm.      Capillary Refill: Capillary refill takes less than 2 seconds.   Neurological:      General: No focal deficit present.      Mental Status: She is alert and oriented to person, place, and time.   Psychiatric:         Mood and Affect: Mood normal.       Assessment:     1. S/P CABG (coronary artery bypass  graft)    Morbid obesity  Type 2 diabetes mellitus  Peripheral arterial disease  Seizure disorders      Plan   Continue outpatient cardiopulmonary  Rehabilitation  Aspirin statin Plavix beta-blocker  Follow-up with Cardiology with primary care at this time  I will discharge the patient from my service today          Krystal Marin MD  [facility]  [place]

## 2022-12-26 PROBLEM — I21.4 NSTEMI (NON-ST ELEVATED MYOCARDIAL INFARCTION): Status: RESOLVED | Noted: 2022-09-17 | Resolved: 2022-12-26

## 2024-01-19 ENCOUNTER — HOSPITAL ENCOUNTER (EMERGENCY)
Facility: HOSPITAL | Age: 64
Discharge: HOME | End: 2024-01-19
Attending: EMERGENCY MEDICINE
Payer: COMMERCIAL

## 2024-01-19 VITALS
OXYGEN SATURATION: 99 % | DIASTOLIC BLOOD PRESSURE: 61 MMHG | TEMPERATURE: 98 F | HEART RATE: 75 BPM | RESPIRATION RATE: 17 BRPM | SYSTOLIC BLOOD PRESSURE: 138 MMHG

## 2024-01-19 DIAGNOSIS — R53.1 WEAKNESS GENERALIZED: ICD-10-CM

## 2024-01-19 DIAGNOSIS — N30.01 ACUTE CYSTITIS WITH HEMATURIA: Primary | ICD-10-CM

## 2024-01-19 LAB
ALBUMIN SERPL BCP-MCNC: 3.2 G/DL (ref 3.5–5.2)
ALP SERPL-CCNC: 78 U/L (ref 55–135)
ALT SERPL W/O P-5'-P-CCNC: 31 U/L (ref 10–44)
ANION GAP SERPL CALC-SCNC: 9 MMOL/L (ref 8–16)
AST SERPL-CCNC: 30 U/L (ref 10–40)
BACTERIA #/AREA URNS HPF: ABNORMAL /HPF
BASOPHILS # BLD AUTO: 0.04 K/UL (ref 0–0.2)
BASOPHILS NFR BLD: 0.3 % (ref 0–1.9)
BILIRUB SERPL-MCNC: 0.9 MG/DL (ref 0.1–1)
BILIRUB UR QL STRIP: NEGATIVE
BUN SERPL-MCNC: 9 MG/DL (ref 8–23)
CALCIUM SERPL-MCNC: 9.8 MG/DL (ref 8.7–10.5)
CHLORIDE SERPL-SCNC: 98 MMOL/L (ref 95–110)
CLARITY UR: ABNORMAL
CO2 SERPL-SCNC: 26 MMOL/L (ref 23–29)
COLOR UR: YELLOW
CREAT SERPL-MCNC: 0.8 MG/DL (ref 0.5–1.4)
DIFFERENTIAL METHOD BLD: ABNORMAL
EOSINOPHIL # BLD AUTO: 0.4 K/UL (ref 0–0.5)
EOSINOPHIL NFR BLD: 3.4 % (ref 0–8)
ERYTHROCYTE [DISTWIDTH] IN BLOOD BY AUTOMATED COUNT: 14.4 % (ref 11.5–14.5)
EST. GFR  (NO RACE VARIABLE): >60 ML/MIN/1.73 M^2
GLUCOSE SERPL-MCNC: 274 MG/DL (ref 70–110)
GLUCOSE UR QL STRIP: ABNORMAL
HCT VFR BLD AUTO: 40.3 % (ref 37–48.5)
HGB BLD-MCNC: 13.6 G/DL (ref 12–16)
HGB UR QL STRIP: ABNORMAL
HYALINE CASTS #/AREA URNS LPF: 17 /LPF
IMM GRANULOCYTES # BLD AUTO: 0.09 K/UL (ref 0–0.04)
IMM GRANULOCYTES NFR BLD AUTO: 0.7 % (ref 0–0.5)
INFLUENZA A, MOLECULAR: NEGATIVE
INFLUENZA B, MOLECULAR: NEGATIVE
KETONES UR QL STRIP: ABNORMAL
LEUKOCYTE ESTERASE UR QL STRIP: ABNORMAL
LYMPHOCYTES # BLD AUTO: 1.4 K/UL (ref 1–4.8)
LYMPHOCYTES NFR BLD: 11 % (ref 18–48)
MCH RBC QN AUTO: 27.8 PG (ref 27–31)
MCHC RBC AUTO-ENTMCNC: 33.7 G/DL (ref 32–36)
MCV RBC AUTO: 82 FL (ref 82–98)
MICROSCOPIC COMMENT: ABNORMAL
MONOCYTES # BLD AUTO: 0.8 K/UL (ref 0.3–1)
MONOCYTES NFR BLD: 6.1 % (ref 4–15)
NEUTROPHILS # BLD AUTO: 10.1 K/UL (ref 1.8–7.7)
NEUTROPHILS NFR BLD: 78.5 % (ref 38–73)
NITRITE UR QL STRIP: NEGATIVE
NRBC BLD-RTO: 0 /100 WBC
PH UR STRIP: 6 [PH] (ref 5–8)
PLATELET # BLD AUTO: 198 K/UL (ref 150–450)
PMV BLD AUTO: 11 FL (ref 9.2–12.9)
POTASSIUM SERPL-SCNC: 3.2 MMOL/L (ref 3.5–5.1)
PROT SERPL-MCNC: 7.5 G/DL (ref 6–8.4)
PROT UR QL STRIP: ABNORMAL
RBC # BLD AUTO: 4.9 M/UL (ref 4–5.4)
RBC #/AREA URNS HPF: 12 /HPF (ref 0–4)
SARS-COV-2 RDRP RESP QL NAA+PROBE: NEGATIVE
SODIUM SERPL-SCNC: 133 MMOL/L (ref 136–145)
SP GR UR STRIP: 1.02 (ref 1–1.03)
SPECIMEN SOURCE: NORMAL
SQUAMOUS #/AREA URNS HPF: 3 /HPF
UNIDENT CRYS URNS QL MICRO: ABNORMAL
URN SPEC COLLECT METH UR: ABNORMAL
UROBILINOGEN UR STRIP-ACNC: ABNORMAL EU/DL
WBC # BLD AUTO: 12.61 K/UL (ref 3.9–12.7)
WBC #/AREA URNS HPF: 31 /HPF (ref 0–5)
YEAST URNS QL MICRO: ABNORMAL

## 2024-01-19 PROCEDURE — 81000 URINALYSIS NONAUTO W/SCOPE: CPT | Performed by: EMERGENCY MEDICINE

## 2024-01-19 PROCEDURE — 87502 INFLUENZA DNA AMP PROBE: CPT | Performed by: EMERGENCY MEDICINE

## 2024-01-19 PROCEDURE — U0002 COVID-19 LAB TEST NON-CDC: HCPCS | Performed by: EMERGENCY MEDICINE

## 2024-01-19 PROCEDURE — 80053 COMPREHEN METABOLIC PANEL: CPT | Performed by: EMERGENCY MEDICINE

## 2024-01-19 PROCEDURE — 85025 COMPLETE CBC W/AUTO DIFF WBC: CPT | Performed by: EMERGENCY MEDICINE

## 2024-01-19 PROCEDURE — 87086 URINE CULTURE/COLONY COUNT: CPT | Performed by: EMERGENCY MEDICINE

## 2024-01-19 PROCEDURE — 99285 EMERGENCY DEPT VISIT HI MDM: CPT | Mod: 25

## 2024-01-19 PROCEDURE — 25000003 PHARM REV CODE 250: Performed by: EMERGENCY MEDICINE

## 2024-01-19 PROCEDURE — 93005 ELECTROCARDIOGRAM TRACING: CPT

## 2024-01-19 PROCEDURE — 93010 ELECTROCARDIOGRAM REPORT: CPT | Mod: ,,, | Performed by: INTERNAL MEDICINE

## 2024-01-19 RX ORDER — POTASSIUM CHLORIDE 20 MEQ/1
40 TABLET, EXTENDED RELEASE ORAL
Status: COMPLETED | OUTPATIENT
Start: 2024-01-19 | End: 2024-01-19

## 2024-01-19 RX ORDER — CEFUROXIME AXETIL 500 MG/1
500 TABLET ORAL 2 TIMES DAILY
Qty: 20 TABLET | Refills: 0 | Status: SHIPPED | OUTPATIENT
Start: 2024-01-19 | End: 2024-01-29

## 2024-01-19 RX ORDER — ACETAMINOPHEN 325 MG/1
650 TABLET ORAL
Status: COMPLETED | OUTPATIENT
Start: 2024-01-19 | End: 2024-01-19

## 2024-01-19 RX ADMIN — POTASSIUM CHLORIDE 40 MEQ: 1500 TABLET, EXTENDED RELEASE ORAL at 02:01

## 2024-01-19 RX ADMIN — BACITRACIN ZINC, NEOMYCIN, POLYMYXIN B 1 EACH: 400; 3.5; 5 OINTMENT TOPICAL at 02:01

## 2024-01-19 RX ADMIN — ACETAMINOPHEN 650 MG: 325 TABLET ORAL at 02:01

## 2024-01-19 NOTE — ED PROVIDER NOTES
"SCRIBE #1 NOTE: I, Serge Aponte, am scribing for, and in the presence of, Jeannie Silva MD. I have scribed the HPI, ROS, and PEx.    SCRIBE #2 NOTE: I, Jorge Bangura, am scribing for, and in the presence of,  Felix Smion MD. I have scribed the remaining portions of the note not scribed by Scribe #1.      History     Chief Complaint   Patient presents with    Knee Pain     Bilateral knee pain after multiple falls over the last few days. Residual weakness and confusion from previous stroke.     Review of patient's allergies indicates:   Allergen Reactions    Epinephrine Anaphylaxis    Lidocaine Anaphylaxis     Dental visit pt stopped breathing after given lidocaine    Ace inhibitors     Sulfadiazine Other (See Comments)         History of Present Illness     HPI    1/19/2024, 1:42 AM  History obtained from the patient and       History of Present Illness: Ca Diaz is a 63 y.o. female patient with a PMHx of CVA, T2DM, anxiety, depression, fibromyalgia who presents to the Emergency Department for evaluation of bilateral knee pain which onset gradually on [place]. Patient states she had COVID around [place] time where she was bed ridden for 4 days when she also had a stroke.  states patient went to have an MRI done but was unable to get one at the time because she was not able to be still. He notes patient was admitted and received an MRI as outpatient. Symptoms are constant and moderate in severity. No mitigating or exacerbating factors reported. Associated sxs include multiple daily falls secondary to generalized weakness, HA, fever. Patient denies any CP, SOB, abdominal pain, bowel or bladder symptoms, and all other sxs at this time. No further complaints or concerns at this time.      Upon review of patient's previous notes and hospital visits, patient was hospitalized on 11/18/2023 for suspected CVA work up as she had "possibly ischemic changes in the left temporal lobe" on her CTA " "head and neck. She had an unremarkable stroke work-up. She did test positive for COVID at the time. Patient had a successful MRI brain on 2023 that showed, "No acute abnormality.  Mild atrophy and white matter degeneration."      Arrival mode: EMS    PCP: Desiree Frye MD        Past Medical History:  Past Medical History:   Diagnosis Date    Anxiety and depression 2014    CVA (cerebral vascular accident) 2017    Essential (primary) hypertension 2013    Fibromyalgia 2012    Hyperlipidemia     Obstructive sleep apnea syndrome 2014    RLS (restless legs syndrome) 10/18/2018    Overview:  Rheum Dr Tam    Seizures     Type 2 diabetes mellitus 2012    ICD-10 Transition Last Assessment & Plan:  Continue with sliding scale insulin.  Control improved on low-dose Lantus presently       Past Surgical History:  Past Surgical History:   Procedure Laterality Date    ABDOMINAL SURGERY      ARTERIOGRAPHY OF SUBCLAVIAN ARTERY  2022    Procedure: ARTERIOGRAM, SUBCLAVIAN;  Surgeon: Vale Pa MD;  Location: [facility] CATH LAB;  Service: Cardiology;;     SECTION      CORONARY ARTERY BYPASS GRAFT (CABG) N/A 2022    Procedure: CORONARY ARTERY BYPASS GRAFT (CABG);  Surgeon: Krystal Marin MD;  Location: [facility] OR;  Service: Cardiothoracic;  Laterality: N/A;  3-VESSEL WITH EPI-AORTIC ULTRASOUND    ENDOSCOPIC HARVEST OF VEIN Left 2022    Procedure: SURGICAL PROCUREMENT, VEIN, ENDOSCOPIC;  Surgeon: Krystal Marin MD;  Location: [facility] OR;  Service: Cardiothoracic;  Laterality: Left;    INJECTION OF ANESTHETIC AGENT AROUND MULTIPLE INTERCOSTAL NERVES N/A 2022    Procedure: BLOCK, NERVE, INTERCOSTAL, 2 OR MORE;  Surgeon: Krystal Marin MD;  Location: [facility] OR;  Service: Cardiothoracic;  Laterality: N/A;  PARASTERNAL NERVE BLOCK    LEFT HEART CATHETERIZATION Left 2022    Procedure: CATHETERIZATION, HEART, LEFT;  Surgeon: Vale Pa MD;  Location: [facility] CATH " LAB;  Service: Cardiology;  Laterality: Left;         Family History:  History reviewed. No pertinent family history.    Social History:  Social History     Tobacco Use    Smoking status: Former     Current packs/day: 0.00     Types: Cigarettes     Quit date:      Years since quittin.0    Smokeless tobacco: Never   Substance and Sexual Activity    Alcohol use: Not Currently    Drug use: Never    Sexual activity: Not on file        Review of Systems     Review of Systems   Constitutional:  Positive for fever.   HENT:  Negative for sore throat.    Respiratory:  Negative for shortness of breath.    Cardiovascular:  Negative for chest pain.   Gastrointestinal:  Negative for abdominal pain, constipation, diarrhea, nausea and vomiting.   Genitourinary:  Negative for dysuria.   Musculoskeletal:  Negative for back pain.   Skin:  Negative for rash.   Neurological:  Positive for weakness (generalized) and headaches.   Hematological:  Does not bruise/bleed easily.   All other systems reviewed and are negative.       Physical Exam     Initial Vitals [24 0107]   BP Pulse Resp Temp SpO2   (!) 141/61 79 16 100.3 °F (37.9 °C) 96 %      MAP       --          Physical Exam \  Nursing Notes and Vital Signs Reviewed.  Constitutional: Patient is in no acute distress. Well-developed and well-nourished.  Head: Atraumatic. Normocephalic.  Eyes: PERRL. EOM intact. Conjunctivae are not pale. No scleral icterus.  ENT: Mucous membranes are moist. Oropharynx is clear and symmetric.    Neck: Supple. Full ROM. No lymphadenopathy.  Cardiovascular: Regular rate. Regular rhythm. No murmurs, rubs, or gallops. Distal pulses are 2+ and symmetric.  Pulmonary/Chest: No respiratory distress. Clear to auscultation bilaterally. No wheezing or rales.  Abdominal: Soft and non-distended.  There is no tenderness.  No rebound, guarding, or rigidity. Good bowel sounds.  Genitourinary: No CVA tenderness  Musculoskeletal: Moves all extremities. No  obvious deformities. No edema. No calf tenderness.  Skin: Warm and dry. Superficial abrasion to anterior left knee.  Neurological:  Alert, awake, and appropriate.  Normal speech.  No acute focal neurological deficits are appreciated.  Psychiatric: Normal affect. Good eye contact. Appropriate in content.     ED Course   Procedures  ED Vital Signs:  Vitals:    01/19/24 0107 01/19/24 0215 01/19/24 0230 01/19/24 0300   BP: (!) 141/61 139/63 (!) 120/54 (!) 144/61   Pulse: 79 76 77 79   Resp: 16 15 15 20   Temp: 100.3 °F (37.9 °C)  (!) 100.5 °F (38.1 °C)    TempSrc:   Oral    SpO2: 96% 98% 96% 97%    01/19/24 0329 01/19/24 0400 01/19/24 0500 01/19/24 0600   BP:  (!) 102/56 (!) 103/59 (!) 113/57   Pulse:  74 71 71   Resp:  15 16 12   Temp: 99.8 °F (37.7 °C)      TempSrc: Oral      SpO2:  96% 95% 96%    01/19/24 0700 01/19/24 0830   BP: (!) 111/55 (!) 128/53   Pulse: 69 71   Resp: 13 12   Temp:     TempSrc:     SpO2: 97% 98%       Abnormal Lab Results:  Labs Reviewed   CBC W/ AUTO DIFFERENTIAL - Abnormal; Notable for the following components:       Result Value    Immature Granulocytes 0.7 (*)     Gran # (ANC) 10.1 (*)     Immature Grans (Abs) 0.09 (*)     Gran % 78.5 (*)     Lymph % 11.0 (*)     All other components within normal limits   COMPREHENSIVE METABOLIC PANEL - Abnormal; Notable for the following components:    Sodium 133 (*)     Potassium 3.2 (*)     Glucose 274 (*)     Albumin 3.2 (*)     All other components within normal limits   URINALYSIS, REFLEX TO URINE CULTURE - Abnormal; Notable for the following components:    Appearance, UA Hazy (*)     Protein, UA 2+ (*)     Glucose, UA 4+ (*)     Ketones, UA Trace (*)     Occult Blood UA 1+ (*)     Urobilinogen, UA 2.0-3.0 (*)     Leukocytes, UA 2+ (*)     All other components within normal limits    Narrative:     Specimen Source->Urine   URINALYSIS MICROSCOPIC - Abnormal; Notable for the following components:    RBC, UA 12 (*)     WBC, UA 31 (*)     Hyaline Casts,  UA 17 (*)     All other components within normal limits    Narrative:     Specimen Source->Urine   INFLUENZA A & B BY MOLECULAR   CULTURE, URINE   SARS-COV-2 RNA AMPLIFICATION, QUAL        All Lab Results:  Results for orders placed or performed during the hospital encounter of 01/19/24   Influenza A & B by Molecular    Specimen: Nasopharyngeal Swab   Result Value Ref Range    Influenza A, Molecular Negative Negative    Influenza B, Molecular Negative Negative    Flu A & B Source Nasal swab    CBC auto differential   Result Value Ref Range    WBC 12.61 3.90 - 12.70 K/uL    RBC 4.90 4.00 - 5.40 M/uL    Hemoglobin 13.6 12.0 - 16.0 g/dL    Hematocrit 40.3 37.0 - 48.5 %    MCV 82 82 - 98 fL    MCH 27.8 27.0 - 31.0 pg    MCHC 33.7 32.0 - 36.0 g/dL    RDW 14.4 11.5 - 14.5 %    Platelets 198 150 - 450 K/uL    MPV 11.0 9.2 - 12.9 fL    Immature Granulocytes 0.7 (H) 0.0 - 0.5 %    Gran # (ANC) 10.1 (H) 1.8 - 7.7 K/uL    Immature Grans (Abs) 0.09 (H) 0.00 - 0.04 K/uL    Lymph # 1.4 1.0 - 4.8 K/uL    Mono # 0.8 0.3 - 1.0 K/uL    Eos # 0.4 0.0 - 0.5 K/uL    Baso # 0.04 0.00 - 0.20 K/uL    nRBC 0 0 /100 WBC    Gran % 78.5 (H) 38.0 - 73.0 %    Lymph % 11.0 (L) 18.0 - 48.0 %    Mono % 6.1 4.0 - 15.0 %    Eosinophil % 3.4 0.0 - 8.0 %    Basophil % 0.3 0.0 - 1.9 %    Differential Method Automated    Comprehensive metabolic panel   Result Value Ref Range    Sodium 133 (L) 136 - 145 mmol/L    Potassium 3.2 (L) 3.5 - 5.1 mmol/L    Chloride 98 95 - 110 mmol/L    CO2 26 23 - 29 mmol/L    Glucose 274 (H) 70 - 110 mg/dL    BUN 9 8 - 23 mg/dL    Creatinine 0.8 0.5 - 1.4 mg/dL    Calcium 9.8 8.7 - 10.5 mg/dL    Total Protein 7.5 6.0 - 8.4 g/dL    Albumin 3.2 (L) 3.5 - 5.2 g/dL    Total Bilirubin 0.9 0.1 - 1.0 mg/dL    Alkaline Phosphatase 78 55 - 135 U/L    AST 30 10 - 40 U/L    ALT 31 10 - 44 U/L    eGFR >60 >60 mL/min/1.73 m^2    Anion Gap 9 8 - 16 mmol/L   Urinalysis, Reflex to Urine Culture Urine, Clean Catch    Specimen: Urine   Result  Value Ref Range    Specimen UA Urine, Clean Catch     Color, UA Yellow Yellow, Straw, Estrellita    Appearance, UA Hazy (A) Clear    pH, UA 6.0 5.0 - 8.0    Specific Gravity, UA 1.020 1.005 - 1.030    Protein, UA 2+ (A) Negative    Glucose, UA 4+ (A) Negative    Ketones, UA Trace (A) Negative    Bilirubin (UA) Negative Negative    Occult Blood UA 1+ (A) Negative    Nitrite, UA Negative Negative    Urobilinogen, UA 2.0-3.0 (A) <2.0 EU/dL    Leukocytes, UA 2+ (A) Negative   COVID-19 Rapid Screening   Result Value Ref Range    SARS-CoV-2 RNA, Amplification, Qual Negative Negative   Urinalysis Microscopic   Result Value Ref Range    RBC, UA 12 (H) 0 - 4 /hpf    WBC, UA 31 (H) 0 - 5 /hpf    Bacteria Occasional None-Occ /hpf    Yeast, UA None None    Squam Epithel, UA 3 /hpf    Hyaline Casts, UA 17 (A) 0-1/lpf /lpf    Unclass Juliana UA Moderate None-Moderate    Microscopic Comment SEE COMMENT          Imaging Results:  Imaging Results              CT Head Without Contrast (Final result)  Result time 01/19/24 07:40:56      Final result by Reza Boo MD (01/19/24 07:40:56)                   Impression:      No acute intracranial abnormalities.    Sinus disease.  No evidence of acute sinusitis.    All CT scans at this facility use dose modulation, iterative reconstruction, and/or weight based dosing when appropriate to reduce radiation dose to as low as reasonable achievable.      Electronically signed by: Reza Boo MD  Date:    01/19/2024  Time:    07:40               Narrative:    EXAMINATION:  CT HEAD WITHOUT CONTRAST    CLINICAL HISTORY:  Syncope, recurrent;    TECHNIQUE:  Low dose axial CT images obtained throughout the head without intravenous contrast. Sagittal and coronal reconstructions were performed.    COMPARISON:  09/17/2022.    FINDINGS:  Intracranial compartment:    The brain parenchyma demonstrates areas of decreased attenuation with mild to moderate periventricular white matter consistent with chronic  microvascular ischemic changes..  No parenchymal mass, hemorrhage, edema or major vascular distribution infarct.  Vascular calcifications are noted.    Mild prominence of the sulci and ventricles are consistent with age-related involutional changes.    No extra-axial blood or fluid collections.    Skull/extracranial contents (limited evaluation): No fracture. Moderate mucosal thickening throughout the maxillary sinuses and ethmoid air cells measuring greater than 3 mm in thickness.  Lobular configuration noted which could reflect polyposis or multiple retention cysts.  Mild mucosal thickening sphenoid sinuses.  No fluid levels to suggest acute sinusitis.  Hypoplastic right frontal sinus noted.                                       X-Ray Chest AP Portable (Final result)  Result time 01/19/24 06:25:21      Final result by Reza Boo MD (01/19/24 06:25:21)                   Impression:      No acute process seen.      Electronically signed by: Reza Boo MD  Date:    01/19/2024  Time:    06:25               Narrative:    EXAMINATION:  XR CHEST AP PORTABLE    CLINICAL HISTORY:  Weakness    FINDINGS:  Single view of the chest.  Comparison 09/25/2022    Cardiac silhouette is normal.  The lungs demonstrate no evidence of active disease.  No evidence of pleural effusion or pneumothorax.  Bones appear intact.  Moderate degenerative changes and moderate atherosclerotic disease.  Sternotomy wires are intact                                     Type of Interpretation: ED Physician (Independently Interpreted).  Radiology Procedure Done: Portable CXR.  Interpretation: NAF          The EKG was ordered, reviewed, and independently interpreted by the ED provider.  Interpretation time: 03:24  Rate: 76 BPM  Rhythm: normal sinus rhythm  Interpretation: Cannot rule out Anterior infarct. No STEMI.    The Emergency Provider reviewed the vital signs and test results, which are outlined above.     ED Discussion     6:00 AM:   Shira transfers care of patient to Dr. Simon pending lab results.    9:31 AM: Reassessed pt at this time. Discussed with pt all pertinent ED information and results. Discussed pt dx and plan of tx. Gave pt all f/u and return to the ED instructions. All questions and concerns were addressed at this time. Pt expresses understanding of information and instructions, and is comfortable with plan to discharge. Pt is stable for discharge.    I discussed with patient and/or family/caretaker that evaluation in the ED does not suggest any emergent or life threatening medical conditions requiring immediate intervention beyond what was provided in the ED, and I believe patient is safe for discharge.  Regardless, an unremarkable evaluation in the ED does not preclude the development or presence of a serious of life threatening condition. As such, patient was instructed to return immediately for any worsening or change in current symptoms.       Medical Decision Making  Weakness, fatigue, temp 100.3  DDx: UTI, weakness    Amount and/or Complexity of Data Reviewed  External Data Reviewed: radiology and notes.  Labs: ordered. Decision-making details documented in ED Course.     Details: +UTI  Radiology: ordered and independent interpretation performed. Decision-making details documented in ED Course.  ECG/medicine tests: ordered and independent interpretation performed. Decision-making details documented in ED Course.    Risk  OTC drugs.  Prescription drug management.                 ED Medication(s):  Medications   neomycin-bacitracnZn-polymyxnB packet 1 each (1 each Topical (Top) Given 1/19/24 0200)   acetaminophen tablet 650 mg (650 mg Oral Given 1/19/24 0245)   potassium chloride SA CR tablet 40 mEq (40 mEq Oral Given 1/19/24 0259)       New Prescriptions    CEFUROXIME (CEFTIN) 500 MG TABLET    Take 1 tablet (500 mg total) by mouth 2 (two) times daily. for 10 days        Follow-up Information       Desiree Frey MD.     Specialty: Family Medicine  Contact information:  [address]  [address]  [facility] [address]  [phone]                                 Scribe Attestation:   Scribe #1: I performed the above scribed service and the documentation accurately describes the services I performed. I attest to the accuracy of the note.     Attending:   Physician Attestation Statement for Scribe #1: I, Jeannie Silva MD, personally performed the services described in this documentation, as scribed by Serge Aponte, in my presence, and it is both accurate and complete.       Scribe Attestation:   Scribe #2: I performed the above scribed service and the documentation accurately describes the services I performed. I attest to the accuracy of the note.    Attending Attestation:           Physician Attestation for Scribe:    Physician Attestation Statement for Scribe #2: I, Felix Simon MD, reviewed documentation, as scribed by Jorge Bangura in my presence, and it is both accurate and complete. I also acknowledge and confirm the content of the note done by Scribe #1.           Clinical Impression       ICD-10-CM ICD-9-CM   1. Acute cystitis with hematuria  N30.01 595.0   2. Weakness generalized  R53.1 780.79       Disposition:   Disposition: Discharged  Condition: Stable         Felix Simon MD  01/19/24 0958

## 2024-01-20 LAB — BACTERIA UR CULT: NO GROWTH

## 2024-03-15 NOTE — PLAN OF CARE
Messages left with SNF facilities requesting update regarding acceptance/decline. Awaiting callback.   Oriented - self; Oriented - place; Oriented - time

## 2024-05-12 ENCOUNTER — HOSPITAL ENCOUNTER (EMERGENCY)
Facility: HOSPITAL | Age: 64
Discharge: HOME | End: 2024-05-12
Attending: EMERGENCY MEDICINE
Payer: COMMERCIAL

## 2024-05-12 VITALS
BODY MASS INDEX: 36.14 KG/M2 | HEART RATE: 76 BPM | TEMPERATURE: 99 F | OXYGEN SATURATION: 99 % | HEIGHT: 62 IN | WEIGHT: 196.38 LBS | DIASTOLIC BLOOD PRESSURE: 72 MMHG | SYSTOLIC BLOOD PRESSURE: 155 MMHG | RESPIRATION RATE: 16 BRPM

## 2024-05-12 DIAGNOSIS — R07.9 CHEST PAIN: ICD-10-CM

## 2024-05-12 LAB
ALBUMIN SERPL BCP-MCNC: 3.3 G/DL (ref 3.5–5.2)
ALP SERPL-CCNC: 77 U/L (ref 55–135)
ALT SERPL W/O P-5'-P-CCNC: 22 U/L (ref 10–44)
ANION GAP SERPL CALC-SCNC: 13 MMOL/L (ref 8–16)
AST SERPL-CCNC: 24 U/L (ref 10–40)
BASOPHILS # BLD AUTO: 0.02 K/UL (ref 0–0.2)
BASOPHILS NFR BLD: 0.3 % (ref 0–1.9)
BILIRUB SERPL-MCNC: 0.5 MG/DL (ref 0.1–1)
BNP SERPL-MCNC: 16 PG/ML (ref 0–99)
BUN SERPL-MCNC: 14 MG/DL (ref 8–23)
CALCIUM SERPL-MCNC: 9.7 MG/DL (ref 8.7–10.5)
CHLORIDE SERPL-SCNC: 103 MMOL/L (ref 95–110)
CO2 SERPL-SCNC: 23 MMOL/L (ref 23–29)
CREAT SERPL-MCNC: 0.8 MG/DL (ref 0.5–1.4)
DIFFERENTIAL METHOD BLD: ABNORMAL
EOSINOPHIL # BLD AUTO: 0.1 K/UL (ref 0–0.5)
EOSINOPHIL NFR BLD: 1 % (ref 0–8)
ERYTHROCYTE [DISTWIDTH] IN BLOOD BY AUTOMATED COUNT: 15.8 % (ref 11.5–14.5)
EST. GFR  (NO RACE VARIABLE): >60 ML/MIN/1.73 M^2
GLUCOSE SERPL-MCNC: 251 MG/DL (ref 70–110)
HCT VFR BLD AUTO: 41.7 % (ref 37–48.5)
HGB BLD-MCNC: 13.5 G/DL (ref 12–16)
IMM GRANULOCYTES # BLD AUTO: 0.02 K/UL (ref 0–0.04)
IMM GRANULOCYTES NFR BLD AUTO: 0.3 % (ref 0–0.5)
LIPASE SERPL-CCNC: 15 U/L (ref 4–60)
LYMPHOCYTES # BLD AUTO: 2.1 K/UL (ref 1–4.8)
LYMPHOCYTES NFR BLD: 27.1 % (ref 18–48)
MCH RBC QN AUTO: 27.4 PG (ref 27–31)
MCHC RBC AUTO-ENTMCNC: 32.4 G/DL (ref 32–36)
MCV RBC AUTO: 85 FL (ref 82–98)
MONOCYTES # BLD AUTO: 0.6 K/UL (ref 0.3–1)
MONOCYTES NFR BLD: 7.1 % (ref 4–15)
NEUTROPHILS # BLD AUTO: 5.1 K/UL (ref 1.8–7.7)
NEUTROPHILS NFR BLD: 64.2 % (ref 38–73)
NRBC BLD-RTO: 0 /100 WBC
PLATELET # BLD AUTO: 229 K/UL (ref 150–450)
PMV BLD AUTO: 11.5 FL (ref 9.2–12.9)
POTASSIUM SERPL-SCNC: 4 MMOL/L (ref 3.5–5.1)
PROT SERPL-MCNC: 7.2 G/DL (ref 6–8.4)
RBC # BLD AUTO: 4.93 M/UL (ref 4–5.4)
SODIUM SERPL-SCNC: 139 MMOL/L (ref 136–145)
TROPONIN I SERPL DL<=0.01 NG/ML-MCNC: <0.006 NG/ML (ref 0–0.03)
TROPONIN I SERPL DL<=0.01 NG/ML-MCNC: <0.006 NG/ML (ref 0–0.03)
WBC # BLD AUTO: 7.87 K/UL (ref 3.9–12.7)

## 2024-05-12 PROCEDURE — 80053 COMPREHEN METABOLIC PANEL: CPT | Performed by: EMERGENCY MEDICINE

## 2024-05-12 PROCEDURE — 84484 ASSAY OF TROPONIN QUANT: CPT | Mod: 91 | Performed by: EMERGENCY MEDICINE

## 2024-05-12 PROCEDURE — 85025 COMPLETE CBC W/AUTO DIFF WBC: CPT | Performed by: EMERGENCY MEDICINE

## 2024-05-12 PROCEDURE — 93005 ELECTROCARDIOGRAM TRACING: CPT

## 2024-05-12 PROCEDURE — 99285 EMERGENCY DEPT VISIT HI MDM: CPT | Mod: 25

## 2024-05-12 PROCEDURE — 93010 ELECTROCARDIOGRAM REPORT: CPT | Mod: ,,, | Performed by: STUDENT IN AN ORGANIZED HEALTH CARE EDUCATION/TRAINING PROGRAM

## 2024-05-12 PROCEDURE — 25000003 PHARM REV CODE 250: Performed by: EMERGENCY MEDICINE

## 2024-05-12 PROCEDURE — 83880 ASSAY OF NATRIURETIC PEPTIDE: CPT | Performed by: EMERGENCY MEDICINE

## 2024-05-12 PROCEDURE — 83690 ASSAY OF LIPASE: CPT | Performed by: EMERGENCY MEDICINE

## 2024-05-12 RX ORDER — INSULIN ASPART 100 [IU]/ML
INJECTION, SOLUTION INTRAVENOUS; SUBCUTANEOUS
COMMUNITY

## 2024-05-12 RX ORDER — AMLODIPINE BESYLATE 10 MG/1
10 TABLET ORAL DAILY
COMMUNITY

## 2024-05-12 RX ORDER — TIRZEPATIDE 5 MG/.5ML
5 INJECTION, SOLUTION SUBCUTANEOUS
COMMUNITY

## 2024-05-12 RX ORDER — METOPROLOL SUCCINATE 50 MG/1
50 TABLET, EXTENDED RELEASE ORAL DAILY
Status: DISCONTINUED | OUTPATIENT
Start: 2024-05-12 | End: 2024-05-12 | Stop reason: HOSPADM

## 2024-05-12 RX ORDER — TRAZODONE HYDROCHLORIDE 50 MG/1
50 TABLET ORAL NIGHTLY
COMMUNITY

## 2024-05-12 RX ORDER — METOPROLOL SUCCINATE 50 MG/1
50 TABLET, EXTENDED RELEASE ORAL DAILY
Status: DISCONTINUED | OUTPATIENT
Start: 2024-05-12 | End: 2024-05-12

## 2024-05-12 RX ORDER — AMLODIPINE BESYLATE 5 MG/1
10 TABLET ORAL
Status: COMPLETED | OUTPATIENT
Start: 2024-05-12 | End: 2024-05-12

## 2024-05-12 RX ORDER — PANTOPRAZOLE SODIUM 40 MG/1
40 TABLET, DELAYED RELEASE ORAL DAILY
Qty: 30 TABLET | Refills: 0 | Status: SHIPPED | OUTPATIENT
Start: 2024-05-12 | End: 2024-06-11

## 2024-05-12 RX ORDER — METOPROLOL SUCCINATE 50 MG/1
50 TABLET, EXTENDED RELEASE ORAL DAILY
Status: DISCONTINUED | OUTPATIENT
Start: 2024-05-13 | End: 2024-05-12

## 2024-05-12 RX ORDER — PANTOPRAZOLE SODIUM 40 MG/1
40 TABLET, DELAYED RELEASE ORAL
Status: COMPLETED | OUTPATIENT
Start: 2024-05-12 | End: 2024-05-12

## 2024-05-12 RX ADMIN — AMLODIPINE BESYLATE 10 MG: 5 TABLET ORAL at 03:05

## 2024-05-12 RX ADMIN — PANTOPRAZOLE SODIUM 40 MG: 40 TABLET, DELAYED RELEASE ORAL at 12:05

## 2024-05-12 RX ADMIN — METOPROLOL SUCCINATE 50 MG: 50 TABLET, EXTENDED RELEASE ORAL at 03:05

## 2024-05-12 NOTE — ED PROVIDER NOTES
SCRIBE #1 NOTE: ITrung, am scribing for, and in the presence of, Ronald Hsieh MD. I have scribed the entire note.       History     Chief Complaint   Patient presents with    Chest Pain     Non radiating constant Central chest x4 days. +sob. +gas. LBM yesterday. Triple bipass oct 1, 2021      Review of patient's allergies indicates:   Allergen Reactions    Epinephrine Anaphylaxis    Lidocaine Anaphylaxis     Dental visit pt stopped breathing after given lidocaine    Ace inhibitors     Sulfadiazine Other (See Comments)         History of Present Illness     HPI    5/12/2024, 12:13 PM  History obtained from the patient      History of Present Illness: Ca Diaz is a 63 y.o. female patient with a PMHx of essential HTN, HLD, DM type II, anxiety, depression, fibromyalgia, CAD, and CVA and s/p hx of a triple bypass who presents to the Emergency Department for evaluation of centered CP which onset gradually since Wednesday. Pain radiates into upper abd. Pt has had an increase in belching as well. Pt has only drank water this morning. Symptoms are constant and moderate in severity. No mitigating or exacerbating factors reported. Associated sxs include elevated BP. Patient denies any dizziness, dysuria, fever, SOB, and all other sxs at this time. No prior Tx. No further complaints or concerns at this time.       Arrival mode: Personal vehicle     PCP: Desiree Frye MD        Past Medical History:  Past Medical History:   Diagnosis Date    Anxiety and depression 03/13/2014    CVA (cerebral vascular accident) 2017    Essential (primary) hypertension 01/22/2013    Fibromyalgia 09/17/2012    Hyperlipidemia     Obstructive sleep apnea syndrome 08/20/2014    RLS (restless legs syndrome) 10/18/2018    Overview:  Rheum Dr Tam    Seizures     Type 2 diabetes mellitus 09/17/2012    ICD-10 Transition Last Assessment & Plan:  Continue with sliding scale insulin.  Control improved on low-dose Lantus  presently       Past Surgical History:  Past Surgical History:   Procedure Laterality Date    ABDOMINAL SURGERY      ARTERIOGRAPHY OF SUBCLAVIAN ARTERY  2022    Procedure: ARTERIOGRAM, SUBCLAVIAN;  Surgeon: Vale Pa MD;  Location: [facility] CATH LAB;  Service: Cardiology;;     SECTION      CORONARY ARTERY BYPASS GRAFT (CABG) N/A 2022    Procedure: CORONARY ARTERY BYPASS GRAFT (CABG);  Surgeon: Krystal Marin MD;  Location: [facility] OR;  Service: Cardiothoracic;  Laterality: N/A;  3-VESSEL WITH EPI-AORTIC ULTRASOUND    ENDOSCOPIC HARVEST OF VEIN Left 2022    Procedure: SURGICAL PROCUREMENT, VEIN, ENDOSCOPIC;  Surgeon: Krystal Marin MD;  Location: [facility] OR;  Service: Cardiothoracic;  Laterality: Left;    INJECTION OF ANESTHETIC AGENT AROUND MULTIPLE INTERCOSTAL NERVES N/A 2022    Procedure: BLOCK, NERVE, INTERCOSTAL, 2 OR MORE;  Surgeon: Krystal Marin MD;  Location: [facility] OR;  Service: Cardiothoracic;  Laterality: N/A;  PARASTERNAL NERVE BLOCK    LEFT HEART CATHETERIZATION Left 2022    Procedure: CATHETERIZATION, HEART, LEFT;  Surgeon: Vale Pa MD;  Location: [facility] CATH LAB;  Service: Cardiology;  Laterality: Left;         Family History:  No family history on file.    Social History:  Social History     Tobacco Use    Smoking status: Former     Current packs/day: 0.00     Types: Cigarettes     Quit date:      Years since quittin.3    Smokeless tobacco: Never   Substance and Sexual Activity    Alcohol use: Not Currently    Drug use: Never    Sexual activity: Not on file        Review of Systems     Review of Systems   Constitutional:  Negative for chills and fever.   HENT:  Negative for congestion and sore throat.    Respiratory:  Negative for cough and shortness of breath.    Cardiovascular:  Positive for chest pain (centered).        (+) elevated BP   Gastrointestinal:  Positive for abdominal pain (upper). Negative for nausea and vomiting.        (+) increased  "belching   Genitourinary:  Negative for dysuria.   Musculoskeletal:  Negative for back pain.   Skin:  Negative for rash.   Neurological:  Negative for dizziness, weakness, light-headedness, numbness and headaches.   Hematological:  Does not bruise/bleed easily.   All other systems reviewed and are negative.       Physical Exam     Initial Vitals [05/12/24 1204]   BP Pulse Resp Temp SpO2   135/86 84 20 98.8 °F (37.1 °C) 99 %      MAP       --          Physical Exam  Nursing Notes and Vital Signs Reviewed.  Constitutional: Patient is in no acute distress. Well-developed and well-nourished.  Head: Atraumatic. Normocephalic.  Eyes: PERRL. EOM intact. Conjunctivae are not pale. No scleral icterus.  ENT: Mucous membranes are moist. Oropharynx is clear and symmetric.    Neck: Supple. Full ROM. No lymphadenopathy.  Cardiovascular: Regular rate. Regular rhythm. No murmurs, rubs, or gallops. Distal pulses are 2+ and symmetric.  Pulmonary/Chest: No respiratory distress. Clear to auscultation bilaterally. No wheezing or rales.  Abdominal: Soft and non-distended.  There is epigastric tenderness.  No rebound, guarding, or rigidity. Good bowel sounds.  Genitourinary: No CVA tenderness  Musculoskeletal: Moves all extremities. No obvious deformities. No edema. No calf tenderness.  Skin: Warm and dry.  Neurological:  Alert, awake, and appropriate.  Normal speech.  No acute focal neurological deficits are appreciated.  Psychiatric: Normal affect. Good eye contact. Appropriate in content.     ED Course   Procedures  ED Vital Signs:  Vitals:    05/12/24 1201 05/12/24 1204 05/12/24 1217 05/12/24 1233   BP:  135/86  (!) 155/66   Pulse:  84 81 81   Resp:  20  18   Temp:  98.8 °F (37.1 °C)     TempSrc:  Oral     SpO2:  99%  99%   Weight: 89.1 kg (196 lb 6.4 oz)      Height: 5' 2" (1.575 m)       05/12/24 1303 05/12/24 1331 05/12/24 1401 05/12/24 1532   BP: (!) 143/65 (!) 146/72 (!) 168/74 (!) 155/72   Pulse: 79 77 77 76   Resp: 15 18 16 16 "   Temp:       TempSrc:       SpO2: 99% 100% 99% 99%   Weight:       Height:           Abnormal Lab Results:  Labs Reviewed   CBC W/ AUTO DIFFERENTIAL - Abnormal; Notable for the following components:       Result Value    RDW 15.8 (*)     All other components within normal limits   COMPREHENSIVE METABOLIC PANEL - Abnormal; Notable for the following components:    Glucose 251 (*)     Albumin 3.3 (*)     All other components within normal limits   TROPONIN I   TROPONIN I   B-TYPE NATRIURETIC PEPTIDE   LIPASE        All Lab Results:  Results for orders placed or performed during the hospital encounter of 05/12/24   CBC auto differential   Result Value Ref Range    WBC 7.87 3.90 - 12.70 K/uL    RBC 4.93 4.00 - 5.40 M/uL    Hemoglobin 13.5 12.0 - 16.0 g/dL    Hematocrit 41.7 37.0 - 48.5 %    MCV 85 82 - 98 fL    MCH 27.4 27.0 - 31.0 pg    MCHC 32.4 32.0 - 36.0 g/dL    RDW 15.8 (H) 11.5 - 14.5 %    Platelets 229 150 - 450 K/uL    MPV 11.5 9.2 - 12.9 fL    Immature Granulocytes 0.3 0.0 - 0.5 %    Gran # (ANC) 5.1 1.8 - 7.7 K/uL    Immature Grans (Abs) 0.02 0.00 - 0.04 K/uL    Lymph # 2.1 1.0 - 4.8 K/uL    Mono # 0.6 0.3 - 1.0 K/uL    Eos # 0.1 0.0 - 0.5 K/uL    Baso # 0.02 0.00 - 0.20 K/uL    nRBC 0 0 /100 WBC    Gran % 64.2 38.0 - 73.0 %    Lymph % 27.1 18.0 - 48.0 %    Mono % 7.1 4.0 - 15.0 %    Eosinophil % 1.0 0.0 - 8.0 %    Basophil % 0.3 0.0 - 1.9 %    Differential Method Automated    Comprehensive metabolic panel   Result Value Ref Range    Sodium 139 136 - 145 mmol/L    Potassium 4.0 3.5 - 5.1 mmol/L    Chloride 103 95 - 110 mmol/L    CO2 23 23 - 29 mmol/L    Glucose 251 (H) 70 - 110 mg/dL    BUN 14 8 - 23 mg/dL    Creatinine 0.8 0.5 - 1.4 mg/dL    Calcium 9.7 8.7 - 10.5 mg/dL    Total Protein 7.2 6.0 - 8.4 g/dL    Albumin 3.3 (L) 3.5 - 5.2 g/dL    Total Bilirubin 0.5 0.1 - 1.0 mg/dL    Alkaline Phosphatase 77 55 - 135 U/L    AST 24 10 - 40 U/L    ALT 22 10 - 44 U/L    eGFR >60 >60 mL/min/1.73 m^2    Anion Gap 13 8  - 16 mmol/L   Troponin I #1   Result Value Ref Range    Troponin I <0.006 0.000 - 0.026 ng/mL   Troponin I #2   Result Value Ref Range    Troponin I <0.006 0.000 - 0.026 ng/mL   BNP   Result Value Ref Range    BNP 16 0 - 99 pg/mL   Lipase   Result Value Ref Range    Lipase 15 4 - 60 U/L         Imaging Results:  Imaging Results              X-Ray Chest AP Portable (Final result)  Result time 05/12/24 12:41:32      Final result by Bobby Rodriguez MD (05/12/24 12:41:32)                   Impression:      No acute cardiopulmonary abnormality.      Electronically signed by: Bobby Rodriguez  Date:    05/12/2024  Time:    12:41               Narrative:    EXAMINATION:  XR CHEST AP PORTABLE    CLINICAL HISTORY:  Chest Pain;    TECHNIQUE:  Single frontal portable view of the chest was performed.    COMPARISON:  X-ray dated 01/19/2024    FINDINGS:  Median sternotomy wires and additional mediastinal surgical findings are unchanged.  Cardiomediastinal silhouette is unchanged in size and contour.  Pulmonary vasculature is unremarkable.  Lungs are clear.  No significant pleural effusion or pneumothorax.  No acute bony abnormality.                                       The EKG was ordered, reviewed, and independently interpreted by the ED provider.  Interpretation time: 12:23  Rate: 82 BPM  Rhythm: normal sinus rhythm  Interpretation: Anterolateral infarct (cited on or before 16-SEP-2022). No STEMI.             The Emergency Provider reviewed the vital signs and test results, which are outlined above.     ED Discussion       3:50 PM: Reassessed pt at this time.  Discussed with pt all pertinent ED information and results. Discussed pt dx and plan of tx. Gave pt all f/u and return to the ED instructions. All questions and concerns were addressed at this time. Pt expresses understanding of information and instructions, and is comfortable with plan to discharge. Pt is stable for discharge.    I discussed with patient and/or  family/caretaker that evaluation in the ED does not suggest any emergent or life threatening medical conditions requiring immediate intervention beyond what was provided in the ED, and I believe patient is safe for discharge.  Regardless, an unremarkable evaluation in the ED does not preclude the development or presence of a serious of life threatening condition. As such, patient was instructed to return immediately for any worsening or change in current symptoms.        Medical Decision Making  DDx includes ACS, pancreatitis, GERD, Gastritis, dyspepsia, PUD    Amount and/or Complexity of Data Reviewed  Labs: ordered. Decision-making details documented in ED Course.  Radiology: ordered. Decision-making details documented in ED Course.  ECG/medicine tests: ordered and independent interpretation performed. Decision-making details documented in ED Course.    Risk  Prescription drug management.                ED Medication(s):  Medications   pantoprazole EC tablet 40 mg (40 mg Oral Given 5/12/24 1229)   amLODIPine tablet 10 mg (10 mg Oral Given 5/12/24 1518)       Discharge Medication List as of 5/12/2024  3:54 PM        START taking these medications    Details   pantoprazole (PROTONIX) 40 MG tablet Take 1 tablet (40 mg total) by mouth once daily., Starting Sun 5/12/2024, Until Tue 6/11/2024, Normal              Follow-up Information       Desiree Frye MD. Schedule an appointment as soon as possible for a visit in 2 days.    Specialty: Family Medicine  Why: For re-evaluation and further treatment  Contact information:  [address]  [address]  [facility] [address]  [phone]               [facility] - Emergency Dept.. Go today.    Specialty: Emergency Medicine  Why: If symptoms worsen, For re-evaluation and further treatment, As needed  Contact information:  [address] [facility] [address] [phone]                               Scribe Attestation:   Scribe #1: I performed the  above scribed service and the documentation accurately describes the services I performed. I attest to the accuracy of the note.     Attending:   Physician Attestation Statement for Scribe #1: I, Ronald Hsieh MD, personally performed the services described in this documentation, as scribed by Trung Bagley, in my presence, and it is both accurate and complete.           Clinical Impression       ICD-10-CM ICD-9-CM   1. Chest pain  R07.9 786.50       Disposition:   Disposition: Discharged  Condition: Stable        Ronald Hsieh MD  05/12/24 2121

## 2024-05-13 LAB
OHS QRS DURATION: 70 MS
OHS QTC CALCULATION: 488 MS

## (undated) DEVICE — SET PERFUSION

## (undated) DEVICE — Device

## (undated) DEVICE — BOWL CELL SAVER 5 225ML

## (undated) DEVICE — DRAIN CHEST DRY SUCTION

## (undated) DEVICE — TAPE MEDIPORE 4IN X 2YDS

## (undated) DEVICE — SOL NS 1000CC

## (undated) DEVICE — ELECTRODE REM PLYHSV RETURN 9

## (undated) DEVICE — SOL WATER STRL IRR 1000ML

## (undated) DEVICE — ORGNZR TUBING CLR W/CLIPS

## (undated) DEVICE — CANNULA IMA 1MM

## (undated) DEVICE — SYR 30CC LUER LOCK

## (undated) DEVICE — CATH PIG145 INFINITI 5X110CM

## (undated) DEVICE — COVER OVERHEAD SURG LT BLUE

## (undated) DEVICE — CANNULA AG CARDPLG 14G FLANGE

## (undated) DEVICE — CONTAINER SPECIMEN OR STER 4OZ

## (undated) DEVICE — HEMOSTAT SURGICEL 4X8IN

## (undated) DEVICE — SOL 9P NACL IRR PIC IL

## (undated) DEVICE — FILTER BACTERIA 001851

## (undated) DEVICE — BLANKET HYPOTHERMIA 25X64IN

## (undated) DEVICE — TAPE SILK 3IN

## (undated) DEVICE — GAUZE SPONGE 4X4 12PLY

## (undated) DEVICE — TUBING MEDI-VAC 20FT .25IN

## (undated) DEVICE — GLOVE BIOGEL PI MICRO SZ 6

## (undated) DEVICE — SET DECANTER MEDICHOICE

## (undated) DEVICE — DRESSING MEPORE ISLAND 31/2X4

## (undated) DEVICE — BLADE SCALP OPHTL BEVEL STR

## (undated) DEVICE — SUT PLEDGET LG SOFT

## (undated) DEVICE — SUT ETHBND EXCEL 2-0 RB-1 30IN

## (undated) DEVICE — KIT INTRODUCER STIFFEN MICRO

## (undated) DEVICE — CATH JL4 5FR

## (undated) DEVICE — SUT PROLENE 4-0 SH BLU 36IN

## (undated) DEVICE — CONTRAST VISIPAQUE 150ML

## (undated) DEVICE — EVACUATOR WOUND BULB 100CC

## (undated) DEVICE — KIT GLIDESHEATH SLEND 6FR 10CM

## (undated) DEVICE — GOWN POLY REINF X-LONG 2XL

## (undated) DEVICE — CONNECTOR 3/8X3/8 STERILE

## (undated) DEVICE — SET SUCTION ANTICOAGULANT

## (undated) DEVICE — GOWN POLY REINF X-LONG XL

## (undated) DEVICE — PERFUSION FX X-COATED

## (undated) DEVICE — SOL ISOLYTE S PH 7.4 1000ML

## (undated) DEVICE — KIT PREVENA PLUS

## (undated) DEVICE — CLIP STEALTH LATIS 1/4 FORCE 6

## (undated) DEVICE — GOWN POLY REINF BRTH SLV XL

## (undated) DEVICE — COVER MAYO STND XL 30X57IN

## (undated) DEVICE — SUT VICRYL 2-0 36 CT-1

## (undated) DEVICE — RETRACTOR OCTOBASE INSERT HOLD

## (undated) DEVICE — PACK CATH LAB CUSTOM BR

## (undated) DEVICE — KIT MANIFOLD LOW PRESS TUBING

## (undated) DEVICE — SUT VICRYL 1 OB 36 CTX

## (undated) DEVICE — ANGIOTOUCH KIT

## (undated) DEVICE — DRESSING MEPORE ADH 3.5X12

## (undated) DEVICE — SUT PROLENE 6-0 C-1 30IN BL

## (undated) DEVICE — TRAY CATH FOL SIL TEMP 10 16FR

## (undated) DEVICE — NDL SAFETY 22G X 1.5 ECLIPSE

## (undated) DEVICE — DRAIN CHANNEL ROUND 19FR

## (undated) DEVICE — SUT STEEL 7 MONO B&S18 CCS

## (undated) DEVICE — DRAIN CHAN RND HUBLS 8MM 24FR

## (undated) DEVICE — TOWEL OR DISP STRL BLUE 4/PK

## (undated) DEVICE — SUT SILK 2-0 SH 18IN BLACK

## (undated) DEVICE — BLADE SURG #15 CARBON STEEL

## (undated) DEVICE — CANNULA VENOUS MC2X 29FR

## (undated) DEVICE — CONNECTOR STRAIGHT 1/4X1/4IN

## (undated) DEVICE — CATH URETHRAL RED RUBBER 18FR

## (undated) DEVICE — BANDAGE ACE DOUBLE STER 6IN

## (undated) DEVICE — SET CARDIOPLEGIA DEL

## (undated) DEVICE — KIT SYR REUSABLE

## (undated) DEVICE — SUT SILK 1 BLK BRAID SA87G

## (undated) DEVICE — PACK OPEN HEART BR

## (undated) DEVICE — CELL SAVER 4/CASE

## (undated) DEVICE — SPONGE DERMACEA GAUZE 4X4

## (undated) DEVICE — DRAPE SLUSH WARMER WITH DISC

## (undated) DEVICE — INSERT STEALTH SURGICAL CLAMP

## (undated) DEVICE — GOWN NONREINF SET-IN SLV 2XL

## (undated) DEVICE — BLADE KNIFE UNITOME 4.0MM

## (undated) DEVICE — BAND TR COMP DEVICE REG 24CM

## (undated) DEVICE — SUT PROLENE 4-0 RB-1 BL MO

## (undated) DEVICE — BLADE SURG CARBON STEEL SZ11

## (undated) DEVICE — SENSORS CDI

## (undated) DEVICE — COUNTER BDL BLADEGUARD LI DBL

## (undated) DEVICE — APPLICATOR NS COTTON-TIP 6IN

## (undated) DEVICE — CANNULA VSL W/DUCKBILL

## (undated) DEVICE — SPONGE LAP 18X18 PREWASHED

## (undated) DEVICE — APPLIER LIGACLIP SM 9.38IN

## (undated) DEVICE — POWDER ARISTA AH 3G

## (undated) DEVICE — SUT MCRYL PLUS 4-0 PS2 27IN

## (undated) DEVICE — SUT 7/0 24IN PROLENE BL MO

## (undated) DEVICE — CATH INFINITI MULTIPAK JR4 5FR

## (undated) DEVICE — COVER TABLE 77 X 96

## (undated) DEVICE — CATH JR4 5FR

## (undated) DEVICE — PUNCH AORTIC SHORT 4.4MM

## (undated) DEVICE — SEE MEDLINE ITEM 157187

## (undated) DEVICE — APPLIER CLIP LIAGCLIP 9.375IN

## (undated) DEVICE — SYS VEIN HARVESTING

## (undated) DEVICE — SUT PERMA HAND SILK BLK 0-0

## (undated) DEVICE — ELECTRODE BLADE E-Z CLEAN 4IN